# Patient Record
Sex: FEMALE | Race: BLACK OR AFRICAN AMERICAN | Employment: FULL TIME | URBAN - METROPOLITAN AREA
[De-identification: names, ages, dates, MRNs, and addresses within clinical notes are randomized per-mention and may not be internally consistent; named-entity substitution may affect disease eponyms.]

---

## 2017-01-06 ENCOUNTER — HOSPITAL ENCOUNTER (OUTPATIENT)
Dept: GENERAL RADIOLOGY | Age: 67
Discharge: HOME | End: 2017-01-06
Payer: COMMERCIAL

## 2017-01-06 DIAGNOSIS — M79.0 CHRONIC ARTICULAR RHEUMATISM: ICD-10-CM

## 2017-01-06 PROCEDURE — 73552 X-RAY EXAM OF FEMUR 2/>: CPT

## 2017-01-06 PROCEDURE — 73502 X-RAY EXAM HIP UNI 2-3 VIEWS: CPT

## 2017-01-06 PROCEDURE — 72110 X-RAY EXAM L-2 SPINE 4/>VWS: CPT

## 2017-08-25 ENCOUNTER — HOSPITAL ENCOUNTER (OUTPATIENT)
Dept: MAMMOGRAPHY | Age: 67
Discharge: HOME | End: 2017-08-25
Attending: INTERNAL MEDICINE
Payer: COMMERCIAL

## 2017-08-25 DIAGNOSIS — Z12.31 VISIT FOR SCREENING MAMMOGRAM: ICD-10-CM

## 2017-08-25 PROCEDURE — 77063 BREAST TOMOSYNTHESIS BI: CPT

## 2019-02-08 ENCOUNTER — HOSPITAL ENCOUNTER (OUTPATIENT)
Dept: MAMMOGRAPHY | Age: 69
Discharge: HOME | End: 2019-02-08
Attending: INTERNAL MEDICINE
Payer: COMMERCIAL

## 2019-02-08 DIAGNOSIS — Z12.31 VISIT FOR SCREENING MAMMOGRAM: ICD-10-CM

## 2019-02-08 PROCEDURE — 77063 BREAST TOMOSYNTHESIS BI: CPT

## 2020-02-28 ENCOUNTER — HOSPITAL ENCOUNTER (OUTPATIENT)
Dept: MAMMOGRAPHY | Age: 70
Discharge: HOME | End: 2020-02-28
Attending: INTERNAL MEDICINE
Payer: MEDICARE

## 2020-02-28 DIAGNOSIS — Z12.31 VISIT FOR SCREENING MAMMOGRAM: ICD-10-CM

## 2020-02-28 PROCEDURE — 77063 BREAST TOMOSYNTHESIS BI: CPT

## 2021-02-23 ENCOUNTER — TRANSCRIBE ORDER (OUTPATIENT)
Dept: SCHEDULING | Age: 71
End: 2021-02-23

## 2021-02-23 DIAGNOSIS — Z12.31 SCREENING MAMMOGRAM, ENCOUNTER FOR: Primary | ICD-10-CM

## 2021-03-17 ENCOUNTER — HOSPITAL ENCOUNTER (OUTPATIENT)
Dept: MAMMOGRAPHY | Age: 71
Discharge: HOME | End: 2021-03-17
Attending: INTERNAL MEDICINE
Payer: COMMERCIAL

## 2021-03-17 DIAGNOSIS — Z12.31 SCREENING MAMMOGRAM, ENCOUNTER FOR: ICD-10-CM

## 2021-03-17 PROCEDURE — 77063 BREAST TOMOSYNTHESIS BI: CPT

## 2022-01-01 ENCOUNTER — HOSPITAL ENCOUNTER (OUTPATIENT)
Dept: RADIATION THERAPY | Age: 72
Discharge: HOME | DRG: 683 | End: 2022-09-21
Payer: MEDICARE

## 2022-01-01 ENCOUNTER — HOSPITAL ENCOUNTER (INPATIENT)
Age: 72
LOS: 10 days | Discharge: HOSPICE | DRG: 754 | End: 2022-10-14
Attending: EMERGENCY MEDICINE | Admitting: HOSPITALIST
Payer: MEDICARE

## 2022-01-01 ENCOUNTER — HOME CARE VISIT (OUTPATIENT)
Dept: HOSPICE | Facility: HOSPICE | Age: 72
End: 2022-01-01
Payer: MEDICARE

## 2022-01-01 ENCOUNTER — APPOINTMENT (OUTPATIENT)
Dept: GENERAL RADIOLOGY | Age: 72
DRG: 754 | End: 2022-01-01
Attending: EMERGENCY MEDICINE
Payer: MEDICARE

## 2022-01-01 ENCOUNTER — APPOINTMENT (OUTPATIENT)
Dept: CT IMAGING | Age: 72
DRG: 754 | End: 2022-01-01
Attending: STUDENT IN AN ORGANIZED HEALTH CARE EDUCATION/TRAINING PROGRAM
Payer: MEDICARE

## 2022-01-01 ENCOUNTER — HOSPITAL ENCOUNTER (OUTPATIENT)
Dept: RADIATION THERAPY | Age: 72
Discharge: HOME | DRG: 683 | End: 2022-09-13
Payer: MEDICARE

## 2022-01-01 ENCOUNTER — APPOINTMENT (OUTPATIENT)
Dept: GENERAL RADIOLOGY | Age: 72
DRG: 683 | End: 2022-01-01
Attending: PHYSICIAN ASSISTANT
Payer: MEDICARE

## 2022-01-01 ENCOUNTER — HOSPITAL ENCOUNTER (OUTPATIENT)
Dept: ULTRASOUND IMAGING | Age: 72
Discharge: HOME | End: 2022-06-21
Attending: INTERNAL MEDICINE
Payer: COMMERCIAL

## 2022-01-01 ENCOUNTER — HOSPITAL ENCOUNTER (OUTPATIENT)
Dept: MAMMOGRAPHY | Age: 72
Discharge: HOME | End: 2022-06-21
Attending: INTERNAL MEDICINE
Payer: COMMERCIAL

## 2022-01-01 ENCOUNTER — APPOINTMENT (OUTPATIENT)
Dept: VASCULAR SURGERY | Age: 72
DRG: 683 | End: 2022-01-01
Attending: HOSPITALIST
Payer: MEDICARE

## 2022-01-01 ENCOUNTER — APPOINTMENT (OUTPATIENT)
Dept: NON INVASIVE DIAGNOSTICS | Age: 72
DRG: 683 | End: 2022-01-01
Attending: HOSPITALIST
Payer: MEDICARE

## 2022-01-01 ENCOUNTER — HOSPITAL ENCOUNTER (INPATIENT)
Dept: RADIATION THERAPY | Age: 72
Discharge: HOME | DRG: 683 | End: 2022-09-08
Payer: MEDICARE

## 2022-01-01 ENCOUNTER — APPOINTMENT (OUTPATIENT)
Dept: CT IMAGING | Age: 72
DRG: 754 | End: 2022-01-01
Attending: EMERGENCY MEDICINE
Payer: MEDICARE

## 2022-01-01 ENCOUNTER — APPOINTMENT (OUTPATIENT)
Dept: RADIATION THERAPY | Age: 72
End: 2022-01-01

## 2022-01-01 ENCOUNTER — HOSPITAL ENCOUNTER (OUTPATIENT)
Dept: RADIATION THERAPY | Age: 72
Discharge: HOME | DRG: 683 | End: 2022-09-20
Payer: MEDICARE

## 2022-01-01 ENCOUNTER — APPOINTMENT (OUTPATIENT)
Dept: GENERAL RADIOLOGY | Age: 72
DRG: 754 | End: 2022-01-01
Attending: STUDENT IN AN ORGANIZED HEALTH CARE EDUCATION/TRAINING PROGRAM
Payer: MEDICARE

## 2022-01-01 ENCOUNTER — HOSPITAL ENCOUNTER (INPATIENT)
Dept: INTERVENTIONAL RADIOLOGY/VASCULAR | Age: 72
Discharge: HOME | DRG: 683 | End: 2022-09-22
Attending: PHYSICIAN ASSISTANT
Payer: MEDICARE

## 2022-01-01 ENCOUNTER — ANESTHESIA EVENT (OUTPATIENT)
Dept: SURGERY | Age: 72
DRG: 683 | End: 2022-01-01
Payer: MEDICARE

## 2022-01-01 ENCOUNTER — ANESTHESIA (OUTPATIENT)
Dept: SURGERY | Age: 72
DRG: 683 | End: 2022-01-01
Payer: MEDICARE

## 2022-01-01 ENCOUNTER — APPOINTMENT (OUTPATIENT)
Dept: CT IMAGING | Age: 72
DRG: 683 | End: 2022-01-01
Attending: PHYSICIAN ASSISTANT
Payer: MEDICARE

## 2022-01-01 ENCOUNTER — HOSPITAL ENCOUNTER (INPATIENT)
Dept: INTERVENTIONAL RADIOLOGY/VASCULAR | Age: 72
Discharge: HOME | DRG: 683 | End: 2022-09-04
Attending: UROLOGY
Payer: MEDICARE

## 2022-01-01 ENCOUNTER — APPOINTMENT (OUTPATIENT)
Dept: ULTRASOUND IMAGING | Age: 72
DRG: 683 | End: 2022-01-01
Attending: INTERNAL MEDICINE
Payer: MEDICARE

## 2022-01-01 ENCOUNTER — HOSPITAL ENCOUNTER (OUTPATIENT)
Dept: RADIATION THERAPY | Age: 72
Discharge: HOME | DRG: 683 | End: 2022-09-12
Payer: MEDICARE

## 2022-01-01 ENCOUNTER — HOSPITAL ENCOUNTER (EMERGENCY)
Age: 72
Discharge: HOME | End: 2022-07-05
Attending: STUDENT IN AN ORGANIZED HEALTH CARE EDUCATION/TRAINING PROGRAM
Payer: COMMERCIAL

## 2022-01-01 ENCOUNTER — HOSPITAL ENCOUNTER (INPATIENT)
Dept: RADIATION THERAPY | Age: 72
Discharge: HOME | DRG: 683 | End: 2022-09-09
Payer: MEDICARE

## 2022-01-01 ENCOUNTER — TRANSCRIBE ORDER (OUTPATIENT)
Dept: SCHEDULING | Age: 72
End: 2022-01-01

## 2022-01-01 ENCOUNTER — HOSPITAL ENCOUNTER (OUTPATIENT)
Dept: RADIATION THERAPY | Age: 72
Discharge: HOME | DRG: 683 | End: 2022-09-14
Payer: MEDICARE

## 2022-01-01 ENCOUNTER — HOSPITAL ENCOUNTER (OUTPATIENT)
Dept: RADIATION THERAPY | Age: 72
Discharge: HOME | DRG: 683 | End: 2022-09-19
Payer: MEDICARE

## 2022-01-01 ENCOUNTER — APPOINTMENT (OUTPATIENT)
Dept: GENERAL RADIOLOGY | Age: 72
DRG: 683 | End: 2022-01-01
Attending: UROLOGY
Payer: MEDICARE

## 2022-01-01 ENCOUNTER — APPOINTMENT (OUTPATIENT)
Dept: ULTRASOUND IMAGING | Age: 72
End: 2022-01-01
Attending: PHYSICIAN ASSISTANT
Payer: COMMERCIAL

## 2022-01-01 ENCOUNTER — HOME CARE VISIT (OUTPATIENT)
Dept: SCHEDULING | Facility: HOME HEALTH | Age: 72
End: 2022-01-01
Payer: MEDICARE

## 2022-01-01 ENCOUNTER — HOSPITAL ENCOUNTER (OUTPATIENT)
Dept: RADIATION THERAPY | Age: 72
Discharge: HOME | DRG: 683 | End: 2022-09-16
Payer: MEDICARE

## 2022-01-01 ENCOUNTER — HOSPITAL ENCOUNTER (OUTPATIENT)
Dept: RADIATION THERAPY | Age: 72
Discharge: HOME | DRG: 683 | End: 2022-09-15
Payer: MEDICARE

## 2022-01-01 ENCOUNTER — HOSPICE ADMISSION (OUTPATIENT)
Dept: HOSPICE | Facility: HOSPICE | Age: 72
End: 2022-01-01
Payer: MEDICARE

## 2022-01-01 ENCOUNTER — HOSPITAL ENCOUNTER (OUTPATIENT)
Dept: RADIATION THERAPY | Age: 72
Discharge: HOME | End: 2022-10-03

## 2022-01-01 ENCOUNTER — HOSPITAL ENCOUNTER (INPATIENT)
Dept: INTERVENTIONAL RADIOLOGY/VASCULAR | Age: 72
Discharge: HOME | DRG: 683 | End: 2022-09-15
Attending: PHYSICIAN ASSISTANT
Payer: MEDICARE

## 2022-01-01 ENCOUNTER — HOSPITAL ENCOUNTER (INPATIENT)
Age: 72
LOS: 22 days | Discharge: NURSING, REHAB OR LONG-TERM CARE | DRG: 683 | End: 2022-09-23
Attending: EMERGENCY MEDICINE | Admitting: INTERNAL MEDICINE
Payer: MEDICARE

## 2022-01-01 ENCOUNTER — HOSPITAL ENCOUNTER (OUTPATIENT)
Dept: RADIATION THERAPY | Age: 72
Discharge: HOME | DRG: 683 | End: 2022-09-23
Payer: MEDICARE

## 2022-01-01 ENCOUNTER — HOSPITAL ENCOUNTER (OUTPATIENT)
Dept: MAMMOGRAPHY | Age: 72
Discharge: HOME | End: 2022-05-07
Attending: INTERNAL MEDICINE
Payer: COMMERCIAL

## 2022-01-01 ENCOUNTER — APPOINTMENT (OUTPATIENT)
Dept: ULTRASOUND IMAGING | Age: 72
DRG: 683 | End: 2022-01-01
Attending: PHYSICIAN ASSISTANT
Payer: MEDICARE

## 2022-01-01 VITALS
OXYGEN SATURATION: 100 % | DIASTOLIC BLOOD PRESSURE: 81 MMHG | SYSTOLIC BLOOD PRESSURE: 143 MMHG | HEART RATE: 88 BPM | RESPIRATION RATE: 20 BRPM

## 2022-01-01 VITALS
OXYGEN SATURATION: 100 % | DIASTOLIC BLOOD PRESSURE: 78 MMHG | RESPIRATION RATE: 18 BRPM | SYSTOLIC BLOOD PRESSURE: 152 MMHG | HEART RATE: 102 BPM

## 2022-01-01 VITALS
OXYGEN SATURATION: 85 % | HEART RATE: 116 BPM | SYSTOLIC BLOOD PRESSURE: 108 MMHG | TEMPERATURE: 99.2 F | RESPIRATION RATE: 26 BRPM | DIASTOLIC BLOOD PRESSURE: 56 MMHG

## 2022-01-01 VITALS
OXYGEN SATURATION: 92 % | HEIGHT: 63 IN | HEART RATE: 116 BPM | TEMPERATURE: 100.6 F | WEIGHT: 226 LBS | RESPIRATION RATE: 22 BRPM | BODY MASS INDEX: 40.04 KG/M2 | SYSTOLIC BLOOD PRESSURE: 99 MMHG | DIASTOLIC BLOOD PRESSURE: 64 MMHG

## 2022-01-01 VITALS
TEMPERATURE: 98.4 F | HEART RATE: 78 BPM | WEIGHT: 223 LBS | RESPIRATION RATE: 17 BRPM | DIASTOLIC BLOOD PRESSURE: 74 MMHG | OXYGEN SATURATION: 98 % | BODY MASS INDEX: 37.11 KG/M2 | SYSTOLIC BLOOD PRESSURE: 162 MMHG

## 2022-01-01 VITALS
TEMPERATURE: 97.6 F | OXYGEN SATURATION: 95 % | DIASTOLIC BLOOD PRESSURE: 76 MMHG | BODY MASS INDEX: 46.38 KG/M2 | WEIGHT: 252 LBS | HEIGHT: 62 IN | RESPIRATION RATE: 18 BRPM | SYSTOLIC BLOOD PRESSURE: 125 MMHG | HEART RATE: 93 BPM

## 2022-01-01 VITALS
SYSTOLIC BLOOD PRESSURE: 143 MMHG | HEART RATE: 119 BPM | RESPIRATION RATE: 20 BRPM | OXYGEN SATURATION: 100 % | DIASTOLIC BLOOD PRESSURE: 81 MMHG

## 2022-01-01 DIAGNOSIS — R92.8 ABNORMAL MAMMOGRAM: ICD-10-CM

## 2022-01-01 DIAGNOSIS — Z12.31 VISIT FOR SCREENING MAMMOGRAM: ICD-10-CM

## 2022-01-01 DIAGNOSIS — D21.9 FIBROID: ICD-10-CM

## 2022-01-01 DIAGNOSIS — Z12.31 VISIT FOR SCREENING MAMMOGRAM: Primary | ICD-10-CM

## 2022-01-01 DIAGNOSIS — K92.2 GASTROINTESTINAL HEMORRHAGE, UNSPECIFIED GASTROINTESTINAL HEMORRHAGE TYPE: ICD-10-CM

## 2022-01-01 DIAGNOSIS — N17.9 ACUTE RENAL FAILURE, UNSPECIFIED ACUTE RENAL FAILURE TYPE (HCC): Primary | ICD-10-CM

## 2022-01-01 DIAGNOSIS — R10.84 ABDOMINAL PAIN, GENERALIZED: ICD-10-CM

## 2022-01-01 DIAGNOSIS — M54.50 ACUTE LOW BACK PAIN, UNSPECIFIED BACK PAIN LATERALITY, UNSPECIFIED WHETHER SCIATICA PRESENT: ICD-10-CM

## 2022-01-01 DIAGNOSIS — B35.4 TINEA CORPORIS: ICD-10-CM

## 2022-01-01 DIAGNOSIS — Z71.89 GOALS OF CARE, COUNSELING/DISCUSSION: Primary | ICD-10-CM

## 2022-01-01 DIAGNOSIS — D49.9 NEOPLASM CAUSING MASS EFFECT ON ADJACENT STRUCTURES: ICD-10-CM

## 2022-01-01 DIAGNOSIS — N93.9 VAGINAL BLEEDING: Primary | ICD-10-CM

## 2022-01-01 DIAGNOSIS — R92.8 ABNORMAL MAMMOGRAM: Primary | ICD-10-CM

## 2022-01-01 DIAGNOSIS — D64.9 ANEMIA, UNSPECIFIED TYPE: ICD-10-CM

## 2022-01-01 DIAGNOSIS — Z71.89 ADVANCED CARE PLANNING/COUNSELING DISCUSSION: ICD-10-CM

## 2022-01-01 DIAGNOSIS — C55 MALIGNANT NEOPLASM OF UTERUS, UNSPECIFIED SITE (HCC): ICD-10-CM

## 2022-01-01 LAB
ABO + RH BLD: NORMAL
ACC. NO. FROM MICRO ORDER, ACCP: NORMAL
ACINETOBACTER CALCOACETICUS-BAUMANII COMPLEX, ACBCX: NOT DETECTED
ALBUMIN SERPL-MCNC: 1.5 G/DL (ref 3.4–5)
ALBUMIN SERPL-MCNC: 2.2 G/DL (ref 3.4–5)
ALBUMIN SERPL-MCNC: 3 G/DL (ref 3.4–5)
ALBUMIN SERPL-MCNC: 3.4 G/DL (ref 3.4–5)
ALBUMIN/GLOB SERPL: 0.3 {RATIO} (ref 0.8–1.7)
ALBUMIN/GLOB SERPL: 0.6 {RATIO} (ref 0.8–1.7)
ALBUMIN/GLOB SERPL: 0.7 {RATIO} (ref 0.8–1.7)
ALP SERPL-CCNC: 107 U/L (ref 45–117)
ALP SERPL-CCNC: 75 U/L (ref 45–117)
ALP SERPL-CCNC: 99 U/L (ref 45–117)
ALT SERPL-CCNC: 17 U/L (ref 13–56)
ALT SERPL-CCNC: 22 U/L (ref 13–56)
ALT SERPL-CCNC: 28 U/L (ref 13–56)
ANION GAP SERPL CALC-SCNC: 10 MMOL/L (ref 3–18)
ANION GAP SERPL CALC-SCNC: 10 MMOL/L (ref 3–18)
ANION GAP SERPL CALC-SCNC: 11 MMOL/L (ref 3–18)
ANION GAP SERPL CALC-SCNC: 11 MMOL/L (ref 3–18)
ANION GAP SERPL CALC-SCNC: 12 MMOL/L (ref 3–18)
ANION GAP SERPL CALC-SCNC: 16 MMOL/L (ref 3–18)
ANION GAP SERPL CALC-SCNC: 16 MMOL/L (ref 3–18)
ANION GAP SERPL CALC-SCNC: 5 MMOL/L (ref 3–18)
ANION GAP SERPL CALC-SCNC: 6 MMOL/L (ref 3–18)
ANION GAP SERPL CALC-SCNC: 7 MMOL/L (ref 3–18)
ANION GAP SERPL CALC-SCNC: 8 MMOL/L (ref 3–18)
ANION GAP SERPL CALC-SCNC: 9 MMOL/L (ref 3–18)
APPEARANCE UR: ABNORMAL
APPEARANCE UR: ABNORMAL
APPEARANCE UR: CLEAR
APTT PPP: 27.7 SEC (ref 23–36.4)
APTT PPP: 30.5 SEC (ref 23–36.4)
AST SERPL-CCNC: 23 U/L (ref 10–38)
AST SERPL-CCNC: 58 U/L (ref 10–38)
AST SERPL-CCNC: 89 U/L (ref 10–38)
ATRIAL RATE: 102 BPM
ATRIAL RATE: 108 BPM
ATRIAL RATE: 85 BPM
BACTERIA SPEC CULT: ABNORMAL
BACTERIA SPEC CULT: NORMAL
BACTERIA URNS QL MICRO: ABNORMAL /HPF
BACTERIA URNS QL MICRO: ABNORMAL /HPF
BACTEROIDES FRAGILIS, BFRA: NOT DETECTED
BASOPHILS # BLD: 0 K/UL (ref 0–0.1)
BASOPHILS # BLD: 0.1 K/UL (ref 0–0.1)
BASOPHILS NFR BLD: 0 % (ref 0–2)
BASOPHILS NFR BLD: 1 % (ref 0–2)
BILIRUB SERPL-MCNC: 0.5 MG/DL (ref 0.2–1)
BILIRUB SERPL-MCNC: 0.7 MG/DL (ref 0.2–1)
BILIRUB SERPL-MCNC: 0.8 MG/DL (ref 0.2–1)
BILIRUB UR QL: ABNORMAL
BILIRUB UR QL: NEGATIVE
BILIRUB UR QL: NEGATIVE
BIOFIRE COMMENT, BCIDPF: NORMAL
BLD PROD TYP BPU: NORMAL
BLOOD GROUP ANTIBODIES SERPL: NORMAL
BPU ID: NORMAL
BUN SERPL-MCNC: 110 MG/DL (ref 7–18)
BUN SERPL-MCNC: 120 MG/DL (ref 7–18)
BUN SERPL-MCNC: 121 MG/DL (ref 7–18)
BUN SERPL-MCNC: 122 MG/DL (ref 7–18)
BUN SERPL-MCNC: 124 MG/DL (ref 7–18)
BUN SERPL-MCNC: 125 MG/DL (ref 7–18)
BUN SERPL-MCNC: 14 MG/DL (ref 7–18)
BUN SERPL-MCNC: 37 MG/DL (ref 7–18)
BUN SERPL-MCNC: 37 MG/DL (ref 7–18)
BUN SERPL-MCNC: 38 MG/DL (ref 7–18)
BUN SERPL-MCNC: 38 MG/DL (ref 7–18)
BUN SERPL-MCNC: 43 MG/DL (ref 7–18)
BUN SERPL-MCNC: 43 MG/DL (ref 7–18)
BUN SERPL-MCNC: 44 MG/DL (ref 7–18)
BUN SERPL-MCNC: 44 MG/DL (ref 7–18)
BUN SERPL-MCNC: 45 MG/DL (ref 7–18)
BUN SERPL-MCNC: 46 MG/DL (ref 7–18)
BUN SERPL-MCNC: 47 MG/DL (ref 7–18)
BUN SERPL-MCNC: 48 MG/DL (ref 7–18)
BUN SERPL-MCNC: 49 MG/DL (ref 7–18)
BUN SERPL-MCNC: 55 MG/DL (ref 7–18)
BUN SERPL-MCNC: 58 MG/DL (ref 7–18)
BUN SERPL-MCNC: 64 MG/DL (ref 7–18)
BUN SERPL-MCNC: 77 MG/DL (ref 7–18)
BUN SERPL-MCNC: 79 MG/DL (ref 7–18)
BUN SERPL-MCNC: 94 MG/DL (ref 7–18)
BUN/CREAT SERPL: 18 (ref 12–20)
BUN/CREAT SERPL: 19 (ref 12–20)
BUN/CREAT SERPL: 20 (ref 12–20)
BUN/CREAT SERPL: 20 (ref 12–20)
BUN/CREAT SERPL: 22 (ref 12–20)
BUN/CREAT SERPL: 22 (ref 12–20)
BUN/CREAT SERPL: 25 (ref 12–20)
BUN/CREAT SERPL: 25 (ref 12–20)
BUN/CREAT SERPL: 26 (ref 12–20)
BUN/CREAT SERPL: 27 (ref 12–20)
BUN/CREAT SERPL: 28 (ref 12–20)
BUN/CREAT SERPL: 30 (ref 12–20)
BUN/CREAT SERPL: 33 (ref 12–20)
BUN/CREAT SERPL: 33 (ref 12–20)
BUN/CREAT SERPL: 34 (ref 12–20)
BUN/CREAT SERPL: 37 (ref 12–20)
BUN/CREAT SERPL: 37 (ref 12–20)
BUN/CREAT SERPL: 41 (ref 12–20)
BUN/CREAT SERPL: 53 (ref 12–20)
BUN/CREAT SERPL: 53 (ref 12–20)
BUN/CREAT SERPL: 56 (ref 12–20)
BUN/CREAT SERPL: 58 (ref 12–20)
BUN/CREAT SERPL: 63 (ref 12–20)
BUN/CREAT SERPL: 64 (ref 12–20)
C GLABRATA DNA VAG QL NAA+PROBE: NOT DETECTED
CALCIUM SERPL-MCNC: 8.2 MG/DL (ref 8.5–10.1)
CALCIUM SERPL-MCNC: 8.2 MG/DL (ref 8.5–10.1)
CALCIUM SERPL-MCNC: 8.3 MG/DL (ref 8.5–10.1)
CALCIUM SERPL-MCNC: 8.4 MG/DL (ref 8.5–10.1)
CALCIUM SERPL-MCNC: 8.5 MG/DL (ref 8.5–10.1)
CALCIUM SERPL-MCNC: 8.6 MG/DL (ref 8.5–10.1)
CALCIUM SERPL-MCNC: 8.7 MG/DL (ref 8.5–10.1)
CALCIUM SERPL-MCNC: 8.7 MG/DL (ref 8.5–10.1)
CALCIUM SERPL-MCNC: 8.9 MG/DL (ref 8.5–10.1)
CALCIUM SERPL-MCNC: 9 MG/DL (ref 8.5–10.1)
CALCIUM SERPL-MCNC: 9.3 MG/DL (ref 8.5–10.1)
CALCIUM SERPL-MCNC: 9.4 MG/DL (ref 8.5–10.1)
CALCIUM SERPL-MCNC: 9.7 MG/DL (ref 8.5–10.1)
CALCULATED P AXIS, ECG09: -2 DEGREES
CALCULATED P AXIS, ECG09: 32 DEGREES
CALCULATED P AXIS, ECG09: 35 DEGREES
CALCULATED R AXIS, ECG10: -18 DEGREES
CALCULATED R AXIS, ECG10: -19 DEGREES
CALCULATED R AXIS, ECG10: -24 DEGREES
CALCULATED T AXIS, ECG11: 164 DEGREES
CALCULATED T AXIS, ECG11: 74 DEGREES
CALCULATED T AXIS, ECG11: 76 DEGREES
CALLED TO:,BCALL1: NORMAL
CALLED TO:,BCALL2: NORMAL
CANDIDA ALBICANS: NOT DETECTED
CANDIDA AURIS, CAAU: NOT DETECTED
CANDIDA KRUSEI, CKRP: NOT DETECTED
CANDIDA PARAPSILOSIS, CPAUP: NOT DETECTED
CANDIDA TROPICALIS, CTROP: NOT DETECTED
CC UR VC: ABNORMAL
CHLORIDE SERPL-SCNC: 102 MMOL/L (ref 100–111)
CHLORIDE SERPL-SCNC: 104 MMOL/L (ref 100–111)
CHLORIDE SERPL-SCNC: 104 MMOL/L (ref 100–111)
CHLORIDE SERPL-SCNC: 105 MMOL/L (ref 100–111)
CHLORIDE SERPL-SCNC: 107 MMOL/L (ref 100–111)
CHLORIDE SERPL-SCNC: 110 MMOL/L (ref 100–111)
CHLORIDE SERPL-SCNC: 111 MMOL/L (ref 100–111)
CHLORIDE SERPL-SCNC: 112 MMOL/L (ref 100–111)
CHLORIDE SERPL-SCNC: 117 MMOL/L (ref 100–111)
CHLORIDE SERPL-SCNC: 121 MMOL/L (ref 100–111)
CHLORIDE SERPL-SCNC: 123 MMOL/L (ref 100–111)
CHLORIDE SERPL-SCNC: 128 MMOL/L (ref 100–111)
CHLORIDE SERPL-SCNC: 96 MMOL/L (ref 100–111)
CO2 SERPL-SCNC: 16 MMOL/L (ref 21–32)
CO2 SERPL-SCNC: 19 MMOL/L (ref 21–32)
CO2 SERPL-SCNC: 19 MMOL/L (ref 21–32)
CO2 SERPL-SCNC: 20 MMOL/L (ref 21–32)
CO2 SERPL-SCNC: 21 MMOL/L (ref 21–32)
CO2 SERPL-SCNC: 22 MMOL/L (ref 21–32)
CO2 SERPL-SCNC: 23 MMOL/L (ref 21–32)
CO2 SERPL-SCNC: 24 MMOL/L (ref 21–32)
CO2 SERPL-SCNC: 30 MMOL/L (ref 21–32)
COLOR UR: ABNORMAL
COLOR UR: YELLOW
COLOR UR: YELLOW
CREAT SERPL-MCNC: 0.76 MG/DL (ref 0.6–1.3)
CREAT SERPL-MCNC: 0.83 MG/DL (ref 0.6–1.3)
CREAT SERPL-MCNC: 0.93 MG/DL (ref 0.6–1.3)
CREAT SERPL-MCNC: 0.99 MG/DL (ref 0.6–1.3)
CREAT SERPL-MCNC: 1.02 MG/DL (ref 0.6–1.3)
CREAT SERPL-MCNC: 1.16 MG/DL (ref 0.6–1.3)
CREAT SERPL-MCNC: 1.22 MG/DL (ref 0.6–1.3)
CREAT SERPL-MCNC: 1.23 MG/DL (ref 0.6–1.3)
CREAT SERPL-MCNC: 1.35 MG/DL (ref 0.6–1.3)
CREAT SERPL-MCNC: 1.35 MG/DL (ref 0.6–1.3)
CREAT SERPL-MCNC: 1.4 MG/DL (ref 0.6–1.3)
CREAT SERPL-MCNC: 1.44 MG/DL (ref 0.6–1.3)
CREAT SERPL-MCNC: 1.47 MG/DL (ref 0.6–1.3)
CREAT SERPL-MCNC: 1.59 MG/DL (ref 0.6–1.3)
CREAT SERPL-MCNC: 1.61 MG/DL (ref 0.6–1.3)
CREAT SERPL-MCNC: 1.66 MG/DL (ref 0.6–1.3)
CREAT SERPL-MCNC: 1.82 MG/DL (ref 0.6–1.3)
CREAT SERPL-MCNC: 1.9 MG/DL (ref 0.6–1.3)
CREAT SERPL-MCNC: 2 MG/DL (ref 0.6–1.3)
CREAT SERPL-MCNC: 2.61 MG/DL (ref 0.6–1.3)
CREAT SERPL-MCNC: 4.79 MG/DL (ref 0.6–1.3)
CREAT SERPL-MCNC: 4.85 MG/DL (ref 0.6–1.3)
CREAT SERPL-MCNC: 5.02 MG/DL (ref 0.6–1.3)
CREAT SERPL-MCNC: 5.55 MG/DL (ref 0.6–1.3)
CREAT SERPL-MCNC: 6.57 MG/DL (ref 0.6–1.3)
CREAT SERPL-MCNC: 6.96 MG/DL (ref 0.6–1.3)
CROSSMATCH RESULT,%XM: NORMAL
CRYPTO NEOFORMANS/GATTII, CRYNEG: NOT DETECTED
DIAGNOSIS, 93000: NORMAL
DIFFERENTIAL METHOD BLD: ABNORMAL
ECHO AO ROOT DIAM: 3.2 CM
ECHO AO ROOT INDEX: 1.52 CM/M2
ECHO LA VOL 2C: 43 ML (ref 22–52)
ECHO LA VOL 4C: 50 ML (ref 22–52)
ECHO LA VOLUME AREA LENGTH: 52 ML
ECHO LA VOLUME INDEX A2C: 20 ML/M2 (ref 16–34)
ECHO LA VOLUME INDEX A4C: 24 ML/M2 (ref 16–34)
ECHO LA VOLUME INDEX AREA LENGTH: 25 ML/M2 (ref 16–34)
ECHO LV E' LATERAL VELOCITY: 8 CM/S
ECHO LV E' SEPTAL VELOCITY: 7 CM/S
ECHO LV FRACTIONAL SHORTENING: 19 % (ref 28–44)
ECHO LV INTERNAL DIMENSION DIASTOLE INDEX: 1.75 CM/M2
ECHO LV INTERNAL DIMENSION DIASTOLIC: 3.7 CM (ref 3.9–5.3)
ECHO LV INTERNAL DIMENSION SYSTOLIC INDEX: 1.42 CM/M2
ECHO LV INTERNAL DIMENSION SYSTOLIC: 3 CM
ECHO LV IVSD: 0.9 CM (ref 0.6–0.9)
ECHO LV MASS 2D: 120.8 G (ref 67–162)
ECHO LV MASS INDEX 2D: 57.2 G/M2 (ref 43–95)
ECHO LV POSTERIOR WALL DIASTOLIC: 1.2 CM (ref 0.6–0.9)
ECHO LV RELATIVE WALL THICKNESS RATIO: 0.65
ECHO LVOT AREA: 3.5 CM2
ECHO LVOT DIAM: 2.1 CM
ECHO LVOT MEAN GRADIENT: 4 MMHG
ECHO LVOT PEAK GRADIENT: 8 MMHG
ECHO LVOT PEAK VELOCITY: 1.4 M/S
ECHO LVOT STROKE VOLUME INDEX: 37.6 ML/M2
ECHO LVOT SV: 79.3 ML
ECHO LVOT VTI: 22.9 CM
ECHO MV A VELOCITY: 1.24 M/S
ECHO MV E DECELERATION TIME (DT): 134.4 MS
ECHO MV E VELOCITY: 0.62 M/S
ECHO MV E/A RATIO: 0.5
ECHO MV E/E' LATERAL: 7.75
ECHO MV E/E' RATIO (AVERAGED): 8.3
ECHO MV E/E' SEPTAL: 8.86
ECHO RV TAPSE: 2.2 CM (ref 1.7–?)
ENTEROBACTER CLOACAE COMPLEX, ECCP: NOT DETECTED
ENTEROBACTERALES SP. , ENBLS: NOT DETECTED
ENTEROCOCCUS FAECALIS, ENFA: NOT DETECTED
ENTEROCOCCUS FAECIUM, ENFAM: NOT DETECTED
EOSINOPHIL # BLD: 0 K/UL (ref 0–0.4)
EOSINOPHIL # BLD: 0.1 K/UL (ref 0–0.4)
EOSINOPHIL # BLD: 0.3 K/UL (ref 0–0.4)
EOSINOPHIL NFR BLD: 0 % (ref 0–5)
EOSINOPHIL NFR BLD: 1 % (ref 0–5)
EOSINOPHIL NFR BLD: 2 % (ref 0–5)
EPITH CASTS URNS QL MICRO: ABNORMAL /LPF (ref 0–5)
EPITH CASTS URNS QL MICRO: NEGATIVE /LPF (ref 0–5)
ERYTHROCYTE [DISTWIDTH] IN BLOOD BY AUTOMATED COUNT: 15.7 % (ref 11.6–14.5)
ERYTHROCYTE [DISTWIDTH] IN BLOOD BY AUTOMATED COUNT: 15.9 % (ref 11.6–14.5)
ERYTHROCYTE [DISTWIDTH] IN BLOOD BY AUTOMATED COUNT: 15.9 % (ref 11.6–14.5)
ERYTHROCYTE [DISTWIDTH] IN BLOOD BY AUTOMATED COUNT: 16.1 % (ref 11.6–14.5)
ERYTHROCYTE [DISTWIDTH] IN BLOOD BY AUTOMATED COUNT: 16.2 % (ref 11.6–14.5)
ERYTHROCYTE [DISTWIDTH] IN BLOOD BY AUTOMATED COUNT: 19.8 % (ref 11.6–14.5)
ERYTHROCYTE [DISTWIDTH] IN BLOOD BY AUTOMATED COUNT: 19.8 % (ref 11.6–14.5)
ERYTHROCYTE [DISTWIDTH] IN BLOOD BY AUTOMATED COUNT: 20.2 % (ref 11.6–14.5)
ERYTHROCYTE [DISTWIDTH] IN BLOOD BY AUTOMATED COUNT: 20.2 % (ref 11.6–14.5)
ERYTHROCYTE [DISTWIDTH] IN BLOOD BY AUTOMATED COUNT: 20.5 % (ref 11.6–14.5)
ERYTHROCYTE [DISTWIDTH] IN BLOOD BY AUTOMATED COUNT: 20.6 % (ref 11.6–14.5)
ERYTHROCYTE [DISTWIDTH] IN BLOOD BY AUTOMATED COUNT: 20.7 % (ref 11.6–14.5)
ERYTHROCYTE [DISTWIDTH] IN BLOOD BY AUTOMATED COUNT: 20.9 % (ref 11.6–14.5)
ERYTHROCYTE [DISTWIDTH] IN BLOOD BY AUTOMATED COUNT: 20.9 % (ref 11.6–14.5)
ERYTHROCYTE [DISTWIDTH] IN BLOOD BY AUTOMATED COUNT: 21.6 % (ref 11.6–14.5)
ERYTHROCYTE [DISTWIDTH] IN BLOOD BY AUTOMATED COUNT: 23.9 % (ref 11.6–14.5)
ERYTHROCYTE [DISTWIDTH] IN BLOOD BY AUTOMATED COUNT: 23.9 % (ref 11.6–14.5)
ERYTHROCYTE [DISTWIDTH] IN BLOOD BY AUTOMATED COUNT: 24.9 % (ref 11.6–14.5)
ERYTHROCYTE [DISTWIDTH] IN BLOOD BY AUTOMATED COUNT: 25.2 % (ref 11.6–14.5)
ERYTHROCYTE [DISTWIDTH] IN BLOOD BY AUTOMATED COUNT: 26.9 % (ref 11.6–14.5)
ESCHERICHIA COLI: NOT DETECTED
FERRITIN SERPL-MCNC: 823 NG/ML (ref 8–388)
FOLATE SERPL-MCNC: 10.8 NG/ML (ref 3.1–17.5)
GLOBULIN SER CALC-MCNC: 4.4 G/DL (ref 2–4)
GLOBULIN SER CALC-MCNC: 4.6 G/DL (ref 2–4)
GLOBULIN SER CALC-MCNC: 5.4 G/DL (ref 2–4)
GLUCOSE BLD STRIP.AUTO-MCNC: 105 MG/DL (ref 70–110)
GLUCOSE BLD STRIP.AUTO-MCNC: 118 MG/DL (ref 70–110)
GLUCOSE BLD STRIP.AUTO-MCNC: 122 MG/DL (ref 70–110)
GLUCOSE BLD STRIP.AUTO-MCNC: 123 MG/DL (ref 70–110)
GLUCOSE BLD STRIP.AUTO-MCNC: 135 MG/DL (ref 70–110)
GLUCOSE BLD STRIP.AUTO-MCNC: 140 MG/DL (ref 70–110)
GLUCOSE BLD STRIP.AUTO-MCNC: 147 MG/DL (ref 70–110)
GLUCOSE BLD STRIP.AUTO-MCNC: 160 MG/DL (ref 70–110)
GLUCOSE BLD STRIP.AUTO-MCNC: 172 MG/DL (ref 70–110)
GLUCOSE SERPL-MCNC: 104 MG/DL (ref 74–99)
GLUCOSE SERPL-MCNC: 105 MG/DL (ref 74–99)
GLUCOSE SERPL-MCNC: 108 MG/DL (ref 74–99)
GLUCOSE SERPL-MCNC: 108 MG/DL (ref 74–99)
GLUCOSE SERPL-MCNC: 109 MG/DL (ref 74–99)
GLUCOSE SERPL-MCNC: 109 MG/DL (ref 74–99)
GLUCOSE SERPL-MCNC: 111 MG/DL (ref 74–99)
GLUCOSE SERPL-MCNC: 112 MG/DL (ref 74–99)
GLUCOSE SERPL-MCNC: 114 MG/DL (ref 74–99)
GLUCOSE SERPL-MCNC: 114 MG/DL (ref 74–99)
GLUCOSE SERPL-MCNC: 115 MG/DL (ref 74–99)
GLUCOSE SERPL-MCNC: 116 MG/DL (ref 74–99)
GLUCOSE SERPL-MCNC: 119 MG/DL (ref 74–99)
GLUCOSE SERPL-MCNC: 123 MG/DL (ref 74–99)
GLUCOSE SERPL-MCNC: 124 MG/DL (ref 74–99)
GLUCOSE SERPL-MCNC: 160 MG/DL (ref 74–99)
GLUCOSE SERPL-MCNC: 164 MG/DL (ref 74–99)
GLUCOSE SERPL-MCNC: 77 MG/DL (ref 74–99)
GLUCOSE SERPL-MCNC: 84 MG/DL (ref 74–99)
GLUCOSE SERPL-MCNC: 88 MG/DL (ref 74–99)
GLUCOSE SERPL-MCNC: 95 MG/DL (ref 74–99)
GLUCOSE SERPL-MCNC: 95 MG/DL (ref 74–99)
GLUCOSE SERPL-MCNC: 96 MG/DL (ref 74–99)
GLUCOSE SERPL-MCNC: 97 MG/DL (ref 74–99)
GLUCOSE UR STRIP.AUTO-MCNC: NEGATIVE MG/DL
GRAM STN SPEC: ABNORMAL
GRAM STN SPEC: ABNORMAL
HAEMOPHILUS INFLUENZAE, HMI: NOT DETECTED
HCT VFR BLD AUTO: 13.6 % (ref 35–45)
HCT VFR BLD AUTO: 14.8 % (ref 35–45)
HCT VFR BLD AUTO: 17 % (ref 35–45)
HCT VFR BLD AUTO: 17.6 % (ref 35–45)
HCT VFR BLD AUTO: 18.4 % (ref 35–45)
HCT VFR BLD AUTO: 18.6 % (ref 35–45)
HCT VFR BLD AUTO: 19.4 % (ref 35–45)
HCT VFR BLD AUTO: 19.5 % (ref 35–45)
HCT VFR BLD AUTO: 19.8 % (ref 35–45)
HCT VFR BLD AUTO: 19.9 % (ref 35–45)
HCT VFR BLD AUTO: 19.9 % (ref 35–45)
HCT VFR BLD AUTO: 20.1 % (ref 35–45)
HCT VFR BLD AUTO: 20.2 % (ref 35–45)
HCT VFR BLD AUTO: 20.4 % (ref 35–45)
HCT VFR BLD AUTO: 20.4 % (ref 35–45)
HCT VFR BLD AUTO: 20.5 % (ref 35–45)
HCT VFR BLD AUTO: 20.6 % (ref 35–45)
HCT VFR BLD AUTO: 20.9 % (ref 35–45)
HCT VFR BLD AUTO: 21.4 % (ref 35–45)
HCT VFR BLD AUTO: 21.5 % (ref 35–45)
HCT VFR BLD AUTO: 21.8 % (ref 35–45)
HCT VFR BLD AUTO: 21.9 % (ref 35–45)
HCT VFR BLD AUTO: 22.1 % (ref 35–45)
HCT VFR BLD AUTO: 22.1 % (ref 35–45)
HCT VFR BLD AUTO: 22.4 % (ref 35–45)
HCT VFR BLD AUTO: 22.6 % (ref 35–45)
HCT VFR BLD AUTO: 22.7 % (ref 35–45)
HCT VFR BLD AUTO: 22.8 % (ref 35–45)
HCT VFR BLD AUTO: 23.1 % (ref 35–45)
HCT VFR BLD AUTO: 23.3 % (ref 35–45)
HCT VFR BLD AUTO: 23.6 % (ref 35–45)
HCT VFR BLD AUTO: 24 % (ref 35–45)
HCT VFR BLD AUTO: 24.1 % (ref 35–45)
HCT VFR BLD AUTO: 24.8 % (ref 35–45)
HCT VFR BLD AUTO: 25.4 % (ref 35–45)
HCT VFR BLD AUTO: 25.5 % (ref 35–45)
HCT VFR BLD AUTO: 26.4 % (ref 35–45)
HCT VFR BLD AUTO: 26.5 % (ref 35–45)
HCT VFR BLD AUTO: 26.7 % (ref 35–45)
HCT VFR BLD AUTO: 27.1 % (ref 35–45)
HCT VFR BLD AUTO: 34.1 % (ref 35–45)
HEMOCCULT STL QL: NEGATIVE
HEMOCCULT STL QL: POSITIVE
HGB BLD-MCNC: 11.8 G/DL (ref 12–16)
HGB BLD-MCNC: 4.2 G/DL (ref 12–16)
HGB BLD-MCNC: 4.8 G/DL (ref 12–16)
HGB BLD-MCNC: 6 G/DL (ref 12–16)
HGB BLD-MCNC: 6 G/DL (ref 12–16)
HGB BLD-MCNC: 6.2 G/DL (ref 12–16)
HGB BLD-MCNC: 6.4 G/DL (ref 12–16)
HGB BLD-MCNC: 6.4 G/DL (ref 12–16)
HGB BLD-MCNC: 6.5 G/DL (ref 12–16)
HGB BLD-MCNC: 6.7 G/DL (ref 12–16)
HGB BLD-MCNC: 6.7 G/DL (ref 12–16)
HGB BLD-MCNC: 6.8 G/DL (ref 12–16)
HGB BLD-MCNC: 6.9 G/DL (ref 12–16)
HGB BLD-MCNC: 7 G/DL (ref 12–16)
HGB BLD-MCNC: 7 G/DL (ref 12–16)
HGB BLD-MCNC: 7.1 G/DL (ref 12–16)
HGB BLD-MCNC: 7.2 G/DL (ref 12–16)
HGB BLD-MCNC: 7.2 G/DL (ref 12–16)
HGB BLD-MCNC: 7.3 G/DL (ref 12–16)
HGB BLD-MCNC: 7.4 G/DL (ref 12–16)
HGB BLD-MCNC: 7.5 G/DL (ref 12–16)
HGB BLD-MCNC: 7.6 G/DL (ref 12–16)
HGB BLD-MCNC: 7.6 G/DL (ref 12–16)
HGB BLD-MCNC: 7.7 G/DL (ref 12–16)
HGB BLD-MCNC: 7.7 G/DL (ref 12–16)
HGB BLD-MCNC: 7.8 G/DL (ref 12–16)
HGB BLD-MCNC: 8.2 G/DL (ref 12–16)
HGB BLD-MCNC: 8.4 G/DL (ref 12–16)
HGB BLD-MCNC: 8.6 G/DL (ref 12–16)
HGB BLD-MCNC: 8.6 G/DL (ref 12–16)
HGB BLD-MCNC: 9 G/DL (ref 12–16)
HGB BLD-MCNC: 9 G/DL (ref 12–16)
HGB BLD-MCNC: 9.1 G/DL (ref 12–16)
HGB BLD-MCNC: 9.3 G/DL (ref 12–16)
HGB UR QL STRIP: ABNORMAL
HGB UR QL STRIP: ABNORMAL
HGB UR QL STRIP: NEGATIVE
HISTORY CHECKED?,CKHIST: NORMAL
HYALINE CASTS URNS QL MICRO: ABNORMAL /LPF (ref 0–2)
IMM GRANULOCYTES # BLD AUTO: 0 K/UL
IMM GRANULOCYTES # BLD AUTO: 0 K/UL (ref 0–0.04)
IMM GRANULOCYTES # BLD AUTO: 0.1 K/UL (ref 0–0.04)
IMM GRANULOCYTES # BLD AUTO: 0.2 K/UL (ref 0–0.04)
IMM GRANULOCYTES # BLD AUTO: 0.2 K/UL (ref 0–0.04)
IMM GRANULOCYTES # BLD AUTO: 0.3 K/UL (ref 0–0.04)
IMM GRANULOCYTES # BLD AUTO: 0.4 K/UL (ref 0–0.04)
IMM GRANULOCYTES NFR BLD AUTO: 0 %
IMM GRANULOCYTES NFR BLD AUTO: 0 % (ref 0–0.5)
IMM GRANULOCYTES NFR BLD AUTO: 1 % (ref 0–0.5)
IMM GRANULOCYTES NFR BLD AUTO: 2 % (ref 0–0.5)
IMM GRANULOCYTES NFR BLD AUTO: 2 % (ref 0–0.5)
IMM GRANULOCYTES NFR BLD AUTO: 3 % (ref 0–0.5)
INR PPP: 1.3 (ref 0.8–1.2)
INR PPP: 1.6 (ref 0.8–1.2)
INR PPP: 1.8 (ref 0.8–1.2)
INR PPP: 2.1 (ref 0.8–1.2)
IRON SATN MFR SERPL: 13 % (ref 20–50)
IRON SERPL-MCNC: 23 UG/DL (ref 50–175)
KETONES UR QL STRIP.AUTO: NEGATIVE MG/DL
KLEBSIELLA AEROGENES, KLAE: NOT DETECTED
KLEBSIELLA OXYTOCA: NOT DETECTED
KLEBSIELLA PNEUMONIAE GROUP, KPPG: NOT DETECTED
LACTATE BLD-SCNC: 1.04 MMOL/L (ref 0.4–2)
LACTATE SERPL-SCNC: 1.3 MMOL/L (ref 0.4–2)
LACTATE SERPL-SCNC: 5.5 MMOL/L (ref 0.4–2)
LEUKOCYTE ESTERASE UR QL STRIP.AUTO: ABNORMAL
LEUKOCYTE ESTERASE UR QL STRIP.AUTO: ABNORMAL
LEUKOCYTE ESTERASE UR QL STRIP.AUTO: NEGATIVE
LIPASE SERPL-CCNC: 112 U/L (ref 73–393)
LISTERIA MONOCYTOGENES, LMONP: NOT DETECTED
LYMPHOCYTES # BLD: 0.1 K/UL (ref 0.9–3.6)
LYMPHOCYTES # BLD: 0.2 K/UL (ref 0.9–3.6)
LYMPHOCYTES # BLD: 0.3 K/UL (ref 0.9–3.6)
LYMPHOCYTES # BLD: 0.4 K/UL (ref 0.9–3.6)
LYMPHOCYTES # BLD: 0.5 K/UL (ref 0.9–3.6)
LYMPHOCYTES # BLD: 0.6 K/UL (ref 0.9–3.6)
LYMPHOCYTES # BLD: 0.6 K/UL (ref 0.9–3.6)
LYMPHOCYTES # BLD: 0.7 K/UL (ref 0.9–3.6)
LYMPHOCYTES # BLD: 0.9 K/UL (ref 0.9–3.6)
LYMPHOCYTES # BLD: 1.1 K/UL (ref 0.9–3.6)
LYMPHOCYTES # BLD: 1.8 K/UL (ref 0.9–3.6)
LYMPHOCYTES # BLD: 1.9 K/UL (ref 0.9–3.6)
LYMPHOCYTES # BLD: 2.5 K/UL (ref 0.9–3.6)
LYMPHOCYTES # BLD: 2.7 K/UL (ref 0.9–3.6)
LYMPHOCYTES NFR BLD: 1 % (ref 21–52)
LYMPHOCYTES NFR BLD: 10 % (ref 21–52)
LYMPHOCYTES NFR BLD: 12 % (ref 21–52)
LYMPHOCYTES NFR BLD: 15 % (ref 21–52)
LYMPHOCYTES NFR BLD: 2 % (ref 21–52)
LYMPHOCYTES NFR BLD: 3 % (ref 21–52)
LYMPHOCYTES NFR BLD: 31 % (ref 21–52)
LYMPHOCYTES NFR BLD: 4 % (ref 21–52)
LYMPHOCYTES NFR BLD: 5 % (ref 21–52)
LYMPHOCYTES NFR BLD: 5 % (ref 21–52)
LYMPHOCYTES NFR BLD: 7 % (ref 21–52)
MAGNESIUM SERPL-MCNC: 2 MG/DL (ref 1.6–2.6)
MAGNESIUM SERPL-MCNC: 2.1 MG/DL (ref 1.6–2.6)
MAGNESIUM SERPL-MCNC: 2.1 MG/DL (ref 1.6–2.6)
MAGNESIUM SERPL-MCNC: 3.6 MG/DL (ref 1.6–2.6)
MAGNESIUM SERPL-MCNC: 3.8 MG/DL (ref 1.6–2.6)
MAGNESIUM SERPL-MCNC: 4 MG/DL (ref 1.6–2.6)
MAGNESIUM SERPL-MCNC: 4.2 MG/DL (ref 1.6–2.6)
MCH RBC QN AUTO: 24.7 PG (ref 24–34)
MCH RBC QN AUTO: 25 PG (ref 24–34)
MCH RBC QN AUTO: 25.1 PG (ref 24–34)
MCH RBC QN AUTO: 25.1 PG (ref 24–34)
MCH RBC QN AUTO: 25.2 PG (ref 24–34)
MCH RBC QN AUTO: 25.3 PG (ref 24–34)
MCH RBC QN AUTO: 25.6 PG (ref 24–34)
MCH RBC QN AUTO: 25.6 PG (ref 24–34)
MCH RBC QN AUTO: 25.7 PG (ref 24–34)
MCH RBC QN AUTO: 26.3 PG (ref 24–34)
MCH RBC QN AUTO: 26.3 PG (ref 24–34)
MCH RBC QN AUTO: 26.8 PG (ref 24–34)
MCH RBC QN AUTO: 27 PG (ref 24–34)
MCH RBC QN AUTO: 27.1 PG (ref 24–34)
MCH RBC QN AUTO: 27.5 PG (ref 24–34)
MCH RBC QN AUTO: 28.4 PG (ref 24–34)
MCH RBC QN AUTO: 28.4 PG (ref 24–34)
MCH RBC QN AUTO: 28.5 PG (ref 24–34)
MCH RBC QN AUTO: 28.9 PG (ref 24–34)
MCH RBC QN AUTO: 29 PG (ref 24–34)
MCH RBC QN AUTO: 29.2 PG (ref 24–34)
MCH RBC QN AUTO: 29.6 PG (ref 24–34)
MCHC RBC AUTO-ENTMCNC: 31.4 G/DL (ref 31–37)
MCHC RBC AUTO-ENTMCNC: 32.1 G/DL (ref 31–37)
MCHC RBC AUTO-ENTMCNC: 32.4 G/DL (ref 31–37)
MCHC RBC AUTO-ENTMCNC: 33 G/DL (ref 31–37)
MCHC RBC AUTO-ENTMCNC: 33.1 G/DL (ref 31–37)
MCHC RBC AUTO-ENTMCNC: 33.2 G/DL (ref 31–37)
MCHC RBC AUTO-ENTMCNC: 33.3 G/DL (ref 31–37)
MCHC RBC AUTO-ENTMCNC: 33.5 G/DL (ref 31–37)
MCHC RBC AUTO-ENTMCNC: 33.5 G/DL (ref 31–37)
MCHC RBC AUTO-ENTMCNC: 33.7 G/DL (ref 31–37)
MCHC RBC AUTO-ENTMCNC: 33.8 G/DL (ref 31–37)
MCHC RBC AUTO-ENTMCNC: 33.9 G/DL (ref 31–37)
MCHC RBC AUTO-ENTMCNC: 34.1 G/DL (ref 31–37)
MCHC RBC AUTO-ENTMCNC: 34.1 G/DL (ref 31–37)
MCHC RBC AUTO-ENTMCNC: 34.3 G/DL (ref 31–37)
MCHC RBC AUTO-ENTMCNC: 34.3 G/DL (ref 31–37)
MCHC RBC AUTO-ENTMCNC: 34.4 G/DL (ref 31–37)
MCHC RBC AUTO-ENTMCNC: 34.6 G/DL (ref 31–37)
MCHC RBC AUTO-ENTMCNC: 34.7 G/DL (ref 31–37)
MCHC RBC AUTO-ENTMCNC: 34.9 G/DL (ref 31–37)
MCHC RBC AUTO-ENTMCNC: 35.3 G/DL (ref 31–37)
MCHC RBC AUTO-ENTMCNC: 35.6 G/DL (ref 31–37)
MCV RBC AUTO: 72 FL (ref 78–100)
MCV RBC AUTO: 72.4 FL (ref 78–100)
MCV RBC AUTO: 72.6 FL (ref 78–100)
MCV RBC AUTO: 72.6 FL (ref 78–100)
MCV RBC AUTO: 73.1 FL (ref 78–100)
MCV RBC AUTO: 73.3 FL (ref 78–100)
MCV RBC AUTO: 74.6 FL (ref 78–100)
MCV RBC AUTO: 74.9 FL (ref 78–100)
MCV RBC AUTO: 76.1 FL (ref 78–100)
MCV RBC AUTO: 76.5 FL (ref 78–100)
MCV RBC AUTO: 79.1 FL (ref 78–100)
MCV RBC AUTO: 79.3 FL (ref 78–100)
MCV RBC AUTO: 79.4 FL (ref 78–100)
MCV RBC AUTO: 80.4 FL (ref 78–100)
MCV RBC AUTO: 84.7 FL (ref 78–100)
MCV RBC AUTO: 85.2 FL (ref 78–100)
MCV RBC AUTO: 86.1 FL (ref 78–100)
MCV RBC AUTO: 87 FL (ref 78–100)
MCV RBC AUTO: 87.3 FL (ref 78–100)
MCV RBC AUTO: 87.4 FL (ref 78–100)
MCV RBC AUTO: 87.6 FL (ref 78–100)
MCV RBC AUTO: 88.9 FL (ref 78–100)
METAMYELOCYTES NFR BLD MANUAL: 1 %
METAMYELOCYTES NFR BLD MANUAL: 2 %
MONOCYTES # BLD: 0 K/UL (ref 0.05–1.2)
MONOCYTES # BLD: 0 K/UL (ref 0.05–1.2)
MONOCYTES # BLD: 0.1 K/UL (ref 0.05–1.2)
MONOCYTES # BLD: 0.2 K/UL (ref 0.05–1.2)
MONOCYTES # BLD: 0.4 K/UL (ref 0.05–1.2)
MONOCYTES # BLD: 0.5 K/UL (ref 0.05–1.2)
MONOCYTES # BLD: 0.6 K/UL (ref 0.05–1.2)
MONOCYTES # BLD: 0.7 K/UL (ref 0.05–1.2)
MONOCYTES # BLD: 0.8 K/UL (ref 0.05–1.2)
MONOCYTES # BLD: 0.8 K/UL (ref 0.05–1.2)
MONOCYTES # BLD: 0.9 K/UL (ref 0.05–1.2)
MONOCYTES NFR BLD: 0 % (ref 3–10)
MONOCYTES NFR BLD: 0 % (ref 3–10)
MONOCYTES NFR BLD: 1 % (ref 3–10)
MONOCYTES NFR BLD: 2 % (ref 3–10)
MONOCYTES NFR BLD: 3 % (ref 3–10)
MONOCYTES NFR BLD: 4 % (ref 3–10)
MONOCYTES NFR BLD: 4 % (ref 3–10)
MONOCYTES NFR BLD: 5 % (ref 3–10)
MONOCYTES NFR BLD: 6 % (ref 3–10)
MONOCYTES NFR BLD: 8 % (ref 3–10)
MUCOUS THREADS URNS QL MICRO: ABNORMAL /LPF
NEISSERIA MENINGITIDIS, NMNI: NOT DETECTED
NEUTS BAND NFR BLD MANUAL: 1 %
NEUTS BAND NFR BLD MANUAL: 1 % (ref 0–5)
NEUTS BAND NFR BLD MANUAL: 1 % (ref 0–5)
NEUTS BAND NFR BLD MANUAL: 3 %
NEUTS BAND NFR BLD MANUAL: 3 % (ref 0–5)
NEUTS BAND NFR BLD MANUAL: 6 % (ref 0–5)
NEUTS BAND NFR BLD MANUAL: 8 % (ref 0–5)
NEUTS SEG # BLD: 10.4 K/UL (ref 1.8–8)
NEUTS SEG # BLD: 11.2 K/UL (ref 1.8–8)
NEUTS SEG # BLD: 11.7 K/UL (ref 1.8–8)
NEUTS SEG # BLD: 12.2 K/UL (ref 1.8–8)
NEUTS SEG # BLD: 12.4 K/UL (ref 1.8–8)
NEUTS SEG # BLD: 12.6 K/UL (ref 1.8–8)
NEUTS SEG # BLD: 12.7 K/UL (ref 1.8–8)
NEUTS SEG # BLD: 12.8 K/UL (ref 1.8–8)
NEUTS SEG # BLD: 13.4 K/UL (ref 1.8–8)
NEUTS SEG # BLD: 13.5 K/UL (ref 1.8–8)
NEUTS SEG # BLD: 13.7 K/UL (ref 1.8–8)
NEUTS SEG # BLD: 14.8 K/UL (ref 1.8–8)
NEUTS SEG # BLD: 16.7 K/UL (ref 1.8–8)
NEUTS SEG # BLD: 5.5 K/UL (ref 1.8–8)
NEUTS SEG # BLD: 6.7 K/UL (ref 1.8–8)
NEUTS SEG # BLD: 7.2 K/UL (ref 1.8–8)
NEUTS SEG # BLD: 8 K/UL (ref 1.8–8)
NEUTS SEG # BLD: 8.3 K/UL (ref 1.8–8)
NEUTS SEG # BLD: 8.9 K/UL (ref 1.8–8)
NEUTS SEG NFR BLD: 63 % (ref 40–73)
NEUTS SEG NFR BLD: 79 % (ref 40–73)
NEUTS SEG NFR BLD: 79 % (ref 40–73)
NEUTS SEG NFR BLD: 80 % (ref 40–73)
NEUTS SEG NFR BLD: 82 % (ref 40–73)
NEUTS SEG NFR BLD: 83 % (ref 40–73)
NEUTS SEG NFR BLD: 85 % (ref 40–73)
NEUTS SEG NFR BLD: 87 % (ref 40–73)
NEUTS SEG NFR BLD: 88 % (ref 40–73)
NEUTS SEG NFR BLD: 89 % (ref 40–73)
NEUTS SEG NFR BLD: 90 % (ref 40–73)
NEUTS SEG NFR BLD: 91 % (ref 40–73)
NEUTS SEG NFR BLD: 92 % (ref 40–73)
NEUTS SEG NFR BLD: 93 % (ref 40–73)
NEUTS SEG NFR BLD: 94 % (ref 40–73)
NITRITE UR QL STRIP.AUTO: NEGATIVE
NITRITE UR QL STRIP.AUTO: NEGATIVE
NITRITE UR QL STRIP.AUTO: POSITIVE
NRBC # BLD: 0 K/UL (ref 0–0.01)
NRBC # BLD: 0.03 K/UL (ref 0–0.01)
NRBC # BLD: 0.03 K/UL (ref 0–0.01)
NRBC # BLD: 0.04 K/UL (ref 0–0.01)
NRBC # BLD: 0.05 K/UL (ref 0–0.01)
NRBC # BLD: 0.06 K/UL (ref 0–0.01)
NRBC # BLD: 0.1 K/UL (ref 0–0.01)
NRBC # BLD: 0.1 K/UL (ref 0–0.01)
NRBC # BLD: 0.12 K/UL (ref 0–0.01)
NRBC # BLD: 0.14 K/UL (ref 0–0.01)
NRBC # BLD: 0.14 K/UL (ref 0–0.01)
NRBC BLD-RTO: 0 PER 100 WBC
NRBC BLD-RTO: 0.2 PER 100 WBC
NRBC BLD-RTO: 0.3 PER 100 WBC
NRBC BLD-RTO: 0.5 PER 100 WBC
NRBC BLD-RTO: 0.5 PER 100 WBC
NRBC BLD-RTO: 0.6 PER 100 WBC
NRBC BLD-RTO: 0.7 PER 100 WBC
NRBC BLD-RTO: 1.3 PER 100 WBC
NRBC BLD-RTO: 1.5 PER 100 WBC
NRBC BLD-RTO: 1.7 PER 100 WBC
P-R INTERVAL, ECG05: 134 MS
P-R INTERVAL, ECG05: 168 MS
P-R INTERVAL, ECG05: 202 MS
PH UR STRIP: 5 [PH] (ref 5–8)
PH UR STRIP: 5.5 [PH] (ref 5–8)
PH UR STRIP: 7.5 [PH] (ref 5–8)
PHOSPHATE SERPL-MCNC: 3.3 MG/DL (ref 2.5–4.9)
PHOSPHATE SERPL-MCNC: 3.7 MG/DL (ref 2.5–4.9)
PLATELET # BLD AUTO: 124 K/UL (ref 135–420)
PLATELET # BLD AUTO: 151 K/UL (ref 135–420)
PLATELET # BLD AUTO: 164 K/UL (ref 135–420)
PLATELET # BLD AUTO: 191 K/UL (ref 135–420)
PLATELET # BLD AUTO: 197 K/UL (ref 135–420)
PLATELET # BLD AUTO: 233 K/UL (ref 135–420)
PLATELET # BLD AUTO: 234 K/UL (ref 135–420)
PLATELET # BLD AUTO: 235 K/UL (ref 135–420)
PLATELET # BLD AUTO: 238 K/UL (ref 135–420)
PLATELET # BLD AUTO: 246 K/UL (ref 135–420)
PLATELET # BLD AUTO: 247 K/UL (ref 135–420)
PLATELET # BLD AUTO: 255 K/UL (ref 135–420)
PLATELET # BLD AUTO: 255 K/UL (ref 135–420)
PLATELET # BLD AUTO: 264 K/UL (ref 135–420)
PLATELET # BLD AUTO: 265 K/UL (ref 135–420)
PLATELET # BLD AUTO: 360 K/UL (ref 135–420)
PLATELET # BLD AUTO: 71 K/UL (ref 135–420)
PLATELET # BLD AUTO: 73 K/UL (ref 135–420)
PLATELET # BLD AUTO: 80 K/UL (ref 135–420)
PLATELET # BLD AUTO: 84 K/UL (ref 135–420)
PLATELET # BLD AUTO: 92 K/UL (ref 135–420)
PLATELET # BLD AUTO: 98 K/UL (ref 135–420)
PLATELET COMMENTS,PCOM: ABNORMAL
PMV BLD AUTO: 10 FL (ref 9.2–11.8)
PMV BLD AUTO: 10.2 FL (ref 9.2–11.8)
PMV BLD AUTO: 10.3 FL (ref 9.2–11.8)
PMV BLD AUTO: 10.3 FL (ref 9.2–11.8)
PMV BLD AUTO: 10.4 FL (ref 9.2–11.8)
PMV BLD AUTO: 10.6 FL (ref 9.2–11.8)
PMV BLD AUTO: 10.7 FL (ref 9.2–11.8)
PMV BLD AUTO: 9.4 FL (ref 9.2–11.8)
PMV BLD AUTO: 9.4 FL (ref 9.2–11.8)
PMV BLD AUTO: 9.8 FL (ref 9.2–11.8)
PMV BLD AUTO: 9.9 FL (ref 9.2–11.8)
POTASSIUM SERPL-SCNC: 3.3 MMOL/L (ref 3.5–5.5)
POTASSIUM SERPL-SCNC: 3.3 MMOL/L (ref 3.5–5.5)
POTASSIUM SERPL-SCNC: 3.4 MMOL/L (ref 3.5–5.5)
POTASSIUM SERPL-SCNC: 3.4 MMOL/L (ref 3.5–5.5)
POTASSIUM SERPL-SCNC: 3.5 MMOL/L (ref 3.5–5.5)
POTASSIUM SERPL-SCNC: 3.6 MMOL/L (ref 3.5–5.5)
POTASSIUM SERPL-SCNC: 3.7 MMOL/L (ref 3.5–5.5)
POTASSIUM SERPL-SCNC: 3.8 MMOL/L (ref 3.5–5.5)
POTASSIUM SERPL-SCNC: 3.9 MMOL/L (ref 3.5–5.5)
POTASSIUM SERPL-SCNC: 3.9 MMOL/L (ref 3.5–5.5)
POTASSIUM SERPL-SCNC: 4 MMOL/L (ref 3.5–5.5)
POTASSIUM SERPL-SCNC: 4.2 MMOL/L (ref 3.5–5.5)
POTASSIUM SERPL-SCNC: 4.3 MMOL/L (ref 3.5–5.5)
POTASSIUM SERPL-SCNC: 4.4 MMOL/L (ref 3.5–5.5)
PROCALCITONIN SERPL-MCNC: 3.88 NG/ML
PROMYELOCYTES NFR BLD MANUAL: 1 %
PROT SERPL-MCNC: 5.9 G/DL (ref 6.4–8.2)
PROT SERPL-MCNC: 8 G/DL (ref 6.4–8.2)
PROT SERPL-MCNC: 8.4 G/DL (ref 6.4–8.2)
PROT UR STRIP-MCNC: 100 MG/DL
PROT UR STRIP-MCNC: ABNORMAL MG/DL
PROT UR STRIP-MCNC: NEGATIVE MG/DL
PROTEUS, PRP: NOT DETECTED
PROTHROMBIN TIME: 17 SEC (ref 11.5–15.2)
PROTHROMBIN TIME: 19.3 SEC (ref 11.5–15.2)
PROTHROMBIN TIME: 21.4 SEC (ref 11.5–15.2)
PROTHROMBIN TIME: 23.8 SEC (ref 11.5–15.2)
PSEUDOMONAS AERUGINOSA: NOT DETECTED
Q-T INTERVAL, ECG07: 300 MS
Q-T INTERVAL, ECG07: 336 MS
Q-T INTERVAL, ECG07: 374 MS
QRS DURATION, ECG06: 76 MS
QRS DURATION, ECG06: 78 MS
QRS DURATION, ECG06: 94 MS
QTC CALCULATION (BEZET), ECG08: 391 MS
QTC CALCULATION (BEZET), ECG08: 445 MS
QTC CALCULATION (BEZET), ECG08: 450 MS
RBC # BLD AUTO: 1.87 M/UL (ref 4.2–5.3)
RBC # BLD AUTO: 2.22 M/UL (ref 4.2–5.3)
RBC # BLD AUTO: 2.31 M/UL (ref 4.2–5.3)
RBC # BLD AUTO: 2.34 M/UL (ref 4.2–5.3)
RBC # BLD AUTO: 2.55 M/UL (ref 4.2–5.3)
RBC # BLD AUTO: 2.57 M/UL (ref 4.2–5.3)
RBC # BLD AUTO: 2.57 M/UL (ref 4.2–5.3)
RBC # BLD AUTO: 2.58 M/UL (ref 4.2–5.3)
RBC # BLD AUTO: 2.68 M/UL (ref 4.2–5.3)
RBC # BLD AUTO: 2.7 M/UL (ref 4.2–5.3)
RBC # BLD AUTO: 2.7 M/UL (ref 4.2–5.3)
RBC # BLD AUTO: 2.75 M/UL (ref 4.2–5.3)
RBC # BLD AUTO: 2.76 M/UL (ref 4.2–5.3)
RBC # BLD AUTO: 2.79 M/UL (ref 4.2–5.3)
RBC # BLD AUTO: 2.84 M/UL (ref 4.2–5.3)
RBC # BLD AUTO: 2.88 M/UL (ref 4.2–5.3)
RBC # BLD AUTO: 2.89 M/UL (ref 4.2–5.3)
RBC # BLD AUTO: 2.95 M/UL (ref 4.2–5.3)
RBC # BLD AUTO: 2.99 M/UL (ref 4.2–5.3)
RBC # BLD AUTO: 3.07 M/UL (ref 4.2–5.3)
RBC # BLD AUTO: 3.28 M/UL (ref 4.2–5.3)
RBC # BLD AUTO: 4.48 M/UL (ref 4.2–5.3)
RBC #/AREA URNS HPF: ABNORMAL /HPF (ref 0–5)
RBC #/AREA URNS HPF: ABNORMAL /HPF (ref 0–5)
RBC MORPH BLD: ABNORMAL
RESISTANT GENE SPACE, REGENE: NORMAL
SALMONELLA, SALMO: NOT DETECTED
SERRATIA MARCESCENS: NOT DETECTED
SERVICE CMNT-IMP: ABNORMAL
SERVICE CMNT-IMP: ABNORMAL
SERVICE CMNT-IMP: NORMAL
SODIUM SERPL-SCNC: 134 MMOL/L (ref 136–145)
SODIUM SERPL-SCNC: 137 MMOL/L (ref 136–145)
SODIUM SERPL-SCNC: 137 MMOL/L (ref 136–145)
SODIUM SERPL-SCNC: 138 MMOL/L (ref 136–145)
SODIUM SERPL-SCNC: 139 MMOL/L (ref 136–145)
SODIUM SERPL-SCNC: 140 MMOL/L (ref 136–145)
SODIUM SERPL-SCNC: 141 MMOL/L (ref 136–145)
SODIUM SERPL-SCNC: 142 MMOL/L (ref 136–145)
SODIUM SERPL-SCNC: 143 MMOL/L (ref 136–145)
SODIUM SERPL-SCNC: 148 MMOL/L (ref 136–145)
SODIUM SERPL-SCNC: 150 MMOL/L (ref 136–145)
SODIUM SERPL-SCNC: 150 MMOL/L (ref 136–145)
SODIUM SERPL-SCNC: 152 MMOL/L (ref 136–145)
SODIUM SERPL-SCNC: 152 MMOL/L (ref 136–145)
SODIUM SERPL-SCNC: 154 MMOL/L (ref 136–145)
SP GR UR REFRACTOMETRY: 1.01 (ref 1–1.03)
SPECIMEN EXP DATE BLD: NORMAL
STAPH EPIDERMIDIS, STEP: NOT DETECTED
STAPH LUGDUNENSIS, STALUG: NOT DETECTED
STAPHYLOCOCCUS AUREUS: NOT DETECTED
STAPHYLOCOCCUS, STAPP: NOT DETECTED
STATUS OF UNIT,%ST: NORMAL
STENO MALTOPHILIA, STMA: NOT DETECTED
STREPTOCOCCUS , STPSP: NOT DETECTED
STREPTOCOCCUS AGALACTIAE (GROUP B): NOT DETECTED
STREPTOCOCCUS PNEUMONIAE , SPNP: NOT DETECTED
STREPTOCOCCUS PYOGENES (GROUP A), SPYOP: NOT DETECTED
T4 FREE SERPL-MCNC: 1.5 NG/DL (ref 0.7–1.5)
TIBC SERPL-MCNC: 176 UG/DL (ref 250–450)
TROPONIN-HIGH SENSITIVITY: 13 NG/L (ref 0–54)
TROPONIN-HIGH SENSITIVITY: 14 NG/L (ref 0–54)
TSH SERPL DL<=0.05 MIU/L-ACNC: 2.57 UIU/ML (ref 0.36–3.74)
UNIT DIVISION, %UDIV: 0
URATE CRY URNS QL MICRO: ABNORMAL
UROBILINOGEN UR QL STRIP.AUTO: 0.2 EU/DL (ref 0.2–1)
UROBILINOGEN UR QL STRIP.AUTO: 0.2 EU/DL (ref 0.2–1)
UROBILINOGEN UR QL STRIP.AUTO: 1 EU/DL (ref 0.2–1)
VANCOMYCIN SERPL-MCNC: 13.5 UG/ML (ref 5–40)
VANCOMYCIN SERPL-MCNC: 17.3 UG/ML (ref 5–40)
VANCOMYCIN SERPL-MCNC: 21.2 UG/ML (ref 5–40)
VANCOMYCIN SERPL-MCNC: 21.3 UG/ML (ref 5–40)
VANCOMYCIN SERPL-MCNC: 21.6 UG/ML (ref 5–40)
VANCOMYCIN SERPL-MCNC: 23.9 UG/ML (ref 5–40)
VANCOMYCIN SERPL-MCNC: 6.9 UG/ML (ref 5–40)
VANCOMYCIN SERPL-MCNC: 7.4 UG/ML (ref 5–40)
VENTRICULAR RATE, ECG03: 102 BPM
VENTRICULAR RATE, ECG03: 108 BPM
VENTRICULAR RATE, ECG03: 85 BPM
VIT B12 SERPL-MCNC: 933 PG/ML (ref 211–911)
WBC # BLD AUTO: 11.8 K/UL (ref 4.6–13.2)
WBC # BLD AUTO: 12 K/UL (ref 4.6–13.2)
WBC # BLD AUTO: 12.4 K/UL (ref 4.6–13.2)
WBC # BLD AUTO: 13.2 K/UL (ref 4.6–13.2)
WBC # BLD AUTO: 13.2 K/UL (ref 4.6–13.2)
WBC # BLD AUTO: 14 K/UL (ref 4.6–13.2)
WBC # BLD AUTO: 14.1 K/UL (ref 4.6–13.2)
WBC # BLD AUTO: 14.2 K/UL (ref 4.6–13.2)
WBC # BLD AUTO: 15.4 K/UL (ref 4.6–13.2)
WBC # BLD AUTO: 15.6 K/UL (ref 4.6–13.2)
WBC # BLD AUTO: 15.6 K/UL (ref 4.6–13.2)
WBC # BLD AUTO: 15.7 K/UL (ref 4.6–13.2)
WBC # BLD AUTO: 16.4 K/UL (ref 4.6–13.2)
WBC # BLD AUTO: 17.2 K/UL (ref 4.6–13.2)
WBC # BLD AUTO: 17.2 K/UL (ref 4.6–13.2)
WBC # BLD AUTO: 17.3 K/UL (ref 4.6–13.2)
WBC # BLD AUTO: 8.2 K/UL (ref 4.6–13.2)
WBC # BLD AUTO: 8.3 K/UL (ref 4.6–13.2)
WBC # BLD AUTO: 8.7 K/UL (ref 4.6–13.2)
WBC # BLD AUTO: 8.9 K/UL (ref 4.6–13.2)
WBC # BLD AUTO: 9.1 K/UL (ref 4.6–13.2)
WBC # BLD AUTO: 9.8 K/UL (ref 4.6–13.2)
WBC MORPH BLD: ABNORMAL
WBC URNS QL MICRO: ABNORMAL /HPF (ref 0–4)
WBC URNS QL MICRO: ABNORMAL /HPF (ref 0–4)

## 2022-01-01 PROCEDURE — 74176 CT ABD & PELVIS W/O CONTRAST: CPT

## 2022-01-01 PROCEDURE — 74011000250 HC RX REV CODE- 250: Performed by: INTERNAL MEDICINE

## 2022-01-01 PROCEDURE — 74011250636 HC RX REV CODE- 250/636: Performed by: HOSPITALIST

## 2022-01-01 PROCEDURE — 85025 COMPLETE CBC W/AUTO DIFF WBC: CPT

## 2022-01-01 PROCEDURE — 74011250637 HC RX REV CODE- 250/637: Performed by: FAMILY MEDICINE

## 2022-01-01 PROCEDURE — 36415 COLL VENOUS BLD VENIPUNCTURE: CPT

## 2022-01-01 PROCEDURE — 99233 SBSQ HOSP IP/OBS HIGH 50: CPT | Performed by: NURSE PRACTITIONER

## 2022-01-01 PROCEDURE — 83605 ASSAY OF LACTIC ACID: CPT

## 2022-01-01 PROCEDURE — 36430 TRANSFUSION BLD/BLD COMPNT: CPT

## 2022-01-01 PROCEDURE — 77030040922 HC BLNKT HYPOTHRM STRY -A: Performed by: UROLOGY

## 2022-01-01 PROCEDURE — 74011250637 HC RX REV CODE- 250/637: Performed by: INTERNAL MEDICINE

## 2022-01-01 PROCEDURE — 99222 1ST HOSP IP/OBS MODERATE 55: CPT | Performed by: NURSE PRACTITIONER

## 2022-01-01 PROCEDURE — 81001 URINALYSIS AUTO W/SCOPE: CPT

## 2022-01-01 PROCEDURE — 77030040392 HC DRSG OPTIFOAM MDII -A

## 2022-01-01 PROCEDURE — 74174 CTA ABD&PLVS W/CONTRAST: CPT

## 2022-01-01 PROCEDURE — 87185 SC STD ENZYME DETCJ PER NZM: CPT

## 2022-01-01 PROCEDURE — 83735 ASSAY OF MAGNESIUM: CPT

## 2022-01-01 PROCEDURE — 2709999900 HC NON-CHARGEABLE SUPPLY

## 2022-01-01 PROCEDURE — 74011250637 HC RX REV CODE- 250/637: Performed by: HOSPITALIST

## 2022-01-01 PROCEDURE — C9113 INJ PANTOPRAZOLE SODIUM, VIA: HCPCS | Performed by: HOSPITALIST

## 2022-01-01 PROCEDURE — 97535 SELF CARE MNGMENT TRAINING: CPT

## 2022-01-01 PROCEDURE — 83540 ASSAY OF IRON: CPT

## 2022-01-01 PROCEDURE — 74011250636 HC RX REV CODE- 250/636: Performed by: INTERNAL MEDICINE

## 2022-01-01 PROCEDURE — 85018 HEMOGLOBIN: CPT

## 2022-01-01 PROCEDURE — 85610 PROTHROMBIN TIME: CPT

## 2022-01-01 PROCEDURE — 99284 EMERGENCY DEPT VISIT MOD MDM: CPT

## 2022-01-01 PROCEDURE — 65270000046 HC RM TELEMETRY

## 2022-01-01 PROCEDURE — 74011250637 HC RX REV CODE- 250/637: Performed by: EMERGENCY MEDICINE

## 2022-01-01 PROCEDURE — 99232 SBSQ HOSP IP/OBS MODERATE 35: CPT | Performed by: FAMILY MEDICINE

## 2022-01-01 PROCEDURE — 99239 HOSP IP/OBS DSCHRG MGMT >30: CPT | Performed by: INTERNAL MEDICINE

## 2022-01-01 PROCEDURE — 0T9130Z DRAINAGE OF LEFT KIDNEY WITH DRAINAGE DEVICE, PERCUTANEOUS APPROACH: ICD-10-PCS | Performed by: RADIOLOGY

## 2022-01-01 PROCEDURE — 36573 INSJ PICC RS&I 5 YR+: CPT | Performed by: INTERNAL MEDICINE

## 2022-01-01 PROCEDURE — 80048 BASIC METABOLIC PNL TOTAL CA: CPT

## 2022-01-01 PROCEDURE — 74011000258 HC RX REV CODE- 258: Performed by: EMERGENCY MEDICINE

## 2022-01-01 PROCEDURE — 84439 ASSAY OF FREE THYROXINE: CPT

## 2022-01-01 PROCEDURE — 99233 SBSQ HOSP IP/OBS HIGH 50: CPT | Performed by: FAMILY MEDICINE

## 2022-01-01 PROCEDURE — 77030018842 HC SOL IRR SOD CL 9% BAXT -A

## 2022-01-01 PROCEDURE — 74011000250 HC RX REV CODE- 250: Performed by: HOSPITALIST

## 2022-01-01 PROCEDURE — 50432 PLMT NEPHROSTOMY CATHETER: CPT

## 2022-01-01 PROCEDURE — 84443 ASSAY THYROID STIM HORMONE: CPT

## 2022-01-01 PROCEDURE — 82272 OCCULT BLD FECES 1-3 TESTS: CPT

## 2022-01-01 PROCEDURE — 80202 ASSAY OF VANCOMYCIN: CPT

## 2022-01-01 PROCEDURE — 50435 EXCHANGE NEPHROSTOMY CATH: CPT

## 2022-01-01 PROCEDURE — 97164 PT RE-EVAL EST PLAN CARE: CPT

## 2022-01-01 PROCEDURE — 74011000636 HC RX REV CODE- 636: Performed by: INTERNAL MEDICINE

## 2022-01-01 PROCEDURE — 96361 HYDRATE IV INFUSION ADD-ON: CPT

## 2022-01-01 PROCEDURE — 65660000004 HC RM CVT STEPDOWN

## 2022-01-01 PROCEDURE — 86923 COMPATIBILITY TEST ELECTRIC: CPT

## 2022-01-01 PROCEDURE — 74011000250 HC RX REV CODE- 250: Performed by: PHYSICIAN ASSISTANT

## 2022-01-01 PROCEDURE — 77412 RADIATION TX DELIVERY LVL 3: CPT

## 2022-01-01 PROCEDURE — 99232 SBSQ HOSP IP/OBS MODERATE 35: CPT | Performed by: HOSPITALIST

## 2022-01-01 PROCEDURE — P9016 RBC LEUKOCYTES REDUCED: HCPCS

## 2022-01-01 PROCEDURE — 74011250636 HC RX REV CODE- 250/636: Performed by: EMERGENCY MEDICINE

## 2022-01-01 PROCEDURE — 97165 OT EVAL LOW COMPLEX 30 MIN: CPT

## 2022-01-01 PROCEDURE — 74011250636 HC RX REV CODE- 250/636: Performed by: STUDENT IN AN ORGANIZED HEALTH CARE EDUCATION/TRAINING PROGRAM

## 2022-01-01 PROCEDURE — 82607 VITAMIN B-12: CPT

## 2022-01-01 PROCEDURE — 36592 COLLECT BLOOD FROM PICC: CPT

## 2022-01-01 PROCEDURE — 77030005518 HC CATH URETH FOL 2W BARD -B: Performed by: UROLOGY

## 2022-01-01 PROCEDURE — 99232 SBSQ HOSP IP/OBS MODERATE 35: CPT | Performed by: INTERNAL MEDICINE

## 2022-01-01 PROCEDURE — 51701 INSERT BLADDER CATHETER: CPT

## 2022-01-01 PROCEDURE — 97530 THERAPEUTIC ACTIVITIES: CPT

## 2022-01-01 PROCEDURE — 74011250636 HC RX REV CODE- 250/636: Performed by: PHYSICIAN ASSISTANT

## 2022-01-01 PROCEDURE — 74018 RADEX ABDOMEN 1 VIEW: CPT

## 2022-01-01 PROCEDURE — 84145 PROCALCITONIN (PCT): CPT

## 2022-01-01 PROCEDURE — 97162 PT EVAL MOD COMPLEX 30 MIN: CPT

## 2022-01-01 PROCEDURE — 99233 SBSQ HOSP IP/OBS HIGH 50: CPT | Performed by: STUDENT IN AN ORGANIZED HEALTH CARE EDUCATION/TRAINING PROGRAM

## 2022-01-01 PROCEDURE — 85730 THROMBOPLASTIN TIME PARTIAL: CPT

## 2022-01-01 PROCEDURE — 99223 1ST HOSP IP/OBS HIGH 75: CPT | Performed by: HOSPITALIST

## 2022-01-01 PROCEDURE — 77030040393 HC DRSG OPTIFOAM GENT MDII -B

## 2022-01-01 PROCEDURE — 77295 3-D RADIOTHERAPY PLAN: CPT

## 2022-01-01 PROCEDURE — 86900 BLOOD TYPING SEROLOGIC ABO: CPT

## 2022-01-01 PROCEDURE — 77030011254 HC DRSG HYDRGEL S&N -A

## 2022-01-01 PROCEDURE — 74011000258 HC RX REV CODE- 258: Performed by: INTERNAL MEDICINE

## 2022-01-01 PROCEDURE — 71045 X-RAY EXAM CHEST 1 VIEW: CPT

## 2022-01-01 PROCEDURE — 0T9030Z DRAINAGE OF RIGHT KIDNEY WITH DRAINAGE DEVICE, PERCUTANEOUS APPROACH: ICD-10-PCS | Performed by: RADIOLOGY

## 2022-01-01 PROCEDURE — 82962 GLUCOSE BLOOD TEST: CPT

## 2022-01-01 PROCEDURE — 93005 ELECTROCARDIOGRAM TRACING: CPT

## 2022-01-01 PROCEDURE — 87186 SC STD MICRODIL/AGAR DIL: CPT

## 2022-01-01 PROCEDURE — 74011250636 HC RX REV CODE- 250/636: Performed by: SURGERY

## 2022-01-01 PROCEDURE — 87086 URINE CULTURE/COLONY COUNT: CPT

## 2022-01-01 PROCEDURE — 85027 COMPLETE CBC AUTOMATED: CPT

## 2022-01-01 PROCEDURE — 76010000138 HC OR TIME 0.5 TO 1 HR: Performed by: UROLOGY

## 2022-01-01 PROCEDURE — 94762 N-INVAS EAR/PLS OXIMTRY CONT: CPT

## 2022-01-01 PROCEDURE — 77030010509 HC AIRWY LMA MSK TELE -A: Performed by: ANESTHESIOLOGY

## 2022-01-01 PROCEDURE — 77427 RADIATION TX MANAGEMENT X5: CPT | Performed by: RADIOLOGY

## 2022-01-01 PROCEDURE — 87040 BLOOD CULTURE FOR BACTERIA: CPT

## 2022-01-01 PROCEDURE — 99233 SBSQ HOSP IP/OBS HIGH 50: CPT | Performed by: INTERNAL MEDICINE

## 2022-01-01 PROCEDURE — 74011000250 HC RX REV CODE- 250: Performed by: EMERGENCY MEDICINE

## 2022-01-01 PROCEDURE — 74011250636 HC RX REV CODE- 250/636: Performed by: FAMILY MEDICINE

## 2022-01-01 PROCEDURE — 76856 US EXAM PELVIC COMPLETE: CPT

## 2022-01-01 PROCEDURE — 74011250637 HC RX REV CODE- 250/637: Performed by: PHYSICIAN ASSISTANT

## 2022-01-01 PROCEDURE — 74011250636 HC RX REV CODE- 250/636: Performed by: RADIOLOGY

## 2022-01-01 PROCEDURE — 30233N1 TRANSFUSION OF NONAUTOLOGOUS RED BLOOD CELLS INTO PERIPHERAL VEIN, PERCUTANEOUS APPROACH: ICD-10-PCS | Performed by: INTERNAL MEDICINE

## 2022-01-01 PROCEDURE — 81003 URINALYSIS AUTO W/O SCOPE: CPT

## 2022-01-01 PROCEDURE — 77030038269 HC DRN EXT URIN PURWCK BARD -A

## 2022-01-01 PROCEDURE — 92526 ORAL FUNCTION THERAPY: CPT

## 2022-01-01 PROCEDURE — 65270000029 HC RM PRIVATE

## 2022-01-01 PROCEDURE — 77030040361 HC SLV COMPR DVT MDII -B: Performed by: UROLOGY

## 2022-01-01 PROCEDURE — 92610 EVALUATE SWALLOWING FUNCTION: CPT

## 2022-01-01 PROCEDURE — 76060000032 HC ANESTHESIA 0.5 TO 1 HR: Performed by: UROLOGY

## 2022-01-01 PROCEDURE — 87150 DNA/RNA AMPLIFIED PROBE: CPT

## 2022-01-01 PROCEDURE — 76770 US EXAM ABDO BACK WALL COMP: CPT

## 2022-01-01 PROCEDURE — G0299 HHS/HOSPICE OF RN EA 15 MIN: HCPCS

## 2022-01-01 PROCEDURE — 96374 THER/PROPH/DIAG INJ IV PUSH: CPT

## 2022-01-01 PROCEDURE — 93306 TTE W/DOPPLER COMPLETE: CPT

## 2022-01-01 PROCEDURE — HOSPICE MEDICATION HC HH HOSPICE MEDICATION

## 2022-01-01 PROCEDURE — 80069 RENAL FUNCTION PANEL: CPT

## 2022-01-01 PROCEDURE — 36569 INSJ PICC 5 YR+ W/O IMAGING: CPT

## 2022-01-01 PROCEDURE — 99222 1ST HOSP IP/OBS MODERATE 55: CPT | Performed by: COLON & RECTAL SURGERY

## 2022-01-01 PROCEDURE — 74420 UROGRAPHY RTRGR +-KUB: CPT

## 2022-01-01 PROCEDURE — 77290 THER RAD SIMULAJ FIELD CPLX: CPT

## 2022-01-01 PROCEDURE — 74011000250 HC RX REV CODE- 250: Performed by: SURGERY

## 2022-01-01 PROCEDURE — 87077 CULTURE AEROBIC IDENTIFY: CPT

## 2022-01-01 PROCEDURE — 82728 ASSAY OF FERRITIN: CPT

## 2022-01-01 PROCEDURE — 74011000636 HC RX REV CODE- 636: Performed by: PHYSICIAN ASSISTANT

## 2022-01-01 PROCEDURE — 0651 HSPC ROUTINE HOME CARE

## 2022-01-01 PROCEDURE — 99223 1ST HOSP IP/OBS HIGH 75: CPT | Performed by: RADIOLOGY

## 2022-01-01 PROCEDURE — 77030037878 HC DRSG MEPILEX >48IN BORD MOLN -B

## 2022-01-01 PROCEDURE — 77280 THER RAD SIMULAJ FIELD SMPL: CPT

## 2022-01-01 PROCEDURE — 76210000017 HC OR PH I REC 1.5 TO 2 HR: Performed by: UROLOGY

## 2022-01-01 PROCEDURE — 77336 RADIATION PHYSICS CONSULT: CPT

## 2022-01-01 PROCEDURE — 74011000636 HC RX REV CODE- 636: Performed by: SURGERY

## 2022-01-01 PROCEDURE — 99285 EMERGENCY DEPT VISIT HI MDM: CPT

## 2022-01-01 PROCEDURE — 00860 ANES XTRPRTL PX LWR ABD NOS: CPT | Performed by: ANESTHESIOLOGY

## 2022-01-01 PROCEDURE — 84484 ASSAY OF TROPONIN QUANT: CPT

## 2022-01-01 PROCEDURE — 74011250636 HC RX REV CODE- 250/636: Performed by: NURSE ANESTHETIST, CERTIFIED REGISTERED

## 2022-01-01 PROCEDURE — 83690 ASSAY OF LIPASE: CPT

## 2022-01-01 PROCEDURE — 74011000272 HC RX REV CODE- 272: Performed by: UROLOGY

## 2022-01-01 PROCEDURE — 99222 1ST HOSP IP/OBS MODERATE 55: CPT | Performed by: INTERNAL MEDICINE

## 2022-01-01 PROCEDURE — 97168 OT RE-EVAL EST PLAN CARE: CPT

## 2022-01-01 PROCEDURE — 99232 SBSQ HOSP IP/OBS MODERATE 35: CPT | Performed by: NURSE PRACTITIONER

## 2022-01-01 PROCEDURE — 77063 BREAST TOMOSYNTHESIS BI: CPT

## 2022-01-01 PROCEDURE — 76642 ULTRASOUND BREAST LIMITED: CPT

## 2022-01-01 PROCEDURE — 93970 EXTREMITY STUDY: CPT

## 2022-01-01 PROCEDURE — 99232 SBSQ HOSP IP/OBS MODERATE 35: CPT | Performed by: COLON & RECTAL SURGERY

## 2022-01-01 PROCEDURE — 99232 SBSQ HOSP IP/OBS MODERATE 35: CPT | Performed by: EMERGENCY MEDICINE

## 2022-01-01 PROCEDURE — P9040 RBC LEUKOREDUCED IRRADIATED: HCPCS

## 2022-01-01 PROCEDURE — 2709999900 HC NON-CHARGEABLE SUPPLY: Performed by: UROLOGY

## 2022-01-01 PROCEDURE — 74011000250 HC RX REV CODE- 250: Performed by: NURSE ANESTHETIST, CERTIFIED REGISTERED

## 2022-01-01 PROCEDURE — 84100 ASSAY OF PHOSPHORUS: CPT

## 2022-01-01 PROCEDURE — 80053 COMPREHEN METABOLIC PANEL: CPT

## 2022-01-01 PROCEDURE — 74011250637 HC RX REV CODE- 250/637: Performed by: NURSE PRACTITIONER

## 2022-01-01 PROCEDURE — C9113 INJ PANTOPRAZOLE SODIUM, VIA: HCPCS | Performed by: EMERGENCY MEDICINE

## 2022-01-01 PROCEDURE — 77061 BREAST TOMOSYNTHESIS UNI: CPT

## 2022-01-01 PROCEDURE — 96375 TX/PRO/DX INJ NEW DRUG ADDON: CPT

## 2022-01-01 PROCEDURE — 74011000250 HC RX REV CODE- 250: Performed by: STUDENT IN AN ORGANIZED HEALTH CARE EDUCATION/TRAINING PROGRAM

## 2022-01-01 PROCEDURE — 3331090004 HSPC SERVICE INTENSITY ADD-ON

## 2022-01-01 PROCEDURE — 77334 RADIATION TREATMENT AID(S): CPT

## 2022-01-01 PROCEDURE — 77263 THER RADIOLOGY TX PLNG CPLX: CPT | Performed by: RADIOLOGY

## 2022-01-01 PROCEDURE — 77417 THER RADIOLOGY PORT IMAGE(S): CPT

## 2022-01-01 PROCEDURE — 77300 RADIATION THERAPY DOSE PLAN: CPT

## 2022-01-01 PROCEDURE — 77030019927 HC TBNG IRR CYSTO BAXT -A: Performed by: UROLOGY

## 2022-01-01 PROCEDURE — 72170 X-RAY EXAM OF PELVIS: CPT

## 2022-01-01 RX ORDER — SODIUM CHLORIDE, SODIUM LACTATE, POTASSIUM CHLORIDE, CALCIUM CHLORIDE 600; 310; 30; 20 MG/100ML; MG/100ML; MG/100ML; MG/100ML
75 INJECTION, SOLUTION INTRAVENOUS CONTINUOUS
Status: DISPENSED | OUTPATIENT
Start: 2022-01-01 | End: 2022-01-01

## 2022-01-01 RX ORDER — NYSTATIN 100000 [USP'U]/G
POWDER TOPICAL 3 TIMES DAILY
Status: DISCONTINUED | OUTPATIENT
Start: 2022-01-01 | End: 2022-01-01 | Stop reason: HOSPADM

## 2022-01-01 RX ORDER — METOPROLOL TARTRATE 25 MG/1
25 TABLET, FILM COATED ORAL 2 TIMES DAILY
Status: DISCONTINUED | OUTPATIENT
Start: 2022-01-01 | End: 2022-01-01

## 2022-01-01 RX ORDER — ACETAMINOPHEN 500 MG
500 TABLET ORAL
Status: DISCONTINUED | OUTPATIENT
Start: 2022-01-01 | End: 2022-01-01 | Stop reason: HOSPADM

## 2022-01-01 RX ORDER — SIMETHICONE 80 MG
80 TABLET,CHEWABLE ORAL
Status: DISCONTINUED | OUTPATIENT
Start: 2022-01-01 | End: 2022-01-01 | Stop reason: HOSPADM

## 2022-01-01 RX ORDER — SODIUM BICARBONATE 650 MG/1
650 TABLET ORAL 2 TIMES DAILY
Qty: 60 TABLET | Refills: 0 | Status: SHIPPED
Start: 2022-01-01 | End: 2022-01-01

## 2022-01-01 RX ORDER — FENTANYL CITRATE 50 UG/ML
INJECTION, SOLUTION INTRAMUSCULAR; INTRAVENOUS AS NEEDED
Status: DISCONTINUED | OUTPATIENT
Start: 2022-01-01 | End: 2022-01-01 | Stop reason: HOSPADM

## 2022-01-01 RX ORDER — SODIUM CHLORIDE 0.9 % (FLUSH) 0.9 %
5-40 SYRINGE (ML) INJECTION AS NEEDED
Status: DISCONTINUED | OUTPATIENT
Start: 2022-01-01 | End: 2022-01-01 | Stop reason: HOSPADM

## 2022-01-01 RX ORDER — MAGNESIUM SULFATE 100 %
4 CRYSTALS MISCELLANEOUS AS NEEDED
Status: DISCONTINUED | OUTPATIENT
Start: 2022-01-01 | End: 2022-01-01 | Stop reason: HOSPADM

## 2022-01-01 RX ORDER — SODIUM CHLORIDE 450 MG/100ML
75 INJECTION, SOLUTION INTRAVENOUS CONTINUOUS
Status: DISPENSED | OUTPATIENT
Start: 2022-01-01 | End: 2022-01-01

## 2022-01-01 RX ORDER — SODIUM CHLORIDE 9 MG/ML
250 INJECTION, SOLUTION INTRAVENOUS AS NEEDED
Status: DISCONTINUED | OUTPATIENT
Start: 2022-01-01 | End: 2022-01-01

## 2022-01-01 RX ORDER — SODIUM BICARBONATE 650 MG/1
1300 TABLET ORAL 2 TIMES DAILY
Status: DISCONTINUED | OUTPATIENT
Start: 2022-01-01 | End: 2022-01-01

## 2022-01-01 RX ORDER — METOPROLOL TARTRATE 50 MG/1
50 TABLET ORAL 2 TIMES DAILY
Status: DISCONTINUED | OUTPATIENT
Start: 2022-01-01 | End: 2022-01-01

## 2022-01-01 RX ORDER — FUROSEMIDE 10 MG/ML
20 INJECTION INTRAMUSCULAR; INTRAVENOUS ONCE
Status: COMPLETED | OUTPATIENT
Start: 2022-01-01 | End: 2022-01-01

## 2022-01-01 RX ORDER — FLUCONAZOLE 2 MG/ML
200 INJECTION, SOLUTION INTRAVENOUS DAILY
Status: DISCONTINUED | OUTPATIENT
Start: 2022-01-01 | End: 2022-01-01 | Stop reason: HOSPADM

## 2022-01-01 RX ORDER — FENTANYL CITRATE 50 UG/ML
25 INJECTION, SOLUTION INTRAMUSCULAR; INTRAVENOUS
Status: DISCONTINUED | OUTPATIENT
Start: 2022-01-01 | End: 2022-01-01 | Stop reason: HOSPADM

## 2022-01-01 RX ORDER — METOPROLOL TARTRATE 50 MG/1
50 TABLET ORAL 2 TIMES DAILY
Qty: 60 TABLET | Refills: 0 | Status: SHIPPED
Start: 2022-01-01 | End: 2022-10-23

## 2022-01-01 RX ORDER — OXYCODONE AND ACETAMINOPHEN 5; 325 MG/1; MG/1
2 TABLET ORAL
Status: COMPLETED | OUTPATIENT
Start: 2022-01-01 | End: 2022-01-01

## 2022-01-01 RX ORDER — LORAZEPAM 2 MG/ML
0.5 CONCENTRATE ORAL
Qty: 30 EACH | Status: SHIPPED | OUTPATIENT
Start: 2022-01-01

## 2022-01-01 RX ORDER — ONDANSETRON 2 MG/ML
INJECTION INTRAMUSCULAR; INTRAVENOUS AS NEEDED
Status: DISCONTINUED | OUTPATIENT
Start: 2022-01-01 | End: 2022-01-01 | Stop reason: HOSPADM

## 2022-01-01 RX ORDER — SODIUM CHLORIDE 9 MG/ML
75 INJECTION, SOLUTION INTRAVENOUS CONTINUOUS
Status: DISCONTINUED | OUTPATIENT
Start: 2022-01-01 | End: 2022-01-01

## 2022-01-01 RX ORDER — PANTOPRAZOLE SODIUM 40 MG/10ML
40 INJECTION, POWDER, LYOPHILIZED, FOR SOLUTION INTRAVENOUS
Status: COMPLETED | OUTPATIENT
Start: 2022-01-01 | End: 2022-01-01

## 2022-01-01 RX ORDER — DEXTROSE MONOHYDRATE AND SODIUM CHLORIDE 5; .45 G/100ML; G/100ML
100 INJECTION, SOLUTION INTRAVENOUS CONTINUOUS
Status: DISCONTINUED | OUTPATIENT
Start: 2022-01-01 | End: 2022-01-01

## 2022-01-01 RX ORDER — SODIUM CHLORIDE 9 MG/ML
100 INJECTION, SOLUTION INTRAVENOUS AS NEEDED
Status: DISCONTINUED | OUTPATIENT
Start: 2022-01-01 | End: 2022-01-01 | Stop reason: HOSPADM

## 2022-01-01 RX ORDER — FENTANYL CITRATE 50 UG/ML
12.5-1 INJECTION, SOLUTION INTRAMUSCULAR; INTRAVENOUS
Status: DISPENSED | OUTPATIENT
Start: 2022-01-01 | End: 2022-01-01

## 2022-01-01 RX ORDER — LIDOCAINE HYDROCHLORIDE 10 MG/ML
30 INJECTION, SOLUTION EPIDURAL; INFILTRATION; INTRACAUDAL; PERINEURAL ONCE
Status: ACTIVE | OUTPATIENT
Start: 2022-01-01 | End: 2022-01-01

## 2022-01-01 RX ORDER — OXYCODONE HCL 5 MG/5 ML
5 SOLUTION, ORAL ORAL
Qty: 30 ML | Refills: 0 | Status: SHIPPED | OUTPATIENT
Start: 2022-01-01 | End: 2022-01-01

## 2022-01-01 RX ORDER — LIDOCAINE HYDROCHLORIDE 10 MG/ML
5 INJECTION, SOLUTION EPIDURAL; INFILTRATION; INTRACAUDAL; PERINEURAL ONCE
Status: ACTIVE | OUTPATIENT
Start: 2022-01-01 | End: 2022-01-01

## 2022-01-01 RX ORDER — SODIUM CHLORIDE 0.9 % (FLUSH) 0.9 %
5-40 SYRINGE (ML) INJECTION EVERY 8 HOURS
Status: DISCONTINUED | OUTPATIENT
Start: 2022-01-01 | End: 2022-01-01 | Stop reason: HOSPADM

## 2022-01-01 RX ORDER — FLUMAZENIL 0.1 MG/ML
0.2 INJECTION INTRAVENOUS
Status: DISPENSED | OUTPATIENT
Start: 2022-01-01 | End: 2022-01-01

## 2022-01-01 RX ORDER — VANCOMYCIN 2 GRAM/500 ML IN 0.9 % SODIUM CHLORIDE INTRAVENOUS
2000 ONCE
Status: COMPLETED | OUTPATIENT
Start: 2022-01-01 | End: 2022-01-01

## 2022-01-01 RX ORDER — SODIUM CHLORIDE, SODIUM LACTATE, POTASSIUM CHLORIDE, CALCIUM CHLORIDE 600; 310; 30; 20 MG/100ML; MG/100ML; MG/100ML; MG/100ML
500 INJECTION, SOLUTION INTRAVENOUS ONCE
Status: COMPLETED | OUTPATIENT
Start: 2022-01-01 | End: 2022-01-01

## 2022-01-01 RX ORDER — POTASSIUM CHLORIDE 20 MEQ/1
40 TABLET, EXTENDED RELEASE ORAL
Status: COMPLETED | OUTPATIENT
Start: 2022-01-01 | End: 2022-01-01

## 2022-01-01 RX ORDER — INFANT FORMULA WITH IRON
POWDER (GRAM) ORAL 3 TIMES DAILY
Status: DISCONTINUED | OUTPATIENT
Start: 2022-01-01 | End: 2022-01-01 | Stop reason: HOSPADM

## 2022-01-01 RX ORDER — CALCIUM CARB/MAGNESIUM CARB 311-232MG
5 TABLET ORAL
Status: DISCONTINUED | OUTPATIENT
Start: 2022-01-01 | End: 2022-01-01 | Stop reason: HOSPADM

## 2022-01-01 RX ORDER — MORPHINE SULFATE 20 MG/ML
20 SOLUTION ORAL
Qty: 24 ML | Refills: 0 | Status: SHIPPED | OUTPATIENT
Start: 2022-01-01 | End: 2022-01-01 | Stop reason: SDUPTHER

## 2022-01-01 RX ORDER — HEPARIN SODIUM 5000 [USP'U]/ML
5000 INJECTION, SOLUTION INTRAVENOUS; SUBCUTANEOUS EVERY 8 HOURS
Status: DISCONTINUED | OUTPATIENT
Start: 2022-01-01 | End: 2022-01-01 | Stop reason: HOSPADM

## 2022-01-01 RX ORDER — MORPHINE SULFATE 2 MG/ML
2 INJECTION, SOLUTION INTRAMUSCULAR; INTRAVENOUS
Status: DISCONTINUED | OUTPATIENT
Start: 2022-01-01 | End: 2022-01-01 | Stop reason: HOSPADM

## 2022-01-01 RX ORDER — TRAMADOL HYDROCHLORIDE 50 MG/1
50 TABLET ORAL
COMMUNITY
Start: 2022-01-01 | End: 2022-01-01

## 2022-01-01 RX ORDER — POTASSIUM CHLORIDE AND SODIUM CHLORIDE 450; 150 MG/100ML; MG/100ML
INJECTION, SOLUTION INTRAVENOUS CONTINUOUS
Status: DISCONTINUED | OUTPATIENT
Start: 2022-01-01 | End: 2022-01-01

## 2022-01-01 RX ORDER — FACIAL-BODY WIPES
10 EACH TOPICAL
Qty: 5 SUPPOSITORY | Refills: 0 | Status: SHIPPED | OUTPATIENT
Start: 2022-01-01

## 2022-01-01 RX ORDER — MIDAZOLAM HYDROCHLORIDE 1 MG/ML
.5-2 INJECTION, SOLUTION INTRAMUSCULAR; INTRAVENOUS
Status: DISPENSED | OUTPATIENT
Start: 2022-01-01 | End: 2022-01-01

## 2022-01-01 RX ORDER — POTASSIUM CHLORIDE 7.45 MG/ML
10 INJECTION INTRAVENOUS
Status: DISPENSED | OUTPATIENT
Start: 2022-01-01 | End: 2022-01-01

## 2022-01-01 RX ORDER — LEVOFLOXACIN 500 MG/1
500 TABLET, FILM COATED ORAL DAILY
Qty: 4 TABLET | Refills: 0 | Status: SHIPPED | OUTPATIENT
Start: 2022-01-01

## 2022-01-01 RX ORDER — PROMETHAZINE HYDROCHLORIDE 12.5 MG/1
12.5 TABLET ORAL
Status: DISCONTINUED | OUTPATIENT
Start: 2022-01-01 | End: 2022-01-01 | Stop reason: HOSPADM

## 2022-01-01 RX ORDER — ONDANSETRON 2 MG/ML
4 INJECTION INTRAMUSCULAR; INTRAVENOUS
Status: COMPLETED | OUTPATIENT
Start: 2022-01-01 | End: 2022-01-01

## 2022-01-01 RX ORDER — NALBUPHINE HYDROCHLORIDE 20 MG/ML
5 INJECTION, SOLUTION INTRAMUSCULAR; INTRAVENOUS; SUBCUTANEOUS
Status: DISCONTINUED | OUTPATIENT
Start: 2022-01-01 | End: 2022-01-01 | Stop reason: HOSPADM

## 2022-01-01 RX ORDER — SODIUM CHLORIDE 0.9 % (FLUSH) 0.9 %
5-40 SYRINGE (ML) INJECTION EVERY 8 HOURS
Status: CANCELLED | OUTPATIENT
Start: 2022-01-01

## 2022-01-01 RX ORDER — SODIUM CHLORIDE 9 MG/ML
250 INJECTION, SOLUTION INTRAVENOUS AS NEEDED
Status: DISCONTINUED | OUTPATIENT
Start: 2022-01-01 | End: 2022-01-01 | Stop reason: HOSPADM

## 2022-01-01 RX ORDER — LISINOPRIL AND HYDROCHLOROTHIAZIDE 12.5; 2 MG/1; MG/1
1 TABLET ORAL DAILY
COMMUNITY
Start: 2022-01-01 | End: 2022-01-01

## 2022-01-01 RX ORDER — NYSTATIN 100000 [USP'U]/G
POWDER TOPICAL
Status: COMPLETED | OUTPATIENT
Start: 2022-01-01 | End: 2022-01-01

## 2022-01-01 RX ORDER — WATER FOR INJECTION,STERILE
VIAL (ML) INJECTION
Status: DISCONTINUED
Start: 2022-01-01 | End: 2022-01-01 | Stop reason: WASHOUT

## 2022-01-01 RX ORDER — HYOSCYAMINE SULFATE 0.12 MG/1
0.12 TABLET SUBLINGUAL
Qty: 10 TABLET | Refills: 0 | Status: SHIPPED | OUTPATIENT
Start: 2022-01-01

## 2022-01-01 RX ORDER — AMOXICILLIN 250 MG
1 CAPSULE ORAL DAILY
Qty: 30 TABLET | Refills: 0 | Status: SHIPPED
Start: 2022-01-01 | End: 2022-10-24

## 2022-01-01 RX ORDER — APIXABAN 2.5 MG/1
TABLET, FILM COATED ORAL
COMMUNITY
Start: 2022-01-01 | End: 2022-01-01

## 2022-01-01 RX ORDER — PHYTONADIONE 10 MG/ML
10 INJECTION, EMULSION INTRAMUSCULAR; INTRAVENOUS; SUBCUTANEOUS ONCE
Status: DISCONTINUED | OUTPATIENT
Start: 2022-01-01 | End: 2022-01-01

## 2022-01-01 RX ORDER — ERGOCALCIFEROL 1.25 MG/1
CAPSULE ORAL
COMMUNITY
Start: 2022-01-01 | End: 2022-01-01

## 2022-01-01 RX ORDER — MORPHINE SULFATE 4 MG/ML
4 INJECTION INTRAVENOUS
Status: COMPLETED | OUTPATIENT
Start: 2022-01-01 | End: 2022-01-01

## 2022-01-01 RX ORDER — NALOXONE HYDROCHLORIDE 0.4 MG/ML
0.2 INJECTION, SOLUTION INTRAMUSCULAR; INTRAVENOUS; SUBCUTANEOUS
Status: DISPENSED | OUTPATIENT
Start: 2022-01-01 | End: 2022-01-01

## 2022-01-01 RX ORDER — NALOXONE HYDROCHLORIDE 0.4 MG/ML
0.2 INJECTION, SOLUTION INTRAMUSCULAR; INTRAVENOUS; SUBCUTANEOUS AS NEEDED
Status: DISCONTINUED | OUTPATIENT
Start: 2022-01-01 | End: 2022-01-01 | Stop reason: HOSPADM

## 2022-01-01 RX ORDER — FLUCONAZOLE 200 MG/1
200 TABLET ORAL DAILY
Qty: 4 TABLET | Refills: 0 | Status: SHIPPED | OUTPATIENT
Start: 2022-01-01 | End: 2022-10-18

## 2022-01-01 RX ORDER — LEVOFLOXACIN 500 MG/1
500 TABLET, FILM COATED ORAL EVERY 24 HOURS
Status: DISCONTINUED | OUTPATIENT
Start: 2022-01-01 | End: 2022-01-01

## 2022-01-01 RX ORDER — HYDRALAZINE HYDROCHLORIDE 25 MG/1
25 TABLET, FILM COATED ORAL 3 TIMES DAILY
Status: DISCONTINUED | OUTPATIENT
Start: 2022-01-01 | End: 2022-01-01

## 2022-01-01 RX ORDER — SODIUM CHLORIDE 0.9 % (FLUSH) 0.9 %
5-40 SYRINGE (ML) INJECTION AS NEEDED
Status: CANCELLED | OUTPATIENT
Start: 2022-01-01

## 2022-01-01 RX ORDER — SODIUM CHLORIDE 9 MG/ML
500 INJECTION, SOLUTION INTRAVENOUS ONCE
Status: COMPLETED | OUTPATIENT
Start: 2022-01-01 | End: 2022-01-01

## 2022-01-01 RX ORDER — OXYCODONE HYDROCHLORIDE 5 MG/1
5 TABLET ORAL
Status: DISCONTINUED | OUTPATIENT
Start: 2022-01-01 | End: 2022-01-01

## 2022-01-01 RX ORDER — METOPROLOL TARTRATE 5 MG/5ML
5 INJECTION INTRAVENOUS ONCE
Status: COMPLETED | OUTPATIENT
Start: 2022-01-01 | End: 2022-01-01

## 2022-01-01 RX ORDER — DEXAMETHASONE SODIUM PHOSPHATE 4 MG/ML
INJECTION, SOLUTION INTRA-ARTICULAR; INTRALESIONAL; INTRAMUSCULAR; INTRAVENOUS; SOFT TISSUE AS NEEDED
Status: DISCONTINUED | OUTPATIENT
Start: 2022-01-01 | End: 2022-01-01 | Stop reason: HOSPADM

## 2022-01-01 RX ORDER — FLUMAZENIL 0.1 MG/ML
0.2 INJECTION INTRAVENOUS AS NEEDED
Status: DISCONTINUED | OUTPATIENT
Start: 2022-01-01 | End: 2022-01-01 | Stop reason: HOSPADM

## 2022-01-01 RX ORDER — LIDOCAINE HYDROCHLORIDE 10 MG/ML
30 INJECTION, SOLUTION EPIDURAL; INFILTRATION; INTRACAUDAL; PERINEURAL ONCE
Status: COMPLETED | OUTPATIENT
Start: 2022-01-01 | End: 2022-01-01

## 2022-01-01 RX ORDER — ONDANSETRON 2 MG/ML
4 INJECTION INTRAMUSCULAR; INTRAVENOUS
Status: DISCONTINUED | OUTPATIENT
Start: 2022-01-01 | End: 2022-01-01 | Stop reason: HOSPADM

## 2022-01-01 RX ORDER — MORPHINE SULFATE 20 MG/ML
20 SOLUTION ORAL
Qty: 30 ML | Refills: 0 | Status: SHIPPED | OUTPATIENT
Start: 2022-01-01 | End: 2022-10-16

## 2022-01-01 RX ORDER — MORPHINE SULFATE 2 MG/ML
2 INJECTION, SOLUTION INTRAMUSCULAR; INTRAVENOUS
Status: DISCONTINUED | OUTPATIENT
Start: 2022-01-01 | End: 2022-01-01

## 2022-01-01 RX ORDER — INFANT FORMULA WITH IRON
POWDER (GRAM) ORAL 3 TIMES DAILY
Qty: 15 G | Refills: 0 | Status: SHIPPED
Start: 2022-01-01 | End: 2022-01-01

## 2022-01-01 RX ORDER — PROPOFOL 10 MG/ML
INJECTION, EMULSION INTRAVENOUS AS NEEDED
Status: DISCONTINUED | OUTPATIENT
Start: 2022-01-01 | End: 2022-01-01 | Stop reason: HOSPADM

## 2022-01-01 RX ORDER — FLUCONAZOLE 2 MG/ML
200 INJECTION, SOLUTION INTRAVENOUS DAILY
Status: DISCONTINUED | OUTPATIENT
Start: 2022-01-01 | End: 2022-01-01

## 2022-01-01 RX ORDER — SODIUM CHLORIDE, SODIUM LACTATE, POTASSIUM CHLORIDE, CALCIUM CHLORIDE 600; 310; 30; 20 MG/100ML; MG/100ML; MG/100ML; MG/100ML
75 INJECTION, SOLUTION INTRAVENOUS CONTINUOUS
Status: DISCONTINUED | OUTPATIENT
Start: 2022-01-01 | End: 2022-01-01

## 2022-01-01 RX ORDER — POLYETHYLENE GLYCOL 3350 17 G/17G
17 POWDER, FOR SOLUTION ORAL DAILY PRN
Status: DISCONTINUED | OUTPATIENT
Start: 2022-01-01 | End: 2022-01-01 | Stop reason: HOSPADM

## 2022-01-01 RX ORDER — OXYCODONE HYDROCHLORIDE 5 MG/1
5-10 TABLET ORAL
Status: DISCONTINUED | OUTPATIENT
Start: 2022-01-01 | End: 2022-01-01 | Stop reason: HOSPADM

## 2022-01-01 RX ORDER — NALOXONE HYDROCHLORIDE 0.4 MG/ML
0.2 INJECTION, SOLUTION INTRAMUSCULAR; INTRAVENOUS; SUBCUTANEOUS
Status: ACTIVE | OUTPATIENT
Start: 2022-01-01 | End: 2022-01-01

## 2022-01-01 RX ORDER — DIPHENHYDRAMINE HYDROCHLORIDE 50 MG/ML
12.5 INJECTION, SOLUTION INTRAMUSCULAR; INTRAVENOUS
Status: DISCONTINUED | OUTPATIENT
Start: 2022-01-01 | End: 2022-01-01 | Stop reason: HOSPADM

## 2022-01-01 RX ORDER — VANCOMYCIN/0.9 % SOD CHLORIDE 1.5G/250ML
1500 PLASTIC BAG, INJECTION (ML) INTRAVENOUS ONCE
Status: DISCONTINUED | OUTPATIENT
Start: 2022-01-01 | End: 2022-01-01

## 2022-01-01 RX ORDER — MORPHINE SULFATE 2 MG/ML
1 INJECTION, SOLUTION INTRAMUSCULAR; INTRAVENOUS
Status: DISCONTINUED | OUTPATIENT
Start: 2022-01-01 | End: 2022-01-01 | Stop reason: HOSPADM

## 2022-01-01 RX ORDER — BISOPROLOL FUMARATE AND HYDROCHLOROTHIAZIDE 5; 6.25 MG/1; MG/1
1 TABLET ORAL DAILY
COMMUNITY
Start: 2022-01-01 | End: 2022-01-01

## 2022-01-01 RX ORDER — AMOXICILLIN 250 MG
1 CAPSULE ORAL DAILY
Status: DISCONTINUED | OUTPATIENT
Start: 2022-01-01 | End: 2022-01-01 | Stop reason: HOSPADM

## 2022-01-01 RX ORDER — FAMOTIDINE 20 MG/1
20 TABLET, FILM COATED ORAL DAILY
Status: DISCONTINUED | OUTPATIENT
Start: 2022-01-01 | End: 2022-01-01 | Stop reason: HOSPADM

## 2022-01-01 RX ORDER — LIDOCAINE HYDROCHLORIDE 20 MG/ML
INJECTION, SOLUTION EPIDURAL; INFILTRATION; INTRACAUDAL; PERINEURAL AS NEEDED
Status: DISCONTINUED | OUTPATIENT
Start: 2022-01-01 | End: 2022-01-01 | Stop reason: HOSPADM

## 2022-01-01 RX ORDER — LIDOCAINE HYDROCHLORIDE 10 MG/ML
5 INJECTION, SOLUTION EPIDURAL; INFILTRATION; INTRACAUDAL; PERINEURAL ONCE
Status: DISPENSED | OUTPATIENT
Start: 2022-01-01 | End: 2022-01-01

## 2022-01-01 RX ORDER — POTASSIUM CHLORIDE 20 MEQ/1
20 TABLET, EXTENDED RELEASE ORAL
Status: COMPLETED | OUTPATIENT
Start: 2022-01-01 | End: 2022-01-01

## 2022-01-01 RX ORDER — ACETAMINOPHEN 325 MG/1
650 TABLET ORAL
Status: DISCONTINUED | OUTPATIENT
Start: 2022-01-01 | End: 2022-01-01 | Stop reason: HOSPADM

## 2022-01-01 RX ORDER — METOPROLOL TARTRATE 5 MG/5ML
5 INJECTION INTRAVENOUS
Status: COMPLETED | OUTPATIENT
Start: 2022-01-01 | End: 2022-01-01

## 2022-01-01 RX ORDER — LEVOFLOXACIN 5 MG/ML
500 INJECTION, SOLUTION INTRAVENOUS EVERY 24 HOURS
Status: DISCONTINUED | OUTPATIENT
Start: 2022-01-01 | End: 2022-01-01 | Stop reason: HOSPADM

## 2022-01-01 RX ORDER — INSULIN LISPRO 100 [IU]/ML
INJECTION, SOLUTION INTRAVENOUS; SUBCUTANEOUS ONCE
Status: DISCONTINUED | OUTPATIENT
Start: 2022-01-01 | End: 2022-01-01 | Stop reason: HOSPADM

## 2022-01-01 RX ORDER — SODIUM CHLORIDE 0.9 % (FLUSH) 0.9 %
5-10 SYRINGE (ML) INJECTION AS NEEDED
Status: DISCONTINUED | OUTPATIENT
Start: 2022-01-01 | End: 2022-01-01 | Stop reason: HOSPADM

## 2022-01-01 RX ORDER — VANCOMYCIN HYDROCHLORIDE
1250 EVERY 24 HOURS
Status: DISCONTINUED | OUTPATIENT
Start: 2022-01-01 | End: 2022-01-01

## 2022-01-01 RX ORDER — OXYCODONE HYDROCHLORIDE 5 MG/1
5-10 TABLET ORAL
Qty: 20 TABLET | Refills: 0 | Status: SHIPPED | OUTPATIENT
Start: 2022-01-01 | End: 2022-01-01

## 2022-01-01 RX ORDER — DEXTROSE MONOHYDRATE 50 MG/ML
50 INJECTION, SOLUTION INTRAVENOUS CONTINUOUS
Status: DISCONTINUED | OUTPATIENT
Start: 2022-01-01 | End: 2022-01-01 | Stop reason: HOSPADM

## 2022-01-01 RX ORDER — POLYETHYLENE GLYCOL 3350 17 G/17G
17 POWDER, FOR SOLUTION ORAL ONCE
Status: DISPENSED | OUTPATIENT
Start: 2022-01-01 | End: 2022-01-01

## 2022-01-01 RX ORDER — METOPROLOL TARTRATE 50 MG/1
50 TABLET ORAL 2 TIMES DAILY
Status: DISCONTINUED | OUTPATIENT
Start: 2022-01-01 | End: 2022-01-01 | Stop reason: HOSPADM

## 2022-01-01 RX ORDER — METOPROLOL TARTRATE 25 MG/1
12.5 TABLET, FILM COATED ORAL 2 TIMES DAILY
Status: DISCONTINUED | OUTPATIENT
Start: 2022-01-01 | End: 2022-01-01

## 2022-01-01 RX ORDER — SIMETHICONE 80 MG
80 TABLET,CHEWABLE ORAL
Qty: 20 TABLET | Refills: 0 | Status: SHIPPED
Start: 2022-01-01

## 2022-01-01 RX ORDER — METOPROLOL TARTRATE 5 MG/5ML
1.25 INJECTION INTRAVENOUS EVERY 6 HOURS
Status: DISCONTINUED | OUTPATIENT
Start: 2022-01-01 | End: 2022-01-01 | Stop reason: HOSPADM

## 2022-01-01 RX ORDER — METOPROLOL TARTRATE 25 MG/1
12.5 TABLET, FILM COATED ORAL EVERY 12 HOURS
Status: DISCONTINUED | OUTPATIENT
Start: 2022-01-01 | End: 2022-01-01

## 2022-01-01 RX ORDER — ACETAMINOPHEN 650 MG/1
650 SUPPOSITORY RECTAL
Status: DISCONTINUED | OUTPATIENT
Start: 2022-01-01 | End: 2022-01-01 | Stop reason: HOSPADM

## 2022-01-01 RX ORDER — SODIUM BICARBONATE 650 MG/1
650 TABLET ORAL 2 TIMES DAILY
Status: DISCONTINUED | OUTPATIENT
Start: 2022-01-01 | End: 2022-01-01 | Stop reason: HOSPADM

## 2022-01-01 RX ORDER — SODIUM CHLORIDE 9 MG/ML
100 INJECTION, SOLUTION INTRAVENOUS CONTINUOUS
Status: DISCONTINUED | OUTPATIENT
Start: 2022-01-01 | End: 2022-01-01

## 2022-01-01 RX ADMIN — OXYCODONE HYDROCHLORIDE 5 MG: 5 TABLET ORAL at 03:50

## 2022-01-01 RX ADMIN — SENNOSIDES AND DOCUSATE SODIUM 1 TABLET: 50; 8.6 TABLET ORAL at 08:42

## 2022-01-01 RX ADMIN — FENTANYL CITRATE 25 MCG: 50 INJECTION, SOLUTION INTRAMUSCULAR; INTRAVENOUS at 12:15

## 2022-01-01 RX ADMIN — OXYCODONE HYDROCHLORIDE 10 MG: 5 TABLET ORAL at 14:54

## 2022-01-01 RX ADMIN — LIDOCAINE HYDROCHLORIDE 30 ML: 10 INJECTION, SOLUTION EPIDURAL; INFILTRATION; INTRACAUDAL; PERINEURAL at 17:30

## 2022-01-01 RX ADMIN — NYSTATIN: 100000 POWDER TOPICAL at 18:03

## 2022-01-01 RX ADMIN — METOPROLOL TARTRATE 12.5 MG: 25 TABLET, FILM COATED ORAL at 21:29

## 2022-01-01 RX ADMIN — SODIUM CHLORIDE, PRESERVATIVE FREE 10 ML: 5 INJECTION INTRAVENOUS at 22:37

## 2022-01-01 RX ADMIN — HYDRALAZINE HYDROCHLORIDE 25 MG: 25 TABLET, FILM COATED ORAL at 10:19

## 2022-01-01 RX ADMIN — SODIUM CHLORIDE, PRESERVATIVE FREE 10 ML: 5 INJECTION INTRAVENOUS at 06:55

## 2022-01-01 RX ADMIN — METOPROLOL TARTRATE 12.5 MG: 25 TABLET, FILM COATED ORAL at 10:38

## 2022-01-01 RX ADMIN — HYDRALAZINE HYDROCHLORIDE 25 MG: 25 TABLET, FILM COATED ORAL at 17:55

## 2022-01-01 RX ADMIN — METOPROLOL TARTRATE 12.5 MG: 25 TABLET, FILM COATED ORAL at 08:41

## 2022-01-01 RX ADMIN — SODIUM CHLORIDE, PRESERVATIVE FREE 10 ML: 5 INJECTION INTRAVENOUS at 06:27

## 2022-01-01 RX ADMIN — SODIUM CHLORIDE, PRESERVATIVE FREE 10 ML: 5 INJECTION INTRAVENOUS at 23:56

## 2022-01-01 RX ADMIN — SODIUM CHLORIDE 75 ML/HR: 9 INJECTION, SOLUTION INTRAVENOUS at 22:52

## 2022-01-01 RX ADMIN — SENNOSIDES AND DOCUSATE SODIUM 1 TABLET: 50; 8.6 TABLET ORAL at 09:35

## 2022-01-01 RX ADMIN — WATER 2 G: 1 INJECTION INTRAMUSCULAR; INTRAVENOUS; SUBCUTANEOUS at 20:54

## 2022-01-01 RX ADMIN — METOPROLOL TARTRATE 25 MG: 25 TABLET, FILM COATED ORAL at 08:57

## 2022-01-01 RX ADMIN — METOPROLOL TARTRATE 12.5 MG: 25 TABLET, FILM COATED ORAL at 09:35

## 2022-01-01 RX ADMIN — PIPERACILLIN AND TAZOBACTAM 3.38 G: 3; .375 INJECTION, POWDER, FOR SOLUTION INTRAVENOUS at 11:06

## 2022-01-01 RX ADMIN — METOPROLOL TARTRATE 1.25 MG: 5 INJECTION INTRAVENOUS at 11:13

## 2022-01-01 RX ADMIN — SODIUM CHLORIDE 500 ML: 9 INJECTION, SOLUTION INTRAVENOUS at 08:18

## 2022-01-01 RX ADMIN — SODIUM CHLORIDE, PRESERVATIVE FREE 40 MG: 5 INJECTION INTRAVENOUS at 09:01

## 2022-01-01 RX ADMIN — NYSTATIN: 100000 POWDER TOPICAL at 21:46

## 2022-01-01 RX ADMIN — SODIUM CHLORIDE, PRESERVATIVE FREE 10 ML: 5 INJECTION INTRAVENOUS at 22:46

## 2022-01-01 RX ADMIN — SENNOSIDES AND DOCUSATE SODIUM 1 TABLET: 50; 8.6 TABLET ORAL at 09:07

## 2022-01-01 RX ADMIN — SODIUM CHLORIDE, PRESERVATIVE FREE 40 MG: 5 INJECTION INTRAVENOUS at 23:54

## 2022-01-01 RX ADMIN — SODIUM CHLORIDE, PRESERVATIVE FREE 40 MG: 5 INJECTION INTRAVENOUS at 09:11

## 2022-01-01 RX ADMIN — METOPROLOL TARTRATE 12.5 MG: 25 TABLET, FILM COATED ORAL at 10:03

## 2022-01-01 RX ADMIN — SODIUM CHLORIDE, PRESERVATIVE FREE 10 ML: 5 INJECTION INTRAVENOUS at 14:11

## 2022-01-01 RX ADMIN — POTASSIUM CHLORIDE 40 MEQ: 1500 TABLET, EXTENDED RELEASE ORAL at 17:19

## 2022-01-01 RX ADMIN — SODIUM BICARBONATE 650 MG: 650 TABLET ORAL at 17:19

## 2022-01-01 RX ADMIN — HYDRALAZINE HYDROCHLORIDE 25 MG: 25 TABLET, FILM COATED ORAL at 21:15

## 2022-01-01 RX ADMIN — SENNOSIDES AND DOCUSATE SODIUM 1 TABLET: 50; 8.6 TABLET ORAL at 10:53

## 2022-01-01 RX ADMIN — SODIUM CHLORIDE, PRESERVATIVE FREE 40 MG: 5 INJECTION INTRAVENOUS at 10:35

## 2022-01-01 RX ADMIN — Medication 1 CAPSULE: at 09:51

## 2022-01-01 RX ADMIN — HYDRALAZINE HYDROCHLORIDE 25 MG: 25 TABLET, FILM COATED ORAL at 16:50

## 2022-01-01 RX ADMIN — Medication 5 MG: at 22:51

## 2022-01-01 RX ADMIN — SODIUM CHLORIDE, PRESERVATIVE FREE 10 ML: 5 INJECTION INTRAVENOUS at 21:59

## 2022-01-01 RX ADMIN — FLUCONAZOLE 200 MG: 2 INJECTION, SOLUTION INTRAVENOUS at 19:05

## 2022-01-01 RX ADMIN — MORPHINE SULFATE 1 MG: 2 INJECTION, SOLUTION INTRAMUSCULAR; INTRAVENOUS at 11:44

## 2022-01-01 RX ADMIN — SODIUM CHLORIDE, PRESERVATIVE FREE 10 ML: 5 INJECTION INTRAVENOUS at 22:15

## 2022-01-01 RX ADMIN — NYSTATIN: 100000 POWDER TOPICAL at 10:06

## 2022-01-01 RX ADMIN — NYSTATIN: 100000 POWDER TOPICAL at 09:48

## 2022-01-01 RX ADMIN — SODIUM BICARBONATE 1300 MG: 650 TABLET ORAL at 17:36

## 2022-01-01 RX ADMIN — NYSTATIN: 100000 POWDER TOPICAL at 17:27

## 2022-01-01 RX ADMIN — Medication 5 MG: at 02:32

## 2022-01-01 RX ADMIN — METOPROLOL TARTRATE 25 MG: 25 TABLET, FILM COATED ORAL at 17:12

## 2022-01-01 RX ADMIN — NYSTATIN: 100000 POWDER TOPICAL at 23:17

## 2022-01-01 RX ADMIN — NYSTATIN: 100000 POWDER TOPICAL at 09:00

## 2022-01-01 RX ADMIN — HYDRALAZINE HYDROCHLORIDE 25 MG: 25 TABLET, FILM COATED ORAL at 08:31

## 2022-01-01 RX ADMIN — SODIUM CHLORIDE, PRESERVATIVE FREE 10 ML: 5 INJECTION INTRAVENOUS at 21:34

## 2022-01-01 RX ADMIN — HYDRALAZINE HYDROCHLORIDE 25 MG: 25 TABLET, FILM COATED ORAL at 19:08

## 2022-01-01 RX ADMIN — SODIUM CHLORIDE, PRESERVATIVE FREE 10 ML: 5 INJECTION INTRAVENOUS at 14:00

## 2022-01-01 RX ADMIN — CEFEPIME 2 G: 2 INJECTION, POWDER, FOR SOLUTION INTRAVENOUS at 21:30

## 2022-01-01 RX ADMIN — ACETAMINOPHEN 650 MG: 325 TABLET, FILM COATED ORAL at 20:15

## 2022-01-01 RX ADMIN — Medication: at 15:05

## 2022-01-01 RX ADMIN — METOPROLOL TARTRATE 50 MG: 50 TABLET, FILM COATED ORAL at 17:37

## 2022-01-01 RX ADMIN — CEFEPIME 2 G: 2 INJECTION, POWDER, FOR SOLUTION INTRAVENOUS at 10:34

## 2022-01-01 RX ADMIN — OXYCODONE HYDROCHLORIDE 10 MG: 5 TABLET ORAL at 15:19

## 2022-01-01 RX ADMIN — METOPROLOL TARTRATE 25 MG: 25 TABLET, FILM COATED ORAL at 10:16

## 2022-01-01 RX ADMIN — HYDRALAZINE HYDROCHLORIDE 25 MG: 25 TABLET, FILM COATED ORAL at 17:28

## 2022-01-01 RX ADMIN — SODIUM CHLORIDE, PRESERVATIVE FREE 10 ML: 5 INJECTION INTRAVENOUS at 06:45

## 2022-01-01 RX ADMIN — Medication 5 MG: at 21:16

## 2022-01-01 RX ADMIN — HYDRALAZINE HYDROCHLORIDE 25 MG: 25 TABLET, FILM COATED ORAL at 17:43

## 2022-01-01 RX ADMIN — METOPROLOL TARTRATE 12.5 MG: 25 TABLET, FILM COATED ORAL at 17:15

## 2022-01-01 RX ADMIN — METOPROLOL TARTRATE 25 MG: 25 TABLET, FILM COATED ORAL at 08:56

## 2022-01-01 RX ADMIN — SODIUM BICARBONATE 650 MG: 650 TABLET ORAL at 09:44

## 2022-01-01 RX ADMIN — WATER 1 G: 1 INJECTION INTRAMUSCULAR; INTRAVENOUS; SUBCUTANEOUS at 17:30

## 2022-01-01 RX ADMIN — SODIUM BICARBONATE 650 MG: 650 TABLET ORAL at 17:36

## 2022-01-01 RX ADMIN — FENTANYL CITRATE 50 MCG: 50 INJECTION, SOLUTION INTRAMUSCULAR; INTRAVENOUS at 11:47

## 2022-01-01 RX ADMIN — NYSTATIN: 100000 POWDER TOPICAL at 08:58

## 2022-01-01 RX ADMIN — SODIUM CHLORIDE, PRESERVATIVE FREE 10 ML: 5 INJECTION INTRAVENOUS at 22:12

## 2022-01-01 RX ADMIN — OXYCODONE HYDROCHLORIDE 10 MG: 5 TABLET ORAL at 22:16

## 2022-01-01 RX ADMIN — POTASSIUM CHLORIDE AND SODIUM CHLORIDE: 450; 150 INJECTION, SOLUTION INTRAVENOUS at 06:24

## 2022-01-01 RX ADMIN — SODIUM CHLORIDE, PRESERVATIVE FREE 10 ML: 5 INJECTION INTRAVENOUS at 21:46

## 2022-01-01 RX ADMIN — MULTIPLE VITAMINS W/ MINERALS TAB 1 TABLET: TAB at 09:23

## 2022-01-01 RX ADMIN — SODIUM CHLORIDE, PRESERVATIVE FREE 10 ML: 5 INJECTION INTRAVENOUS at 22:04

## 2022-01-01 RX ADMIN — HYDRALAZINE HYDROCHLORIDE 25 MG: 25 TABLET, FILM COATED ORAL at 20:48

## 2022-01-01 RX ADMIN — Medication 5 MG: at 23:14

## 2022-01-01 RX ADMIN — HYDRALAZINE HYDROCHLORIDE 25 MG: 25 TABLET, FILM COATED ORAL at 10:03

## 2022-01-01 RX ADMIN — NYSTATIN: 100000 POWDER TOPICAL at 08:40

## 2022-01-01 RX ADMIN — SODIUM CHLORIDE, PRESERVATIVE FREE 10 ML: 5 INJECTION INTRAVENOUS at 13:43

## 2022-01-01 RX ADMIN — IRON SUCROSE 200 MG: 20 INJECTION, SOLUTION INTRAVENOUS at 09:23

## 2022-01-01 RX ADMIN — SODIUM CHLORIDE, PRESERVATIVE FREE 10 ML: 5 INJECTION INTRAVENOUS at 16:36

## 2022-01-01 RX ADMIN — HYDRALAZINE HYDROCHLORIDE 25 MG: 25 TABLET, FILM COATED ORAL at 08:56

## 2022-01-01 RX ADMIN — SODIUM CHLORIDE, PRESERVATIVE FREE 10 ML: 5 INJECTION INTRAVENOUS at 06:37

## 2022-01-01 RX ADMIN — SENNOSIDES AND DOCUSATE SODIUM 1 TABLET: 50; 8.6 TABLET ORAL at 09:40

## 2022-01-01 RX ADMIN — METOPROLOL TARTRATE 50 MG: 50 TABLET, FILM COATED ORAL at 09:45

## 2022-01-01 RX ADMIN — FAMOTIDINE 20 MG: 20 TABLET ORAL at 09:07

## 2022-01-01 RX ADMIN — METOPROLOL TARTRATE 12.5 MG: 25 TABLET, FILM COATED ORAL at 09:06

## 2022-01-01 RX ADMIN — MULTIPLE VITAMINS W/ MINERALS TAB 1 TABLET: TAB at 08:41

## 2022-01-01 RX ADMIN — SODIUM BICARBONATE 1300 MG: 650 TABLET ORAL at 09:28

## 2022-01-01 RX ADMIN — Medication 5 MG: at 21:12

## 2022-01-01 RX ADMIN — MULTIPLE VITAMINS W/ MINERALS TAB 1 TABLET: TAB at 10:03

## 2022-01-01 RX ADMIN — Medication 1 CAPSULE: at 10:19

## 2022-01-01 RX ADMIN — OXYCODONE HYDROCHLORIDE 10 MG: 5 TABLET ORAL at 07:25

## 2022-01-01 RX ADMIN — PIPERACILLIN AND TAZOBACTAM 3.38 G: 3; .375 INJECTION, POWDER, FOR SOLUTION INTRAVENOUS at 09:40

## 2022-01-01 RX ADMIN — OXYCODONE HYDROCHLORIDE 10 MG: 5 TABLET ORAL at 15:25

## 2022-01-01 RX ADMIN — SODIUM CHLORIDE, POTASSIUM CHLORIDE, SODIUM LACTATE AND CALCIUM CHLORIDE 75 ML/HR: 600; 310; 30; 20 INJECTION, SOLUTION INTRAVENOUS at 16:57

## 2022-01-01 RX ADMIN — NYSTATIN: 100000 POWDER TOPICAL at 10:17

## 2022-01-01 RX ADMIN — SODIUM CHLORIDE, PRESERVATIVE FREE 40 MG: 5 INJECTION INTRAVENOUS at 22:52

## 2022-01-01 RX ADMIN — Medication 1 CAPSULE: at 09:44

## 2022-01-01 RX ADMIN — FAMOTIDINE 20 MG: 20 TABLET ORAL at 08:41

## 2022-01-01 RX ADMIN — SODIUM CHLORIDE, PRESERVATIVE FREE 10 ML: 5 INJECTION INTRAVENOUS at 15:34

## 2022-01-01 RX ADMIN — SODIUM BICARBONATE 650 MG: 650 TABLET ORAL at 17:55

## 2022-01-01 RX ADMIN — SODIUM CHLORIDE, PRESERVATIVE FREE 10 ML: 5 INJECTION INTRAVENOUS at 21:42

## 2022-01-01 RX ADMIN — MULTIPLE VITAMINS W/ MINERALS TAB 1 TABLET: TAB at 09:35

## 2022-01-01 RX ADMIN — OXYCODONE HYDROCHLORIDE 10 MG: 5 TABLET ORAL at 09:34

## 2022-01-01 RX ADMIN — MULTIPLE VITAMINS W/ MINERALS TAB 1 TABLET: TAB at 08:56

## 2022-01-01 RX ADMIN — FENTANYL CITRATE 50 MCG: 50 INJECTION, SOLUTION INTRAMUSCULAR; INTRAVENOUS at 10:35

## 2022-01-01 RX ADMIN — NYSTATIN: 100000 POWDER TOPICAL at 21:52

## 2022-01-01 RX ADMIN — METOPROLOL TARTRATE 12.5 MG: 25 TABLET, FILM COATED ORAL at 09:00

## 2022-01-01 RX ADMIN — HYDRALAZINE HYDROCHLORIDE 25 MG: 25 TABLET, FILM COATED ORAL at 08:57

## 2022-01-01 RX ADMIN — METOPROLOL TARTRATE 1.25 MG: 5 INJECTION INTRAVENOUS at 23:40

## 2022-01-01 RX ADMIN — DEXAMETHASONE SODIUM PHOSPHATE 4 MG: 4 INJECTION, SOLUTION INTRAMUSCULAR; INTRAVENOUS at 11:47

## 2022-01-01 RX ADMIN — SODIUM BICARBONATE 650 MG: 650 TABLET ORAL at 08:57

## 2022-01-01 RX ADMIN — Medication: at 13:42

## 2022-01-01 RX ADMIN — Medication 1 CAPSULE: at 08:29

## 2022-01-01 RX ADMIN — FAMOTIDINE 20 MG: 20 TABLET ORAL at 08:52

## 2022-01-01 RX ADMIN — SODIUM BICARBONATE 1300 MG: 650 TABLET ORAL at 08:29

## 2022-01-01 RX ADMIN — NYSTATIN: 100000 POWDER TOPICAL at 17:01

## 2022-01-01 RX ADMIN — CEFEPIME 2 G: 2 INJECTION, POWDER, FOR SOLUTION INTRAVENOUS at 20:39

## 2022-01-01 RX ADMIN — Medication: at 22:22

## 2022-01-01 RX ADMIN — METOPROLOL TARTRATE 25 MG: 25 TABLET, FILM COATED ORAL at 09:51

## 2022-01-01 RX ADMIN — METOPROLOL TARTRATE 12.5 MG: 25 TABLET, FILM COATED ORAL at 10:53

## 2022-01-01 RX ADMIN — SODIUM CHLORIDE, PRESERVATIVE FREE 40 MG: 5 INJECTION INTRAVENOUS at 08:45

## 2022-01-01 RX ADMIN — METOPROLOL TARTRATE 1.25 MG: 5 INJECTION INTRAVENOUS at 17:52

## 2022-01-01 RX ADMIN — FAMOTIDINE 20 MG: 20 TABLET ORAL at 10:16

## 2022-01-01 RX ADMIN — SODIUM CHLORIDE, PRESERVATIVE FREE 10 ML: 5 INJECTION INTRAVENOUS at 05:44

## 2022-01-01 RX ADMIN — NYSTATIN: 100000 POWDER TOPICAL at 22:39

## 2022-01-01 RX ADMIN — SODIUM CHLORIDE, PRESERVATIVE FREE 10 ML: 5 INJECTION INTRAVENOUS at 21:19

## 2022-01-01 RX ADMIN — FAMOTIDINE 20 MG: 20 TABLET ORAL at 09:40

## 2022-01-01 RX ADMIN — OXYCODONE HYDROCHLORIDE 10 MG: 5 TABLET ORAL at 12:49

## 2022-01-01 RX ADMIN — CEFEPIME 2 G: 2 INJECTION, POWDER, FOR SOLUTION INTRAVENOUS at 08:53

## 2022-01-01 RX ADMIN — HYDRALAZINE HYDROCHLORIDE 25 MG: 25 TABLET, FILM COATED ORAL at 09:07

## 2022-01-01 RX ADMIN — VANCOMYCIN HYDROCHLORIDE 2000 MG: 10 INJECTION, POWDER, LYOPHILIZED, FOR SOLUTION INTRAVENOUS at 23:48

## 2022-01-01 RX ADMIN — MULTIPLE VITAMINS W/ MINERALS TAB 1 TABLET: TAB at 09:07

## 2022-01-01 RX ADMIN — LEVOFLOXACIN 500 MG: 5 INJECTION, SOLUTION INTRAVENOUS at 15:32

## 2022-01-01 RX ADMIN — MULTIPLE VITAMINS W/ MINERALS TAB 1 TABLET: TAB at 09:43

## 2022-01-01 RX ADMIN — LIDOCAINE HYDROCHLORIDE 30 ML: 10 INJECTION, SOLUTION EPIDURAL; INFILTRATION; INTRACAUDAL; PERINEURAL at 10:42

## 2022-01-01 RX ADMIN — SODIUM CHLORIDE, PRESERVATIVE FREE 40 MG: 5 INJECTION INTRAVENOUS at 21:33

## 2022-01-01 RX ADMIN — VANCOMYCIN HYDROCHLORIDE 1250 MG: 10 INJECTION, POWDER, LYOPHILIZED, FOR SOLUTION INTRAVENOUS at 14:37

## 2022-01-01 RX ADMIN — PROPOFOL 100 MG: 10 INJECTION, EMULSION INTRAVENOUS at 11:38

## 2022-01-01 RX ADMIN — OXYCODONE HYDROCHLORIDE 5 MG: 5 TABLET ORAL at 10:01

## 2022-01-01 RX ADMIN — Medication 5 MG: at 22:23

## 2022-01-01 RX ADMIN — HYDRALAZINE HYDROCHLORIDE 25 MG: 25 TABLET, FILM COATED ORAL at 08:41

## 2022-01-01 RX ADMIN — SODIUM CHLORIDE, PRESERVATIVE FREE 10 ML: 5 INJECTION INTRAVENOUS at 06:11

## 2022-01-01 RX ADMIN — OXYCODONE HYDROCHLORIDE 5 MG: 5 TABLET ORAL at 16:09

## 2022-01-01 RX ADMIN — OXYCODONE HYDROCHLORIDE 10 MG: 5 TABLET ORAL at 12:19

## 2022-01-01 RX ADMIN — FAMOTIDINE 20 MG: 20 TABLET ORAL at 08:20

## 2022-01-01 RX ADMIN — POTASSIUM BICARBONATE 20 MEQ: 782 TABLET, EFFERVESCENT ORAL at 11:13

## 2022-01-01 RX ADMIN — MULTIPLE VITAMINS W/ MINERALS TAB 1 TABLET: TAB at 08:57

## 2022-01-01 RX ADMIN — Medication 1 CAPSULE: at 08:41

## 2022-01-01 RX ADMIN — OXYCODONE HYDROCHLORIDE 10 MG: 5 TABLET ORAL at 22:45

## 2022-01-01 RX ADMIN — Medication: at 15:24

## 2022-01-01 RX ADMIN — CEFEPIME 2 G: 2 INJECTION, POWDER, FOR SOLUTION INTRAVENOUS at 22:04

## 2022-01-01 RX ADMIN — SODIUM CHLORIDE, PRESERVATIVE FREE 10 ML: 5 INJECTION INTRAVENOUS at 15:48

## 2022-01-01 RX ADMIN — HYDRALAZINE HYDROCHLORIDE 25 MG: 25 TABLET, FILM COATED ORAL at 10:16

## 2022-01-01 RX ADMIN — Medication 5 MG: at 22:22

## 2022-01-01 RX ADMIN — SODIUM CHLORIDE, PRESERVATIVE FREE 40 MG: 5 INJECTION INTRAVENOUS at 09:43

## 2022-01-01 RX ADMIN — METOPROLOL TARTRATE 1.25 MG: 5 INJECTION INTRAVENOUS at 05:36

## 2022-01-01 RX ADMIN — NYSTATIN: 100000 POWDER TOPICAL at 23:44

## 2022-01-01 RX ADMIN — FAMOTIDINE 20 MG: 20 TABLET ORAL at 08:57

## 2022-01-01 RX ADMIN — NYSTATIN: 100000 POWDER TOPICAL at 15:35

## 2022-01-01 RX ADMIN — HEPARIN SODIUM 5000 UNITS: 5000 INJECTION INTRAVENOUS; SUBCUTANEOUS at 21:58

## 2022-01-01 RX ADMIN — SODIUM CHLORIDE, PRESERVATIVE FREE 10 ML: 5 INJECTION INTRAVENOUS at 21:44

## 2022-01-01 RX ADMIN — SODIUM CHLORIDE, PRESERVATIVE FREE 10 ML: 5 INJECTION INTRAVENOUS at 05:13

## 2022-01-01 RX ADMIN — POTASSIUM CHLORIDE AND SODIUM CHLORIDE: 450; 150 INJECTION, SOLUTION INTRAVENOUS at 13:51

## 2022-01-01 RX ADMIN — CEFEPIME 2 G: 2 INJECTION, POWDER, FOR SOLUTION INTRAVENOUS at 19:59

## 2022-01-01 RX ADMIN — MORPHINE SULFATE 2 MG: 2 INJECTION, SOLUTION INTRAMUSCULAR; INTRAVENOUS at 04:26

## 2022-01-01 RX ADMIN — SODIUM CHLORIDE, PRESERVATIVE FREE 10 ML: 5 INJECTION INTRAVENOUS at 13:02

## 2022-01-01 RX ADMIN — SODIUM CHLORIDE, PRESERVATIVE FREE 10 ML: 5 INJECTION INTRAVENOUS at 15:32

## 2022-01-01 RX ADMIN — HYDRALAZINE HYDROCHLORIDE 25 MG: 25 TABLET, FILM COATED ORAL at 17:25

## 2022-01-01 RX ADMIN — IRON SUCROSE 200 MG: 20 INJECTION, SOLUTION INTRAVENOUS at 09:40

## 2022-01-01 RX ADMIN — FLUCONAZOLE 200 MG: 2 INJECTION, SOLUTION INTRAVENOUS at 16:03

## 2022-01-01 RX ADMIN — Medication 5 MG: at 22:44

## 2022-01-01 RX ADMIN — OXYCODONE HYDROCHLORIDE 10 MG: 5 TABLET ORAL at 04:07

## 2022-01-01 RX ADMIN — ACETAMINOPHEN 650 MG: 325 TABLET, FILM COATED ORAL at 03:24

## 2022-01-01 RX ADMIN — NYSTATIN: 100000 POWDER TOPICAL at 19:08

## 2022-01-01 RX ADMIN — NYSTATIN: 100000 POWDER TOPICAL at 15:48

## 2022-01-01 RX ADMIN — SODIUM CHLORIDE, PRESERVATIVE FREE 40 MG: 5 INJECTION INTRAVENOUS at 21:46

## 2022-01-01 RX ADMIN — SODIUM CHLORIDE, PRESERVATIVE FREE 40 MG: 5 INJECTION INTRAVENOUS at 20:38

## 2022-01-01 RX ADMIN — SODIUM CHLORIDE, PRESERVATIVE FREE 40 MG: 5 INJECTION INTRAVENOUS at 08:51

## 2022-01-01 RX ADMIN — POTASSIUM CHLORIDE AND SODIUM CHLORIDE: 450; 150 INJECTION, SOLUTION INTRAVENOUS at 17:35

## 2022-01-01 RX ADMIN — SODIUM CHLORIDE, PRESERVATIVE FREE 10 ML: 5 INJECTION INTRAVENOUS at 06:14

## 2022-01-01 RX ADMIN — METOPROLOL TARTRATE 12.5 MG: 25 TABLET, FILM COATED ORAL at 17:19

## 2022-01-01 RX ADMIN — SODIUM CHLORIDE, PRESERVATIVE FREE 10 ML: 5 INJECTION INTRAVENOUS at 05:35

## 2022-01-01 RX ADMIN — SODIUM CHLORIDE, PRESERVATIVE FREE 10 ML: 5 INJECTION INTRAVENOUS at 21:31

## 2022-01-01 RX ADMIN — NYSTATIN: 100000 POWDER TOPICAL at 17:03

## 2022-01-01 RX ADMIN — ACETAMINOPHEN 500 MG: 500 TABLET ORAL at 06:42

## 2022-01-01 RX ADMIN — HEPARIN SODIUM 5000 UNITS: 5000 INJECTION INTRAVENOUS; SUBCUTANEOUS at 07:48

## 2022-01-01 RX ADMIN — Medication 5 MG: at 21:42

## 2022-01-01 RX ADMIN — METOPROLOL TARTRATE 12.5 MG: 25 TABLET, FILM COATED ORAL at 20:10

## 2022-01-01 RX ADMIN — Medication 5 MG: at 22:04

## 2022-01-01 RX ADMIN — SODIUM CHLORIDE, PRESERVATIVE FREE 10 ML: 5 INJECTION INTRAVENOUS at 05:10

## 2022-01-01 RX ADMIN — SODIUM BICARBONATE 650 MG: 650 TABLET ORAL at 16:50

## 2022-01-01 RX ADMIN — SODIUM CHLORIDE 75 ML/HR: 9 INJECTION, SOLUTION INTRAVENOUS at 17:24

## 2022-01-01 RX ADMIN — POTASSIUM CHLORIDE AND SODIUM CHLORIDE: 450; 150 INJECTION, SOLUTION INTRAVENOUS at 02:55

## 2022-01-01 RX ADMIN — MORPHINE SULFATE 4 MG: 4 INJECTION, SOLUTION INTRAMUSCULAR; INTRAVENOUS at 16:17

## 2022-01-01 RX ADMIN — METOPROLOL TARTRATE 12.5 MG: 25 TABLET, FILM COATED ORAL at 17:43

## 2022-01-01 RX ADMIN — SODIUM CHLORIDE, PRESERVATIVE FREE 40 MG: 5 INJECTION INTRAVENOUS at 10:20

## 2022-01-01 RX ADMIN — FAMOTIDINE 20 MG: 20 TABLET ORAL at 09:35

## 2022-01-01 RX ADMIN — Medication 1 CAPSULE: at 08:52

## 2022-01-01 RX ADMIN — POTASSIUM CHLORIDE AND SODIUM CHLORIDE: 450; 150 INJECTION, SOLUTION INTRAVENOUS at 03:42

## 2022-01-01 RX ADMIN — OXYCODONE HYDROCHLORIDE 5 MG: 5 TABLET ORAL at 21:24

## 2022-01-01 RX ADMIN — SODIUM CHLORIDE, PRESERVATIVE FREE 10 ML: 5 INJECTION INTRAVENOUS at 05:15

## 2022-01-01 RX ADMIN — LEVOFLOXACIN 500 MG: 5 INJECTION, SOLUTION INTRAVENOUS at 16:55

## 2022-01-01 RX ADMIN — NYSTATIN: 100000 POWDER TOPICAL at 08:17

## 2022-01-01 RX ADMIN — NYSTATIN: 100000 POWDER TOPICAL at 22:00

## 2022-01-01 RX ADMIN — IOPAMIDOL 96 ML: 755 INJECTION, SOLUTION INTRAVENOUS at 09:00

## 2022-01-01 RX ADMIN — METOPROLOL TARTRATE 50 MG: 50 TABLET, FILM COATED ORAL at 17:53

## 2022-01-01 RX ADMIN — SODIUM BICARBONATE 650 MG: 650 TABLET ORAL at 17:53

## 2022-01-01 RX ADMIN — Medication 5 MG: at 22:21

## 2022-01-01 RX ADMIN — NYSTATIN: 100000 POWDER TOPICAL at 09:07

## 2022-01-01 RX ADMIN — SODIUM CHLORIDE, PRESERVATIVE FREE 10 ML: 5 INJECTION INTRAVENOUS at 14:07

## 2022-01-01 RX ADMIN — FENTANYL CITRATE 25 MCG: 50 INJECTION INTRAMUSCULAR; INTRAVENOUS at 12:29

## 2022-01-01 RX ADMIN — SODIUM CHLORIDE, PRESERVATIVE FREE 10 ML: 5 INJECTION INTRAVENOUS at 13:58

## 2022-01-01 RX ADMIN — IOHEXOL 50 ML: 300 INJECTION, SOLUTION INTRAVENOUS at 17:56

## 2022-01-01 RX ADMIN — SODIUM CHLORIDE, PRESERVATIVE FREE 10 ML: 5 INJECTION INTRAVENOUS at 22:51

## 2022-01-01 RX ADMIN — METOPROLOL TARTRATE 12.5 MG: 25 TABLET, FILM COATED ORAL at 08:31

## 2022-01-01 RX ADMIN — NYSTATIN: 100000 POWDER TOPICAL at 21:44

## 2022-01-01 RX ADMIN — CEFEPIME 2 G: 2 INJECTION, POWDER, FOR SOLUTION INTRAVENOUS at 09:33

## 2022-01-01 RX ADMIN — HEPARIN SODIUM 5000 UNITS: 5000 INJECTION INTRAVENOUS; SUBCUTANEOUS at 15:41

## 2022-01-01 RX ADMIN — SODIUM CHLORIDE, PRESERVATIVE FREE 40 MG: 5 INJECTION INTRAVENOUS at 09:34

## 2022-01-01 RX ADMIN — HYDRALAZINE HYDROCHLORIDE 25 MG: 25 TABLET, FILM COATED ORAL at 17:36

## 2022-01-01 RX ADMIN — HYDRALAZINE HYDROCHLORIDE 25 MG: 25 TABLET, FILM COATED ORAL at 09:35

## 2022-01-01 RX ADMIN — OXYCODONE HYDROCHLORIDE 10 MG: 5 TABLET ORAL at 18:46

## 2022-01-01 RX ADMIN — METOPROLOL TARTRATE 12.5 MG: 25 TABLET, FILM COATED ORAL at 17:06

## 2022-01-01 RX ADMIN — SODIUM CHLORIDE, PRESERVATIVE FREE 40 MG: 5 INJECTION INTRAVENOUS at 09:00

## 2022-01-01 RX ADMIN — OXYCODONE HYDROCHLORIDE 5 MG: 5 TABLET ORAL at 02:12

## 2022-01-01 RX ADMIN — Medication 5 MG: at 23:18

## 2022-01-01 RX ADMIN — SODIUM CHLORIDE, POTASSIUM CHLORIDE, SODIUM LACTATE AND CALCIUM CHLORIDE 500 ML: 600; 310; 30; 20 INJECTION, SOLUTION INTRAVENOUS at 21:46

## 2022-01-01 RX ADMIN — MORPHINE SULFATE 1 MG: 2 INJECTION, SOLUTION INTRAMUSCULAR; INTRAVENOUS at 18:07

## 2022-01-01 RX ADMIN — DEXTROSE MONOHYDRATE 50 ML/HR: 50 INJECTION, SOLUTION INTRAVENOUS at 16:09

## 2022-01-01 RX ADMIN — DEXTROSE MONOHYDRATE 50 ML/HR: 50 INJECTION, SOLUTION INTRAVENOUS at 19:39

## 2022-01-01 RX ADMIN — SODIUM CHLORIDE, PRESERVATIVE FREE 10 ML: 5 INJECTION INTRAVENOUS at 17:36

## 2022-01-01 RX ADMIN — HYDRALAZINE HYDROCHLORIDE 25 MG: 25 TABLET, FILM COATED ORAL at 17:16

## 2022-01-01 RX ADMIN — FAMOTIDINE 20 MG: 20 TABLET ORAL at 10:03

## 2022-01-01 RX ADMIN — Medication 5 MG: at 22:06

## 2022-01-01 RX ADMIN — OXYCODONE HYDROCHLORIDE 5 MG: 5 TABLET ORAL at 03:41

## 2022-01-01 RX ADMIN — SENNOSIDES AND DOCUSATE SODIUM 1 TABLET: 50; 8.6 TABLET ORAL at 10:01

## 2022-01-01 RX ADMIN — HEPARIN SODIUM 5000 UNITS: 5000 INJECTION INTRAVENOUS; SUBCUTANEOUS at 06:36

## 2022-01-01 RX ADMIN — MULTIPLE VITAMINS W/ MINERALS TAB 1 TABLET: TAB at 09:45

## 2022-01-01 RX ADMIN — METOPROLOL TARTRATE 12.5 MG: 25 TABLET, FILM COATED ORAL at 19:13

## 2022-01-01 RX ADMIN — SODIUM CHLORIDE, POTASSIUM CHLORIDE, SODIUM LACTATE AND CALCIUM CHLORIDE 75 ML/HR: 600; 310; 30; 20 INJECTION, SOLUTION INTRAVENOUS at 16:50

## 2022-01-01 RX ADMIN — NYSTATIN: 100000 POWDER TOPICAL at 17:44

## 2022-01-01 RX ADMIN — ACETAMINOPHEN 650 MG: 325 TABLET, FILM COATED ORAL at 12:17

## 2022-01-01 RX ADMIN — FAMOTIDINE 20 MG: 20 TABLET ORAL at 10:20

## 2022-01-01 RX ADMIN — ACETAMINOPHEN 650 MG: 325 TABLET, FILM COATED ORAL at 12:23

## 2022-01-01 RX ADMIN — MULTIPLE VITAMINS W/ MINERALS TAB 1 TABLET: TAB at 10:01

## 2022-01-01 RX ADMIN — VANCOMYCIN HYDROCHLORIDE 2000 MG: 10 INJECTION, POWDER, LYOPHILIZED, FOR SOLUTION INTRAVENOUS at 23:17

## 2022-01-01 RX ADMIN — FAMOTIDINE 20 MG: 20 TABLET ORAL at 10:01

## 2022-01-01 RX ADMIN — SODIUM CHLORIDE, PRESERVATIVE FREE 10 ML: 5 INJECTION INTRAVENOUS at 14:01

## 2022-01-01 RX ADMIN — DEXTROSE MONOHYDRATE 50 ML/HR: 50 INJECTION, SOLUTION INTRAVENOUS at 15:03

## 2022-01-01 RX ADMIN — Medication: at 22:08

## 2022-01-01 RX ADMIN — PIPERACILLIN AND TAZOBACTAM 3.38 G: 3; .375 INJECTION, POWDER, FOR SOLUTION INTRAVENOUS at 23:18

## 2022-01-01 RX ADMIN — FUROSEMIDE 20 MG: 10 INJECTION, SOLUTION INTRAMUSCULAR; INTRAVENOUS at 12:53

## 2022-01-01 RX ADMIN — FLUCONAZOLE 200 MG: 2 INJECTION, SOLUTION INTRAVENOUS at 18:28

## 2022-01-01 RX ADMIN — MIDAZOLAM HYDROCHLORIDE 1 MG: 1 INJECTION, SOLUTION INTRAMUSCULAR; INTRAVENOUS at 10:46

## 2022-01-01 RX ADMIN — METOPROLOL TARTRATE 25 MG: 25 TABLET, FILM COATED ORAL at 08:52

## 2022-01-01 RX ADMIN — MIDAZOLAM HYDROCHLORIDE 0.5 MG: 1 INJECTION, SOLUTION INTRAMUSCULAR; INTRAVENOUS at 12:00

## 2022-01-01 RX ADMIN — FAMOTIDINE 20 MG: 20 TABLET ORAL at 10:53

## 2022-01-01 RX ADMIN — SODIUM CHLORIDE, PRESERVATIVE FREE 10 ML: 5 INJECTION INTRAVENOUS at 14:20

## 2022-01-01 RX ADMIN — METOPROLOL TARTRATE 12.5 MG: 25 TABLET, FILM COATED ORAL at 17:25

## 2022-01-01 RX ADMIN — NYSTATIN: 100000 POWDER TOPICAL at 22:22

## 2022-01-01 RX ADMIN — HYDRALAZINE HYDROCHLORIDE 25 MG: 25 TABLET, FILM COATED ORAL at 09:23

## 2022-01-01 RX ADMIN — MULTIPLE VITAMINS W/ MINERALS TAB 1 TABLET: TAB at 08:20

## 2022-01-01 RX ADMIN — SODIUM BICARBONATE 650 MG: 650 TABLET ORAL at 09:51

## 2022-01-01 RX ADMIN — HEPARIN SODIUM 5000 UNITS: 5000 INJECTION INTRAVENOUS; SUBCUTANEOUS at 22:27

## 2022-01-01 RX ADMIN — VANCOMYCIN HYDROCHLORIDE 750 MG: 750 INJECTION, POWDER, LYOPHILIZED, FOR SOLUTION INTRAVENOUS at 14:14

## 2022-01-01 RX ADMIN — MULTIPLE VITAMINS W/ MINERALS TAB 1 TABLET: TAB at 10:16

## 2022-01-01 RX ADMIN — SODIUM CHLORIDE, PRESERVATIVE FREE 10 ML: 5 INJECTION INTRAVENOUS at 13:55

## 2022-01-01 RX ADMIN — SENNOSIDES AND DOCUSATE SODIUM 1 TABLET: 50; 8.6 TABLET ORAL at 10:16

## 2022-01-01 RX ADMIN — SODIUM CHLORIDE, PRESERVATIVE FREE 40 MG: 5 INJECTION INTRAVENOUS at 10:38

## 2022-01-01 RX ADMIN — OXYCODONE HYDROCHLORIDE AND ACETAMINOPHEN 2 TABLET: 5; 325 TABLET ORAL at 00:18

## 2022-01-01 RX ADMIN — OXYCODONE HYDROCHLORIDE 5 MG: 5 TABLET ORAL at 14:09

## 2022-01-01 RX ADMIN — NYSTATIN: 100000 POWDER TOPICAL at 11:06

## 2022-01-01 RX ADMIN — SODIUM CHLORIDE, PRESERVATIVE FREE 10 ML: 5 INJECTION INTRAVENOUS at 15:20

## 2022-01-01 RX ADMIN — OXYCODONE HYDROCHLORIDE 5 MG: 5 TABLET ORAL at 03:32

## 2022-01-01 RX ADMIN — METOPROLOL TARTRATE 25 MG: 25 TABLET, FILM COATED ORAL at 19:14

## 2022-01-01 RX ADMIN — SODIUM CHLORIDE, PRESERVATIVE FREE 10 ML: 5 INJECTION INTRAVENOUS at 05:52

## 2022-01-01 RX ADMIN — POLYETHYLENE GLYCOL 3350 17 G: 17 POWDER, FOR SOLUTION ORAL at 17:33

## 2022-01-01 RX ADMIN — SODIUM CHLORIDE, PRESERVATIVE FREE 10 ML: 5 INJECTION INTRAVENOUS at 21:30

## 2022-01-01 RX ADMIN — POTASSIUM CHLORIDE 20 MEQ: 1500 TABLET, EXTENDED RELEASE ORAL at 16:35

## 2022-01-01 RX ADMIN — SENNOSIDES AND DOCUSATE SODIUM 1 TABLET: 50; 8.6 TABLET ORAL at 08:57

## 2022-01-01 RX ADMIN — SODIUM BICARBONATE 650 MG: 650 TABLET ORAL at 08:52

## 2022-01-01 RX ADMIN — SODIUM CHLORIDE, PRESERVATIVE FREE 10 ML: 5 INJECTION INTRAVENOUS at 22:22

## 2022-01-01 RX ADMIN — HEPARIN SODIUM 5000 UNITS: 5000 INJECTION INTRAVENOUS; SUBCUTANEOUS at 15:37

## 2022-01-01 RX ADMIN — SODIUM CHLORIDE, PRESERVATIVE FREE 10 ML: 5 INJECTION INTRAVENOUS at 13:03

## 2022-01-01 RX ADMIN — Medication 5 MG: at 22:26

## 2022-01-01 RX ADMIN — METOPROLOL TARTRATE 25 MG: 25 TABLET, FILM COATED ORAL at 18:02

## 2022-01-01 RX ADMIN — SODIUM CHLORIDE, PRESERVATIVE FREE 10 ML: 5 INJECTION INTRAVENOUS at 22:49

## 2022-01-01 RX ADMIN — ACETAMINOPHEN 500 MG: 500 TABLET ORAL at 22:52

## 2022-01-01 RX ADMIN — SODIUM CHLORIDE, POTASSIUM CHLORIDE, SODIUM LACTATE AND CALCIUM CHLORIDE 75 ML/HR: 600; 310; 30; 20 INJECTION, SOLUTION INTRAVENOUS at 11:27

## 2022-01-01 RX ADMIN — NYSTATIN: 100000 POWDER TOPICAL at 20:09

## 2022-01-01 RX ADMIN — DEXTROSE MONOHYDRATE 50 ML/HR: 50 INJECTION, SOLUTION INTRAVENOUS at 10:47

## 2022-01-01 RX ADMIN — HYDRALAZINE HYDROCHLORIDE 25 MG: 25 TABLET, FILM COATED ORAL at 09:51

## 2022-01-01 RX ADMIN — HEPARIN SODIUM 5000 UNITS: 5000 INJECTION INTRAVENOUS; SUBCUTANEOUS at 21:44

## 2022-01-01 RX ADMIN — OXYCODONE HYDROCHLORIDE 10 MG: 5 TABLET ORAL at 22:48

## 2022-01-01 RX ADMIN — Medication 1 CAPSULE: at 09:35

## 2022-01-01 RX ADMIN — SODIUM CHLORIDE, PRESERVATIVE FREE 10 ML: 5 INJECTION INTRAVENOUS at 21:18

## 2022-01-01 RX ADMIN — SODIUM BICARBONATE 650 MG: 650 TABLET ORAL at 08:56

## 2022-01-01 RX ADMIN — HYDRALAZINE HYDROCHLORIDE 25 MG: 25 TABLET, FILM COATED ORAL at 22:27

## 2022-01-01 RX ADMIN — HYDRALAZINE HYDROCHLORIDE 25 MG: 25 TABLET, FILM COATED ORAL at 21:42

## 2022-01-01 RX ADMIN — PIPERACILLIN AND TAZOBACTAM 3.38 G: 3; .375 INJECTION, POWDER, FOR SOLUTION INTRAVENOUS at 21:59

## 2022-01-01 RX ADMIN — SODIUM CHLORIDE 75 ML/HR: 9 INJECTION, SOLUTION INTRAVENOUS at 02:36

## 2022-01-01 RX ADMIN — SODIUM BICARBONATE 650 MG: 650 TABLET ORAL at 18:02

## 2022-01-01 RX ADMIN — Medication 5 MG: at 22:14

## 2022-01-01 RX ADMIN — SODIUM BICARBONATE 1300 MG: 650 TABLET ORAL at 17:25

## 2022-01-01 RX ADMIN — SODIUM CHLORIDE, PRESERVATIVE FREE 40 MG: 5 INJECTION INTRAVENOUS at 22:05

## 2022-01-01 RX ADMIN — ACETAMINOPHEN 650 MG: 325 TABLET, FILM COATED ORAL at 03:36

## 2022-01-01 RX ADMIN — Medication 1 CAPSULE: at 10:03

## 2022-01-01 RX ADMIN — MULTIPLE VITAMINS W/ MINERALS TAB 1 TABLET: TAB at 09:51

## 2022-01-01 RX ADMIN — FENTANYL CITRATE 50 MCG: 50 INJECTION INTRAMUSCULAR; INTRAVENOUS at 17:40

## 2022-01-01 RX ADMIN — OXYCODONE HYDROCHLORIDE 10 MG: 5 TABLET ORAL at 08:59

## 2022-01-01 RX ADMIN — VANCOMYCIN HYDROCHLORIDE 1250 MG: 10 INJECTION, POWDER, LYOPHILIZED, FOR SOLUTION INTRAVENOUS at 14:00

## 2022-01-01 RX ADMIN — MULTIPLE VITAMINS W/ MINERALS TAB 1 TABLET: TAB at 10:19

## 2022-01-01 RX ADMIN — OXYCODONE HYDROCHLORIDE 5 MG: 5 TABLET ORAL at 08:38

## 2022-01-01 RX ADMIN — METOPROLOL TARTRATE 12.5 MG: 25 TABLET, FILM COATED ORAL at 10:01

## 2022-01-01 RX ADMIN — SODIUM CHLORIDE, PRESERVATIVE FREE 10 ML: 5 INJECTION INTRAVENOUS at 19:39

## 2022-01-01 RX ADMIN — Medication 5 MG: at 22:43

## 2022-01-01 RX ADMIN — FAMOTIDINE 20 MG: 20 TABLET ORAL at 09:43

## 2022-01-01 RX ADMIN — SIMETHICONE 80 MG: 80 TABLET, CHEWABLE ORAL at 12:43

## 2022-01-01 RX ADMIN — HYDRALAZINE HYDROCHLORIDE 25 MG: 25 TABLET, FILM COATED ORAL at 23:14

## 2022-01-01 RX ADMIN — Medication 1 CAPSULE: at 08:58

## 2022-01-01 RX ADMIN — NYSTATIN: 100000 POWDER TOPICAL at 17:19

## 2022-01-01 RX ADMIN — MIDAZOLAM 1 MG: 1 INJECTION INTRAMUSCULAR; INTRAVENOUS at 17:40

## 2022-01-01 RX ADMIN — SODIUM CHLORIDE, PRESERVATIVE FREE 10 ML: 5 INJECTION INTRAVENOUS at 05:53

## 2022-01-01 RX ADMIN — SENNOSIDES AND DOCUSATE SODIUM 1 TABLET: 50; 8.6 TABLET ORAL at 09:23

## 2022-01-01 RX ADMIN — SODIUM CHLORIDE 75 ML/HR: 9 INJECTION, SOLUTION INTRAVENOUS at 17:28

## 2022-01-01 RX ADMIN — Medication 1 CAPSULE: at 17:15

## 2022-01-01 RX ADMIN — HEPARIN SODIUM 5000 UNITS: 5000 INJECTION INTRAVENOUS; SUBCUTANEOUS at 13:35

## 2022-01-01 RX ADMIN — METOPROLOL TARTRATE 12.5 MG: 25 TABLET, FILM COATED ORAL at 21:33

## 2022-01-01 RX ADMIN — FAMOTIDINE 20 MG: 20 TABLET ORAL at 09:51

## 2022-01-01 RX ADMIN — NYSTATIN: 100000 POWDER TOPICAL at 09:24

## 2022-01-01 RX ADMIN — SODIUM CHLORIDE, POTASSIUM CHLORIDE, SODIUM LACTATE AND CALCIUM CHLORIDE 75 ML/HR: 600; 310; 30; 20 INJECTION, SOLUTION INTRAVENOUS at 05:25

## 2022-01-01 RX ADMIN — SENNOSIDES AND DOCUSATE SODIUM 1 TABLET: 50; 8.6 TABLET ORAL at 10:19

## 2022-01-01 RX ADMIN — SODIUM CHLORIDE, PRESERVATIVE FREE 10 ML: 5 INJECTION INTRAVENOUS at 23:19

## 2022-01-01 RX ADMIN — SODIUM CHLORIDE, PRESERVATIVE FREE 10 ML: 5 INJECTION INTRAVENOUS at 06:12

## 2022-01-01 RX ADMIN — DEXTROSE MONOHYDRATE 50 ML/HR: 50 INJECTION, SOLUTION INTRAVENOUS at 02:35

## 2022-01-01 RX ADMIN — IRON SUCROSE 200 MG: 20 INJECTION, SOLUTION INTRAVENOUS at 10:35

## 2022-01-01 RX ADMIN — Medication 1 CAPSULE: at 09:54

## 2022-01-01 RX ADMIN — HYDRALAZINE HYDROCHLORIDE 25 MG: 25 TABLET, FILM COATED ORAL at 22:40

## 2022-01-01 RX ADMIN — METOPROLOL TARTRATE 1.25 MG: 5 INJECTION INTRAVENOUS at 23:57

## 2022-01-01 RX ADMIN — SODIUM CHLORIDE, PRESERVATIVE FREE 10 ML: 5 INJECTION INTRAVENOUS at 21:21

## 2022-01-01 RX ADMIN — SENNOSIDES AND DOCUSATE SODIUM 1 TABLET: 50; 8.6 TABLET ORAL at 09:06

## 2022-01-01 RX ADMIN — FLUCONAZOLE 200 MG: 2 INJECTION, SOLUTION INTRAVENOUS at 18:04

## 2022-01-01 RX ADMIN — METOPROLOL TARTRATE 12.5 MG: 25 TABLET, FILM COATED ORAL at 09:48

## 2022-01-01 RX ADMIN — METOPROLOL TARTRATE 1.25 MG: 5 INJECTION INTRAVENOUS at 12:53

## 2022-01-01 RX ADMIN — Medication 1 CAPSULE: at 10:01

## 2022-01-01 RX ADMIN — PIPERACILLIN AND TAZOBACTAM 3.38 G: 3; .375 INJECTION, POWDER, FOR SOLUTION INTRAVENOUS at 09:23

## 2022-01-01 RX ADMIN — SODIUM CHLORIDE, PRESERVATIVE FREE 10 ML: 5 INJECTION INTRAVENOUS at 22:23

## 2022-01-01 RX ADMIN — DEXTROSE MONOHYDRATE 50 ML/HR: 50 INJECTION, SOLUTION INTRAVENOUS at 17:30

## 2022-01-01 RX ADMIN — Medication 1 CAPSULE: at 09:06

## 2022-01-01 RX ADMIN — NYSTATIN: 100000 POWDER TOPICAL at 09:44

## 2022-01-01 RX ADMIN — MIDAZOLAM HYDROCHLORIDE 0.5 MG: 1 INJECTION, SOLUTION INTRAMUSCULAR; INTRAVENOUS at 12:15

## 2022-01-01 RX ADMIN — HYDRALAZINE HYDROCHLORIDE 25 MG: 25 TABLET, FILM COATED ORAL at 22:51

## 2022-01-01 RX ADMIN — OXYCODONE HYDROCHLORIDE 10 MG: 5 TABLET ORAL at 08:40

## 2022-01-01 RX ADMIN — IRON SUCROSE 300 MG: 20 INJECTION, SOLUTION INTRAVENOUS at 17:55

## 2022-01-01 RX ADMIN — OXYCODONE HYDROCHLORIDE 5 MG: 5 TABLET ORAL at 15:17

## 2022-01-01 RX ADMIN — HYDRALAZINE HYDROCHLORIDE 25 MG: 25 TABLET, FILM COATED ORAL at 09:06

## 2022-01-01 RX ADMIN — SENNOSIDES AND DOCUSATE SODIUM 1 TABLET: 50; 8.6 TABLET ORAL at 09:43

## 2022-01-01 RX ADMIN — METOPROLOL TARTRATE 25 MG: 25 TABLET, FILM COATED ORAL at 17:55

## 2022-01-01 RX ADMIN — VANCOMYCIN HYDROCHLORIDE 1250 MG: 10 INJECTION, POWDER, LYOPHILIZED, FOR SOLUTION INTRAVENOUS at 12:19

## 2022-01-01 RX ADMIN — Medication: at 09:01

## 2022-01-01 RX ADMIN — FENTANYL CITRATE 50 MCG: 50 INJECTION INTRAMUSCULAR; INTRAVENOUS at 17:30

## 2022-01-01 RX ADMIN — OXYCODONE HYDROCHLORIDE 5 MG: 5 TABLET ORAL at 10:22

## 2022-01-01 RX ADMIN — SODIUM CHLORIDE, PRESERVATIVE FREE 10 ML: 5 INJECTION INTRAVENOUS at 07:49

## 2022-01-01 RX ADMIN — HYDRALAZINE HYDROCHLORIDE 25 MG: 25 TABLET, FILM COATED ORAL at 09:00

## 2022-01-01 RX ADMIN — SODIUM CHLORIDE, PRESERVATIVE FREE 10 ML: 5 INJECTION INTRAVENOUS at 20:05

## 2022-01-01 RX ADMIN — METOPROLOL TARTRATE 12.5 MG: 25 TABLET, FILM COATED ORAL at 17:36

## 2022-01-01 RX ADMIN — SODIUM BICARBONATE 1300 MG: 650 TABLET ORAL at 17:15

## 2022-01-01 RX ADMIN — FAMOTIDINE 20 MG: 20 TABLET ORAL at 08:29

## 2022-01-01 RX ADMIN — MULTIPLE VITAMINS W/ MINERALS TAB 1 TABLET: TAB at 09:00

## 2022-01-01 RX ADMIN — NYSTATIN: 100000 POWDER TOPICAL at 15:50

## 2022-01-01 RX ADMIN — FAMOTIDINE 20 MG: 20 TABLET ORAL at 09:48

## 2022-01-01 RX ADMIN — HEPARIN SODIUM 5000 UNITS: 5000 INJECTION INTRAVENOUS; SUBCUTANEOUS at 05:10

## 2022-01-01 RX ADMIN — ONDANSETRON 4 MG: 2 INJECTION INTRAMUSCULAR; INTRAVENOUS at 11:47

## 2022-01-01 RX ADMIN — SODIUM CHLORIDE, PRESERVATIVE FREE 10 ML: 5 INJECTION INTRAVENOUS at 15:42

## 2022-01-01 RX ADMIN — MIDAZOLAM HYDROCHLORIDE 1 MG: 1 INJECTION, SOLUTION INTRAMUSCULAR; INTRAVENOUS at 10:35

## 2022-01-01 RX ADMIN — SODIUM CHLORIDE, PRESERVATIVE FREE 10 ML: 5 INJECTION INTRAVENOUS at 06:03

## 2022-01-01 RX ADMIN — NYSTATIN: 100000 POWDER TOPICAL at 15:44

## 2022-01-01 RX ADMIN — FENTANYL CITRATE 25 MCG: 50 INJECTION INTRAMUSCULAR; INTRAVENOUS at 12:20

## 2022-01-01 RX ADMIN — MORPHINE SULFATE 2 MG: 2 INJECTION, SOLUTION INTRAMUSCULAR; INTRAVENOUS at 09:56

## 2022-01-01 RX ADMIN — NYSTATIN: 100000 POWDER TOPICAL at 22:15

## 2022-01-01 RX ADMIN — HYDRALAZINE HYDROCHLORIDE 25 MG: 25 TABLET, FILM COATED ORAL at 22:21

## 2022-01-01 RX ADMIN — SODIUM CHLORIDE, PRESERVATIVE FREE 10 ML: 5 INJECTION INTRAVENOUS at 05:37

## 2022-01-01 RX ADMIN — SODIUM BICARBONATE 650 MG: 650 TABLET ORAL at 08:19

## 2022-01-01 RX ADMIN — SODIUM BICARBONATE 650 MG: 650 TABLET ORAL at 10:16

## 2022-01-01 RX ADMIN — PHYTONADIONE 10 MG: 10 INJECTION, EMULSION INTRAMUSCULAR; INTRAVENOUS; SUBCUTANEOUS at 17:29

## 2022-01-01 RX ADMIN — SODIUM CHLORIDE, PRESERVATIVE FREE 10 ML: 5 INJECTION INTRAVENOUS at 06:23

## 2022-01-01 RX ADMIN — NYSTATIN: 100000 POWDER TOPICAL at 09:09

## 2022-01-01 RX ADMIN — PIPERACILLIN AND TAZOBACTAM 3.38 G: 3; .375 INJECTION, POWDER, FOR SOLUTION INTRAVENOUS at 22:43

## 2022-01-01 RX ADMIN — SODIUM CHLORIDE, PRESERVATIVE FREE 40 MG: 5 INJECTION INTRAVENOUS at 08:11

## 2022-01-01 RX ADMIN — SODIUM BICARBONATE 650 MG: 650 TABLET ORAL at 17:02

## 2022-01-01 RX ADMIN — SODIUM CHLORIDE, PRESERVATIVE FREE 10 ML: 5 INJECTION INTRAVENOUS at 09:36

## 2022-01-01 RX ADMIN — NYSTATIN: 100000 POWDER TOPICAL at 10:22

## 2022-01-01 RX ADMIN — OXYCODONE HYDROCHLORIDE 10 MG: 5 TABLET ORAL at 21:15

## 2022-01-01 RX ADMIN — SODIUM CHLORIDE, PRESERVATIVE FREE 10 ML: 5 INJECTION INTRAVENOUS at 23:17

## 2022-01-01 RX ADMIN — SODIUM CHLORIDE, PRESERVATIVE FREE 10 ML: 5 INJECTION INTRAVENOUS at 22:11

## 2022-01-01 RX ADMIN — NYSTATIN: 100000 POWDER TOPICAL at 22:27

## 2022-01-01 RX ADMIN — Medication 1 CAPSULE: at 10:16

## 2022-01-01 RX ADMIN — NYSTATIN: 100000 POWDER TOPICAL at 22:42

## 2022-01-01 RX ADMIN — NYSTATIN: 100000 POWDER TOPICAL at 08:36

## 2022-01-01 RX ADMIN — SODIUM BICARBONATE 1300 MG: 650 TABLET ORAL at 10:01

## 2022-01-01 RX ADMIN — IRON SUCROSE 200 MG: 20 INJECTION, SOLUTION INTRAVENOUS at 15:41

## 2022-01-01 RX ADMIN — NYSTATIN: 100000 POWDER TOPICAL at 09:54

## 2022-01-01 RX ADMIN — NYSTATIN: 100000 POWDER TOPICAL at 08:53

## 2022-01-01 RX ADMIN — HYDRALAZINE HYDROCHLORIDE 25 MG: 25 TABLET, FILM COATED ORAL at 21:11

## 2022-01-01 RX ADMIN — HYDRALAZINE HYDROCHLORIDE 25 MG: 25 TABLET, FILM COATED ORAL at 15:48

## 2022-01-01 RX ADMIN — METOPROLOL TARTRATE 12.5 MG: 25 TABLET, FILM COATED ORAL at 10:20

## 2022-01-01 RX ADMIN — MULTIPLE VITAMINS W/ MINERALS TAB 1 TABLET: TAB at 08:52

## 2022-01-01 RX ADMIN — HYDRALAZINE HYDROCHLORIDE 25 MG: 25 TABLET, FILM COATED ORAL at 09:43

## 2022-01-01 RX ADMIN — METOPROLOL TARTRATE 12.5 MG: 25 TABLET, FILM COATED ORAL at 16:34

## 2022-01-01 RX ADMIN — SODIUM CHLORIDE 75 ML/HR: 4.5 INJECTION, SOLUTION INTRAVENOUS at 00:06

## 2022-01-01 RX ADMIN — SODIUM CHLORIDE, PRESERVATIVE FREE 10 ML: 5 INJECTION INTRAVENOUS at 21:16

## 2022-01-01 RX ADMIN — MORPHINE SULFATE 1 MG: 2 INJECTION, SOLUTION INTRAMUSCULAR; INTRAVENOUS at 04:25

## 2022-01-01 RX ADMIN — NYSTATIN: 100000 POWDER TOPICAL at 19:13

## 2022-01-01 RX ADMIN — FAMOTIDINE 20 MG: 20 TABLET ORAL at 09:23

## 2022-01-01 RX ADMIN — SODIUM CHLORIDE, PRESERVATIVE FREE 40 MG: 5 INJECTION INTRAVENOUS at 20:54

## 2022-01-01 RX ADMIN — MORPHINE SULFATE 2 MG: 2 INJECTION, SOLUTION INTRAMUSCULAR; INTRAVENOUS at 09:47

## 2022-01-01 RX ADMIN — HEPARIN SODIUM 5000 UNITS: 5000 INJECTION INTRAVENOUS; SUBCUTANEOUS at 06:16

## 2022-01-01 RX ADMIN — SODIUM BICARBONATE 1300 MG: 650 TABLET ORAL at 10:03

## 2022-01-01 RX ADMIN — SODIUM BICARBONATE 650 MG: 650 TABLET ORAL at 09:07

## 2022-01-01 RX ADMIN — SODIUM CHLORIDE, PRESERVATIVE FREE 10 ML: 5 INJECTION INTRAVENOUS at 06:17

## 2022-01-01 RX ADMIN — SODIUM BICARBONATE 650 MG: 650 TABLET ORAL at 09:43

## 2022-01-01 RX ADMIN — OXYCODONE HYDROCHLORIDE 10 MG: 5 TABLET ORAL at 22:22

## 2022-01-01 RX ADMIN — OXYCODONE HYDROCHLORIDE 5 MG: 5 TABLET ORAL at 23:14

## 2022-01-01 RX ADMIN — OXYCODONE HYDROCHLORIDE 5 MG: 5 TABLET ORAL at 03:38

## 2022-01-01 RX ADMIN — POTASSIUM CHLORIDE AND SODIUM CHLORIDE: 450; 150 INJECTION, SOLUTION INTRAVENOUS at 19:22

## 2022-01-01 RX ADMIN — NYSTATIN: 100000 POWDER TOPICAL at 17:55

## 2022-01-01 RX ADMIN — Medication 1 CAPSULE: at 08:56

## 2022-01-01 RX ADMIN — PIPERACILLIN AND TAZOBACTAM 3.38 G: 3; .375 INJECTION, POWDER, FOR SOLUTION INTRAVENOUS at 09:50

## 2022-01-01 RX ADMIN — IOHEXOL 50 ML: 300 INJECTION, SOLUTION INTRAVENOUS at 10:52

## 2022-01-01 RX ADMIN — OXYCODONE HYDROCHLORIDE 5 MG: 5 TABLET ORAL at 17:43

## 2022-01-01 RX ADMIN — OXYCODONE HYDROCHLORIDE 10 MG: 5 TABLET ORAL at 08:52

## 2022-01-01 RX ADMIN — OXYCODONE HYDROCHLORIDE 10 MG: 5 TABLET ORAL at 06:28

## 2022-01-01 RX ADMIN — LEVOFLOXACIN 500 MG: 5 INJECTION, SOLUTION INTRAVENOUS at 17:52

## 2022-01-01 RX ADMIN — HYDRALAZINE HYDROCHLORIDE 25 MG: 25 TABLET, FILM COATED ORAL at 21:57

## 2022-01-01 RX ADMIN — NYSTATIN: 100000 POWDER TOPICAL at 23:45

## 2022-01-01 RX ADMIN — MIDAZOLAM 1 MG: 1 INJECTION INTRAMUSCULAR; INTRAVENOUS at 17:30

## 2022-01-01 RX ADMIN — SODIUM CHLORIDE 1000 ML: 9 INJECTION, SOLUTION INTRAVENOUS at 16:17

## 2022-01-01 RX ADMIN — SODIUM CHLORIDE, PRESERVATIVE FREE 10 ML: 5 INJECTION INTRAVENOUS at 22:00

## 2022-01-01 RX ADMIN — LIDOCAINE HYDROCHLORIDE 20 MG: 20 INJECTION, SOLUTION EPIDURAL; INFILTRATION; INTRACAUDAL; PERINEURAL at 11:38

## 2022-01-01 RX ADMIN — METOPROLOL TARTRATE 5 MG: 5 INJECTION, SOLUTION INTRAVENOUS at 14:06

## 2022-01-01 RX ADMIN — ONDANSETRON 4 MG: 2 INJECTION INTRAMUSCULAR; INTRAVENOUS at 16:17

## 2022-01-01 RX ADMIN — NYSTATIN: 100000 POWDER TOPICAL at 21:24

## 2022-01-01 RX ADMIN — SODIUM CHLORIDE, PRESERVATIVE FREE 40 MG: 5 INJECTION INTRAVENOUS at 21:30

## 2022-01-01 RX ADMIN — SODIUM CHLORIDE, PRESERVATIVE FREE 10 ML: 5 INJECTION INTRAVENOUS at 07:00

## 2022-01-01 RX ADMIN — FENTANYL CITRATE 50 MCG: 50 INJECTION, SOLUTION INTRAMUSCULAR; INTRAVENOUS at 10:45

## 2022-01-01 RX ADMIN — Medication 1 CAPSULE: at 09:00

## 2022-01-01 RX ADMIN — FENTANYL CITRATE 50 MCG: 50 INJECTION, SOLUTION INTRAMUSCULAR; INTRAVENOUS at 11:44

## 2022-01-01 RX ADMIN — MORPHINE SULFATE 1 MG: 2 INJECTION, SOLUTION INTRAMUSCULAR; INTRAVENOUS at 18:24

## 2022-01-01 RX ADMIN — Medication 1 CAPSULE: at 09:07

## 2022-01-01 RX ADMIN — Medication 5 MG: at 22:49

## 2022-01-01 RX ADMIN — PANTOPRAZOLE SODIUM 40 MG: 40 INJECTION, POWDER, FOR SOLUTION INTRAVENOUS at 00:19

## 2022-01-01 RX ADMIN — SODIUM CHLORIDE, PRESERVATIVE FREE 10 ML: 5 INJECTION INTRAVENOUS at 23:55

## 2022-01-01 RX ADMIN — IRON SUCROSE 200 MG: 20 INJECTION, SOLUTION INTRAVENOUS at 10:53

## 2022-01-01 RX ADMIN — SENNOSIDES AND DOCUSATE SODIUM 1 TABLET: 50; 8.6 TABLET ORAL at 09:51

## 2022-01-01 RX ADMIN — OXYCODONE HYDROCHLORIDE 10 MG: 5 TABLET ORAL at 07:39

## 2022-01-01 RX ADMIN — HYDRALAZINE HYDROCHLORIDE 25 MG: 25 TABLET, FILM COATED ORAL at 16:51

## 2022-01-01 RX ADMIN — OXYCODONE HYDROCHLORIDE 10 MG: 5 TABLET ORAL at 17:19

## 2022-01-01 RX ADMIN — SODIUM CHLORIDE, PRESERVATIVE FREE 10 ML: 5 INJECTION INTRAVENOUS at 09:54

## 2022-01-01 RX ADMIN — HEPARIN SODIUM 5000 UNITS: 5000 INJECTION INTRAVENOUS; SUBCUTANEOUS at 23:19

## 2022-01-01 RX ADMIN — IRON SUCROSE 200 MG: 20 INJECTION, SOLUTION INTRAVENOUS at 09:34

## 2022-01-01 RX ADMIN — SODIUM CHLORIDE, PRESERVATIVE FREE 10 ML: 5 INJECTION INTRAVENOUS at 21:47

## 2022-01-01 RX ADMIN — HYDRALAZINE HYDROCHLORIDE 25 MG: 25 TABLET, FILM COATED ORAL at 17:18

## 2022-01-01 RX ADMIN — HEPARIN SODIUM 5000 UNITS: 5000 INJECTION INTRAVENOUS; SUBCUTANEOUS at 22:43

## 2022-01-01 RX ADMIN — MULTIPLE VITAMINS W/ MINERALS TAB 1 TABLET: TAB at 08:29

## 2022-01-01 RX ADMIN — METOPROLOL TARTRATE 12.5 MG: 25 TABLET, FILM COATED ORAL at 19:08

## 2022-01-01 RX ADMIN — METOPROLOL TARTRATE 1.25 MG: 5 INJECTION INTRAVENOUS at 18:23

## 2022-01-01 RX ADMIN — SENNOSIDES AND DOCUSATE SODIUM 1 TABLET: 50; 8.6 TABLET ORAL at 08:56

## 2022-01-01 RX ADMIN — IRON SUCROSE 300 MG: 20 INJECTION, SOLUTION INTRAVENOUS at 17:36

## 2022-01-01 RX ADMIN — SENNOSIDES AND DOCUSATE SODIUM 1 TABLET: 50; 8.6 TABLET ORAL at 10:03

## 2022-01-01 RX ADMIN — MORPHINE SULFATE 1 MG: 2 INJECTION, SOLUTION INTRAMUSCULAR; INTRAVENOUS at 02:30

## 2022-01-01 RX ADMIN — FAMOTIDINE 20 MG: 20 TABLET ORAL at 09:46

## 2022-01-01 RX ADMIN — SENNOSIDES AND DOCUSATE SODIUM 1 TABLET: 50; 8.6 TABLET ORAL at 08:29

## 2022-01-01 RX ADMIN — NYSTATIN: 100000 POWDER TOPICAL at 08:44

## 2022-01-01 RX ADMIN — OXYCODONE HYDROCHLORIDE 5 MG: 5 TABLET ORAL at 10:03

## 2022-01-01 RX ADMIN — SODIUM BICARBONATE 650 MG: 650 TABLET ORAL at 17:12

## 2022-01-01 RX ADMIN — FENTANYL CITRATE 25 MCG: 50 INJECTION, SOLUTION INTRAMUSCULAR; INTRAVENOUS at 12:00

## 2022-01-01 RX ADMIN — OXYCODONE HYDROCHLORIDE 5 MG: 5 TABLET ORAL at 20:07

## 2022-01-01 RX ADMIN — METOPROLOL TARTRATE 5 MG: 5 INJECTION, SOLUTION INTRAVENOUS at 21:46

## 2022-01-01 RX ADMIN — PIPERACILLIN AND TAZOBACTAM 3.38 G: 3; .375 INJECTION, POWDER, FOR SOLUTION INTRAVENOUS at 23:17

## 2022-01-01 RX ADMIN — HYDRALAZINE HYDROCHLORIDE 25 MG: 25 TABLET, FILM COATED ORAL at 10:01

## 2022-01-01 RX ADMIN — METOPROLOL TARTRATE 1.25 MG: 5 INJECTION INTRAVENOUS at 05:51

## 2022-01-01 RX ADMIN — NYSTATIN: 100000 POWDER TOPICAL at 22:53

## 2022-01-01 RX ADMIN — OXYCODONE HYDROCHLORIDE 10 MG: 5 TABLET ORAL at 10:27

## 2022-01-01 RX ADMIN — HEPARIN SODIUM 5000 UNITS: 5000 INJECTION INTRAVENOUS; SUBCUTANEOUS at 07:00

## 2022-01-01 RX ADMIN — NYSTATIN: 100000 POWDER TOPICAL at 17:16

## 2022-01-01 RX ADMIN — NYSTATIN: 100000 POWDER TOPICAL at 00:07

## 2022-01-01 RX ADMIN — OXYCODONE HYDROCHLORIDE 5 MG: 5 TABLET ORAL at 21:42

## 2022-01-01 RX ADMIN — WATER 1 G: 1 INJECTION INTRAMUSCULAR; INTRAVENOUS; SUBCUTANEOUS at 11:50

## 2022-01-01 RX ADMIN — Medication: at 08:47

## 2022-01-01 RX ADMIN — METOPROLOL TARTRATE 12.5 MG: 25 TABLET, FILM COATED ORAL at 09:43

## 2022-01-01 RX ADMIN — METOPROLOL TARTRATE 50 MG: 50 TABLET, FILM COATED ORAL at 17:18

## 2022-01-01 RX ADMIN — METOPROLOL TARTRATE 12.5 MG: 25 TABLET, FILM COATED ORAL at 09:23

## 2022-01-01 RX ADMIN — SODIUM CHLORIDE 75 ML/HR: 9 INJECTION, SOLUTION INTRAVENOUS at 22:40

## 2022-01-01 RX ADMIN — SODIUM CHLORIDE, PRESERVATIVE FREE 10 ML: 5 INJECTION INTRAVENOUS at 18:02

## 2022-01-01 RX ADMIN — SODIUM CHLORIDE, PRESERVATIVE FREE 10 ML: 5 INJECTION INTRAVENOUS at 04:18

## 2022-01-01 RX ADMIN — SODIUM CHLORIDE, PRESERVATIVE FREE 10 ML: 5 INJECTION INTRAVENOUS at 22:45

## 2022-01-01 RX ADMIN — SODIUM CHLORIDE, PRESERVATIVE FREE 10 ML: 5 INJECTION INTRAVENOUS at 02:37

## 2022-01-01 RX ADMIN — OXYCODONE HYDROCHLORIDE 10 MG: 5 TABLET ORAL at 13:16

## 2022-01-01 RX ADMIN — NYSTATIN: 100000 POWDER TOPICAL at 15:05

## 2022-01-01 RX ADMIN — NYSTATIN: 100000 POWDER TOPICAL at 17:28

## 2022-01-01 RX ADMIN — NYSTATIN: 100000 POWDER TOPICAL at 23:20

## 2022-01-01 RX ADMIN — FAMOTIDINE 20 MG: 20 TABLET ORAL at 08:56

## 2022-01-01 RX ADMIN — MULTIPLE VITAMINS W/ MINERALS TAB 1 TABLET: TAB at 10:52

## 2022-01-01 RX ADMIN — NYSTATIN: 100000 POWDER TOPICAL at 10:04

## 2022-01-01 RX ADMIN — OXYCODONE HYDROCHLORIDE 10 MG: 5 TABLET ORAL at 19:56

## 2022-01-01 RX ADMIN — HYDRALAZINE HYDROCHLORIDE 25 MG: 25 TABLET, FILM COATED ORAL at 22:14

## 2022-01-01 RX ADMIN — SODIUM CHLORIDE, PRESERVATIVE FREE 40 MG: 5 INJECTION INTRAVENOUS at 21:19

## 2022-01-01 RX ADMIN — NYSTATIN: 100000 POWDER TOPICAL at 11:21

## 2022-01-01 RX ADMIN — FAMOTIDINE 20 MG: 20 TABLET ORAL at 10:02

## 2022-01-01 RX ADMIN — MORPHINE SULFATE 2 MG: 2 INJECTION, SOLUTION INTRAMUSCULAR; INTRAVENOUS at 18:03

## 2022-01-01 RX ADMIN — NYSTATIN: 100000 POWDER TOPICAL at 17:38

## 2022-01-01 RX ADMIN — Medication 5 MG: at 21:58

## 2022-01-01 RX ADMIN — FAMOTIDINE 20 MG: 20 TABLET ORAL at 09:00

## 2022-01-01 RX ADMIN — CEFEPIME 2 G: 2 INJECTION, POWDER, FOR SOLUTION INTRAVENOUS at 08:00

## 2022-01-01 RX ADMIN — Medication 5 MG: at 21:15

## 2022-01-01 RX ADMIN — SODIUM CHLORIDE 250 ML: 9 INJECTION, SOLUTION INTRAVENOUS at 18:06

## 2022-01-01 RX ADMIN — SODIUM CHLORIDE, PRESERVATIVE FREE 10 ML: 5 INJECTION INTRAVENOUS at 15:05

## 2022-01-01 RX ADMIN — METOPROLOL TARTRATE 25 MG: 25 TABLET, FILM COATED ORAL at 17:38

## 2022-01-01 RX ADMIN — METOPROLOL TARTRATE 12.5 MG: 25 TABLET, FILM COATED ORAL at 22:05

## 2022-01-01 RX ADMIN — MIDAZOLAM HYDROCHLORIDE 1 MG: 1 INJECTION, SOLUTION INTRAMUSCULAR; INTRAVENOUS at 11:50

## 2022-01-01 RX ADMIN — NYSTATIN: 100000 POWDER TOPICAL at 09:41

## 2022-01-01 RX ADMIN — Medication 5 MG: at 22:00

## 2022-01-01 RX ADMIN — Medication 5 MG: at 21:44

## 2022-01-01 RX ADMIN — HYDRALAZINE HYDROCHLORIDE 25 MG: 25 TABLET, FILM COATED ORAL at 15:18

## 2022-01-01 RX ADMIN — SODIUM CHLORIDE, PRESERVATIVE FREE 10 ML: 5 INJECTION INTRAVENOUS at 16:50

## 2022-01-01 RX ADMIN — SODIUM CHLORIDE, PRESERVATIVE FREE 10 ML: 5 INJECTION INTRAVENOUS at 06:04

## 2022-01-01 RX ADMIN — METOPROLOL TARTRATE 12.5 MG: 25 TABLET, FILM COATED ORAL at 09:07

## 2022-01-01 RX ADMIN — MORPHINE SULFATE 1 MG: 2 INJECTION, SOLUTION INTRAMUSCULAR; INTRAVENOUS at 08:53

## 2022-01-01 RX ADMIN — SODIUM BICARBONATE 650 MG: 650 TABLET ORAL at 19:08

## 2022-01-01 RX ADMIN — OXYCODONE HYDROCHLORIDE 10 MG: 5 TABLET ORAL at 22:21

## 2022-01-01 RX ADMIN — HEPARIN SODIUM 5000 UNITS: 5000 INJECTION INTRAVENOUS; SUBCUTANEOUS at 14:00

## 2022-01-01 RX ADMIN — SODIUM CHLORIDE, PRESERVATIVE FREE 10 ML: 5 INJECTION INTRAVENOUS at 17:28

## 2022-01-01 RX ADMIN — SODIUM BICARBONATE 1300 MG: 650 TABLET ORAL at 17:43

## 2022-01-01 RX ADMIN — METOPROLOL TARTRATE 12.5 MG: 25 TABLET, FILM COATED ORAL at 17:27

## 2022-01-01 RX ADMIN — IRON SUCROSE 200 MG: 20 INJECTION, SOLUTION INTRAVENOUS at 10:21

## 2022-01-01 RX ADMIN — METOPROLOL TARTRATE 12.5 MG: 25 TABLET, FILM COATED ORAL at 17:30

## 2022-01-01 RX ADMIN — FENTANYL CITRATE 50 MCG: 50 INJECTION, SOLUTION INTRAMUSCULAR; INTRAVENOUS at 11:50

## 2022-01-01 RX ADMIN — MULTIPLE VITAMINS W/ MINERALS TAB 1 TABLET: TAB at 09:40

## 2022-01-01 RX ADMIN — SODIUM CHLORIDE, PRESERVATIVE FREE 10 ML: 5 INJECTION INTRAVENOUS at 21:20

## 2022-01-01 RX ADMIN — NYSTATIN: 100000 POWDER TOPICAL at 17:37

## 2022-01-01 RX ADMIN — PROMETHAZINE HYDROCHLORIDE 12.5 MG: 12.5 TABLET ORAL at 02:31

## 2022-01-01 RX ADMIN — Medication 1 CAPSULE: at 08:19

## 2022-01-01 RX ADMIN — CEFEPIME 2 G: 2 INJECTION, POWDER, FOR SOLUTION INTRAVENOUS at 10:21

## 2022-01-01 RX ADMIN — OXYCODONE HYDROCHLORIDE 10 MG: 5 TABLET ORAL at 16:55

## 2022-01-01 RX ADMIN — HEPARIN SODIUM 5000 UNITS: 5000 INJECTION INTRAVENOUS; SUBCUTANEOUS at 13:11

## 2022-07-05 NOTE — Clinical Note
[facility]  [address] [facility] EMERGENCY DEPT  [id number] [address] [phone]    Work/School Note    Date: 7/5/2022    To Whom It May concern:    Truman Kinney was seen and treated today in the emergency room by the following provider(s):  Attending Provider: Kayla Kahn DO  Physician Assistant: Tonja Mcnamara. Truman Kinney is excused from work/school on 07/05/22 and 07/06/22. She is medically clear to return to work/school on 7/7/2022.        Sincerely,          ARSALAN Hernandez

## 2022-07-05 NOTE — DISCHARGE INSTRUCTIONS
Follow-up with your primary care physician and gynecologist within 2 days for reassessment. Bring the results from this visit with you for their review. YOU WILL NEED FURTHER EVALUATION AND WORK-UP FOR THE ABNORMAL VAGINAL BLEEDING. Return to the ED immediately for any new, worsening, or persistent symptoms, including fever, vomiting, abdominal pain, or any other medical concerns.

## 2022-07-05 NOTE — ED TRIAGE NOTES
Patient states she has had light vaginal bleeding for a week. Today it gushed out with clots.   Described as dark

## 2022-07-05 NOTE — ED PROVIDER NOTES
EMERGENCY DEPARTMENT HISTORY AND PHYSICAL EXAM    7:59 AM      Date: 7/5/2022  Patient Name: Brandon Steiner    History of Presenting Illness     Chief Complaint   Patient presents with    Vaginal Bleeding       History Provided By: Patient, Son    Additional History (Context): Brandon Steiner is a 80 y.o. female with hx of HTN and other noted PMH who presents with c/o vaginal bleeding x 1 week. Pt notes mild spotting with increased bleeding and passage of clots today. Pt denies fever or chills, nausea or vomiting, abdominal pain, dysuria, weight loss. Denies history of fibroids, ovarian cyst, cancer. Denies blood thinner use. Patient notes menopause over 20 years ago. PCP: Marisa Shepard MD      Past History     Past Medical History:  Past Medical History:   Diagnosis Date    HTN (hypertension)        Past Surgical History:  History reviewed. No pertinent surgical history. Family History:  History reviewed. No pertinent family history. Social History:  Social History     Tobacco Use    Smoking status: Never Smoker    Smokeless tobacco: Not on file   Substance Use Topics    Alcohol use: Not Currently    Drug use: Not on file       Allergies:  No Known Allergies      Review of Systems       Review of Systems   Constitutional: Negative for chills and fever. Respiratory: Negative for shortness of breath. Cardiovascular: Negative for chest pain. Gastrointestinal: Negative for abdominal pain, nausea and vomiting. Genitourinary: Positive for vaginal bleeding. Skin: Negative for rash. Neurological: Negative for weakness. All other systems reviewed and are negative. Physical Exam     Visit Vitals  BP (!) 162/74 (BP 1 Location: Left upper arm)   Pulse 78   Temp 98.4 °F (36.9 °C)   Resp 17   Wt 101.2 kg (223 lb)   SpO2 98%   BMI 37.11 kg/m²         Physical Exam  Vitals and nursing note reviewed. Constitutional:       General: She is not in acute distress.      Appearance: Normal appearance. She is well-developed. She is not ill-appearing, toxic-appearing or diaphoretic. HENT:      Head: Normocephalic and atraumatic. Cardiovascular:      Rate and Rhythm: Normal rate and regular rhythm. Heart sounds: Normal heart sounds. No murmur heard. No friction rub. No gallop. Pulmonary:      Effort: Pulmonary effort is normal. No respiratory distress. Breath sounds: Normal breath sounds. No wheezing or rales. Abdominal:      General: Abdomen is flat. There is no distension. Palpations: Abdomen is soft. Tenderness: There is no abdominal tenderness. There is no right CVA tenderness, left CVA tenderness, guarding or rebound. Musculoskeletal:         General: Normal range of motion. Cervical back: Normal range of motion and neck supple. Skin:     General: Skin is warm. Findings: No rash. Neurological:      Mental Status: She is alert. Diagnostic Study Results     Labs -  Recent Results (from the past 12 hour(s))   CBC WITH AUTOMATED DIFF    Collection Time: 07/05/22  6:31 AM   Result Value Ref Range    WBC 8.7 4.6 - 13.2 K/uL    RBC 4.48 4.20 - 5.30 M/uL    HGB 11.8 (L) 12.0 - 16.0 g/dL    HCT 34.1 (L) 35.0 - 45.0 %    MCV 76.1 (L) 78.0 - 100.0 FL    MCH 26.3 24.0 - 34.0 PG    MCHC 34.6 31.0 - 37.0 g/dL    RDW 15.7 (H) 11.6 - 14.5 %    PLATELET 673 234 - 490 K/uL    MPV 10.6 9.2 - 11.8 FL    NRBC 0.0 0  WBC    ABSOLUTE NRBC 0.00 0.00 - 0.01 K/uL    NEUTROPHILS 63 40 - 73 %    LYMPHOCYTES 31 21 - 52 %    MONOCYTES 4 3 - 10 %    EOSINOPHILS 1 0 - 5 %    BASOPHILS 0 0 - 2 %    IMMATURE GRANULOCYTES 0 0.0 - 0.5 %    ABS. NEUTROPHILS 5.5 1.8 - 8.0 K/UL    ABS. LYMPHOCYTES 2.7 0.9 - 3.6 K/UL    ABS. MONOCYTES 0.4 0.05 - 1.2 K/UL    ABS. EOSINOPHILS 0.1 0.0 - 0.4 K/UL    ABS. BASOPHILS 0.0 0.0 - 0.1 K/UL    ABS. IMM.  GRANS. 0.0 0.00 - 0.04 K/UL    DF AUTOMATED     METABOLIC PANEL, COMPREHENSIVE    Collection Time: 07/05/22  6:31 AM   Result Value Ref Range    Sodium 140 136 - 145 mmol/L    Potassium 3.6 3.5 - 5.5 mmol/L    Chloride 104 100 - 111 mmol/L    CO2 30 21 - 32 mmol/L    Anion gap 6 3.0 - 18 mmol/L    Glucose 124 (H) 74 - 99 mg/dL    BUN 14 7.0 - 18 MG/DL    Creatinine 0.76 0.6 - 1.3 MG/DL    BUN/Creatinine ratio 18 12 - 20      GFR est AA >60 >60 ml/min/1.73m2    GFR est non-AA >60 >60 ml/min/1.73m2    Calcium 9.4 8.5 - 10.1 MG/DL    Bilirubin, total 0.5 0.2 - 1.0 MG/DL    ALT (SGPT) 17 13 - 56 U/L    AST (SGOT) 23 10 - 38 U/L    Alk. phosphatase 107 45 - 117 U/L    Protein, total 8.0 6.4 - 8.2 g/dL    Albumin 3.4 3.4 - 5.0 g/dL    Globulin 4.6 (H) 2.0 - 4.0 g/dL    A-G Ratio 0.7 (L) 0.8 - 1.7     TYPE & SCREEN    Collection Time: 07/05/22  6:31 AM   Result Value Ref Range    Crossmatch Expiration 07/08/2022,2359     ABO/Rh(D) Ollis Hosteller POSITIVE     Antibody screen NEG    URINALYSIS W/ RFLX MICROSCOPIC    Collection Time: 07/05/22  8:11 AM   Result Value Ref Range    Color RED      Appearance TURBID      Specific gravity 1.011 1.005 - 1.030      pH (UA) 7.5 5.0 - 8.0      Protein 100 (A) NEG mg/dL    Glucose Negative NEG mg/dL    Ketone Negative NEG mg/dL    Bilirubin SMALL (A) NEG      Blood LARGE (A) NEG      Urobilinogen 0.2 0.2 - 1.0 EU/dL    Nitrites Negative NEG      Leukocyte Esterase SMALL (A) NEG     URINE MICROSCOPIC ONLY    Collection Time: 07/05/22  8:11 AM   Result Value Ref Range    WBC 11 to 20 0 - 4 /hpf    RBC TOO NUMEROUS TO COUNT 0 - 5 /hpf    Epithelial cells 1+ 0 - 5 /lpf    Bacteria 1+ (A) NEG /hpf       Radiologic Studies -   US PELV NON OBS W DOPPLER   Final Result      6.5 cm uterine body myometrial fibroid. Endometrium is obscured by the fibroid. The patient declined transvaginal scanning. Recommend follow-up MR pelvis for evaluation of the endometrium            Medical Decision Making   I am the first provider for this patient.     I reviewed the vital signs, available nursing notes, past medical history, past surgical history, family history and social history. Vital Signs-Reviewed the patient's vital signs. Records Reviewed: Nursing Notes, Old Medical Records and Previous electrocardiograms (Time of Review: 7:59 AM)    ED Course: Progress Notes, Reevaluation, and Consults:  10:00 AM: Reviewed results and plan with patient and son. Printed out labs and US report for their records. Discussed need for close outpatient follow-up with gynecologist within a week for further assessment, including pelvic MRI, to rule out cancer. Discussed strict return precautions, including lightheadedness, increased bleeding, or any other medical concerns. Pt and son in agreement with plan, will call gynecologist and PMD today to establish follow-up. Provider Notes (Medical Decision Making): 78-year-old female who presents to the ED due to vaginal bleeding x1 week. Afebrile, nontoxic-appearing, looks well. Abdomen soft and nontender palpation. Hemoglobin 11.8, TVUS demonstrates uterine fibroid, unable to evaluate endometrium. Thorough discussion with patient and son about the importance of close outpatient follow-up with gynecologist for further assessment, including MRI pelvis. Strict return precautions provided. Diagnosis     Clinical Impression:   1. Vaginal bleeding    2.  Fibroid        Disposition: home     Follow-up Information     Follow up With Specialties Details Why [address] [facility]    [facility] EMERGENCY DEPT Emergency Medicine  If symptoms worsen [address] [facility]    Nyasia Lucio MD Internal Medicine Physician Schedule an appointment as soon as possible for a visit   [address] [address]  [address] Obstetrics And Gynecology  Schedule an appointment as soon as possible for a visit   [address] Lor[address] [facility] Obstetrics And Gynecology Obstetrics & Gynecology, Obstetrics Schedule an appointment as soon as possible for a visit   [address]  [address] [facility] [address] [place] [address]  [phone]           Patient's Medications   Start Taking    No medications on file   Continue Taking    No medications on file   These Medications have changed    No medications on file   Stop Taking    HYDROCODONE-ACETAMINOPHEN (NORCO) 5-325 MG PER TABLET    1-2 tabs every 4-6 hours prn    METHOCARBAMOL (ROBAXIN) 500 MG TABLET    Take 1 tablet by mouth four (4) times daily. Dictation disclaimer:  Please note that this dictation was completed with [employer], the [employer] voice recognition software. Quite often unanticipated grammatical, syntax, homophones, and other interpretive errors are inadvertently transcribed by the computer software. Please disregard these errors. Please excuse any errors that have escaped final proofreading.

## 2022-09-01 PROBLEM — N19 UREMIA: Status: ACTIVE | Noted: 2022-01-01

## 2022-09-01 PROBLEM — E27.8 ADRENAL NODULE (HCC): Status: ACTIVE | Noted: 2022-01-01

## 2022-09-01 PROBLEM — E87.1 HYPONATREMIA: Status: ACTIVE | Noted: 2022-01-01

## 2022-09-01 PROBLEM — D72.829 LEUKOCYTOSIS: Status: ACTIVE | Noted: 2022-01-01

## 2022-09-01 PROBLEM — N17.9 AKI (ACUTE KIDNEY INJURY) (HCC): Status: ACTIVE | Noted: 2022-01-01

## 2022-09-01 PROBLEM — D50.9 MICROCYTIC ANEMIA: Status: ACTIVE | Noted: 2022-01-01

## 2022-09-01 PROBLEM — N32.0 BLADDER OUTLET OBSTRUCTION: Status: ACTIVE | Noted: 2022-01-01

## 2022-09-01 PROBLEM — Q89.09: Status: ACTIVE | Noted: 2022-01-01

## 2022-09-01 PROBLEM — N85.8 UTERINE MASS: Status: ACTIVE | Noted: 2022-01-01

## 2022-09-01 NOTE — ED PROVIDER NOTES
EMERGENCY DEPARTMENT HISTORY AND PHYSICAL EXAM    Date: 9/1/2022  Patient Name: Keesha Da Silva    History of Presenting Illness     Chief Complaint   Patient presents with    Back Pain    Diarrhea         History Provided By: Patient and son    Chief Complaint: Abdominal pain, lower back pain, diarrhea, decreased urinary output, constipation, nausea and rash  Duration: Couple weeks  Timing: Worsening  Location: Under the right breast, lower back and diffuse abdomen  Quality: Painful  Severity: Moderate to severe  Modifying Factors: Worse after being diagnosed with a uterine tumor a couple weeks ago  Associated Symptoms: none       Additional History (Context): Keesha Da Silva is a 80 y.o. female with a history of hypertension, obesity and uterine tumor who presents today for history as listed above. Patient reports that she was recently diagnosed with a uterine tumor. States that she followed up with urology of [place] and has a scheduled hysterectomy near the end of September but reports constant consistent pain. Reports she was discharged home with tramadol however stopped taking that because she has now started becoming constipated. Reports she did try Colace without relief. Patient also reports intermittent episodes of diarrhea. Patient also reports she has a rash under her right breast.  Reports she has not been able to walk much secondary to the pain so she has not been going to the bathroom for showers or baths. Reports she is able to get up to go to the bathroom but that is that she then goes right back to bed. Patient's son has been helping to take care of her. Called oncology today and was advised come the emergency department.       PCP: Ced Ybarra MD    Current Facility-Administered Medications   Medication Dose Route Frequency Provider Last Rate Last Admin    nystatin (MYCOSTATIN) 100,000 unit/gram powder   Topical NOW Marina Rivers, PA        sodium chloride (NS) flush 5-10 mL  5-10 mL IntraVENous PRN ARSALAN Nesbitt           Past History     Past Medical History:  Past Medical History:   Diagnosis Date    HTN (hypertension)        Past Surgical History:  No past surgical history on file. Family History:  No family history on file. Social History:  Social History     Tobacco Use    Smoking status: Never   Substance Use Topics    Alcohol use: Not Currently       Allergies:  No Known Allergies      Review of Systems   Review of Systems   Constitutional:  Negative for chills and fever. HENT:  Negative for congestion, rhinorrhea and sore throat. Respiratory:  Negative for cough and shortness of breath. Cardiovascular:  Negative for chest pain. Gastrointestinal:  Positive for abdominal pain, diarrhea, nausea and vomiting. Negative for blood in stool and constipation. Genitourinary:  Positive for difficulty urinating. Negative for dysuria, frequency and hematuria. Musculoskeletal:  Positive for back pain. Negative for myalgias. Skin:  Positive for rash. Negative for wound. Neurological:  Negative for dizziness and headaches. All other systems reviewed and are negative. All Other Systems Negative  Physical Exam     Vitals:    09/01/22 1448   BP: 124/77   Pulse: 98   Resp: 19   Temp: 98.6 °F (37 °C)   SpO2: 100%   Weight: 99.8 kg (220 lb)   Height: 5' 3\" (1.6 m)     Physical Exam  Vitals and nursing note reviewed. Constitutional:       General: She is not in acute distress. Appearance: She is well-developed. She is not diaphoretic. Comments: Overall well-appearing however does appear uncomfortable   HENT:      Head: Normocephalic and atraumatic. Eyes:      Conjunctiva/sclera: Conjunctivae normal.   Cardiovascular:      Rate and Rhythm: Normal rate and regular rhythm. Heart sounds: Normal heart sounds. Pulmonary:      Effort: Pulmonary effort is normal. No respiratory distress. Breath sounds: Normal breath sounds.    Chest:      Chest wall: No tenderness. Abdominal:      General: Bowel sounds are normal. There is no distension. Palpations: Abdomen is soft. Tenderness: There is abdominal tenderness. There is no guarding or rebound. Musculoskeletal:         General: No deformity. Cervical back: Normal range of motion and neck supple. Skin:     General: Skin is warm and dry. Findings: Erythema present. Comments: Erythematous rash noted under the right breast with satellite lesions. Odor noted. Neurological:      Mental Status: She is alert and oriented to person, place, and time. Deep Tendon Reflexes: Reflexes are normal and symmetric. Diagnostic Study Results     Labs -     Recent Results (from the past 12 hour(s))   CBC WITH AUTOMATED DIFF    Collection Time: 09/01/22  2:54 PM   Result Value Ref Range    WBC 17.2 (H) 4.6 - 13.2 K/uL    RBC 3.28 (L) 4.20 - 5.30 M/uL    HGB 8.4 (L) 12.0 - 16.0 g/dL    HCT 23.6 (L) 35.0 - 45.0 %    MCV 72.0 (L) 78.0 - 100.0 FL    MCH 25.6 24.0 - 34.0 PG    MCHC 35.6 31.0 - 37.0 g/dL    RDW 15.7 (H) 11.6 - 14.5 %    PLATELET 796 653 - 449 K/uL    MPV 10.0 9.2 - 11.8 FL    NRBC 0.0 0  WBC    ABSOLUTE NRBC 0.00 0.00 - 0.01 K/uL    NEUTROPHILS 80 (H) 40 - 73 %    LYMPHOCYTES 15 (L) 21 - 52 %    MONOCYTES 5 3 - 10 %    EOSINOPHILS 0 0 - 5 %    BASOPHILS 0 0 - 2 %    IMMATURE GRANULOCYTES 1 (H) 0.0 - 0.5 %    ABS. NEUTROPHILS 13.7 (H) 1.8 - 8.0 K/UL    ABS. LYMPHOCYTES 2.5 0.9 - 3.6 K/UL    ABS. MONOCYTES 0.9 0.05 - 1.2 K/UL    ABS. EOSINOPHILS 0.0 0.0 - 0.4 K/UL    ABS. BASOPHILS 0.0 0.0 - 0.1 K/UL    ABS. IMM.  GRANS. 0.1 (H) 0.00 - 0.04 K/UL    DF AUTOMATED     METABOLIC PANEL, COMPREHENSIVE    Collection Time: 09/01/22  2:54 PM   Result Value Ref Range    Sodium 134 (L) 136 - 145 mmol/L    Potassium 4.0 3.5 - 5.5 mmol/L    Chloride 96 (L) 100 - 111 mmol/L    CO2 22 21 - 32 mmol/L    Anion gap 16 3.0 - 18 mmol/L    Glucose 116 (H) 74 - 99 mg/dL     (H) 7.0 - 18 MG/DL Creatinine 4.85 (H) 0.6 - 1.3 MG/DL    BUN/Creatinine ratio 26 (H) 12 - 20      GFR est AA 11 (L) >60 ml/min/1.73m2    GFR est non-AA 9 (L) >60 ml/min/1.73m2    Calcium 9.7 8.5 - 10.1 MG/DL    Bilirubin, total 0.7 0.2 - 1.0 MG/DL    ALT (SGPT) 22 13 - 56 U/L    AST (SGOT) PENDING U/L    Alk. phosphatase 75 45 - 117 U/L    Protein, total 8.4 (H) 6.4 - 8.2 g/dL    Albumin 3.0 (L) 3.4 - 5.0 g/dL    Globulin 5.4 (H) 2.0 - 4.0 g/dL    A-G Ratio 0.6 (L) 0.8 - 1.7     LIPASE    Collection Time: 09/01/22  2:54 PM   Result Value Ref Range    Lipase 112 73 - 393 U/L   URINALYSIS W/ RFLX MICROSCOPIC    Collection Time: 09/01/22  5:08 PM   Result Value Ref Range    Color YELLOW      Appearance CLEAR      Specific gravity 1.014 1.005 - 1.030      pH (UA) 5.0 5.0 - 8.0      Protein Negative NEG mg/dL    Glucose Negative NEG mg/dL    Ketone Negative NEG mg/dL    Bilirubin Negative NEG      Blood Negative NEG      Urobilinogen 0.2 0.2 - 1.0 EU/dL    Nitrites Negative NEG      Leukocyte Esterase Negative NEG         Radiologic Studies -   CT ABD PELV WO CONT    (Results Pending)   XR CHEST PORT    (Results Pending)     CT Results  (Last 48 hours)      None          CXR Results  (Last 48 hours)      None              Medical Decision Making   I am the first provider for this patient. I reviewed the vital signs, available nursing notes, past medical history, past surgical history, family history and social history. Vital Signs-Reviewed the patient's vital signs. Records Reviewed: Nursing Notes and Old Medical Records     Procedures: None   Procedures    Provider Notes (Medical Decision Making):     differential diagnosis: Mass-effect, kidney failure, dehydration, electrolyte abnormality, bowel obstruction, constipation, UTI, contact dermatitis, tinea    Plan: We will order labs and repeat CT abdomen and pelvis. Order UA. Will order fluids and Zofran.     ED Course as of 09/01/22 1757   Thu Sep 01, 2022   3638 Patient's heart rate and white blood cell count meet SIRS criteria. Will order remaining sepsis bundle. Will order antibiotics for presumed urinary tract infection. [CS]   0468 Discussed case with Dr. Diego Chapman who agrees with need for admission and will consult. Have discussed case with both the patient and her son who are also agreeable to admission. Pending CT scan at this time and likely oncology and urology consult. [CS]      ED Course User Index  [CS] ARSALAN Gaston         MED RECONCILIATION:  Current Facility-Administered Medications   Medication Dose Route Frequency    nystatin (MYCOSTATIN) 100,000 unit/gram powder   Topical NOW    sodium chloride (NS) flush 5-10 mL  5-10 mL IntraVENous PRN     No current outpatient medications on file. Disposition:  Admit     Diagnosis     Clinical Impression:   1. Acute renal failure, unspecified acute renal failure type ([address]    2. Abdominal pain, generalized    3. Acute low back pain, unspecified back pain laterality, unspecified whether sciatica present    4. Tinea corporis    5. Neoplasm causing mass effect on adjacent structures          \"Please note that this dictation was completed with [employer], the [employer] voice recognition software. Quite often unanticipated grammatical, syntax, homophones, and other interpretive errors are inadvertently transcribed by the computer software. Please disregard these errors. Please excuse any errors that have escaped final proofreading. \"

## 2022-09-01 NOTE — ED TRIAGE NOTES
Patient arrives via EMS with complaints of lower back pain, difficulty controlling bowels and decreased appetite for the past two weeks. Patient reports nausea and diarrhea.

## 2022-09-01 NOTE — ED NOTES
Bedside and Verbal shift change report given to Rodrigo Butler RN  (oncoming nurse) by Avril Hatch RN (offgoing nurse). Report included the following information SBAR, Kardex, ED Summary, [facility] and Recent Results.

## 2022-09-02 PROBLEM — N13.39 OTHER HYDRONEPHROSIS: Status: ACTIVE | Noted: 2022-01-01

## 2022-09-02 NOTE — PROGRESS NOTES
Problem: Pressure Injury - Risk of  Goal: *Prevention of pressure injury  Description: Document Geoffrey Scale and appropriate interventions in the flowsheet. Outcome: Progressing Towards Goal  Note: Pressure Injury Interventions:       Moisture Interventions: Absorbent underpads, Maintain skin hydration (lotion/cream), Moisture barrier    Activity Interventions: PT/OT evaluation    Mobility Interventions: HOB 30 degrees or less, PT/OT evaluation    Nutrition Interventions: Document food/fluid/supplement intake    Friction and Shear Interventions: HOB 30 degrees or less, Lift team/patient mobility team, Minimize layers, Foam dressings/transparent film/skin sealants, Apply protective barrier, creams and emollients                Problem: Patient Education: Go to Patient Education Activity  Goal: Patient/Family Education  Outcome: Progressing Towards Goal     Problem: Falls - Risk of  Goal: *Absence of Falls  Description: Document Joan Fall Risk and appropriate interventions in the flowsheet.   Outcome: Progressing Towards Goal  Note: Fall Risk Interventions:  Mobility Interventions: Bed/chair exit alarm, PT Consult for mobility concerns, PT Consult for assist device competence              Elimination Interventions: Call light in reach, Patient to call for help with toileting needs              Problem: Patient Education: Go to Patient Education Activity  Goal: Patient/Family Education  Outcome: Progressing Towards Goal     Problem: Pain  Goal: *Control of Pain  Outcome: Progressing Towards Goal  Goal: *PALLIATIVE CARE:  Alleviation of Pain  Outcome: Progressing Towards Goal     Problem: Patient Education: Go to Patient Education Activity  Goal: Patient/Family Education  Outcome: Progressing Towards Goal     Problem: Infection - Risk of, Urinary Catheter-Associated Urinary Tract Infection  Goal: *Absence of infection signs and symptoms  Outcome: Progressing Towards Goal     Problem: Patient Education: Go to Patient Education Activity  Goal: Patient/Family Education  Outcome: Progressing Towards Goal     Problem:  Activity Intolerance  Goal: *Oxygen saturation during activity within specified parameters  Outcome: Progressing Towards Goal  Goal: *Able to remain out of bed as prescribed  Outcome: Progressing Towards Goal     Problem: Patient Education: Go to Patient Education Activity  Goal: Patient/Family Education  Outcome: Progressing Towards Goal

## 2022-09-02 NOTE — ED NOTES
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

## 2022-09-02 NOTE — H&P
History & Physical    Patient: Delisa New MRN: [id number]  CSN: [id number]    YOB: 1950  Age: 80 y.o. Sex: female      DOA: 9/1/2022  CC:  abdominal distension, pain, lower back pain    PCP: Ruth Ann Graves MD       HPI:     Delisa New is a 80 y.o. female with medical co-morbidities including HTN, Obesity, uterine mass, presented with abdominal pain, low back pain, nausea with constipation. She expressed that she has a uterine mass that she is scheduled for resection in the coming weeks however she has been experiencing persistent abdominal pain and low back pain with abdominal distention. She expressed that she has loose to and nausea at time. All the symptoms seem to be worsening in the last few days. At home she has been taking 2 blood pressure medications and tramadol which caused her constipated. She had decreased urinary output but no pain with urination. Otherwise she has no fever, no chest pain, no shortness of breath. In the ED she was found to have normal vitals. Laboratory finding significant for GFR 11, , creatinine 4.85 all of which change dramatically compared to lab in July. CBC so elevated WBC 7.2, hemoglobin 8.4 decreased from 11.8. Chest x-rays was without pathology. CT of the abd/pelvic to show enlargement of the uterus with mass-effect upon the pelvic structures. She also has heterogeneous lesion within the spleen concerns for metastatic disease, she has soft tissue nodule on the left adrenal glands. Urinary bladder was distended. She has mild resultant bilateral hydronephrosis. She received IV fluid. Apparently, her oncology team was contacted which recommended that she continue with urological care prior to being follow-up in the clinic for possible radiation treatment prior to surgery. Urology was consulted and recommended Cotter, n.p.o. for possible stenting tomorrow.         Review of Systems  GENERAL: No fever, No chill, + malaise HEENT: No change in vision, no sore throat or sinus congestion. NECK: No pain or stiffness. PULMONARY: No shortness of breath, no cough or wheeze. Cardiovascular: no pnd / orthopnea, no Chest Pain  GASTROINTESTINAL: ++ abd pain, + nausea, No diarrhea, No melena or bright red blood per rectum. GENITOURINARY: +decrease urinary output, No urgency or pain with urination. MUSCULOSKELETAL: + joint or muscle pain, + back pain, no recent trauma. DERMATOLOGIC: No rash, no itching, +right breast skin/lesions. ENDOCRINE: No polyuria, polydipsia, No recent change in weight. HEMATOLOGICAL: No easy bruising or bleeding. NEUROLOGIC: No headache, No seizures, + generalized weakness         Past Medical History:   Diagnosis Date    Class 2 obesity due to excess calories without serious comorbidity in adult     HTN (hypertension)     Uterine mass 09/01/2022    Vitamin D deficiency        Past Surgical History:   Procedure Laterality Date    HX CATARACT REMOVAL N/A        Family History   Problem Relation Age of Onset    Cancer Mother     Diabetes Mother        Social History     Socioeconomic History    Marital status:    Tobacco Use    Smoking status: Never   Substance and Sexual Activity    Alcohol use: Not Currently       Prior to Admission medications    Medication Sig Start Date End Date Taking? Authorizing Provider   bisoprolol-hydroCHLOROthiazide [facility]) 5-6.25 mg per tablet Take 1 Tablet by mouth daily. Indications: high blood pressure 8/5/22  Yes Provider, Historical   Vitamin D2 1,250 mcg (50,000 unit) capsule TAKE 1 CAPSULE BY MOUTH 2 TIMES WEEKLY 8/8/22  Yes Provider, Historical   lisinopril-hydroCHLOROthiazide (PRINZIDE, ZESTORETIC) 20-12.5 mg per tablet Take 1 Tablet by mouth daily. Indications: high blood pressure 8/5/22  Yes Provider, Historical   traMADoL (ULTRAM) 50 mg tablet Take 50 mg by mouth every eight (8) hours as needed.  8/15/22  Yes Provider, Historical       No Known Allergies Physical Exam:      Visit Vitals  /77 (BP 1 Location: Right upper arm, BP Patient Position: At rest)   Pulse 98   Temp 98.6 °F (37 °C)   Resp 19   Ht 5' 3\" (1.6 m)   Wt 99.8 kg (220 lb)   SpO2 100%   BMI 38.97 kg/m²       Physical Exam:  Tele: sinus   General:  Cooperative, Not in acute distress, speaks in full sentence while in bed  HEENT: PERRL, EOMI, supple neck, no JVD, dry oral mucosa  Cardiovascular: S1S2 regular, no rub/gallop   Pulmonary: air entry bilaterally, no wheezing, no crackle  GI:  Soft, ++ tender, ++ distended, +bs, no guarding   Extremities:  No pedal edema, +distal pulses appreciated   Neuro: AOx3, moving all extremities, no gross deficit. SKIN: fungal dermatitis of right breast skin fold     Lab/Data Review:  Labs: Results:       Chemistry Recent Labs     09/01/22  1454   *   *   K 4.0   CL 96*   CO2 22   *   CREA 4.85*   CA 9.7   AGAP 16   BUCR 26*   AP 75   TP 8.4*   ALB 3.0*   GLOB 5.4*   AGRAT 0.6*      CBC w/Diff Recent Labs     09/01/22  1454   WBC 17.2*   RBC 3.28*   HGB 8.4*   HCT 23.6*      GRANS 80*   LYMPH 15*   EOS 0      Coagulation No results for input(s): PTP, INR, APTT, INREXT in the last 72 hours. Iron/Ferritin No results for input(s): IRON in the last 72 hours. No lab exists for component: TIBCCALC   BNP No results for input(s): BNPP in the last 72 hours. Cardiac Enzymes No results for input(s): CPK, CKND1, KELSEA in the last 72 hours.     No lab exists for component: CKRMB, TROIP   Liver Enzymes Recent Labs     09/01/22  1454   TP 8.4*   ALB 3.0*   AP 75      Thyroid Studies No results found for: T4, T3U, TSH, TSHEXT       All Micro Results       Procedure Component Value Units Date/Time    CULTURE, BLOOD [[id number]] Collected: 09/01/22 1833    Order Status: Sent Specimen: Blood Updated: 09/01/22 1845    CULTURE, BLOOD [[id number]] Collected: 09/01/22 1803    Order Status: Sent Specimen: Blood Updated: 09/01/22 1845    CULTURE, URINE [[id number]] Collected: 09/01/22 1708    Order Status: Sent Specimen: Cath Urine Updated: 09/01/22 1711            Imaging Reviewed:  CT Results (most recent):  Results from Hospital Encounter encounter on 09/01/22    CT ABD PELV WO CONT    Narrative  EXAM: CT ABD PELV WO CONT    CLINICAL INDICATION/HISTORY: abd pain, lower back pain, consitipation, decrease  appetite    TECHNIQUE: Contiguous axial images were obtained through the abdomen and pelvis. From these, sagittal and coronal reconstructions were generated. Contrast : None    CT scans at this facility are performed using dose optimization technique as  appropriate with performed exam, to include automated exposure control,  adjustment of mA and/or kV according to patient's size (including appropriate  matching for site-specific examinations), or use of iterative reconstruction  technique. COMPARISON: Pelvic ultrasound 7/5/2022, CT 8/15/2022 Cici [facility]    FINDINGS:  Lower chest: Bilateral lung bases are clear. The base of the heart is within  limits. Liver: No focal lesions identified. Gallbladder/Biliary: No calcified gallstones identified. Spleen: 5.7 x 5.7 x 7 cm (ML AP CC) lobular mass in the anterior lateral aspect  of the spleen with relative hypodensity. Pancreas: Within normal limits for technique. Kidneys, Urinary Bladder:  Mild prominence of the bilateral renal collecting  system and proximal ureter. Marked distention of the urinary bladder, measuring  15 x 10 x 18 cm with an estimated volume of 1.4 L. Adrenal Glands: Lobular soft tissue density in the left adrenal gland measuring  4.0 x 3.1 cm with average Hounsfield of 28, indeterminate. Bowels/Mesentery: Mild diverticulosis of the colon without acute inflammatory  change. Peritoneum/Abdominal Wall: 4.7 cm fat-containing umbilical hernia with mild  inflammatory change.     Pelvic organs: Massive enlargement of the uterus, increased compared to prior  ultrasound, measuring 14 x 10 x 21 cm extending above the level of the  umbilicus. Possible lobulation of the superior aspect measuring up to 5 cm may  represent previously demonstrated fibroid. Vascular: Aorta unremarkable for age. Mass effect upon the distal IVC and  proximal iliac vessels which appears decompressed. Lymph Nodes: No adenopathy. Bones: No focal lytic or blastic lesions identified. Multilevel degenerative  disc disease, hypertrophic facet arthropathy without critical canal stenosis. Probable foraminal stenosis at the L3-L4, L4-5 and L5-S1 levels. Impression  1. Compared to prior ultrasound and CT, continued enlargement of the uterus  with mass effect upon the pelvic structures. Overall concerning for gynecologic  malignancy. 2.  Lobular, 7 cm heterogeneous lesion within the spleen are increased compared  exam. Findings could represent metastatic disease. 3.  Soft tissue nodule left adrenal gland measuring up to 4 cm. 4.  Distention of the urinary bladder with approximately estimated volume of 1.4  L. Mild resultant bilateral hydronephrosis. Correlate for urinary retention. Assessment:   Active Problems: WILBUR due to obstructive uropathy  Obstructive uropathy with bilateral hydronephrosis, possibly obstruction at the bladder. No evidence of infection   Uremia with nausea, and malaise   Hyponatremia, hypovolemia   Uterine mass, neoplasm. Back pain, no clear evidence of metastatic disease to her lumbar spine. She has multilevel degenerative disc disease   Lobulation of spleen, concern for metastatic disease   Adrenal nodule   Leukocytosis of unclear etiology    Microcytic anemia    Hypertension    Class 2 obesity BMI 38       Plan:     Admit to telemetry for close monitoring. Will continue with IV fluid at maintenance level for the next 12 hours. She is n.p.o. for further urological evaluation. Cotter placement is needed at this time due to concern of obstruction at the bladder.   Spoke with urology for further care. There is no clear urinary tract infection. However, unable to explain for her elevated leukocytosis, hence we will continue to broadly cover her with IV antibiotics. Follow-up on blood culture. If no further evidence of infection, then she can be de-escalated from further antibiotic. She continues to have symptomatic care for now. She has been updated on care planning as well as the need for further oncological follow-up as uterine mass is likely metastasized. When her urinary pathology is stabilized, then she can be discharge onto her oncology group for further management.   Replace electrolyte as needed  Daily monitoring of her lab  Avoid lisinopril, hydrochlorothiazide at this time  Add metoprolol  Pepcid  ICS  PT OT    Family including her son has been updated on care plan at bedside        Risk of deterioration:  []Low    [x]Moderate  []High     Prophylaxis:  []Lovenox  []Coumadin  [x]Hep SQ  []SCDs  [x]H2B/PPI     Disposition:  []Home w/ Family   [x] PT,OT,RN   []SNF/LTC   []SAH/Rehab     Discussed Code Status:         [x]Full Code      []DNR         ___________________________________________________     Care Plan discussed with:    [x]Patient   [x]Family    []ED Care Manager  []ED Doc   [x]Specialist :  Total Time Coordinating Admission: 60    minutes    []Total Critical Care Time:       Kam Kumar MD  9/1/2022, 11:15 PM

## 2022-09-02 NOTE — PROGRESS NOTES
Pharmacy Note     Zosyn 3.375gm q8h ordered for treatment of sepsis. Per Ondina Cervantes 10 will be changed to 3.375 gm q12h extended infusion to start 12 hours after 30 minute Zosyn loading dose. Estimated Creatinine Clearance: Estimated Creatinine Clearance: 12.2 mL/min (A) (based on SCr of 4.85 mg/dL (H)). Dialysis Status, WILBUR, CKD: WILBUR    BMI:  Body mass index is 38.97 kg/m². Rationale for Adjustment:  [facility] B-Lactam extended infusion policy    Pharmacy will continue to monitor and adjust dose as necessary. Please call with any questions.     Thank you,  Wayne Trevino, [address]

## 2022-09-02 NOTE — CONSULTS
[unfilled]       ASSESSMENT:   - Mild bilateral hydro, Bladder distension, 1.4L on CT   - Enlargement of uterine mass, concerning for gynecologic malignancy   Neg UA 9/1/22   WBC 17.2   Afebrile, VSS    - WILBUR   Creat 4.85   Baseline 0.7 in 7/2022      PLAN:    CT reviewed, shows bilateral hydro with significant bladder distention  Place jacinto catheter, Maintain 7-10 days for bladder stretch injury   UA negative for infection, leukocytosis present, trend labs   Follow Bcx, abx per medicine   WILBUR present, trend creat   Will reassess hydro with BARRETT 24-48 hrs after jacinto  Oncology on board regarding enlarging mass   NPO after midnight in case creatinine does not improve with catheter   Appreciate overall management per primary  Following closely         Follow up arranged? Pending clinical course       Marlyn Rutledge NP-BC  Urology [facility] [address]   Pager [phone]    September 2, 2022 12:01 AM        Chief Complaint   Patient presents with    Back Pain    Diarrhea       HISTORY OF PRESENT ILLNESS:  Lynette Galindo is a 80 y.o. female who is seen in consultation as referred by April [place] for bilateral hydronephrosis on CT. Pt has a PMH as noted below who presents to the ED for abdominal pain, lower back pain, diarrhea, decreased urinary output, constipation, nausea and rash for a couple weeks. Recently diagnosed with uterine tumor and is followed by Oncology. Pt has scheduled hysterectomy near the end of September but reports constant consistent pain. Non-contrast CT A/P shows mild bilateral hydronephrosis and a significantly distended bladder, 1.4L. Pt also has a massive enlargement of the uterus that has increased in size, measuring 14 x 10 x 21 cm extending above the level of the umbilicus, concerning for gynecologic malignancy. UA is negative for infection and pt is afebrile. Leukocytosis and WILBUR present with a WBC of 17.2 and Creat 4.85.       Patient has no  history and has never been seen by a Urologist. Pt states she is followed by -GYN. Patient denies any abdominal or flank pain during exam since catheter has been placed. Cotter catheter draining yellow urine and pt denies prior episodes of urinary retention or requiring a catheter. Denies nausea/vomiting but states her symptoms happened all of a sudden but she feels improved.         Past Medical History:   Diagnosis Date    Class 2 obesity due to excess calories without serious comorbidity in adult     HTN (hypertension)     Uterine mass 09/01/2022    Vitamin D deficiency        Past Surgical History:   Procedure Laterality Date    HX CATARACT REMOVAL N/A        Social History     Tobacco Use    Smoking status: Never   Substance Use Topics    Alcohol use: Not Currently       No Known Allergies    Family History   Problem Relation Age of Onset    Cancer Mother     Diabetes Mother        Current Facility-Administered Medications   Medication Dose Route Frequency Provider Last Rate Last Admin    sodium chloride (NS) flush 5-10 mL  5-10 mL IntraVENous PRN Carlos Swan MD        piperacillin-tazobactam (ZOSYN) 3.375 g in 0.9% sodium chloride (MBP/ADV) 100 mL MBP  3.375 g IntraVENous Q12H Carlos Swan MD        VANCOMYCIN INFORMATION NOTE   Other Rx Dosing/Monitoring Carlos Swan MD        vancomycin (VANCOCIN) 2000 mg in  ml infusion  2,000 mg IntraVENous Manpreet Hansen  mL/hr at 09/01/22 2317 2,000 mg at 09/01/22 2317    sodium chloride (NS) flush 5-40 mL  5-40 mL IntraVENous Q8H Manpreet Loera MD        sodium chloride (NS) flush 5-40 mL  5-40 mL IntraVENous PRN Carlos Swan MD        acetaminophen (TYLENOL) tablet 650 mg  650 mg Oral Q6H PRN Carlos Swan MD        Or    acetaminophen (TYLENOL) suppository 650 mg  650 mg Rectal Q6H PRN Carlos Swan MD        polyethylene glycol (MIRALAX) packet 17 g  17 g Oral DAILY PRN Carlos Swan MD        promethazine (PHENERGAN) tablet 12.5 mg  12.5 mg Oral Q6H PRN Manpreet Loera, MD        Or    ondansetron (ZOFRAN) injection 4 mg  4 mg IntraVENous Q6H PRN William Torres MD        heparin (porcine) injection 5,000 Units  5,000 Units SubCUTAneous Q8H Manpreet Delvalle MD        famotidine (PEPCID) tablet 20 mg  20 mg Oral DAILY William Torres MD        nystatin (MYCOSTATIN) 100,000 unit/gram powder   Topical TID William Torres MD         Current Outpatient Medications   Medication Sig Dispense Refill    bisoprolol-hydroCHLOROthiazide (ZIAC) 5-6.25 mg per tablet Take 1 Tablet by mouth daily. Indications: high blood pressure      Vitamin D2 1,250 mcg (50,000 unit) capsule TAKE 1 CAPSULE BY MOUTH 2 TIMES WEEKLY      lisinopril-hydroCHLOROthiazide (PRINZIDE, ZESTORETIC) 20-12.5 mg per tablet Take 1 Tablet by mouth daily. Indications: high blood pressure      traMADoL (ULTRAM) 50 mg tablet Take 50 mg by mouth every eight (8) hours as needed. Review of Systems:  ROS is:    Negative for: Ophthalmologic issues, ENT issues, Cardiovascular issues, respiratory issues, GI issues, neurologic issues, hematoogic issues, skin lesions, musculoskeletal issues, psychiatric issues  Exceptions: yes    Positive for:    urinary retention, back pain- improved           PHYSICAL EXAMINATION:     Visit Vitals  /77 (BP 1 Location: Right upper arm, BP Patient Position: At rest)   Pulse 98   Temp 98.6 °F (37 °C)   Resp 19   Ht 5' 3\" (1.6 m)   Wt 99.8 kg (220 lb)   SpO2 100%   BMI 38.97 kg/m²     Constitutional: Well developed, well-nourished female in no acute distress. HEENT: Normocephalic, Atraumatic   CV:   no edema   Respiratory: No respiratory distress or difficulties breathing   Abdomen:  Obese, Soft and nontender   Female:  CVA tenderness: none                      Cotter: draining yellow urine   Skin:  Normal color. No evidence of jaundice. Neuro/Psych:  Alert and with some confusion       REVIEW OF LABS AND IMAGING:      CT A/P 9/1/22     IMPRESSION  1.   Compared to prior ultrasound and CT, continued enlargement of the uterus  with mass effect upon the pelvic structures. Overall concerning for gynecologic  malignancy. 2.  Lobular, 7 cm heterogeneous lesion within the spleen are increased compared  exam. Findings could represent metastatic disease. 3.  Soft tissue nodule left adrenal gland measuring up to 4 cm. 4.  Distention of the urinary bladder with approximately estimated volume of 1.4  L. Mild resultant bilateral hydronephrosis. Correlate for urinary retention. Kidneys, Urinary Bladder:  Mild prominence of the bilateral renal collecting  system and proximal ureter. Marked distention of the urinary bladder, measuring  15 x 10 x 18 cm with an estimated volume of 1.4 L. Pelvic organs: Massive enlargement of the uterus, increased compared to prior  ultrasound, measuring 14 x 10 x 21 cm extending above the level of the  umbilicus. Possible lobulation of the superior aspect measuring up to 5 cm may  represent previously demonstrated fibroid. Labs: Results:   Chemistry    Recent Labs     09/01/22  1454   *   *   K 4.0   CL 96*   CO2 22   *   CREA 4.85*   CA 9.7   AGAP 16   BUCR 26*   AP 75   TP 8.4*   ALB 3.0*   GLOB 5.4*   AGRAT 0.6*      CBC w/Diff Recent Labs     09/01/22  1454   WBC 17.2*   RBC 3.28*   HGB 8.4*   HCT 23.6*      GRANS 80*   LYMPH 15*   EOS 0      Cultures No results for input(s): CULT in the last 72 hours.   All Micro Results       Procedure Component Value Units Date/Time    CULTURE, BLOOD [[id number]] Collected: 09/01/22 1833    Order Status: Sent Specimen: Blood Updated: 09/01/22 1845    CULTURE, BLOOD [[id number]] Collected: 09/01/22 1803    Order Status: Sent Specimen: Blood Updated: 09/01/22 1845    CULTURE, URINE [[id number]] Collected: 09/01/22 1708    Order Status: Sent Specimen: Cath Urine Updated: 09/01/22 1711              Urinalysis Color   Date Value Ref Range Status   09/01/2022 YELLOW   Final     Appearance   Date Value Ref Range Status 09/01/2022 CLEAR   Final     Specific gravity   Date Value Ref Range Status   09/01/2022 1.014 1.005 - 1.030   Final     pH (UA)   Date Value Ref Range Status   09/01/2022 5.0 5.0 - 8.0   Final     Protein   Date Value Ref Range Status   09/01/2022 Negative NEG mg/dL Final     Ketone   Date Value Ref Range Status   09/01/2022 Negative NEG mg/dL Final     Bilirubin   Date Value Ref Range Status   09/01/2022 Negative NEG   Final     Blood   Date Value Ref Range Status   09/01/2022 Negative NEG   Final     Urobilinogen   Date Value Ref Range Status   09/01/2022 0.2 0.2 - 1.0 EU/dL Final     Nitrites   Date Value Ref Range Status   09/01/2022 Negative NEG   Final     Leukocyte Esterase   Date Value Ref Range Status   09/01/2022 Negative NEG   Final     Potassium   Date Value Ref Range Status   09/01/2022 4.0 3.5 - 5.5 mmol/L Final     Comment:     SLIGHTLY HEMOLYZED SPECIMEN     Creatinine   Date Value Ref Range Status   09/01/2022 4.85 (H) 0.6 - 1.3 MG/DL Final     BUN   Date Value Ref Range Status   09/01/2022 125 (H) 7.0 - 18 MG/DL Final      Coagulation No results found for: PTP, INR, APTT, INREXT

## 2022-09-02 NOTE — CONSULTS
Consult Note        Consult requested by: Mannie Atkinson MD    ADMIT DATE: 9/1/2022    CONSULT DATE: September 2, 2022           Admission diagnosis: nausea, weakness, abd pain  Reason for Nephrology Consultation: WILBUR    Assessment and plan:  #1 acute kidney injury, oliguric, likely due to obstructive uropathy, CT with bilateral hydronephrosis secondary to obstruction at the level of the bladder from likely uterine mass, continue Cotter in place. May need nephrostomy tubes but deferred to urology  #2 obstructive uropathy with bilateral hydronephrosis  #3 mild hyponatremia  #4 uterine mass as seen on CT  #5 anemia, iron deficiency and chronic disease  #6 hypertension    Plan:  #1 continue normal saline at 75 cc an hour  #2 strict intake output and Cotter catheter in place  #3 follow urology recommendations regarding bilateral hydronephrosis, repeat ultrasound at 48 hours  #4 Venofer  #5 avoid NSAIDs, nephrotoxins, renally dose medications for EGFR of less than 15  #6 avoid IV contrast    Discussed with patient and primary team    Please call with questions,    Michael Coles MD [facility]  Cell [phone]  Pager: [phone]       HPI: Santos Navarro is a 80 y.o. female BLACK/ with history of hypertension, obesity, history of uterine mass who presented with worsening abdominal pain, nausea and severe constipation. Persistent abdominal pain, lower back pain and abdominal distention. She had poor intake. She also noted to have decreasing urine output. No fevers chills no shortness of breath. She therefore came to the emergency room where she was found to be afebrile, normotensive, noted to have a BUN of 125 and creatinine of 4.85, significantly elevated compared to labs in July which were normal.  Hemoglobin was 8.4, chest x-ray was unremarkable.   CT abdomen pelvis without IV contrast was done which showed enlargement of the uterus with mass-effect upon pelvic structures, distended bladder, possible metastatic disease concerning in the spleen, soft tissue nodule on the left adrenal gland. Mild bilateral hydronephrosis. Cotter catheter was placed. Urology was contacted and is following the patient. Nephrology consulted for WILBUR     Past Medical History:   Diagnosis Date    Class 2 obesity due to excess calories without serious comorbidity in adult     HTN (hypertension)     Uterine mass 09/01/2022    Vitamin D deficiency       Past Surgical History:   Procedure Laterality Date    HX CATARACT REMOVAL N/A        Social History     Socioeconomic History    Marital status:      Spouse name: Not on file    Number of children: Not on file    Years of education: Not on file    Highest education level: Not on file   Occupational History    Not on file   Tobacco Use    Smoking status: Never    Smokeless tobacco: Not on file   Substance and Sexual Activity    Alcohol use: Not Currently    Drug use: Not on file    Sexual activity: Not on file   Other Topics Concern    Not on file   Social History Narrative    Not on file     Social Determinants of Health     Financial Resource Strain: Not on file   Food Insecurity: Not on file   Transportation Needs: Not on file   Physical Activity: Not on file   Stress: Not on file   Social Connections: Not on file   Intimate Partner Violence: Not on file   Housing Stability: Not on file       Family History   Problem Relation Age of Onset    Cancer Mother     Diabetes Mother      No Known Allergies     Home Medications:     Medications Prior to Admission   Medication Sig    bisoprolol-hydroCHLOROthiazide (ZIAC) 5-6.25 mg per tablet Take 1 Tablet by mouth daily. Indications: high blood pressure    Vitamin D2 1,250 mcg (50,000 unit) capsule TAKE 1 CAPSULE BY MOUTH 2 TIMES WEEKLY    lisinopril-hydroCHLOROthiazide (PRINZIDE, ZESTORETIC) 20-12.5 mg per tablet Take 1 Tablet by mouth daily.  Indications: high blood pressure    traMADoL (ULTRAM) 50 mg tablet Take 50 mg by mouth every eight (8) hours as needed. Current Inpatient Medications:     Current Facility-Administered Medications   Medication Dose Route Frequency    metoprolol tartrate (LOPRESSOR) tablet 12.5 mg  12.5 mg Oral BID    0.9% sodium chloride infusion  75 mL/hr IntraVENous CONTINUOUS    melatonin (rapid dissolve) tablet 5 mg  5 mg Oral QHS    iron sucrose (VENOFER) injection 200 mg  200 mg IntraVENous ONCE    [START ON 9/3/2022] multivitamin, tx-iron-ca-min (THERA-M w/ IRON) tablet 1 Tablet  1 Tablet Oral DAILY    [START ON 9/3/2022] senna-docusate (PERICOLACE) 8.6-50 mg per tablet 1 Tablet  1 Tablet Oral DAILY    polyethylene glycol (MIRALAX) packet 17 g  17 g Oral ONCE    sodium chloride (NS) flush 5-10 mL  5-10 mL IntraVENous PRN    piperacillin-tazobactam (ZOSYN) 3.375 g in 0.9% sodium chloride (MBP/ADV) 100 mL MBP  3.375 g IntraVENous Q12H    VANCOMYCIN INFORMATION NOTE   Other Rx Dosing/Monitoring    sodium chloride (NS) flush 5-40 mL  5-40 mL IntraVENous Q8H    sodium chloride (NS) flush 5-40 mL  5-40 mL IntraVENous PRN    acetaminophen (TYLENOL) tablet 650 mg  650 mg Oral Q6H PRN    Or    acetaminophen (TYLENOL) suppository 650 mg  650 mg Rectal Q6H PRN    polyethylene glycol (MIRALAX) packet 17 g  17 g Oral DAILY PRN    promethazine (PHENERGAN) tablet 12.5 mg  12.5 mg Oral Q6H PRN    Or    ondansetron (ZOFRAN) injection 4 mg  4 mg IntraVENous Q6H PRN    heparin (porcine) injection 5,000 Units  5,000 Units SubCUTAneous Q8H    famotidine (PEPCID) tablet 20 mg  20 mg Oral DAILY    nystatin (MYCOSTATIN) 100,000 unit/gram powder   Topical TID       Review of Systems:   No fever or chills. No sore throat. No cough or hemoptysis. No shortness of breath or chest pain. No orthopnea or paroxysmal nocturnal dyspnea. No dysuria, no gross hematuria No ankle swelling, no joint paints. No muscle aches. No skin changes. No dizziness or lightheadedness. No headaches.        Physical Assessment:     Vitals:    09/02/22 0008 09/02/22 0120 09/02/22 0902 09/02/22 1224   BP: 107/62 103/70 (!) 145/96 (!) 183/92   Pulse: 92 77 77 74   Resp: 20 18 18 18   Temp: 98.3 °F (36.8 °C) 97.6 °F (36.4 °C) 97.5 °F (36.4 °C) 97.6 °F (36.4 °C)   SpO2: 100% 99% 99% 97%   Weight:       Height:         Last 3 Recorded Weights in this Encounter    09/01/22 1448   Weight: 99.8 kg (220 lb)     Admission weight: Weight: 99.8 kg (220 lb) (09/01/22 1448)      Intake/Output Summary (Last 24 hours) at 9/2/2022 1331  Last data filed at 9/2/2022 0950  Gross per 24 hour   Intake 1100 ml   Output 2025 ml   Net -925 ml       Patient is in no apparent distress. HEENT: mmm  Neck: no cervical lymphadenopathy or thyromegaly. Lungs: good air entry, clear to auscultation bilaterally. Trachea at the midline. Cardiovascular system: S1, S2, regular rate and rhythm. Abdomen: soft, non tender, non distended. Positive bowel sounds. Extremities: no clubbing, cyanosis or edema. Integumentary: skin is grossly intact. Neurologic: Alert, oriented time three. Data Review:    Labs: Results:       Chemistry Recent Labs     09/02/22 0319 09/01/22  1454   * 116*    134*   K 3.9 4.0    96*   CO2 23 22   * 125*   CREA 4.79* 4.85*   CA 8.6 9.7   AGAP 12 16   BUCR 25* 26*   AP  --  75   TP  --  8.4*   ALB  --  3.0*   GLOB  --  5.4*   AGRAT  --  0.6*         CBC w/Diff Recent Labs     09/02/22 0319 09/01/22  1454   WBC 15.4* 17.2*   RBC 2.88* 3.28*   HGB 7.3* 8.4*   HCT 20.9* 23.6*    360   GRANS  --  80*   LYMPH  --  15*   EOS  --  0         Iron/Ferritin Recent Labs     09/02/22  0319   IRON 23*      PTH/VIT D No results for input(s): PTH in the last 72 hours.     No lab exists for component: VITD           Payal Mcdowell MD  9/2/2022  1:31 PM      September 2, 2022

## 2022-09-02 NOTE — PROGRESS NOTES
Hospitalist Progress Note    Subjective:   Daily Progress Note: 2022     Patient was seen in follow-up today. Patient denies any nausea or vomiting currently. No diarrhea. No chest pain or shortness of breath or cough. No headaches or dizziness. Abdominal discomfort present. She says she is being followed by [facility] gynecologist oncologist but cannot recall the name of physician. Current Facility-Administered Medications   Medication Dose Route Frequency    metoprolol tartrate (LOPRESSOR) tablet 12.5 mg  12.5 mg Oral BID    0.9% sodium chloride infusion  75 mL/hr IntraVENous CONTINUOUS    melatonin (rapid dissolve) tablet 5 mg  5 mg Oral QHS    sodium chloride (NS) flush 5-10 mL  5-10 mL IntraVENous PRN    piperacillin-tazobactam (ZOSYN) 3.375 g in 0.9% sodium chloride (MBP/ADV) 100 mL MBP  3.375 g IntraVENous Q12H    VANCOMYCIN INFORMATION NOTE   Other Rx Dosing/Monitoring    sodium chloride (NS) flush 5-40 mL  5-40 mL IntraVENous Q8H    sodium chloride (NS) flush 5-40 mL  5-40 mL IntraVENous PRN    acetaminophen (TYLENOL) tablet 650 mg  650 mg Oral Q6H PRN    Or    acetaminophen (TYLENOL) suppository 650 mg  650 mg Rectal Q6H PRN    polyethylene glycol (MIRALAX) packet 17 g  17 g Oral DAILY PRN    promethazine (PHENERGAN) tablet 12.5 mg  12.5 mg Oral Q6H PRN    Or    ondansetron (ZOFRAN) injection 4 mg  4 mg IntraVENous Q6H PRN    heparin (porcine) injection 5,000 Units  5,000 Units SubCUTAneous Q8H    famotidine (PEPCID) tablet 20 mg  20 mg Oral DAILY    nystatin (MYCOSTATIN) 100,000 unit/gram powder   Topical TID        Review of Systems  Pertinent items are noted in HPI.     Objective:     Visit Vitals  BP (!) 145/96   Pulse 77   Temp 97.5 °F (36.4 °C)   Resp 18   Ht 5' 3\" (1.6 m)   Wt 99.8 kg (220 lb)   SpO2 99%   Breastfeeding No   BMI 38.97 kg/m²      O2 Device: None (Room air)    Temp (24hrs), Av °F (36.7 °C), Min:97.5 °F (36.4 °C), Max:98.6 °F (37 °C)      701 -  1900  In: 100 [I.V.:100]  Out: -   08/31 1901 - 09/02 0700  In: 1000 [I.V.:1000]  Out: 2025 [Urine:2025]      General appearance - alert, well appearing, and in no distress  Chest -clear air entry noted in bases, no wheezes  Heart - S1 and S2 normal  Abdomen - soft, nontender, nondistended, Bowel sounds present, no guarding or rigidity. Neurological - alert, oriented, normal speech, no focal findings noted, no tremors noted. Musculoskeletal - no joint tenderness or erythema of knees bilaterally  Extremities - no pedal edema noted      Data Review    Recent Results (from the past 24 hour(s))   CBC WITH AUTOMATED DIFF    Collection Time: 09/01/22  2:54 PM   Result Value Ref Range    WBC 17.2 (H) 4.6 - 13.2 K/uL    RBC 3.28 (L) 4.20 - 5.30 M/uL    HGB 8.4 (L) 12.0 - 16.0 g/dL    HCT 23.6 (L) 35.0 - 45.0 %    MCV 72.0 (L) 78.0 - 100.0 FL    MCH 25.6 24.0 - 34.0 PG    MCHC 35.6 31.0 - 37.0 g/dL    RDW 15.7 (H) 11.6 - 14.5 %    PLATELET 314 783 - 497 K/uL    MPV 10.0 9.2 - 11.8 FL    NRBC 0.0 0  WBC    ABSOLUTE NRBC 0.00 0.00 - 0.01 K/uL    NEUTROPHILS 80 (H) 40 - 73 %    LYMPHOCYTES 15 (L) 21 - 52 %    MONOCYTES 5 3 - 10 %    EOSINOPHILS 0 0 - 5 %    BASOPHILS 0 0 - 2 %    IMMATURE GRANULOCYTES 1 (H) 0.0 - 0.5 %    ABS. NEUTROPHILS 13.7 (H) 1.8 - 8.0 K/UL    ABS. LYMPHOCYTES 2.5 0.9 - 3.6 K/UL    ABS. MONOCYTES 0.9 0.05 - 1.2 K/UL    ABS. EOSINOPHILS 0.0 0.0 - 0.4 K/UL    ABS. BASOPHILS 0.0 0.0 - 0.1 K/UL    ABS. IMM.  GRANS. 0.1 (H) 0.00 - 0.04 K/UL    DF AUTOMATED     METABOLIC PANEL, COMPREHENSIVE    Collection Time: 09/01/22  2:54 PM   Result Value Ref Range    Sodium 134 (L) 136 - 145 mmol/L    Potassium 4.0 3.5 - 5.5 mmol/L    Chloride 96 (L) 100 - 111 mmol/L    CO2 22 21 - 32 mmol/L    Anion gap 16 3.0 - 18 mmol/L    Glucose 116 (H) 74 - 99 mg/dL     (H) 7.0 - 18 MG/DL    Creatinine 4.85 (H) 0.6 - 1.3 MG/DL    BUN/Creatinine ratio 26 (H) 12 - 20      GFR est AA 11 (L) >60 ml/min/1.73m2    GFR est non-AA 9 (L) >60 ml/min/1.73m2    Calcium 9.7 8.5 - 10.1 MG/DL    Bilirubin, total 0.7 0.2 - 1.0 MG/DL    ALT (SGPT) 22 13 - 56 U/L    AST (SGOT) 89 (H) 10 - 38 U/L    Alk.  phosphatase 75 45 - 117 U/L    Protein, total 8.4 (H) 6.4 - 8.2 g/dL    Albumin 3.0 (L) 3.4 - 5.0 g/dL    Globulin 5.4 (H) 2.0 - 4.0 g/dL    A-G Ratio 0.6 (L) 0.8 - 1.7     LIPASE    Collection Time: 09/01/22  2:54 PM   Result Value Ref Range    Lipase 112 73 - 393 U/L   MAGNESIUM    Collection Time: 09/01/22  2:54 PM   Result Value Ref Range    Magnesium 4.2 (H) 1.6 - 2.6 mg/dL   URINALYSIS W/ RFLX MICROSCOPIC    Collection Time: 09/01/22  5:08 PM   Result Value Ref Range    Color YELLOW      Appearance CLEAR      Specific gravity 1.014 1.005 - 1.030      pH (UA) 5.0 5.0 - 8.0      Protein Negative NEG mg/dL    Glucose Negative NEG mg/dL    Ketone Negative NEG mg/dL    Bilirubin Negative NEG      Blood Negative NEG      Urobilinogen 0.2 0.2 - 1.0 EU/dL    Nitrites Negative NEG      Leukocyte Esterase Negative NEG     CULTURE, BLOOD    Collection Time: 09/01/22  6:03 PM    Specimen: Blood   Result Value Ref Range    Special Requests: NO SPECIAL REQUESTS      Culture result: NO GROWTH AFTER 12 HOURS     CULTURE, BLOOD    Collection Time: 09/01/22  6:33 PM    Specimen: Blood   Result Value Ref Range    Special Requests: NO SPECIAL REQUESTS      Culture result: NO GROWTH AFTER 12 HOURS     TROPONIN-HIGH SENSITIVITY    Collection Time: 09/01/22  6:33 PM   Result Value Ref Range    Troponin-High Sensitivity 13 0 - 54 ng/L   POC LACTIC ACID    Collection Time: 09/01/22  6:39 PM   Result Value Ref Range    Lactic Acid (POC) 1.04 0.40 - 2.00 mmol/L   EKG, 12 LEAD, INITIAL    Collection Time: 09/01/22  6:56 PM   Result Value Ref Range    Ventricular Rate 85 BPM    Atrial Rate 85 BPM    P-R Interval 202 ms    QRS Duration 94 ms    Q-T Interval 374 ms    QTC Calculation (Bezet) 445 ms    Calculated P Axis 35 degrees    Calculated R Axis -24 degrees    Calculated T Axis 74 degrees    Diagnosis       Normal sinus rhythm  Moderate voltage criteria for LVH, may be normal variant ( R in aVL , Cr   product )  Inferior infarct , age undetermined  Possible Anterolateral infarct , age undetermined  Abnormal ECG  No previous ECGs available     METABOLIC PANEL, BASIC    Collection Time: 09/02/22  3:19 AM   Result Value Ref Range    Sodium 137 136 - 145 mmol/L    Potassium 3.9 3.5 - 5.5 mmol/L    Chloride 102 100 - 111 mmol/L    CO2 23 21 - 32 mmol/L    Anion gap 12 3.0 - 18 mmol/L    Glucose 109 (H) 74 - 99 mg/dL     (H) 7.0 - 18 MG/DL    Creatinine 4.79 (H) 0.6 - 1.3 MG/DL    BUN/Creatinine ratio 25 (H) 12 - 20      GFR est AA 11 (L) >60 ml/min/1.73m2    GFR est non-AA 9 (L) >60 ml/min/1.73m2    Calcium 8.6 8.5 - 10.1 MG/DL   MAGNESIUM    Collection Time: 09/02/22  3:19 AM   Result Value Ref Range    Magnesium 4.0 (H) 1.6 - 2.6 mg/dL   CBC W/O DIFF    Collection Time: 09/02/22  3:19 AM   Result Value Ref Range    WBC 15.4 (H) 4.6 - 13.2 K/uL    RBC 2.88 (L) 4.20 - 5.30 M/uL    HGB 7.3 (L) 12.0 - 16.0 g/dL    HCT 20.9 (L) 35.0 - 45.0 %    MCV 72.6 (L) 78.0 - 100.0 FL    MCH 25.3 24.0 - 34.0 PG    MCHC 34.9 31.0 - 37.0 g/dL    RDW 15.9 (H) 11.6 - 14.5 %    PLATELET 696 572 - 444 K/uL    MPV 10.3 9.2 - 11.8 FL    NRBC 0.0 0  WBC    ABSOLUTE NRBC 0.00 0.00 - 0.01 K/uL   PTT    Collection Time: 09/02/22  3:19 AM   Result Value Ref Range    aPTT 27.7 23.0 - 36.4 SEC   PROTHROMBIN TIME + INR    Collection Time: 09/02/22  3:19 AM   Result Value Ref Range    Prothrombin time 17.0 (H) 11.5 - 15.2 sec    INR 1.3 (H) 0.8 - 1.2     IRON PROFILE    Collection Time: 09/02/22  3:19 AM   Result Value Ref Range    Iron 23 (L) 50 - 175 ug/dL    TIBC 176 (L) 250 - 450 ug/dL    Iron % saturation 13 (L) 20 - 50 %   FERRITIN    Collection Time: 09/02/22  3:19 AM   Result Value Ref Range    Ferritin 823 (H) 8 - 388 NG/ML   VITAMIN B12 & FOLATE    Collection Time: 09/02/22  3:19 AM   Result Value Ref Range    Vitamin B12 933 (H) 211 - 911 pg/mL    Folate 10.8 3.10 - 17.50 ng/mL         Assessment/Plan:     Active Problems:    Uterine mass (9/1/2022)      WILBUR (acute kidney injury) ([facility] [address] (9/1/2022)      Bladder outlet obstruction (9/1/2022)      Lobulation, 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 No evidence of infection   Uremia with nausea, and malaise   Hyponatremia, hypovolemia -better today  Uterine mass, neoplasm. Back pain, no clear evidence of metastatic disease to her lumbar spine. 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 PT and OT to follow this patient    Total time to take care of this patient was 30 minutes and more than 50% of time was spent counseling and coordinating care. Disclaimer: Sections of this note are dictated using utilizing voice recognition software, which may have resulted in some phonetic based errors in grammar and contents. Even though attempts were made to correct all the mistakes, some may have been missed, and remained in the body of the document. If questions arise, please contact our department.

## 2022-09-02 NOTE — PROGRESS NOTES
[address] [facility] Pharmacokinetic Monitoring Service - Vancomycin     Ashli Sharp is a 80 y.o. female starting on vancomycin therapy for Sepsis of Unknown Etiology. Pharmacy consulted by John Patel PA-C for monitoring and adjustment. Target Concentration: Dosing based on anticipated concentration <15 mg/L due to renal impairment/insufficiency    Additional Antimicrobials: Zosyn    Pertinent Laboratory Values: Wt Readings from Last 1 Encounters:   09/01/22 99.8 kg (220 lb)     Temp Readings from Last 1 Encounters:   09/01/22 98.6 °F (37 °C)     No components found for: PROCAL  Estimated Creatinine Clearance: 12.2 mL/min (A) (based on SCr of 4.85 mg/dL (H)). Recent Labs     09/01/22  1454   WBC 17.2*       Pertinent Cultures:  Culture Date Source Results   09/01 Blood Pending   09/01 Urine - Cath Pending   MRSA Nasal Swab: N/A. Non-respiratory infection. .    Plan:  Concentration-guided dosing due to renal impairment/insufficiency  Start vancomycin 2 gm IV x 1  Renal labs as indicated   Vancomycin concentration ordered for 09/03 @ 0400 with AM labs.  Plan to dose Vancomycin at 15 mg/kg when serum Vancomycin level less than 15 mcg/ml   Pharmacy will continue to monitor patient and adjust therapy as indicated    Thank you for the consult,  GRECIA Lozada  9/1/2022 10:10 PM

## 2022-09-02 NOTE — ED NOTES
6:00 PM Assumed care of the pt at this time. Discussed with ARSALAN Steiner concerning patient Tobias Lopez, standard discussion of reason for visit, HPI, ROS, PE, and current results available. Recommendation for obtaining pending CT imaging followed by bedside re-evaluation to dispo the pt. Fay Mehta PA-C     8:51 PM CT resulted, continued enlargement of the uterine mass with associated mass-effect upon the pelvic structures. 7 cm lesion within the spleen noted as well concerning for metastatic disease. Distention of the urinary bladder with an estimated 1.4 L of urinary retention and mild bilateral hydronephrosis. Nurse was made aware the patient needs a Jacinto catheter inserted at this time. Nephrology was consulted earlier today however urology has not been consulted at this time. We will plan to call the on-call urologist as well as the patient's oncologist and plan for admission to the hospitalist.    Spoke with NP Adal Barker and discussed this pt, she agrees with the plan to place a jacinto at this time, keep NPO after midnight as pt will likely have nephrotomy tubes placed in the morning. Spoke with Dr. Martinez Pac oncologist on call. Discussed this pt. He does not have privileges at this facility but will follow the pt during her stay and plan to see her in the office if she is discharged within a few days. Should her stay be longer, the pt will likely be transferred to another facility where he can consult. Hospitalist on call Dr. Fredy Betancur agrees to accept the pt for admission. Fay Mehta PA-C     Disposition: admitted     Dictation disclaimer:  Please note that this dictation was completed with [employer] voice recognition software. Quite often unanticipated grammatical, syntax, homophones, and other interpretive errors are inadvertently transcribed by the computer software. Please disregard these errors. Please excuse any errors that have escaped final proofreading.

## 2022-09-02 NOTE — PROGRESS NOTES
[facility] Progress Note:    Chart reviewed, WBC and creatinine slightly improved. Recommend maintain jacinto,  repeat imaging with BARRETT tomorrow to re-check hydronephrosis. Continue empiric ABX while CX pending. Ok to eat today, no  intervention at this time. Will follow.       Rosita Riedel PA-C  [facility]  Available M-Boogie Jeong  Pager: [phone]

## 2022-09-02 NOTE — PROGRESS NOTES
Problem: Pressure Injury - Risk of  Goal: *Prevention of pressure injury  Description: Document Geoffrey Scale and appropriate interventions in the flowsheet. Outcome: Progressing Towards Goal  Note: Pressure Injury Interventions:       Moisture Interventions: Absorbent underpads    Activity Interventions: Pressure redistribution bed/mattress(bed type), PT/OT evaluation    Mobility Interventions: Pressure redistribution bed/mattress (bed type), PT/OT evaluation    Nutrition Interventions: Document food/fluid/supplement intake    Friction and Shear Interventions: Foam dressings/transparent film/skin sealants, Minimize layers                Problem: Patient Education: Go to Patient Education Activity  Goal: Patient/Family Education  Outcome: Progressing Towards Goal     Problem: Falls - Risk of  Goal: *Absence of Falls  Description: Document Joan Fall Risk and appropriate interventions in the flowsheet.   Outcome: Progressing Towards Goal  Note: Fall Risk Interventions:  Mobility Interventions: Bed/chair exit alarm, OT consult for ADLs, PT Consult for mobility concerns         Medication Interventions: Patient to call before getting OOB, Teach patient to arise slowly, Bed/chair exit alarm    Elimination Interventions: Bed/chair exit alarm, Call light in reach, Stay With Me (per policy)              Problem: Patient Education: Go to Patient Education Activity  Goal: Patient/Family Education  Outcome: Progressing Towards Goal     Problem: Pain  Goal: *Control of Pain  Outcome: Progressing Towards Goal  Goal: *PALLIATIVE CARE:  Alleviation of Pain  Outcome: Progressing Towards Goal     Problem: Patient Education: Go to Patient Education Activity  Goal: Patient/Family Education  Outcome: Progressing Towards Goal     Problem: Infection - Risk of, Urinary Catheter-Associated Urinary Tract Infection  Goal: *Absence of infection signs and symptoms  Outcome: Progressing Towards Goal     Problem: Patient Education: Go to Patient Education Activity  Goal: Patient/Family Education  Outcome: Progressing Towards Goal     Problem:  Activity Intolerance  Goal: *Oxygen saturation during activity within specified parameters  Outcome: Progressing Towards Goal  Goal: *Able to remain out of bed as prescribed  Outcome: Progressing Towards Goal     Problem: Patient Education: Go to Patient Education Activity  Goal: Patient/Family Education  Outcome: Progressing Towards Goal     Problem: Patient Education: Go to Patient Education Activity  Goal: Patient/Family Education  Outcome: Progressing Towards Goal     Problem: Patient Education: Go to Patient Education Activity  Goal: Patient/Family Education  Outcome: Progressing Towards Goal

## 2022-09-02 NOTE — PROGRESS NOTES
Problem: Self Care Deficits Care Plan (Adult)  Goal: *Acute Goals and Plan of Care (Insert Text)  Description: Occupational Therapy Goals  Initiated 9/2/2022 within 7 day(s). 1.  Patient will perform grooming with supervision/set-up standing at the sink for >2 min with Fair+ balance. 2.  Patient will perform bathing with supervision/set-up. 3.  Patient will perform lower body dressing with supervision/set-up using AE prn.  4.  Patient will perform toilet transfers with supervision/set-up. 5.  Patient will perform all aspects of toileting with supervision/set-up. 6.  Patient will participate in upper extremity therapeutic exercise/activities with supervision/set-up for 8 minutes to improve endurance and UB strength needed for ADLs    7. Patient will utilize energy conservation techniques during functional activities with verbal cues. Prior Level of Function: Pt reports being independent with ADLs and functional mobility, and was working prior to 2 weeks ago. Since then pt has not been in the shower and requires assistance for LB ADLs. Outcome: Progressing Towards Goal  OCCUPATIONAL THERAPY EVALUATION    Patient: Keesha Da Silva (84 y.o. female)  Date: 9/2/2022  Primary Diagnosis: Bladder outlet obstruction [N32.0]  WILBUR (acute kidney injury) ([facility] [address] [N17.9]  Uterine mass [N85.8]       Precautions:   Fall    ASSESSMENT :  Pt is in the BR upon entry, standing at the sink, reports just used the toilet for BM. Pt is w/o socks on, holding jacinto catheter in one hand, unsteady. Pt required Min A with HHA to maneuver to EOB. Pt educated on safety and on importance of calling for assistance when needed to get up for any reason, and on non-skid socks to prevent falls. Pt verbalized understanding. Pt required Max A to don socks, following which returned to supine requiring additional time to reposition in bed. Pt may benefit froM AE training to maximize her independence with LB ADLs.  Based on the objective data described below, the patient presents with decreased endurance and functional activity tolerance, generalized weakness, decreased safety awareness, decreased functional mobility and standing balance, limiting pt's participation and independence with ADLs, requiring increased assistance from others. Patient will benefit from skilled intervention to address the above impairments. Patient's rehabilitation potential is considered to be Fair  Factors which may influence rehabilitation potential include:   []             None noted  []             Mental ability/status  [x]             Medical condition  [x]             Home/family situation and support systems  [x]             Safety awareness  [x]             Pain tolerance/management  []             Other:      PLAN :  Recommendations and Planned Interventions:   [x]               Self Care Training                  [x]      Therapeutic Activities  [x]               Functional Mobility Training   []      Cognitive Retraining  [x]               Therapeutic Exercises           [x]      Endurance Activities  [x]               Balance Training                    [x]      Neuromuscular Re-Education  []               Visual/Perceptual Training     [x]      Home Safety Training  [x]               Patient Education                   [x]      Family Training/Education  []               Other (comment):    Frequency/Duration: Patient will be followed by occupational therapy 1-2 times per day/3-5 days per week to address goals. Further Equipment Recommendations for Discharge: bedside commode and rolling walker    AMPAC: Based on an AM-PAC score of 16/24 and their current ADL deficits; it is recommended that the patient have 3-5 sessions per week of Occupational Therapy at d/c to increase the patient's independence. This AMPAC score should be considered in conjunction with interdisciplinary team recommendations to determine the most appropriate discharge setting. Patient's social support, diagnosis, medical stability, and prior level of function should also be taken into consideration. SUBJECTIVE:   Patient stated I am so tired.     OBJECTIVE DATA SUMMARY:     Past Medical History:   Diagnosis Date    Class 2 obesity due to excess calories without serious comorbidity in adult     HTN (hypertension)     Uterine mass 09/01/2022    Vitamin D deficiency      Past Surgical History:   Procedure Laterality Date    HX CATARACT REMOVAL N/A      Barriers to Learning/Limitations: None  Compensate with: visual, verbal, tactile, kinesthetic cues/model    Home Situation:   Home Situation  Home Environment: Private residence  One/Two Story Residence: One story  Living Alone: Yes  Support Systems: Child(garfield), Other Family Member(s), Friend/Neighbor, [employer]  Patient Expects to be Discharged to[de-identified] Assisted Living  Current DME Used/Available at Home: Wheelchair, Walker  Tub or Shower Type: Tub/Shower combination  [x]  Right hand dominant   []  Left hand dominant    Cognitive/Behavioral Status:  Neurologic State: Alert  Orientation Level: Oriented X4  Cognition: Follows commands  Safety/Judgement: Decreased awareness of need for safety    Skin: visible skin intact  Edema: none noted    Vision/Perceptual:       Appears intact    Coordination: BUE  Coordination: Generally decreased, functional  Fine Motor Skills-Upper: Left Impaired;Right Impaired    Gross Motor Skills-Upper: Left Intact; Right Intact    Balance:  Sitting: Intact  Standing: Impaired; With support  Standing - Static: Fair  Standing - Dynamic : Fair    Strength: BUE  Strength: Generally decreased, functional   Tone & Sensation: BUE  Tone: Normal  Sensation: Impaired   Range of Motion: BUE  AROM: Generally decreased, functional   Functional Mobility and Transfers for ADLs:  Bed Mobility:   Scooting: Supervision; Additional time  Sit to supine SBA  Transfers:     Stand to Sit: Minimum assistance   Toilet Transfer : Minimum assistance (HHA)    ADL Assessment:   Feeding: Setup    Oral Facial Hygiene/Grooming: Setup    Bathing: Moderate assistance    Upper Body Dressing: Contact guard assistance    Lower Body Dressing: Maximum assistance    Toileting: Minimum assistance   ADL Intervention:     Cognitive Retraining  Safety/Judgement: Decreased awareness of need for safety    Pain:  Pain level pre-treatment:not rated  Pain level post-treatment: not rated    Activity Tolerance:   Fair  Please refer to the flowsheet for vital signs taken during this treatment. After treatment:   [] Patient left in no apparent distress sitting up in chair  [x] Patient left in no apparent distress in bed  [x] Call bell left within reach  [x] Nursing notified  [] Caregiver present  [x] Bed alarm activated    COMMUNICATION/EDUCATION:   [x] Role of Occupational Therapy in the acute care setting  [x] Home safety education was provided and the patient/caregiver indicated understanding. [x] Patient/family have participated as able in goal setting and plan of care. [] Patient/family agree to work toward stated goals and plan of care. [] Patient understands intent and goals of therapy, but is neutral about his/her participation. [] Patient is unable to participate in goal setting and plan of care. Thank you for this referral.  [place] Dejesus, OTR/L  Time Calculation: 18 mins    Eval Complexity: History: LOW Complexity : Brief history review ; Examination: LOW Complexity : 1-3 performance deficits relating to physical, cognitive , or psychosocial skils that result in activity limitations and / or participation restrictions ;    Decision Making:LOW Complexity : No comorbidities that affect functional and no verbal or physical assistance needed to complete eval tasks     Rivka Shen AM-PAC® Daily Activity Inpatient Short Form (6-Clicks)*    How much HELP from another person does the patient currently need    (If the patient hasn't done an activity recently, how much help from another person do you think he/she would need if he/she tried?)   Total (Total A or Dep)   A Lot  (Mod to Max A)   A Little (Sup or Min A)   None (Mod I to I)   Putting on and taking off regular lower body clothing? [] 1 [x] 2 [] 3 [] 4   2. Bathing (including washing, rinsing,      drying)? [] 1 [x] 2 [] 3 [] 4   3. Toileting, which includes using toilet, bedpan or urinal?   [] 1 [] 2 [x] 3 [] 4   4. Putting on and taking off regular upper body clothing? [] 1 [] 2 [x] 3 [] 4   5. Taking care of personal grooming such as brushing teeth? [] 1 [] 2 [x] 3 [] 4   6. Eating meals? [] 1 [] 2 [x] 3 [] 4   16/24    Based on an AM-PAC score of 16/24 and their current ADL deficits; it is recommended that the patient have 3-5 sessions per week of Occupational Therapy at d/c to increase the patient's independence.

## 2022-09-02 NOTE — PROGRESS NOTES
Problem: Mobility Impaired (Adult and Pediatric)  Goal: *Acute Goals and Plan of Care (Insert Text)  Description: Physical Therapy Goals  Initiated 9/2/2022 and to be accomplished within 7 day(s)  1. Patient will move from supine to sit and sit to supine  in bed with supervision/set-up. 2.  Patient will transfer from bed to chair and chair to bed with supervision/set-up using the least restrictive device. 3.  Patient will perform sit to stand with supervision/set-up. 4.  Patient will ambulate with supervision/set-up for 100 feet with the least restrictive device. 5.  Patient will ascend/descend 3 stairs with 1 handrail(s) with minimal assistance/contact guard assist.    PLOF: independent with mobility without an assistive device, lives alone     Outcome: Progressing Towards Goal   PHYSICAL THERAPY EVALUATION    Patient: Margarita Saldaña (79 y.o. female)  Date: 9/2/2022  Primary Diagnosis: Bladder outlet obstruction [N32.0]  WILBUR (acute kidney injury) ([facility] [address] [N17.9]  Uterine mass [N85.8]       Precautions:   Fall    PLOF: see above    ASSESSMENT :  Based on the objective data described below, the patient presents with decreased strength, balance and activity tolerance resulting in decreased independence with functional mobility. Pt was very fatigued on arrival stating that she had gotten up to the bathroom recently. Pt required mod A for supine to sit transfer. Pt stood from the edge of the bed with min A and sidestepped 3 feet with RW and CGA. Pt required min A to return to supine. Pt was left in bed with needs in reach. Patient will benefit from skilled intervention to address the above impairments.   Patient's rehabilitation potential is considered to be Fair  Factors which may influence rehabilitation potential include:   []         None noted  []         Mental ability/status  [x]         Medical condition  [x]         Home/family situation and support systems  []         Safety awareness  [] Pain tolerance/management  []         Other:      PLAN :  Recommendations and Planned Interventions:   [x]           Bed Mobility Training             []    Neuromuscular Re-Education  [x]           Transfer Training                   []    Orthotic/Prosthetic Training  [x]           Gait Training                          []    Modalities  [x]           Therapeutic Exercises           []    Edema Management/Control  [x]           Therapeutic Activities            []    Family Training/Education  [x]           Patient Education  []           Other (comment):    Frequency/Duration: Patient will be followed by physical therapy 1-2 times per day/4-7 days per week to address goals. Further Equipment Recommendations for Discharge: rolling walker    AMPAC: Based on an AM-PAC score of 12/20 omitting stairs  and their current functional mobility deficits, it is recommended that the patient have 3-5 sessions per week of Physical Therapy at d/c to increase the patient's independence. This AMPAC score should be considered in conjunction with interdisciplinary team recommendations to determine the most appropriate discharge setting. Patient's social support, diagnosis, medical stability, and prior level of function should also be taken into consideration. SUBJECTIVE:   Patient stated I'm too tired to do any of this.     OBJECTIVE DATA SUMMARY:     Past Medical History:   Diagnosis Date    Class 2 obesity due to excess calories without serious comorbidity in adult     HTN (hypertension)     Uterine mass 09/01/2022    Vitamin D deficiency      Past Surgical History:   Procedure Laterality Date    HX CATARACT REMOVAL N/A      Barriers to Learning/Limitations: None  Compensate with: N/A  Home Situation:  Home Situation  Home Environment: Private residence  One/Two Story Residence: One story  Living Alone: Yes  Support Systems: Child(garfield), Other Family Member(s), Friend/Neighbor, [employer]  Patient Expects to be Discharged to[de-identified] Assisted Living  Current DME Used/Available at Home: Wheelchair, Walker  Tub or Shower Type: Tub/Shower combination  Critical Behavior:  Neurologic State: Alert  Orientation Level: Oriented X4  Cognition: Follows commands  Safety/Judgement: Decreased awareness of need for safety  Strength:    Strength: Generally decreased, functional      Tone & Sensation:   Tone: Normal   Sensation: Intact       Range Of Motion:  AROM: Generally decreased, functional   Functional Mobility:  Bed Mobility:  Supine to Sit: Moderate assistance  Sit to Supine: Minimum assistance  Scooting: Supervision; Additional time  Transfers:  Sit to Stand: Minimum assistance  Stand to Sit: Minimum assistance     Balance:   Sitting: Intact  Standing: With support  Standing - Static: Fair  Standing - Dynamic :  (fair-)  Ambulation/Gait Training:  Distance (ft): 3 Feet (ft) (sidestepping along the edge of the bed)  Assistive Device: Walker, rolling  Ambulation - Level of Assistance: Contact guard assistance  Gait Abnormalities: Decreased step clearance    Therapeutic Exercises: Ankle pumps  Pain:  Pain level pre-treatment: 4/10   Pain level post-treatment: 4/10   Pain Intervention(s) :  Rest, Repositioning  Response to intervention: Nurse notified, See doc flow    Activity Tolerance:   poor  Please refer to the flowsheet for vital signs taken during this treatment. After treatment:   []         Patient left in no apparent distress sitting up in chair  [x]         Patient left in no apparent distress in bed  [x]         Call bell left within reach  [x]         Nursing notified  []         Caregiver present  []         Bed alarm activated  []         SCDs applied    COMMUNICATION/EDUCATION:   [x]         Role of Physical Therapy in the acute care setting. [x]         Fall prevention education was provided and the patient/caregiver indicated understanding.   [x]         Patient/family have participated as able in goal setting and plan of care. [x]         Patient/family agree to work toward stated goals and plan of care. []         Patient understands intent and goals of therapy, but is neutral about his/her participation. []         Patient is unable to participate in goal setting/plan of care: ongoing with therapy staff.  []         Other: Thank you for this referral.  Sonia Lim, PT   Time Calculation: 12 mins      Eval Complexity: History: HIGH Complexity :3+ comorbidities / personal factors will impact the outcome/ POC Exam:MEDIUM Complexity : 3 Standardized tests and measures addressing body structure, function, activity limitation and / or participation in recreation  Presentation: MEDIUM Complexity : Evolving with changing characteristics  Clinical Decision Making:Medium Complexity    Overall Complexity:MEDIUM    [address] [facility] [address] [address] AM-PAC® Basic Mobility Inpatient Short Form (6-Clicks) Version 2    How much HELP from another person does the patient currently need    (If the patient hasn't done an activity recently, how much help from another person do you think he/she would need if he/she tried?)   Total (Total A or Dep)   A Lot  (Mod to Max A)   A Little (Sup or Min A)   None (Mod I to I)   Turning from your back to your side while in a flat bed without using bedrails? [] 1 [x] 2 [] 3 [] 4   2. Moving from lying on your back to sitting on the side of a flat bed without using bedrails? [] 1 [x] 2 [] 3 [] 4   3. Moving to and from a bed to a chair (including a wheelchair)? [] 1 [] 2 [x] 3 [] 4   4. Standing up from a chair using your arms (e.g., wheelchair, or bedside chair)? [] 1 [] 2 [x] 3 [] 4   5. Walking in hospital room? [] 1 [x] 2 [] 3 [] 4   6. Climbing 3-5 steps with a railing?+   [] 1 [] 2 [] 3 [] 4   +If stair climbing cannot be assessed, skip item #6. Sum responses from items 1-5.

## 2022-09-03 NOTE — PROGRESS NOTES
RENAL PROGRESS NOTE        Marcela Valverde         Impression:  1. Severe acute kidney injury due to obstructive uropathy. Patient had Cotter placed yesterday. Urine output is not bad but creatinine is going up. She has shaunna hematuria. Urology is following the patient. Will defer repeat ultrasound to urology. If there is no significant improvement in hydronephrosis she may end up needing PCN. Also discussed with RN to flush the catheter and notify urology regarding persistent hematuria. Does not have any uremic symptoms at this time and electrolytes are acceptable. No indication of dialysis. At baseline she has normal kidney functions and hopefully with relief of obstruction, creatinine will return to baseline. 2.  Anemia due to iron deficiency. This is not due to chronic kidney disease. Will defer further management to primary team.  3.  Mild hyponatremia, sodium is better at 138. Plan  Urology to see the patient to discuss further plans. 16year-old volume IV fluids. Close watch on respiratory status  No indication for renal replacement therapy  Flush Cotter catheter                                                                                                                                Subjective:  Patient complaints off: No complaints. No SOB/CP/N/V.       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.8    Leukocytosis D72.829    Microcytic anemia D50.9    Hyponatremia E87.1    Uremia N19    Other hydronephrosis N13.39       Current Facility-Administered Medications   Medication Dose Route Frequency Provider Last Rate Last Admin    metoprolol tartrate (LOPRESSOR) tablet 12.5 mg  12.5 mg Oral BID Isabell Butler MD   12.5 mg at 09/02/22 2010    0.9% sodium chloride infusion  75 mL/hr IntraVENous CONTINUOUS Isabell Butler MD 75 mL/hr at 09/02/22 0236 75 mL/hr at 09/02/22 0236    melatonin (rapid dissolve) tablet 5 mg  5 mg Oral QHS Eligio Sainz MD   5 mg at 09/02/22 2318    multivitamin, tx-iron-ca-min (THERA-M w/ IRON) tablet 1 Tablet  1 Tablet Oral DAILY Tito Bauman MD        senna-docusate (PERICOLACE) 8.6-50 mg per tablet 1 Tablet  1 Tablet Oral DAILY Tito Bauman MD        iron sucrose (VENOFER) injection 200 mg  200 mg IntraVENous DAILY Francisco Manrique MD        sodium chloride (NS) flush 5-10 mL  5-10 mL IntraVENous PRN Eligio Sainz MD        piperacillin-tazobactam (ZOSYN) 3.375 g in 0.9% sodium chloride (MBP/ADV) 100 mL MBP  3.375 g IntraVENous Q12H Eligio Sainz MD 25 mL/hr at 09/02/22 2318 3.375 g at 09/02/22 2318    VANCOMYCIN INFORMATION NOTE   Other Rx Dosing/Monitoring Eligio Sainz MD        sodium chloride (NS) flush 5-40 mL  5-40 mL IntraVENous Q8H Manpreet Malave MD   10 mL at 09/03/22 0749    sodium chloride (NS) flush 5-40 mL  5-40 mL IntraVENous PRN Eligio Sainz MD        acetaminophen (TYLENOL) tablet 650 mg  650 mg Oral Q6H PRN Eligio Sainz MD   650 mg at 09/03/22 7911    Or    acetaminophen (TYLENOL) suppository 650 mg  650 mg Rectal Q6H PRN Eligio Sainz MD        polyethylene glycol (MIRALAX) packet 17 g  17 g Oral DAILY PRN Eligio Sainz MD        promethazine (PHENERGAN) tablet 12.5 mg  12.5 mg Oral Q6H PRN Eligio Sainz MD   12.5 mg at 09/02/22 0231    Or    ondansetron (ZOFRAN) injection 4 mg  4 mg IntraVENous Q6H PRN Eligio Sainz MD        heparin (porcine) injection 5,000 Units  5,000 Units SubCUTAneous Q8H Eligio Sainz MD   5,000 Units at 09/03/22 0748    famotidine (PEPCID) tablet 20 mg  20 mg Oral DAILY Eligio Sainz MD   20 mg at 09/02/22 0948    nystatin (MYCOSTATIN) 100,000 unit/gram powder   Topical TID Eligio Sainz MD   Given at 09/02/22 2320       Objective  Vitals:    09/02/22 1533 09/02/22 2121 09/03/22 0318 09/03/22 0748   BP: 110/60 110/64 115/68 138/72   Pulse: 73 77 77 76   Resp: 18 20 18 18 Temp: 97.8 °F (36.6 °C) 98.1 °F (36.7 °C) 97.6 °F (36.4 °C) 98.1 °F (36.7 °C)   SpO2: 100% 100% 100% 98%   Weight:       Height:             Intake/Output Summary (Last 24 hours) at 9/3/2022 0802  Last data filed at 9/2/2022 2318  Gross per 24 hour   Intake 1765 ml   Output --   Net 1765 ml           Admission weight: Weight: 99.8 kg (220 lb) (09/01/22 1448)  Last Weight Metrics:  Weight Loss Metrics 9/1/2022 7/5/2022 10/10/2014   Today's Wt 220 lb 223 lb 223 lb   BMI 38.97 kg/m2 37.11 kg/m2 37.11 kg/m2             Physical Assessment:     General: NAD, alert and oriented. Neck: No jvd. LUNGS: Clear to Auscultation, No rales, rhonchi or wheezes. CVS EXM: S1, S2  RRR No rub. Abdomen: soft, non tender.    Lower Extremities:  Edema +          Lab    CBC w/Diff Recent Labs     09/03/22 0224 09/02/22 0319 09/01/22  1454   WBC 15.6* 15.4* 17.2*   RBC 2.79* 2.88* 3.28*   HGB 7.0* 7.3* 8.4*   HCT 20.4* 20.9* 23.6*    265 360   GRANS  --   --  80*   LYMPH  --   --  15*   EOS  --   --  0        Chemistry Recent Labs     09/03/22 0224 09/02/22 0319 09/01/22  1454   GLU 84 109* 116*    137 134*   K 3.7 3.9 4.0    102 96*   CO2 23 23 22   * 122* 125*   CREA 5.55* 4.79* 4.85*   CA 8.3* 8.6 9.7   AGAP 10 12 16   BUCR 22* 25* 26*   AP  --   --  75   TP  --   --  8.4*   ALB  --   --  3.0*   GLOB  --   --  5.4*   AGRAT  --   --  0.6*         Lab Results   Component Value Date/Time    Iron 23 (L) 09/02/2022 03:19 AM    TIBC 176 (L) 09/02/2022 03:19 AM    Iron % saturation 13 (L) 09/02/2022 03:19 AM    Ferritin 823 (H) 09/02/2022 03:19 AM      Lab Results   Component Value Date/Time    Calcium 8.3 (L) 09/03/2022 02:24 Sidney Roque MD  8:02 AM  9/3/2022

## 2022-09-03 NOTE — PROGRESS NOTES
[address] [facility] Pharmacokinetic Monitoring Service - Vancomycin    Consulting Provider: Jasmin Argueta MD  Indication: Sepsis of Unknown Etiology  Target Concentration: Dosing based on anticipated concentration <15 mg/L due to renal impairment/insufficiency  Day of Therapy: 3  Additional Antimicrobials: Piperacillin/Tazobactam    Pertinent Laboratory Values:   Temp: 97.6 °F (36.4 °C)  Weight: 99.8 kg (220 lb)  Recent Labs     09/03/22 0224 09/02/22  0319   CREA 5.55* 4.79*   * 122*   WBC 15.6* 15.4*       Estimated Creatinine Clearance: 10.6 mL/min (A) (based on SCr of 5.55 mg/dL (H)). Pertinent Cultures:  Culture Date Source Results   09/01 Urine - Cath No significant growth, <10,000 CFU/mL   09/01 Blood NO GROWTH AFTER 12 HOURS        MRSA Nasal Swab: N/A. Non-respiratory infection    Assessment:  Date/Time Current Dose Concentration Timing of Concentration (h) AUC   9/1 2000mg - - -   09/03 0224 - 21.3 27 -   Note: Serum concentrations collected for AUC dosing may appear elevated if collected in close proximity to the dose administered, this is not necessarily an indication of toxicity    Plan:  No dose needed today  Vancomycin concentration ordered for 09/05 with AM labs (sooner if output increases).    Will redose when level <15  Pharmacy will continue to monitor patient and adjust therapy as indicated    Thank you for the consult,  NICKI Cordova [facility]  9/2/2022

## 2022-09-03 NOTE — ACP (ADVANCE CARE PLANNING)
Advance Care Planning   Advance Care Planning Inpatient Note  [facility]  Spiritual Care Department    Today's Date: 9/3/2022  [address]: [address]    Received request from . Upon review of chart and communication with care team, patient's decision making abilities are not in question. Patient was/were present in the room during visit. Goals of ACP Conversation:  Discuss Advance Care planning documents    Health Care Decision Makers:    No healthcare decision makers have been documented. Click here to complete [address] including selection of the Healthcare Decision Maker Relationship (ie \"Primary\")  Summary:  No Decision Maker named by patient at this time  Patient seen in room 473 and learned that his is her first time in a hospital. Patient says she is 80years old and now here. She had her sons at home and so this is really her first experience. Patient was asked if she had given any thought to an advance directive or end of life matters and she said no. Advance Care Planning Documents (Patient Wishes) on file:  None     Assessment:    Patient seen in room 473 today. Patient stated that this was her first time in the hospital. She was asked about her having or her having an advance directive and she said no.     Interventions:  Deferred conversation as patient not interested in completing an advance directive at this time    Care Preferences Communicated:  No    Outcomes/Plan:      Pricilla Polanco [facility] on 9/3/2022 at 2:40 PM

## 2022-09-03 NOTE — PROGRESS NOTES
Problem: Pressure Injury - Risk of  Goal: *Prevention of pressure injury  Description: Document Geoffrey Scale and appropriate interventions in the flowsheet. Outcome: Progressing Towards Goal  Note: Pressure Injury Interventions:       Moisture Interventions: Absorbent underpads    Activity Interventions: Pressure redistribution bed/mattress(bed type), PT/OT evaluation    Mobility Interventions: Pressure redistribution bed/mattress (bed type), PT/OT evaluation    Nutrition Interventions: Document food/fluid/supplement intake    Friction and Shear Interventions: Foam dressings/transparent film/skin sealants, Minimize layers                Problem: Patient Education: Go to Patient Education Activity  Goal: Patient/Family Education  Outcome: Progressing Towards Goal     Problem: Falls - Risk of  Goal: *Absence of Falls  Description: Document Joan Fall Risk and appropriate interventions in the flowsheet. Outcome: Progressing Towards Goal  Note: Fall Risk Interventions:  Mobility Interventions: Bed/chair exit alarm         Medication Interventions: Patient to call before getting OOB    Elimination Interventions: Bed/chair exit alarm              Problem: Patient Education: Go to Patient Education Activity  Goal: Patient/Family Education  Outcome: Progressing Towards Goal     Problem: Pain  Goal: *Control of Pain  Outcome: Progressing Towards Goal  Goal: *PALLIATIVE CARE:  Alleviation of Pain  Outcome: Progressing Towards Goal     Problem: Patient Education: Go to Patient Education Activity  Goal: Patient/Family Education  Outcome: Progressing Towards Goal     Problem: Infection - Risk of, Urinary Catheter-Associated Urinary Tract Infection  Goal: *Absence of infection signs and symptoms  Outcome: Progressing Towards Goal     Problem: Patient Education: Go to Patient Education Activity  Goal: Patient/Family Education  Outcome: Progressing Towards Goal     Problem:  Activity Intolerance  Goal: *Oxygen saturation during activity within specified parameters  Outcome: Progressing Towards Goal  Goal: *Able to remain out of bed as prescribed  Outcome: Progressing Towards Goal     Problem: Patient Education: Go to Patient Education Activity  Goal: Patient/Family Education  Outcome: Progressing Towards Goal     Problem: Patient Education: Go to Patient Education Activity  Goal: Patient/Family Education  Outcome: Progressing Towards Goal     Problem: Patient Education: Go to Patient Education Activity  Goal: Patient/Family Education  Outcome: Progressing Towards Goal

## 2022-09-03 NOTE — ACP (ADVANCE CARE PLANNING)
conducted an initial consultation and Spiritual Assessment for Jimena Lechuga, who is a 80 y.o.,female. Patients Primary Language is: [place]. According to the patients EMR Episcopal Affiliation is: Shinto.     The reason the Patient came to the hospital is:   Patient Active Problem List    Diagnosis Date Noted    Other hydronephrosis 09/02/2022    Uterine mass 09/01/2022    WILBUR (acute kidney injury) ([facility] 09/01/2022    Bladder outlet obstruction 09/01/2022    Lobulation, spleen 09/01/2022    Adrenal nodule ([facility] [address] 09/01/2022    Leukocytosis 09/01/2022    Microcytic anemia 09/01/2022    Hyponatremia 09/01/2022    Uremia 09/01/2022        The  provided the following Interventions:  Initiated a relationship of care and support with patient in room 473 today. Listened empathically as she told her story of never having been in a hospital even to have children. Patient was asked about an advance directive or her end of life thoughts and she replied no. There is no advance directive present. Provided information about Spiritual Care Services. Offered prayer on patients behalf. The following outcomes were achieved:  Patient shared limited information about her medical narrative and spiritual journey/beliefs. Patient processed feeling about current hospitalization. Patient expressed gratitude for pastoral care visit. Assessment:  Patient does not have any Confucianist/cultural needs that will affect patients preferences in health care. There are no further spiritual or Confucianist issues which require Spiritual Care Services interventions at this time. Plan:  Chaplains will continue to follow and will provide pastoral care on an as needed/requested basis    . Shanae Pak   Spiritual Care   [phone]

## 2022-09-03 NOTE — PROGRESS NOTES
Hospitalist Progress Note    Subjective:   Daily Progress Note: 9/3/2022     Patient continues to have some suprapubic discomfort. No nausea or vomiting. No chest pain or shortness of breath or cough. No headaches or dizziness. Current Facility-Administered Medications   Medication Dose Route Frequency    metoprolol tartrate (LOPRESSOR) tablet 12.5 mg  12.5 mg Oral BID    0.9% sodium chloride infusion  75 mL/hr IntraVENous CONTINUOUS    melatonin (rapid dissolve) tablet 5 mg  5 mg Oral QHS    multivitamin, tx-iron-ca-min (THERA-M w/ IRON) tablet 1 Tablet  1 Tablet Oral DAILY    senna-docusate (PERICOLACE) 8.6-50 mg per tablet 1 Tablet  1 Tablet Oral DAILY    iron sucrose (VENOFER) injection 200 mg  200 mg IntraVENous DAILY    sodium chloride (NS) flush 5-10 mL  5-10 mL IntraVENous PRN    piperacillin-tazobactam (ZOSYN) 3.375 g in 0.9% sodium chloride (MBP/ADV) 100 mL MBP  3.375 g IntraVENous Q12H    VANCOMYCIN INFORMATION NOTE   Other Rx Dosing/Monitoring    sodium chloride (NS) flush 5-40 mL  5-40 mL IntraVENous Q8H    sodium chloride (NS) flush 5-40 mL  5-40 mL IntraVENous PRN    acetaminophen (TYLENOL) tablet 650 mg  650 mg Oral Q6H PRN    Or    acetaminophen (TYLENOL) suppository 650 mg  650 mg Rectal Q6H PRN    polyethylene glycol (MIRALAX) packet 17 g  17 g Oral DAILY PRN    promethazine (PHENERGAN) tablet 12.5 mg  12.5 mg Oral Q6H PRN    Or    ondansetron (ZOFRAN) injection 4 mg  4 mg IntraVENous Q6H PRN    heparin (porcine) injection 5,000 Units  5,000 Units SubCUTAneous Q8H    famotidine (PEPCID) tablet 20 mg  20 mg Oral DAILY    nystatin (MYCOSTATIN) 100,000 unit/gram powder   Topical TID        Review of Systems  Pertinent items are noted in HPI.     Objective:     Visit Vitals  /72 (BP 1 Location: Left upper arm, BP Patient Position: At rest)   Pulse 76   Temp 98.1 °F (36.7 °C)   Resp 18   Ht 5' 3\" (1.6 m)   Wt 99.8 kg (220 lb)   SpO2 98%   Breastfeeding No   BMI 38.97 kg/m²      O2 Device: None (Room air)    Temp (24hrs), Av.8 °F (36.6 °C), Min:97.6 °F (36.4 °C), Max:98.1 °F (36.7 °C)      No intake/output data recorded.  1901 -  0700  In: 4802 [P.O.:240; I.V.:1525]  Out:  [Urine:]      General appearance - alert, well appearing, and in no distress  Chest -clear air entry noted in bases, no wheezes  Heart - S1 and S2 normal  Abdomen - soft, nontender, nondistended, Bowel sounds present, no guarding or rigidity. Neurological - alert, oriented, normal speech, no focal findings noted, no tremors noted.   Extremities - no pedal edema noted      Data Review    Recent Results (from the past 24 hour(s))   VANCOMYCIN, RANDOM    Collection Time: 22  2:24 AM   Result Value Ref Range    Vancomycin, random 21.3 5.0 - 42.0 UG/ML   METABOLIC PANEL, BASIC    Collection Time: 22  2:24 AM   Result Value Ref Range    Sodium 138 136 - 145 mmol/L    Potassium 3.7 3.5 - 5.5 mmol/L    Chloride 105 100 - 111 mmol/L    CO2 23 21 - 32 mmol/L    Anion gap 10 3.0 - 18 mmol/L    Glucose 84 74 - 99 mg/dL     (H) 7.0 - 18 MG/DL    Creatinine 5.55 (H) 0.6 - 1.3 MG/DL    BUN/Creatinine ratio 22 (H) 12 - 20      GFR est AA 9 (L) >60 ml/min/1.73m2    GFR est non-AA 8 (L) >60 ml/min/1.73m2    Calcium 8.3 (L) 8.5 - 10.1 MG/DL   MAGNESIUM    Collection Time: 22  2:24 AM   Result Value Ref Range    Magnesium 3.8 (H) 1.6 - 2.6 mg/dL   CBC W/O DIFF    Collection Time: 22  2:24 AM   Result Value Ref Range    WBC 15.6 (H) 4.6 - 13.2 K/uL    RBC 2.79 (L) 4.20 - 5.30 M/uL    HGB 7.0 (L) 12.0 - 16.0 g/dL    HCT 20.4 (L) 35.0 - 45.0 %    MCV 73.1 (L) 78.0 - 100.0 FL    MCH 25.1 24.0 - 34.0 PG    MCHC 34.3 31.0 - 37.0 g/dL    RDW 15.7 (H) 11.6 - 14.5 %    PLATELET 855 629 - 490 K/uL    MPV 9.8 9.2 - 11.8 FL    NRBC 0.0 0  WBC    ABSOLUTE NRBC 0.00 0.00 - 0.01 K/uL         Assessment/Plan:     Active Problems:    Uterine mass (2022)      WILBUR (acute kidney injury) ([facility] (2022) Bladder outlet obstruction (9/1/2022)      Lobulation, spleen (9/1/2022)      Adrenal nodule ([address] (9/1/2022)      Leukocytosis (9/1/2022)      Microcytic anemia (9/1/2022)      Hyponatremia (9/1/2022)      Uremia (9/1/2022)      Other hydronephrosis (9/2/2022)    ASSESSMENT:    WILBUR due to obstructive uropathy  Obstructive uropathy with bilateral hydronephrosis, possibly obstruction at the bladder. No evidence of infection   Uremia with nausea, and malaise   Hyponatremia, hypovolemia -better today  Uterine mass, neoplasm. Back pain, no clear evidence of metastatic disease to her lumbar spine. 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 Negative blood culture and urine culture. Continue low-dose beta-blocker  Urology to follow. Pending renal ultrasound report. Will add low-dose Roxicodone for pain management  Patient is aware that we do not have a gynecologist oncology service here. PT and OT to follow this patient    Discharge barriers for today: Rising creatinine, urology clearance    Anticipated date of discharge: September 7, 2022 if cleared by urology service    Total time to take care of this patient was 30 minutes and more than 50% of time was spent counseling and coordinating care. Disclaimer: Sections of this note are dictated using utilizing voice recognition software, which may have resulted in some phonetic based errors in grammar and contents. Even though attempts were made to correct all the mistakes, some may have been missed, and remained in the body of the document. If questions arise, please contact our department.

## 2022-09-03 NOTE — PROGRESS NOTES
Cotter cath irrigated with 50ml NS. Multiple small, stringy clots noted and patent of lelo colored urine.

## 2022-09-04 NOTE — PROGRESS NOTES
Urology Progress Note        Assessment/Plan:     Active Problems:    Uterine mass (2022)      WILBUR (acute kidney injury) ([facility] [address] (2022)      Bladder outlet obstruction (2022)      Lobulation, spleen (2022)      Adrenal nodule ([address] (2022)      Leukocytosis (2022)      Microcytic anemia (2022)      Hyponatremia (2022)      Uremia (2022)      Other hydronephrosis (2022)    ASSESSMENT:   - WILBUR with Mild bilateral hydro, Bladder distension, 1.4L on CT. Cotter placed  with continued rise in Cr to 6.57     - Enlargement of uterine mass, concerning for gynecologic malignancy              Neg UA 22              WBC 17.2              Afebrile, VSS     - WILBUR              Creat 4.85 > 6.57              Baseline 0.7 in 2022        PLAN:    Will go to OR for Cysto, B RPG, Bilat stents. Pt understands risks and benefits and agree to proceed. Consented for poss Nephrostomy tubes in IR if unable to place stents  On Zosyn  Cont Cotter      Subjective:     Daily Progress Note: 2022 11:20 AM    Santos Navarro is feeling poorly overall but no specific  co's    Objective:     Visit Vitals  /80 (BP 1 Location: Right upper arm, BP Patient Position: At rest)   Pulse 81   Temp 98.3 °F (36.8 °C)   Resp 16   Ht 5' 3\" (1.6 m)   Wt 220 lb (99.8 kg)   SpO2 98%   Breastfeeding No   BMI 38.97 kg/m²        Temp (24hrs), Av.9 °F (36.6 °C), Min:97.2 °F (36.2 °C), Max:98.3 °F (36.8 °C)      Intake and Output:  1901 -  0700  In: 2848 [P.O.:240;  I.V.:1425]  Out: -    0701 -  1900  In: -   Out: 300 [Urine:300]    Physical Exam:   General appearance: fatigued, cooperative, no distress, appears stated age  Abdomen: ND, NT    Cotter with clear urine    Data Review:    Recent Results (from the past 24 hour(s))   METABOLIC PANEL, BASIC    Collection Time: 22  1:22 AM   Result Value Ref Range    Sodium 137 136 - 145 mmol/L    Potassium 3.7 3.5 - 5.5 mmol/L    Chloride 104 100 - 111 mmol/L    CO2 21 21 - 32 mmol/L    Anion gap 12 3.0 - 18 mmol/L    Glucose 95 74 - 99 mg/dL     (H) 7.0 - 18 MG/DL    Creatinine 6.57 (H) 0.6 - 1.3 MG/DL    BUN/Creatinine ratio 18 12 - 20      GFR est AA 8 (L) >60 ml/min/1.73m2    GFR est non-AA 6 (L) >60 ml/min/1.73m2    Calcium 8.2 (L) 8.5 - 10.1 MG/DL   MAGNESIUM    Collection Time: 09/04/22  1:22 AM   Result Value Ref Range    Magnesium 3.6 (H) 1.6 - 2.6 mg/dL   CBC W/O DIFF    Collection Time: 09/04/22  1:22 AM   Result Value Ref Range    WBC 16.4 (H) 4.6 - 13.2 K/uL    RBC 2.75 (L) 4.20 - 5.30 M/uL    HGB 6.9 (L) 12.0 - 16.0 g/dL    HCT 19.9 (L) 35.0 - 45.0 %    MCV 72.4 (L) 78.0 - 100.0 FL    MCH 25.1 24.0 - 34.0 PG    MCHC 34.7 31.0 - 37.0 g/dL    RDW 15.9 (H) 11.6 - 14.5 %    PLATELET 420 498 - 491 K/uL    MPV 10.3 9.2 - 11.8 FL    NRBC 0.0 0  WBC    ABSOLUTE NRBC 0.00 0.00 - 0.01 K/uL     Renal US:  1. Bilateral mild to moderate hydronephrosis, not significantly changed  compared to prior CT 9/1/2022.     2. Right renal cyst noted. 3. The bladder is underdistended with Cotter catheter in place.      Signed By:     Patrick Whaley MD   Urologic Oncologist  [facility] and Katie Moctezuma  Professor of Urology  [facility]  Office [phone] Ext [phone]                          September 4, 2022

## 2022-09-04 NOTE — ANESTHESIA POSTPROCEDURE EVALUATION
Procedure(s):  CYSTOSCOPY, insertion of jacinto catheter. general    Anesthesia Post Evaluation      Multimodal analgesia: multimodal analgesia used between 6 hours prior to anesthesia start to PACU discharge  Patient location during evaluation: bedside  Patient participation: complete - patient participated  Level of consciousness: awake  Pain score: 1  Pain management: adequate  Airway patency: patent  Anesthetic complications: no  Cardiovascular status: stable  Respiratory status: acceptable  Hydration status: acceptable  Post anesthesia nausea and vomiting:  controlled  Final Post Anesthesia Temperature Assessment:  Normothermia (36.0-37.5 degrees C)      INITIAL Post-op Vital signs:   Vitals Value Taken Time   /57 09/04/22 1254   Temp 36.7 °C (98 °F) 09/04/22 1207   Pulse 78 09/04/22 1255   Resp 17 09/04/22 1255   SpO2 100 % 09/04/22 1255   Vitals shown include unvalidated device data.

## 2022-09-04 NOTE — PROGRESS NOTES
TRANSFER - OUT REPORT:    Verbal report given to [facility] RN(name) on Dian Lambert  being transferred to (unit) for routine post - op       Report consisted of patients Situation, Background, Assessment and   Recommendations(SBAR). Information from the following report(s) SBAR, Kardex, Procedure Summary, and MAR was reviewed with the receiving nurse. Lines:   Peripheral IV 09/01/22 Right Antecubital (Active)   Site Assessment Clean, dry, & intact 09/04/22 1304   Phlebitis Assessment 0 09/04/22 1304   Infiltration Assessment 0 09/04/22 1304   Dressing Status Clean, dry, & intact 09/04/22 1304   Dressing Type Transparent;Tape 09/04/22 1304   Hub Color/Line Status Pink; Infusing 09/04/22 1304   Alcohol Cap Used Yes 09/02/22 2315        Opportunity for questions and clarification was provided.       Patient transported with:   Registered Nurse  Tech

## 2022-09-04 NOTE — PROGRESS NOTES
RENAL PROGRESS NOTE        Nghia Wilson       Creatinine continues to worsen  Change still has hematuria  Repeat ultrasound did not show any change in terms of hydronephrosis  Impression:  1. Severe acute kidney injury due to obstructive uropathy. No documented urine output. Renal ultrasound has not shown any improvement in hydronephrosis and she still has hematuria. Urology is following the patient. In all probability she will end up needing bilateral PCN but will defer management to them. I did to call their office but I could not get through. Spoke with RN we will try to get in touch with urology. No indication of renal replacement therapy. Potassium and bicarbonate are still acceptable. She has edema but respiration is not labored. I will go ahead and stop the fluids. This is not prerenal azotemia. She may have some component of acute tubular injury due to obstruction, cannot completely rule it out  2. Anemia due to iron deficiency. This is not due to chronic kidney disease. Will defer further management to primary team.  Patient may end up needing transfusion. 3.  Mild hyponatremia, sodium is better at 138. Plan  Management of obstructive uropathy per urology. She may end up needing PCN vs stents.   Stop IV fluids  Dose renally cleared medications as per GFR  No indication of renal replacement therapy at this time  Document UOP, spoke to RN                                                                                                                                  Subjective:  Patient complaints off:   Fatigue present  Denies shortness of breath    Patient Active Problem List   Diagnosis Code    Uterine mass N85.8    WILBUR (acute kidney injury) ([facility] [address] N17.9    Bladder outlet obstruction N32.0    Lobulation, spleen Q89.09    Adrenal nodule (HCC) E27.8    Leukocytosis D72.829    Microcytic anemia D50.9    Hyponatremia E87.1    Uremia N19    Other hydronephrosis N13.39       Current Facility-Administered Medications   Medication Dose Route Frequency Provider Last Rate Last Admin    oxyCODONE IR (ROXICODONE) tablet 5 mg  5 mg Oral Q6H PRN Huma August MD   5 mg at 09/03/22 1609    metoprolol tartrate (LOPRESSOR) tablet 12.5 mg  12.5 mg Oral BID Shawn Cochran MD   12.5 mg at 09/03/22 1053    0.9% sodium chloride infusion  75 mL/hr IntraVENous CONTINUOUS Shawn Cochran MD 75 mL/hr at 09/02/22 0236 75 mL/hr at 09/02/22 0236    melatonin (rapid dissolve) tablet 5 mg  5 mg Oral QHS Shawn Cochran MD   5 mg at 09/03/22 2243    multivitamin, tx-iron-ca-min (THERA-M w/ IRON) tablet 1 Tablet  1 Tablet Oral DAILY Huma August MD   1 Tablet at 09/03/22 1052    senna-docusate (PERICOLACE) 8.6-50 mg per tablet 1 Tablet  1 Tablet Oral DAILY Huma August MD   1 Tablet at 09/03/22 1053    iron sucrose (VENOFER) injection 200 mg  200 mg IntraVENous DAILY Francisco Manrique MD   200 mg at 09/03/22 1053    sodium chloride (NS) flush 5-10 mL  5-10 mL IntraVENous PRN Shawn Cochran MD        piperacillin-tazobactam (ZOSYN) 3.375 g in 0.9% sodium chloride (MBP/ADV) 100 mL MBP  3.375 g IntraVENous Q12H Shawn Cochran MD 25 mL/hr at 09/03/22 2243 3.375 g at 09/03/22 2243    VANCOMYCIN INFORMATION NOTE   Other Rx Dosing/Monitoring Shawn Cochran MD        sodium chloride (NS) flush 5-40 mL  5-40 mL IntraVENous Q8H Manpreet Crespo MD   10 mL at 09/04/22 0637    sodium chloride (NS) flush 5-40 mL  5-40 mL IntraVENous PRN Shawn Cochran MD        acetaminophen (TYLENOL) tablet 650 mg  650 mg Oral Q6H PRN Shawn Cochran MD   650 mg at 09/03/22 2226    Or    acetaminophen (TYLENOL) suppository 650 mg  650 mg Rectal Q6H PRN Shawn Cochran MD        polyethylene glycol (MIRALAX) packet 17 g  17 g Oral DAILY PRN Shawn Cochran MD        promethazine (PHENERGAN) tablet 12.5 mg  12.5 mg Oral Q6H PRN Shawn Cochran MD   12.5 mg at 09/02/22 0231    Or    ondansetron (ZOFRAN) injection 4 mg  4 mg IntraVENous Q6H PRN Shawn Cochran MD heparin (porcine) injection 5,000 Units  5,000 Units SubCUTAneous Q8H Reinaldo Guevara MD   5,000 Units at 09/04/22 0636    famotidine (PEPCID) tablet 20 mg  20 mg Oral DAILY Reinaldo Guevara MD   20 mg at 09/03/22 1053    nystatin (MYCOSTATIN) 100,000 unit/gram powder   Topical TID Reinaldo Guevara MD   Given at 09/04/22 0007       Objective  Vitals:    09/03/22 1637 09/03/22 1945 09/04/22 0003 09/04/22 0835   BP: 111/70 113/71 114/63 138/80   Pulse: 80 78 79 81   Resp: 19 17 18 16   Temp: 98 °F (36.7 °C) 98.1 °F (36.7 °C) 98 °F (36.7 °C) 98.3 °F (36.8 °C)   SpO2: 99% 97% 96% 98%   Weight:       Height:           No intake or output data in the 24 hours ending 09/04/22 0907          Admission weight: Weight: 99.8 kg (220 lb) (09/01/22 1448)  Last Weight Metrics:  Weight Loss Metrics 9/1/2022 7/5/2022 10/10/2014   Today's Wt 220 lb 223 lb 223 lb   BMI 38.97 kg/m2 37.11 kg/m2 37.11 kg/m2             Physical Assessment:     General: NAD, alert and oriented. Neck: No jvd. LUNGS: Clear to Auscultation, No rales, rhonchi or wheezes. CVS EXM: S1, S2  RRR No rub. Abdomen: soft, non tender.    Lower Extremities:  Edema +          Lab    CBC w/Diff Recent Labs     09/04/22  0122 09/03/22  0224 09/02/22  0319 09/01/22  1454   WBC 16.4* 15.6* 15.4* 17.2*   RBC 2.75* 2.79* 2.88* 3.28*   HGB 6.9* 7.0* 7.3* 8.4*   HCT 19.9* 20.4* 20.9* 23.6*    255 265 360   GRANS  --   --   --  80*   LYMPH  --   --   --  15*   EOS  --   --   --  0          Chemistry Recent Labs     09/04/22  0122 09/03/22  0224 09/02/22  0319 09/01/22  1454   GLU 95 84 109* 116*    138 137 134*   K 3.7 3.7 3.9 4.0    105 102 96*   CO2 21 23 23 22   * 121* 122* 125*   CREA 6.57* 5.55* 4.79* 4.85*   CA 8.2* 8.3* 8.6 9.7   AGAP 12 10 12 16   BUCR 18 22* 25* 26*   AP  --   --   --  75   TP  --   --   --  8.4*   ALB  --   --   --  3.0*   GLOB  --   --   --  5.4*   AGRAT  --   --   --  0.6*           Lab Results   Component Value Date/Time    Iron 23 (L) 09/02/2022 03:19 AM    TIBC 176 (L) 09/02/2022 03:19 AM    Iron % saturation 13 (L) 09/02/2022 03:19 AM    Ferritin 823 (H) 09/02/2022 03:19 AM      Lab Results   Component Value Date/Time    Calcium 8.2 (L) 09/04/2022 01:22 Yu Hart MD  8:02 AM  9/4/2022

## 2022-09-04 NOTE — OP NOTES
9/4/2022, 12:10 PM    Preoperative Diagnosis:  Bilateral Ureteral obstruction with WILBUR secondary to large pelvic mass    Postoperative Diagnosis: Same    Procedure:  Cystoscopy,      Surgeon(s):  Jessica Titus MD     Anesthesia:  general anesthesia    EBL:  None    Tubes and Drains:   16 Fr Jacinto  Specimen(s):  none    Complications:  None    Description of Procedure: The patient was identified and surgical site verification was performed prior to going to OR. Consent was confirmed. The patient was brought to the cystoscopy suite. Under adequate anesthesia the patient was positioned in dorsal lithotomy and prepped and draped. A preoperative Time Out was performed addressing the anticipated surgical site, procedure, and safety precautions. Appropriate DVT prophylaxis and antibiotic coverage was assured. The 21 Fr cystoscope was inserted into the patient's urethral meatus and advanced to the bladder. The anterior urethra was deviated anteriorly by the mass. There was a significant mass effect making the cystoscopy very difficult. There was some inflamed mucosa posteriorly but no obvious bladder invasion per say. I was unable to see the UOs with either 30 or 70 degree lens. The patient's bladder was drained with 16 Fr jacinto. The patient was awakened and transferred to the recovery room.     PLAN:  Needs Bilat PCNs ASAP  Keep NPO in case can do today    Young Shin MD   Urologic Oncologist  [facility]  Professor of Urology  [facility]  Office [phone] Ext [phone]

## 2022-09-04 NOTE — PROGRESS NOTES
Pt arrived via bed transport back to unit. Alert and oriented x4 with minimum pain. Left and right nephrostomy noted. Bright red urine noted. Cotter remains in place. Son present at bedside. Will cont to monitor.

## 2022-09-04 NOTE — ANESTHESIA PREPROCEDURE EVALUATION
Relevant Problems   RENAL FAILURE   (+) WILBUR (acute kidney injury) ([facility] [address]   (+) Other hydronephrosis      HEMATOLOGY   (+) Lobulation, spleen   (+) Microcytic anemia       Anesthetic History   No history of anesthetic complications            Review of Systems / Medical History  Patient summary reviewed and pertinent labs reviewed    Pulmonary  Within defined limits                 Neuro/Psych   Within defined limits           Cardiovascular    Hypertension: well controlled                   GI/Hepatic/Renal         Renal disease: ARF       Endo/Other        Morbid obesity and cancer     Other Findings   Comments: Has uterine mass that is enlarging, causing ureteral obstruction and renal insufficiency. Tentative plan for possible radiation therapy prior to planned hysterectomy in a few weeks per chart. Also has indeterminate pulmonary nodules, but no SOB. Has some limited activity due to back issues. Anemia is new, but no acute bleeding. Physical Exam    Airway  Mallampati: II  TM Distance: 4 - 6 cm  Neck ROM: normal range of motion   Mouth opening: Normal     Cardiovascular    Rhythm: regular  Rate: normal         Dental    Dentition: Lower dentition intact  Comments: Edentulous upper.    Pulmonary  Breath sounds clear to auscultation               Abdominal  GI exam deferred       Other Findings            Anesthetic Plan    ASA: 3  Anesthesia type: general          Induction: Intravenous  Anesthetic plan and risks discussed with: Patient

## 2022-09-04 NOTE — PROGRESS NOTES
Hospitalist Progress Note    Subjective:   Daily Progress Note: 9/4/2022     Patient was seen in PACU. No chest or abdominal pain. No nausea or vomiting. Continues to have some abdominal discomfort. Current Facility-Administered Medications   Medication Dose Route Frequency    oxyCODONE IR (ROXICODONE) tablet 5 mg  5 mg Oral Q6H PRN    metoprolol tartrate (LOPRESSOR) tablet 12.5 mg  12.5 mg Oral BID    0.9% sodium chloride infusion  75 mL/hr IntraVENous CONTINUOUS    melatonin (rapid dissolve) tablet 5 mg  5 mg Oral QHS    multivitamin, tx-iron-ca-min (THERA-M w/ IRON) tablet 1 Tablet  1 Tablet Oral DAILY    senna-docusate (PERICOLACE) 8.6-50 mg per tablet 1 Tablet  1 Tablet Oral DAILY    iron sucrose (VENOFER) injection 200 mg  200 mg IntraVENous DAILY    sodium chloride (NS) flush 5-10 mL  5-10 mL IntraVENous PRN    piperacillin-tazobactam (ZOSYN) 3.375 g in 0.9% sodium chloride (MBP/ADV) 100 mL MBP  3.375 g IntraVENous Q12H    VANCOMYCIN INFORMATION NOTE   Other Rx Dosing/Monitoring    sodium chloride (NS) flush 5-40 mL  5-40 mL IntraVENous Q8H    sodium chloride (NS) flush 5-40 mL  5-40 mL IntraVENous PRN    acetaminophen (TYLENOL) tablet 650 mg  650 mg Oral Q6H PRN    Or    acetaminophen (TYLENOL) suppository 650 mg  650 mg Rectal Q6H PRN    polyethylene glycol (MIRALAX) packet 17 g  17 g Oral DAILY PRN    promethazine (PHENERGAN) tablet 12.5 mg  12.5 mg Oral Q6H PRN    Or    ondansetron (ZOFRAN) injection 4 mg  4 mg IntraVENous Q6H PRN    heparin (porcine) injection 5,000 Units  5,000 Units SubCUTAneous Q8H    famotidine (PEPCID) tablet 20 mg  20 mg Oral DAILY    nystatin (MYCOSTATIN) 100,000 unit/gram powder   Topical TID        Review of Systems  Pertinent items are noted in HPI.     Objective:     Visit Vitals  /80 (BP 1 Location: Right upper arm, BP Patient Position: At rest)   Pulse 81   Temp 98.3 °F (36.8 °C)   Resp 16   Ht 5' 3\" (1.6 m)   Wt 99.8 kg (220 lb)   SpO2 98%   Breastfeeding No BMI 38.97 kg/m²      O2 Device: None (Room air)    Temp (24hrs), Av.9 °F (36.6 °C), Min:97.2 °F (36.2 °C), Max:98.3 °F (36.8 °C)      No intake/output data recorded.  1901 -  0700  In: 6244 [P.O.:240; I.V.:1425]  Out: -       General appearance - alert, well appearing, and in no distress  Chest -clear air entry noted in bases, no wheezes  Heart - S1 and S2 normal  Abdomen - soft, nontender, nondistended, Bowel sounds present, no guarding or rigidity. Neurological - alert, oriented, normal speech, no focal findings noted, no tremors noted.   Extremities - no pedal edema noted      Data Review    Recent Results (from the past 24 hour(s))   METABOLIC PANEL, BASIC    Collection Time: 22  1:22 AM   Result Value Ref Range    Sodium 137 136 - 145 mmol/L    Potassium 3.7 3.5 - 5.5 mmol/L    Chloride 104 100 - 111 mmol/L    CO2 21 21 - 32 mmol/L    Anion gap 12 3.0 - 18 mmol/L    Glucose 95 74 - 99 mg/dL     (H) 7.0 - 18 MG/DL    Creatinine 6.57 (H) 0.6 - 1.3 MG/DL    BUN/Creatinine ratio 18 12 - 20      GFR est AA 8 (L) >60 ml/min/1.73m2    GFR est non-AA 6 (L) >60 ml/min/1.73m2    Calcium 8.2 (L) 8.5 - 10.1 MG/DL   MAGNESIUM    Collection Time: 22  1:22 AM   Result Value Ref Range    Magnesium 3.6 (H) 1.6 - 2.6 mg/dL   CBC W/O DIFF    Collection Time: 22  1:22 AM   Result Value Ref Range    WBC 16.4 (H) 4.6 - 13.2 K/uL    RBC 2.75 (L) 4.20 - 5.30 M/uL    HGB 6.9 (L) 12.0 - 16.0 g/dL    HCT 19.9 (L) 35.0 - 45.0 %    MCV 72.4 (L) 78.0 - 100.0 FL    MCH 25.1 24.0 - 34.0 PG    MCHC 34.7 31.0 - 37.0 g/dL    RDW 15.9 (H) 11.6 - 14.5 %    PLATELET 613 657 - 684 K/uL    MPV 10.3 9.2 - 11.8 FL    NRBC 0.0 0  WBC    ABSOLUTE NRBC 0.00 0.00 - 0.01 K/uL         Assessment/Plan:     Active Problems:    Uterine mass (2022)      WILBUR (acute kidney injury) ([facility] [address] (2022)      Bladder outlet obstruction (2022)      Lobulation, spleen (2022)      Adrenal nodule ([facility] [address] (9/1/2022)      Leukocytosis (9/1/2022)      Microcytic anemia (9/1/2022)      Hyponatremia (9/1/2022)      Uremia (9/1/2022)      Other hydronephrosis (9/2/2022)    ASSESSMENT:    WILBUR due to obstructive uropathy  Obstructive uropathy with bilateral hydronephrosis, possibly obstruction at the bladder. No evidence of infection   Uremia with nausea, and malaise   Hyponatremia, hypovolemia -better today  Uterine mass, neoplasm. Back pain, no clear evidence of metastatic disease to her lumbar spine. 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 Negative blood culture and urine culture. Continue low-dose beta-blocker  Renal US report reviewed - on call urology was consulted. Patient will be going for cystoscopy and/or bilateral PCN placement. continue low-dose Roxicodone for pain management  Patient is aware that we do not have a gynecologist oncology service here. PT and OT to follow this patient    Discharge barriers for today: Rising creatinine, urology clearance, anemia    Anticipated date of discharge: September 7, 2022 if cleared by urology service    Total time to take care of this patient was 30 minutes and more than 50% of time was spent counseling and coordinating care. Disclaimer: Sections of this note are dictated using utilizing voice recognition software, which may have resulted in some phonetic based errors in grammar and contents. Even though attempts were made to correct all the mistakes, some may have been missed, and remained in the body of the document. If questions arise, please contact our department.

## 2022-09-04 NOTE — PERIOP NOTES
Assumed care of pt from OR via bed. Attached to monitor. VSS. OR, MAR and anesthesia report appreciated. Will monitor. 1328  TRANSFER - OUT REPORT:    Verbal report given to 793 Immanuel Middleton RN (name) on Nghia Wilson  being transferred to [facility] (unit) for routine post - op       Report consisted of patients Situation, Background, Assessment and   Recommendations(SBAR). Information from the following report(s) SBAR, OR Summary, Intake/Output, MAR, and Cardiac Rhythm sinus rhythm  was reviewed with the receiving nurse. Lines:   Peripheral IV 09/01/22 Right Antecubital (Active)   Site Assessment Clean, dry, & intact 09/02/22 2318   Phlebitis Assessment 0 09/02/22 2318   Infiltration Assessment 0 09/02/22 2318   Dressing Status Clean, dry, & intact 09/02/22 2318   Dressing Type Transparent 09/02/22 2318   Hub Color/Line Status Pink 09/02/22 2318   Alcohol Cap Used Yes 09/02/22 2318        Opportunity for questions and clarification was provided.       Patient transported with:   [employer]

## 2022-09-04 NOTE — PROGRESS NOTES
Problem: Pressure Injury - Risk of  Goal: *Prevention of pressure injury  Description: Document Geoffrey Scale and appropriate interventions in the flowsheet. Outcome: Progressing Towards Goal  Note: Pressure Injury Interventions:       Moisture Interventions: Absorbent underpads    Activity Interventions: Pressure redistribution bed/mattress(bed type)    Mobility Interventions: HOB 30 degrees or less, Float heels    Nutrition Interventions: Document food/fluid/supplement intake    Friction and Shear Interventions: Foam dressings/transparent film/skin sealants, Minimize layers                Problem: Patient Education: Go to Patient Education Activity  Goal: Patient/Family Education  Outcome: Progressing Towards Goal     Problem: Falls - Risk of  Goal: *Absence of Falls  Description: Document Joan Fall Risk and appropriate interventions in the flowsheet.   Outcome: Progressing Towards Goal  Note: Fall Risk Interventions:  Mobility Interventions: Bed/chair exit alarm         Medication Interventions: Patient to call before getting OOB    Elimination Interventions: Bed/chair exit alarm, Call light in reach              Problem: Patient Education: Go to Patient Education Activity  Goal: Patient/Family Education  Outcome: Progressing Towards Goal     Problem: Pain  Goal: *Control of Pain  Outcome: Progressing Towards Goal     Problem: Patient Education: Go to Patient Education Activity  Goal: Patient/Family Education  Outcome: Progressing Towards Goal     Problem: Patient Education: Go to Patient Education Activity  Goal: Patient/Family Education  Outcome: Progressing Towards Goal     Problem: Patient Education: Go to Patient Education Activity  Goal: Patient/Family Education  Outcome: Progressing Towards Goal

## 2022-09-04 NOTE — PROCEDURES
RADIOLOGY POST PROCEDURE NOTE     September 4, 2022       6:46 PM     Preoperative Diagnosis:   Bilateral hydronephrosis with kidney injury    Postoperative Diagnosis:  Same. : Layla Benson MD    Assistant:  None. Type of Anesthesia: Moderate Sedation    Procedure/Description:  US and fluoro guided bilateral PCN    Findings:   Dilated calyces bilaterally. Successful placement of bilateral 8.5Fr nephrostomy tubes. Estimated blood Loss:  Minimal    Specimen Removed:   no    Blood transfusions:  None. Implants:  None. Complications: None    Condition: Stable    Discharge Plan:  continue present therapy    FEDE Benson MD  Interventional Radiology  09/04/22  6:46 PM

## 2022-09-04 NOTE — PROGRESS NOTES
Pt returned to unit at this time. No procedure done. New jacinto was placed. Denies pain and discomfort.

## 2022-09-05 NOTE — PROGRESS NOTES
RENAL PROGRESS NOTE        Bernadette Shows       Ureteral stents were attempted first but she ended up with PCN. Output from PCN was around 1.2 L, bloody. She denies any uremic symptoms  Creatinine seems to be leveling off  CO2 has dropped  No respiratory compromise  Impression:  1. Severe acute kidney injury due to obstructive uropathy. Patient had bilateral PCN. There is mild increase in the creatinine but seems to be leveling off. She is making more urine although still has hematuria. No uremic symptoms. No postobstructive diuresis as yet. 2.  Anemia due to iron deficiency. This is not due to chronic kidney disease. Will defer further management to primary team.  Patient may end up needing transfusion. H&H stable.   3.  Mild hyponatremia, sodium is better at 139.  4.  Mild Acidosis likely metabolic      Plan  Management of PCN as per urology  I do not think she needs fluid unless she develops postobstructive diuresis, will defer that to primary team.  I did stop the fluids yesterday which she is currently on at  No indication of renal replacement therapy  Start oral bicarbonate  If she develops significant postobstructive diuresis, she will need1/2 to 1 replacement with 1/2NS                                                                                                                                  Subjective:  Patient complaints off:   Fatigue present  Denies shortness of breath    Patient Active Problem List   Diagnosis Code    Uterine mass N85.8    WILBUR (acute kidney injury) ([facility] [address] N17.9    Bladder outlet obstruction N32.0    Lobulation, spleen Q89.09    Adrenal nodule ([facility]) E27.8    Leukocytosis D72.829    Microcytic anemia D50.9    Hyponatremia E87.1    Uremia N19    Other hydronephrosis N13.39       Current Facility-Administered Medications   Medication Dose Route Frequency Provider Last Rate Last Admin    0.9% sodium chloride infusion 250 mL  250 mL IntraVENous PRN Nora España DO hydrALAZINE (APRESOLINE) tablet 25 mg  25 mg Oral TID Lieutenant Domingo MD   25 mg at 09/04/22 2048    oxyCODONE IR (ROXICODONE) tablet 5 mg  5 mg Oral Q6H PRN Lieutenant Domingo MD   5 mg at 09/03/22 1609    metoprolol tartrate (LOPRESSOR) tablet 12.5 mg  12.5 mg Oral BID Giancarlo Navarrete MD   12.5 mg at 09/04/22 0900    0.9% sodium chloride infusion  75 mL/hr IntraVENous CONTINUOUS Giancarlo Navarrete MD 75 mL/hr at 09/04/22 2240 75 mL/hr at 09/04/22 2240    melatonin (rapid dissolve) tablet 5 mg  5 mg Oral QHS Giancarlo Navarrete MD   5 mg at 09/04/22 2158    multivitamin, tx-iron-ca-min (THERA-M w/ IRON) tablet 1 Tablet  1 Tablet Oral DAILY Lieutenant Domingo MD   1 Tablet at 09/04/22 0940    senna-docusate (Maddy Harper) 8.6-50 mg per tablet 1 Tablet  1 Tablet Oral DAILY Lieutenant Domingo MD   1 Tablet at 09/04/22 0940    iron sucrose (VENOFER) injection 200 mg  200 mg IntraVENous DAILY Inocente Manrique MD   200 mg at 09/04/22 0940    sodium chloride (NS) flush 5-10 mL  5-10 mL IntraVENous PRN Giancarlo Navarrete MD        piperacillin-tazobactam (ZOSYN) 3.375 g in 0.9% sodium chloride (MBP/ADV) 100 mL MBP  3.375 g IntraVENous Q12H Giancarlo Navarrete MD 25 mL/hr at 09/04/22 2159 3.375 g at 09/04/22 2159    VANCOMYCIN INFORMATION NOTE   Other Rx Dosing/Monitoring Giancarlo Navarrete MD        sodium chloride (NS) flush 5-40 mL  5-40 mL IntraVENous Q8H Manpreet Morley MD   10 mL at 09/05/22 0617    sodium chloride (NS) flush 5-40 mL  5-40 mL IntraVENous PRN Giancarlo Navarrete MD        acetaminophen (TYLENOL) tablet 650 mg  650 mg Oral Q6H PRN Giancarlo Navarrete MD   650 mg at 09/03/22 2339    Or    acetaminophen (TYLENOL) suppository 650 mg  650 mg Rectal Q6H PRN Giancarlo Navarrete MD        polyethylene glycol (MIRALAX) packet 17 g  17 g Oral DAILY PRN Giancarlo Navarrete MD        promethazine (PHENERGAN) tablet 12.5 mg  12.5 mg Oral Q6H PRN Giancarlo Navarrete MD   12.5 mg at 09/02/22 0231    Or    ondansetron (ZOFRAN) injection 4 mg  4 mg IntraVENous Q6H PRN Giancarlo Navarrete MD [Held by provider] heparin (porcine) injection 5,000 Units  5,000 Units SubCUTAneous Q8H Isabell Butler MD   5,000 Units at 09/05/22 0616    famotidine (PEPCID) tablet 20 mg  20 mg Oral DAILY Isabell Butler MD   20 mg at 09/04/22 0940    nystatin (MYCOSTATIN) 100,000 unit/gram powder   Topical TID Isabell Butler MD   Given at 09/04/22 2242       Objective  Vitals:    09/04/22 2048 09/05/22 0015 09/05/22 0354 09/05/22 0723   BP: 137/65 135/68 135/72 132/75   Pulse: 75 80 88 94   Resp: 17 18 18 18   Temp: 97.6 °F (36.4 °C) 97.7 °F (36.5 °C) 97.6 °F (36.4 °C) 98.7 °F (37.1 °C)   SpO2: 98% 99% 97% 97%   Weight:       Height:             Intake/Output Summary (Last 24 hours) at 9/5/2022 0811  Last data filed at 9/5/2022 0723  Gross per 24 hour   Intake 300 ml   Output 1680 ml   Net -1380 ml             Admission weight: Weight: 99.8 kg (220 lb) (09/01/22 1448)  Last Weight Metrics:  Weight Loss Metrics 9/1/2022 7/5/2022 10/10/2014   Today's Wt 220 lb 223 lb 223 lb   BMI 38.97 kg/m2 37.11 kg/m2 37.11 kg/m2             Physical Assessment:     General: NAD, alert and oriented. Neck: No jvd. LUNGS: Clear to Auscultation, No rales, rhonchi or wheezes. CVS EXM: S1, S2  RRR No rub. Abdomen: soft, non tender.    Lower Extremities:  Edema +          Lab    CBC w/Diff Recent Labs     09/05/22  0314 09/04/22  0122 09/03/22  0224   WBC 17.3* 16.4* 15.6*   RBC 2.70* 2.75* 2.79*   HGB 6.8* 6.9* 7.0*   HCT 19.8* 19.9* 20.4*    238 255   GRANS 85*  --   --    LYMPH 10*  --   --    EOS 0  --   --           Chemistry Recent Labs     09/05/22  0314 09/04/22  0122 09/03/22  0224   GLU 77 95 84    137 138   K 4.0 3.7 3.7    104 105   CO2 16* 21 23   * 120* 121*   CREA 6.96* 6.57* 5.55*   CA 8.9 8.2* 8.3*   AGAP 16 12 10   BUCR 18 18 22*           Lab Results   Component Value Date/Time    Iron 23 (L) 09/02/2022 03:19 AM    TIBC 176 (L) 09/02/2022 03:19 AM    Iron % saturation 13 (L) 09/02/2022 03:19 AM    Ferritin 823 (H) 09/02/2022 03:19 AM      Lab Results   Component Value Date/Time    Calcium 8.9 09/05/2022 03:14 AM        Curtis Moya MD  8:02 AM  9/5/2022

## 2022-09-05 NOTE — PROGRESS NOTES
Patient's PIV infiltrated in her left forearm. It was swollen and her arm was elevated on a pillow with a warm compress applied. She now has a new PIV to her right forearm, and it is patent. Nephrostomy tubes and jacinto catheter are all patent and draining properly. Patient denies pain or discomfort at this time.

## 2022-09-05 NOTE — ACP (ADVANCE CARE PLANNING)
Advance Care Planning   Advance Care Planning Inpatient Note  [address] [place] [address]   Spiritual Care Department    Today's Date: 9/5/2022  Unit: [address]     Upon review of chart and communication with care team, patient's decision making abilities are not in question. Patient was/were present in the room during visit. Goals of ACP Conversation:  Discuss Advance Care planning documents  Discuss state decision maker hierarchy    Health Care Decision Makers:      Primary Decision Maker: Maxine Castleman - [phone]    Summary:  Patient declined interest in completing an AMD.  [address]  Documented Next of Kin, per patient report    Advance Care Planning Documents (Patient Wishes) on file:  None     Assessment:    Ms. Charli Zuñiga expresses confidence in her son making decisions on her behalf. They are close. He is her only remaining family besides cousins.     Interventions:  Discussed and provided education on state decision maker hierarchy  Encouraged ongoing ACP conversation with future decision makers and loved ones    Care Preferences Communicated:  No    Outcomes/Plan:  ACP Discussion Completed    Chaplain Lois on 9/5/2022 at 3:10 PM

## 2022-09-05 NOTE — PROGRESS NOTES
Hospitalist Progress Note    Subjective:   Daily Progress Note: 9/5/2022      No chest or abdominal pain. No nausea or vomiting. Continues to have some abdominal discomfort. Current Facility-Administered Medications   Medication Dose Route Frequency    0.9% sodium chloride infusion 250 mL  250 mL IntraVENous PRN    sodium bicarbonate tablet 1,300 mg  1,300 mg Oral BID    hydrALAZINE (APRESOLINE) tablet 25 mg  25 mg Oral TID    oxyCODONE IR (ROXICODONE) tablet 5 mg  5 mg Oral Q6H PRN    metoprolol tartrate (LOPRESSOR) tablet 12.5 mg  12.5 mg Oral BID    0.9% sodium chloride infusion  75 mL/hr IntraVENous CONTINUOUS    melatonin (rapid dissolve) tablet 5 mg  5 mg Oral QHS    multivitamin, tx-iron-ca-min (THERA-M w/ IRON) tablet 1 Tablet  1 Tablet Oral DAILY    senna-docusate (PERICOLACE) 8.6-50 mg per tablet 1 Tablet  1 Tablet Oral DAILY    sodium chloride (NS) flush 5-10 mL  5-10 mL IntraVENous PRN    piperacillin-tazobactam (ZOSYN) 3.375 g in 0.9% sodium chloride (MBP/ADV) 100 mL MBP  3.375 g IntraVENous Q12H    VANCOMYCIN INFORMATION NOTE   Other Rx Dosing/Monitoring    sodium chloride (NS) flush 5-40 mL  5-40 mL IntraVENous Q8H    sodium chloride (NS) flush 5-40 mL  5-40 mL IntraVENous PRN    acetaminophen (TYLENOL) tablet 650 mg  650 mg Oral Q6H PRN    Or    acetaminophen (TYLENOL) suppository 650 mg  650 mg Rectal Q6H PRN    polyethylene glycol (MIRALAX) packet 17 g  17 g Oral DAILY PRN    promethazine (PHENERGAN) tablet 12.5 mg  12.5 mg Oral Q6H PRN    Or    ondansetron (ZOFRAN) injection 4 mg  4 mg IntraVENous Q6H PRN    [Held by provider] heparin (porcine) injection 5,000 Units  5,000 Units SubCUTAneous Q8H    famotidine (PEPCID) tablet 20 mg  20 mg Oral DAILY    nystatin (MYCOSTATIN) 100,000 unit/gram powder   Topical TID        Review of Systems  Pertinent items are noted in HPI.     Objective:     Visit Vitals  /75 (BP 1 Location: Right upper arm, BP Patient Position: Supine)   Pulse 94   Temp 98.7 °F (37.1 °C)   Resp 18   Ht 5' 3\" (1.6 m)   Wt 106.4 kg (234 lb 8 oz)   SpO2 97%   Breastfeeding No   BMI 41.54 kg/m²      O2 Device: None (Room air)    Temp (24hrs), Av.9 °F (36.6 °C), Min:97.5 °F (36.4 °C), Max:98.7 °F (37.1 °C)      701 - 1900  In: 240 [P.O.:240]  Out: 385 [Urine:385]  1901 -  07  In: 300 [I.V.:300]  Out: 1295 [Urine:1295]      General appearance - alert, well appearing, and in no distress  Chest -clear air entry noted in bases, no wheezes  Heart - S1 and S2 normal  Abdomen - soft, nontender, nondistended, Bowel sounds present, no guarding or rigidity. Bilateral PCN tubes in situ  Neurological - alert, oriented, normal speech, no focal findings noted, no tremors noted. Extremities - no pedal edema noted      Data Review    Recent Results (from the past 24 hour(s))   VANCOMYCIN, RANDOM    Collection Time: 22  3:14 AM   Result Value Ref Range    Vancomycin, random 17.3 5.0 - 40.0 UG/ML   CBC WITH AUTOMATED DIFF    Collection Time: 22  3:14 AM   Result Value Ref Range    WBC 17.3 (H) 4.6 - 13.2 K/uL    RBC 2.70 (L) 4.20 - 5.30 M/uL    HGB 6.8 (L) 12.0 - 16.0 g/dL    HCT 19.8 (L) 35.0 - 45.0 %    MCV 73.3 (L) 78.0 - 100.0 FL    MCH 25.2 24.0 - 34.0 PG    MCHC 34.3 31.0 - 37.0 g/dL    RDW 16.1 (H) 11.6 - 14.5 %    PLATELET 821 535 - 018 K/uL    MPV 9.9 9.2 - 11.8 FL    NRBC 0.0 0  WBC    ABSOLUTE NRBC 0.00 0.00 - 0.01 K/uL    NEUTROPHILS 85 (H) 40 - 73 %    LYMPHOCYTES 10 (L) 21 - 52 %    MONOCYTES 3 3 - 10 %    EOSINOPHILS 0 0 - 5 %    BASOPHILS 0 0 - 2 %    IMMATURE GRANULOCYTES 1 (H) 0.0 - 0.5 %    ABS. NEUTROPHILS 14.8 (H) 1.8 - 8.0 K/UL    ABS. LYMPHOCYTES 1.8 0.9 - 3.6 K/UL    ABS. MONOCYTES 0.6 0.05 - 1.2 K/UL    ABS. EOSINOPHILS 0.0 0.0 - 0.4 K/UL    ABS. BASOPHILS 0.0 0.0 - 0.1 K/UL    ABS. IMM.  GRANS. 0.2 (H) 0.00 - 0.04 K/UL    DF AUTOMATED     METABOLIC PANEL, BASIC    Collection Time: 22  3:14 AM   Result Value Ref Range Sodium 139 136 - 145 mmol/L    Potassium 4.0 3.5 - 5.5 mmol/L    Chloride 107 100 - 111 mmol/L    CO2 16 (L) 21 - 32 mmol/L    Anion gap 16 3.0 - 18 mmol/L    Glucose 77 74 - 99 mg/dL     (H) 7.0 - 18 MG/DL    Creatinine 6.96 (H) 0.6 - 1.3 MG/DL    BUN/Creatinine ratio 18 12 - 20      GFR est AA 7 (L) >60 ml/min/1.73m2    GFR est non-AA 6 (L) >60 ml/min/1.73m2    Calcium 8.9 8.5 - 10.1 MG/DL         Assessment/Plan:     Active Problems:    Uterine mass (9/1/2022)      WILBUR (acute kidney injury) ([facility] [address] (9/1/2022)      Bladder outlet obstruction (9/1/2022)      Lobulation, spleen (9/1/2022)      Adrenal nodule ([facility] [address] (9/1/2022)      Leukocytosis (9/1/2022)      Microcytic anemia (9/1/2022)      Hyponatremia (9/1/2022)      Uremia (9/1/2022)      Other hydronephrosis (9/2/2022)    ASSESSMENT:    WILBUR due to obstructive uropathy status post bilateral PCN  Obstructive uropathy with bilateral hydronephrosis, possibly obstruction at the bladder. No evidence of infection s/p bilateral PCN  Uremia with nausea, and malaise, stable  Hyponatremia, hypovolemia -better today  Uterine mass, neoplasm. Back pain, no clear evidence of metastatic disease to her lumbar spine. 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 Microcytic anemia    Hypertension    Class 2 obesity BMI 38     PLAN:      Continue IV antibiotic and monitor this patient  Continue management per renal failure per nephrologist  Continue low-dose beta-blocker  We will add probiotic  continue low-dose Roxicodone for pain management  Patient is aware that we do not have a gynecologist oncology service here.   PT and OT to follow this patient    Discharge barriers for today: Rising creatinine, urology clearance, anemia    Anticipated date of discharge: September 7, 2022 if cleared by urology service    Total time to take care of this patient was 30 minutes and more than 50% of time was spent counseling and coordinating care. Disclaimer: Sections of this note are dictated using utilizing voice recognition software, which may have resulted in some phonetic based errors in grammar and contents. Even though attempts were made to correct all the mistakes, some may have been missed, and remained in the body of the document. If questions arise, please contact our department.

## 2022-09-05 NOTE — PROGRESS NOTES
Problem: Pressure Injury - Risk of  Goal: *Prevention of pressure injury  Description: Document Geoffrey Scale and appropriate interventions in the flowsheet. Outcome: Progressing Towards Goal  Note: Pressure Injury Interventions:       Moisture Interventions: Absorbent underpads    Activity Interventions: Pressure redistribution bed/mattress(bed type), Increase time out of bed    Mobility Interventions: HOB 30 degrees or less    Nutrition Interventions:  (NPO)    Friction and Shear Interventions: Foam dressings/transparent film/skin sealants, Minimize layers                Problem: Patient Education: Go to Patient Education Activity  Goal: Patient/Family Education  Outcome: Progressing Towards Goal     Problem: Falls - Risk of  Goal: *Absence of Falls  Description: Document Joan Fall Risk and appropriate interventions in the flowsheet.   Outcome: Progressing Towards Goal  Note: Fall Risk Interventions:  Mobility Interventions: Bed/chair exit alarm         Medication Interventions: Patient to call before getting OOB    Elimination Interventions: Bed/chair exit alarm, Call light in reach              Problem: Patient Education: Go to Patient Education Activity  Goal: Patient/Family Education  Outcome: Progressing Towards Goal     Problem: Pain  Goal: *Control of Pain  Outcome: Progressing Towards Goal     Problem: Patient Education: Go to Patient Education Activity  Goal: Patient/Family Education  Outcome: Progressing Towards Goal     Problem: Infection - Risk of, Urinary Catheter-Associated Urinary Tract Infection  Goal: *Absence of infection signs and symptoms  Outcome: Progressing Towards Goal     Problem: Patient Education: Go to Patient Education Activity  Goal: Patient/Family Education  Outcome: Progressing Towards Goal     Problem: Patient Education: Go to Patient Education Activity  Goal: Patient/Family Education  Outcome: Progressing Towards Goal     Problem: Patient Education: Go to Patient Education Activity  Goal: Patient/Family Education  Outcome: Progressing Towards Goal

## 2022-09-05 NOTE — PROGRESS NOTES
Problem: Self Care Deficits Care Plan (Adult)  Goal: *Acute Goals and Plan of Care (Insert Text)  Description: Occupational Therapy Goals  Initiated 9/2/2022 within 7 day(s). 1.  Patient will perform grooming with supervision/set-up standing at the sink for >2 min with Fair+ balance. 2.  Patient will perform bathing with supervision/set-up. 3.  Patient will perform lower body dressing with supervision/set-up using AE prn.  4.  Patient will perform toilet transfers with supervision/set-up. 5.  Patient will perform all aspects of toileting with supervision/set-up. 6.  Patient will participate in upper extremity therapeutic exercise/activities with supervision/set-up for 8 minutes to improve endurance and UB strength needed for ADLs    7. Patient will utilize energy conservation techniques during functional activities with verbal cues. Prior Level of Function: Pt reports being independent with ADLs and functional mobility, and was working prior to 2 weeks ago. Since then pt has not been in the shower and requires assistance for LB ADLs. Outcome: Progressing Towards Goal   OCCUPATIONAL THERAPY TREATMENT    Patient: Tobias Lopez (8 y.o. female)  Date: 9/5/2022  Diagnosis: Bladder outlet obstruction [N32.0]  WILBUR (acute kidney injury) ([facility] [address] [N17.9]  Uterine mass [N85.8] <principal problem not specified>  Procedure(s) (LRB):  CYSTOSCOPY, insertion of jacinto catheter (N/A) 1 Day Post-Op  Precautions: Fall  PLOF: Pt reports being independent with ADLs and functional mobility, and was working prior to 2 weeks ago. Since then pt has not been in the shower and requires assistance for LB ADLs. Chart, occupational therapy assessment, plan of care, and goals were reviewed. ASSESSMENT:  Pt presented supine in bed upon entry reporting she had episode of loose BM while in bed. Pt required MIN A rolling L/R to change soil chux pads and MAX A provided for doug area hygiene.  She was given clean hospital gown and donned with SBA. Pt educated on AE training for increased (I) during LB ADL's however pt deferring at this time 2/2 fatigue despite encouragement. She was able to scoot herself towards [facility] with CGA and positioned for comfort. Pt left with HOB elevated, all drains intact, bed alarm active, and all needs left within reach. RN made aware of pt's participation and position. Progression toward goals:  []          Improving appropriately and progressing toward goals  [x]          Improving slowly and progressing toward goals  []          Not making progress toward goals and plan of care will be adjusted     PLAN:  Patient continues to benefit from skilled intervention to address the above impairments. Continue treatment per established plan of care. Further Equipment Recommendations for Discharge:   and [facility]    AMPAC: Based on an AM-PAC score of 15/24 and their current ADL deficits; it is recommended that the patient have 3-5 sessions per week of Occupational Therapy at d/c to increase the patient's independence. This AMPAC score should be considered in conjunction with interdisciplinary team recommendations to determine the most appropriate discharge setting. Patient's social support, diagnosis, medical stability, and prior level of function should also be taken into consideration. SUBJECTIVE:   Patient stated This is my fourth time having a BM today.     OBJECTIVE DATA SUMMARY:   Cognitive/Behavioral Status:  Neurologic State: Alert, Eyes open spontaneously  Orientation Level: Oriented X4  Cognition: Appropriate decision making, Appropriate for age attention/concentration, Appropriate safety awareness, Follows commands  Safety/Judgement: Decreased awareness of need for safety    Functional Mobility and Transfers for ADLs:   Bed Mobility:  Rolling: Minimum assistance        Scooting: Contact guard assistance (towards [facility])     ADL Intervention:       Upper Body [address]: Stand-by assistance         Toileting  Bowel Hygiene: Maximum assistance (supine)      Pain:  Pain level pre-treatment: 0/10   Pain level post-treatment: 0/10      Activity Tolerance:    Fair   Please refer to the flowsheet for vital signs taken during this treatment. After treatment:   []  Patient left in no apparent distress sitting up in chair  [x]  Patient left in no apparent distress in bed  [x]  Call bell left within reach  [x]  Nursing notified  []  Caregiver present  [x]  Bed alarm activated    COMMUNICATION/EDUCATION:   [x] Role of Occupational Therapy in the acute care setting  [] Home safety education was provided and the patient/caregiver indicated understanding. [x] Patient/family have participated as able in working towards goals and plan of care. [x] Patient/family agree to work toward stated goals and plan of care. [] Patient understands intent and goals of therapy, but is neutral about his/her participation. [] Patient is unable to participate in goal setting and plan of care. Thank you for this referral.  JAMSHID Boothe  Time Calculation: 24 mins    [employer] AM-PAC® Daily Activity Inpatient Short Form (6-Clicks)    How much HELP from another person does the patient currently need    (If the patient hasn't done an activity recently, how much help from another person do you think he/she would need if he/she tried?)   Total (Total A or Dep)   A Lot  (Mod to Max A)   A Little (Sup or Min A)   None (Mod I to I)   Putting on and taking off regular lower body clothing? [] 1 [x] 2 [] 3 [] 4   2. Bathing (including washing, rinsing,      drying)? [] 1 [x] 2 [] 3 [] 4   3. Toileting, which includes using toilet, bedpan or urinal?   [] 1 [x] 2 [] 3 [] 4   4. Putting on and taking off regular upper body clothing? [] 1 [] 2 [x] 3 [] 4   5. Taking care of personal grooming such as brushing teeth? [] 1 [] 2 [x] 3 [] 4   6. Eating meals?    [] 1 [] 2 [x] 3 [] 4

## 2022-09-05 NOTE — PROGRESS NOTES
[address] [facility] Pharmacokinetic Monitoring Service - Vancomycin    Consulting Provider: Reinaldo Guevara MD  Indication: Sepsis of Unknown Etiology  Target Concentration: Dosing based on anticipated concentration <15 mg/L due to renal impairment/insufficiency  Day of Therapy: 4  Additional Antimicrobials: Piperacillin/Tazobactam    Pertinent Laboratory Values:   Temp: 97.6 °F (36.4 °C)  Weight: 99.8 kg (220 lb)  Recent Labs     09/05/22  0314 09/04/22  0122   CREA 6.96* 6.57*   * 120*   WBC 17.3* 16.4*       Estimated Creatinine Clearance: 8.5 mL/min (A) (based on SCr of 6.96 mg/dL (H)). Pertinent Cultures:  Culture Date Source Results   09/01 Urine - Cath No significant growth, <10,000 CFU/mL   09/01 Blood NO GROWTH         MRSA Nasal Swab: N/A.  Non-respiratory infection    Assessment:  Date/Time Current Dose Concentration Timing of Concentration (h) AUC   9/1 2000mg - - -   09/03 0224 - 21.3 27 -   9/4 -      09/05 - 17.3 72 h -          Note: Serum concentrations collected for AUC dosing may appear elevated if collected in close proximity to the dose administered, this is not necessarily an indication of toxicity    Plan:  No dose needed today  Vancomycin concentration ordered for 09/06 with AM labs  Will redose when level <15 mcg/ml  Pharmacy will continue to monitor patient and adjust therapy as indicated

## 2022-09-05 NOTE — PROGRESS NOTES
Bedside and Verbal shift change report given to AUNDREA Yates (oncoming nurse) by Mayra Scheuermann, RN   (offgoing nurse). Report included the following information SBAR, Kardex, Intake/Output, MAR, and Recent Results.      Wound Prevention Checklist    Patient: Santa Sr (76 y.o. female)  Date: 9/5/2022  Diagnosis: Bladder outlet obstruction [N32.0]  WILBUR (acute kidney injury) ([facility] [address] [N17.9]  Uterine mass [N85.8] <principal problem not specified>    Precautions: Fall       []  Heel prevention boots placed on patient    []  Patient turned q2h during shift    []  Lift team ordered    [x]  Patient on Shon bed/Specialty bed    []  Each Wound is documented during shift (Stage, Color, drainage, odor, measurements, and dressings)    [x]  Dual skin checks done at bedside during shift report with Luis Tsai RN

## 2022-09-05 NOTE — PROGRESS NOTES
INTERIM UPDATE - 0818 EST on 9/05/2022    Nursing Staff calls to report that AM labs have returned with abnormal findings of Hgb 6.8 g/dL. Alternatives, Risks, Benefits, and Consequences of Blood Transfusion were discussed with Patient in an effort to Obtain Informed Consent for Blood Transfusion. Alternatives, such as foregoing Blood Transfusion and waiting for the body to naturally replace hemoglobin and other connective tissues, were discussed (but not recommended). Risks of Blood Transfusion were discussed; such as Risk of Volume Overload in Congestive Heart Failure, Cirrhosis, or End Stage Renal Disease; Pre-Formed Antibodies causing Acute Lung Injury; transmission of blood borne infection from donated blood; \"Catastrophic Blood Transfusion Errors\" (i.e. acute hemolytic transfusion reactions) were discussed; and the risks of pain, bruising, bleeding, and infection at the site of IV Access. Patient was informed that there are no good substitutions for Blood available at this time to perform intravascular oxygen transportation (or the equivalent). Patient was given the opportunity to ask questions. Ultimately, Patient decided to Deliberate Further regarding Blood Transfusion and decline to sign the Blood Consent Form. Patient requested that Type & Screen not be ordered at this time and, instead, asked that H&H be checked every 8 hours to monitor Hemoglobin. Patient was educated on the symptoms of anemia and was instructed to call Nursing Staff if Patient began to exhibit symptoms or symptoms worsened. The Physical Incomplete Blood Consent Form resides in Patient's Physical Chart. Plan:  As described above, ordered H&H q8hrs to monitor hemoglobin. Blood Transfusion declined at this time (as above) and Type & Screen refused at this time (as above).

## 2022-09-05 NOTE — PROGRESS NOTES
Bedside and Verbal shift change report given to Jeferson Isbell (oncoming nurse) by Yisel Patton RN   (offgoing nurse). Report included the following information SBAR, Kardex, Intake/Output, MAR, and Recent Results.      Wound Prevention Checklist    Patient: Jane Pate (89 y.o. female)  Date: 9/5/2022  Diagnosis: Bladder outlet obstruction [N32.0]  WILBUR (acute kidney injury) ([facility] [address] [N17.9]  Uterine mass [N85.8] <principal problem not specified>    Precautions: Fall       []  Heel prevention boots placed on patient    []  Patient turned q2h during shift    []  Lift team ordered    [x]  Patient on Shon bed/Specialty bed    []  Each Wound is documented during shift (Stage, Color, drainage, odor, measurements, and dressings)    [x]  Dual skin checks done at bedside during shift report with Rosette May RN

## 2022-09-05 NOTE — PROGRESS NOTES
conducted an initial consultation and Spiritual Assessment for Almas Patricia, who is a 80 y.o.,female. Patient's Primary Language is: [place]. According to the patient's EMR Mormon Affiliation is: Denominational.     The reason the Patient came to the hospital is:   Patient Active Problem List    Diagnosis Date Noted    Other hydronephrosis 09/02/2022    Uterine mass 09/01/2022    WILBUR (acute kidney injury) ([facility] [address] 09/01/2022    Bladder outlet obstruction 09/01/2022    Lobulation, spleen 09/01/2022    Adrenal nodule ([facility] [address] 09/01/2022    Leukocytosis 09/01/2022    Microcytic anemia 09/01/2022    Hyponatremia 09/01/2022    Uremia 09/01/2022        The  provided the following Interventions:  Initiated a relationship of care and support through supportive dialogue. Explored for current spiritual needs  Listened empathically as she shared concerning her medical concerns and expressed her laura and trust in God. Offered prayer and assurance of continued prayers on patient's behalf. Addressed AMD's. See ACP note. Chart reviewed. The following outcomes where achieved:  Patient shared limited information about both their medical narrative and spiritual journey/beliefs.  confirmed Patient's Mormon Affiliation. Patient processed feelings about current hospitalization. Patient expressed gratitude for 's visit. Assessment:  Patient does not have any Christian/cultural needs that will affect patient's preferences in health care. There are no aditional spiritual or Christian issues which require intervention at this time. Plan:  Chaplains will continue to follow and will provide pastoral care on an as needed/requested basis.  recommends bedside caregivers page  on duty if patient shows signs of acute spiritual or emotional distress.     [address] [place]   [phone]

## 2022-09-05 NOTE — PROGRESS NOTES
Urology Progress Note        Assessment/Plan:     Active Problems:    Uterine mass (2022)      WILBUR (acute kidney injury) ([facility] [address] (2022)      Bladder outlet obstruction (2022)      Lobulation, spleen (2022)      Adrenal nodule ([facility] [address] (2022)      Leukocytosis (2022)      Microcytic anemia (2022)      Hyponatremia (2022)      Uremia (2022)      Other hydronephrosis (2022)    ASSESSMENT:   - WILBUR with Mild bilateral hydro, Bladder distension, 1.4L on CT. Cotter placed  with continued rise in Cr to 6.57  Cystoscopy  unable to identify ureteral orifices. Bilateral PCNs placed . Cr 6.9     - Enlargement of uterine mass, concerning for gynecologic malignancy              Neg UA 22              WBC 17.2              Afebrile, VSS     - WILBUR              Creat 4.85 > 6.9              Baseline 0.7 in 2022        PLAN:    Cxs neg  Continue Cotter and bilateral PCNs. Follow creatinine      Subjective:     Daily Progress Note: 2022 5:06 PM    Tatyana Molina is 1 Day Post-Op and doing satisfactory. She is tolerating the bilateral nephrostomy tubes which were placed yesterday. No specific complaints    Objective:     Visit Vitals  /73 (BP 1 Location: Right upper arm, BP Patient Position: At rest)   Pulse (!) 103   Temp 98.3 °F (36.8 °C)   Resp 18   Ht 5' 3\" (1.6 m)   Wt 234 lb 8 oz (106.4 kg)   SpO2 97%   Breastfeeding No   BMI 41.54 kg/m²        Temp (24hrs), Av.1 °F (36.7 °C), Min:97.6 °F (36.4 °C), Max:98.7 °F (37.1 °C)      Intake and Output:  1901 -  0700  In: 300 [I.V.:300]  Out: 1295 [Urine:1295]   07 -  1900  In: 240 [P.O.:240]  Out: 5716 [Urine:1125]    Physical Exam:   General appearance: alert, cooperative, no distress, appears stated age  Abdomen: Nondistended, nontender. Bilateral PCNs in place with bloody urine.   Cotter also in place with bloody urine        Data Review:    Recent Results (from the past 24 hour(s)) VANCOMYCIN, RANDOM    Collection Time: 09/05/22  3:14 AM   Result Value Ref Range    Vancomycin, random 17.3 5.0 - 40.0 UG/ML   CBC WITH AUTOMATED DIFF    Collection Time: 09/05/22  3:14 AM   Result Value Ref Range    WBC 17.3 (H) 4.6 - 13.2 K/uL    RBC 2.70 (L) 4.20 - 5.30 M/uL    HGB 6.8 (L) 12.0 - 16.0 g/dL    HCT 19.8 (L) 35.0 - 45.0 %    MCV 73.3 (L) 78.0 - 100.0 FL    MCH 25.2 24.0 - 34.0 PG    MCHC 34.3 31.0 - 37.0 g/dL    RDW 16.1 (H) 11.6 - 14.5 %    PLATELET 168 843 - 271 K/uL    MPV 9.9 9.2 - 11.8 FL    NRBC 0.0 0  WBC    ABSOLUTE NRBC 0.00 0.00 - 0.01 K/uL    NEUTROPHILS 85 (H) 40 - 73 %    LYMPHOCYTES 10 (L) 21 - 52 %    MONOCYTES 3 3 - 10 %    EOSINOPHILS 0 0 - 5 %    BASOPHILS 0 0 - 2 %    IMMATURE GRANULOCYTES 1 (H) 0.0 - 0.5 %    ABS. NEUTROPHILS 14.8 (H) 1.8 - 8.0 K/UL    ABS. LYMPHOCYTES 1.8 0.9 - 3.6 K/UL    ABS. MONOCYTES 0.6 0.05 - 1.2 K/UL    ABS. EOSINOPHILS 0.0 0.0 - 0.4 K/UL    ABS. BASOPHILS 0.0 0.0 - 0.1 K/UL    ABS. IMM.  GRANS. 0.2 (H) 0.00 - 0.04 K/UL    DF AUTOMATED     METABOLIC PANEL, BASIC    Collection Time: 09/05/22  3:14 AM   Result Value Ref Range    Sodium 139 136 - 145 mmol/L    Potassium 4.0 3.5 - 5.5 mmol/L    Chloride 107 100 - 111 mmol/L    CO2 16 (L) 21 - 32 mmol/L    Anion gap 16 3.0 - 18 mmol/L    Glucose 77 74 - 99 mg/dL     (H) 7.0 - 18 MG/DL    Creatinine 6.96 (H) 0.6 - 1.3 MG/DL    BUN/Creatinine ratio 18 12 - 20      GFR est AA 7 (L) >60 ml/min/1.73m2    GFR est non-AA 6 (L) >60 ml/min/1.73m2    Calcium 8.9 8.5 - 10.1 MG/DL   TYPE & SCREEN    Collection Time: 09/05/22  8:42 AM   Result Value Ref Range    Crossmatch Expiration 09/08/2022,2359     ABO/Rh(D) Sis Canales POSITIVE     Antibody screen NEG    HGB & HCT    Collection Time: 09/05/22 12:00 PM   Result Value Ref Range    HGB 7.0 (L) 12.0 - 16.0 g/dL    HCT 20.1 (L) 35.0 - 45.0 %         Signed By:     Bea Titus MD   Urologic Oncologist  [facility] Hortensia Fernando MD Ana Lilia  Professor of Urology  [facility] [employer]  Office [phone] Ext [phone]                          September 5, 2022

## 2022-09-06 NOTE — PROGRESS NOTES
Pt not seen for skilled PT due to:    [ ]  Nausea/vomiting  [ ]  Eating  [ ]  Pain  [ ]  Pt lethargic  [ ]  Off Unit  Other: Attx2- pt declined both times, receiving blood transfusion on 2nd attempt as well. Will f/u later as schedule allows. Thank you.   Bg Starks, PT, DPT

## 2022-09-06 NOTE — PROGRESS NOTES
CM to bedside to complete initial assessment and discuss discharge plan. The patient said she was not ready to discuss going home. She is a  and lives alone. The patient is scheduled to have surgery September 20,2022 at [facility] and from there her recovery will be 4 to 6 weeks.  will continue to monitor and assist with transition of care needs.     MARTHA Seo, RN  Care Management

## 2022-09-06 NOTE — PROGRESS NOTES
Hospitalist Progress Note    Subjective:   Daily Progress Note: 9/6/2022     She continues to have lower abdominal discomfort. Denies any headaches or dizziness. No nausea or vomiting. She prefers going to nursing home as she lives alone.     Current Facility-Administered Medications   Medication Dose Route Frequency    0.9% sodium chloride infusion 250 mL  250 mL IntraVENous PRN    0.45% sodium chloride with KCl 20 mEq/L infusion   IntraVENous CONTINUOUS    0.9% sodium chloride infusion 250 mL  250 mL IntraVENous PRN    0.9% sodium chloride infusion 250 mL  250 mL IntraVENous PRN    sodium bicarbonate tablet 1,300 mg  1,300 mg Oral BID    L. acidophilus,casei,rhamnosus (BIO-K PLUS) capsule 1 Capsule  1 Capsule Oral DAILY    hydrALAZINE (APRESOLINE) tablet 25 mg  25 mg Oral TID    oxyCODONE IR (ROXICODONE) tablet 5 mg  5 mg Oral Q6H PRN    metoprolol tartrate (LOPRESSOR) tablet 12.5 mg  12.5 mg Oral BID    melatonin (rapid dissolve) tablet 5 mg  5 mg Oral QHS    multivitamin, tx-iron-ca-min (THERA-M w/ IRON) tablet 1 Tablet  1 Tablet Oral DAILY    senna-docusate (PERICOLACE) 8.6-50 mg per tablet 1 Tablet  1 Tablet Oral DAILY    sodium chloride (NS) flush 5-10 mL  5-10 mL IntraVENous PRN    sodium chloride (NS) flush 5-40 mL  5-40 mL IntraVENous Q8H    sodium chloride (NS) flush 5-40 mL  5-40 mL IntraVENous PRN    acetaminophen (TYLENOL) tablet 650 mg  650 mg Oral Q6H PRN    Or    acetaminophen (TYLENOL) suppository 650 mg  650 mg Rectal Q6H PRN    polyethylene glycol (MIRALAX) packet 17 g  17 g Oral DAILY PRN    promethazine (PHENERGAN) tablet 12.5 mg  12.5 mg Oral Q6H PRN    Or    ondansetron (ZOFRAN) injection 4 mg  4 mg IntraVENous Q6H PRN    [Held by provider] heparin (porcine) injection 5,000 Units  5,000 Units SubCUTAneous Q8H    famotidine (PEPCID) tablet 20 mg  20 mg Oral DAILY    nystatin (MYCOSTATIN) 100,000 unit/gram powder   Topical TID        Review of Systems  Pertinent items are noted in HPI.    Objective:     Visit Vitals  BP (!) 144/72 (BP 1 Location: Right upper arm)   Pulse (!) 112   Temp 98.2 °F (36.8 °C)   Resp 18   Ht 5' 3\" (1.6 m)   Wt 106.4 kg (234 lb 8 oz)   SpO2 98%   Breastfeeding No   BMI 41.54 kg/m²      O2 Device: None (Room air)    Temp (24hrs), Av.3 °F (36.8 °C), Min:98.2 °F (36.8 °C), Max:98.6 °F (37 °C)      701 - 1900  In: 100 [P.O.:100]  Out: 250 [Urine:250]  1901 -  0700  In: 1320 [P.O.:1320]  Out: 2146 [Urine:3190]      General appearance - alert, well appearing, and in no distress  Chest -clear air entry noted in bases, no wheezes  Heart - S1 and S2 normal  Abdomen - soft, nontender, nondistended, Bowel sounds present, no guarding or rigidity. Bilateral PCN tubes in situ  Neurological - alert, oriented, normal speech, no focal findings noted, no tremors noted.   Extremities - no pedal edema noted      Data Review    Recent Results (from the past 24 hour(s))   HGB & HCT    Collection Time: 22 12:00 PM   Result Value Ref Range    HGB 7.0 (L) 12.0 - 16.0 g/dL    HCT 20.1 (L) 35.0 - 45.0 %   HGB & HCT    Collection Time: 22  8:35 PM   Result Value Ref Range    HGB 6.4 (L) 12.0 - 16.0 g/dL    HCT 18.4 (L) 35.0 - 45.0 %   VANCOMYCIN, RANDOM    Collection Time: 22  5:06 AM   Result Value Ref Range    Vancomycin, random 13.5 5.0 - 40.0 UG/ML   CBC WITH AUTOMATED DIFF    Collection Time: 22  5:06 AM   Result Value Ref Range    WBC 15.7 (H) 4.6 - 13.2 K/uL    RBC 2.34 (L) 4.20 - 5.30 M/uL    HGB 6.0 (L) 12.0 - 16.0 g/dL    HCT 17.0 (LL) 35.0 - 45.0 %    MCV 72.6 (L) 78.0 - 100.0 FL    MCH 25.6 24.0 - 34.0 PG    MCHC 35.3 31.0 - 37.0 g/dL    RDW 16.2 (H) 11.6 - 14.5 %    PLATELET 557 201 - 740 K/uL    MPV 9.9 9.2 - 11.8 FL    NRBC 0.2 (H) 0  WBC    ABSOLUTE NRBC 0.03 (H) 0.00 - 0.01 K/uL    NEUTROPHILS 79 (H) 40 - 73 %    LYMPHOCYTES 12 (L) 21 - 52 %    MONOCYTES 5 3 - 10 %    EOSINOPHILS 2 0 - 5 %    BASOPHILS 0 0 - 2 % IMMATURE GRANULOCYTES 2 (H) 0.0 - 0.5 %    ABS. NEUTROPHILS 12.4 (H) 1.8 - 8.0 K/UL    ABS. LYMPHOCYTES 1.9 0.9 - 3.6 K/UL    ABS. MONOCYTES 0.8 0.05 - 1.2 K/UL    ABS. EOSINOPHILS 0.3 0.0 - 0.4 K/UL    ABS. BASOPHILS 0.0 0.0 - 0.1 K/UL    ABS. IMM. GRANS. 0.4 (H) 0.00 - 0.04 K/UL    DF AUTOMATED     METABOLIC PANEL, BASIC    Collection Time: 09/06/22  5:06 AM   Result Value Ref Range    Sodium 142 136 - 145 mmol/L    Potassium 3.3 (L) 3.5 - 5.5 mmol/L    Chloride 111 100 - 111 mmol/L    CO2 19 (L) 21 - 32 mmol/L    Anion gap 12 3.0 - 18 mmol/L    Glucose 114 (H) 74 - 99 mg/dL     (H) 7.0 - 18 MG/DL    Creatinine 5.02 (H) 0.6 - 1.3 MG/DL    BUN/Creatinine ratio 22 (H) 12 - 20      GFR est AA 10 (L) >60 ml/min/1.73m2    GFR est non-AA 9 (L) >60 ml/min/1.73m2    Calcium 8.6 8.5 - 10.1 MG/DL   RBC, ALLOCATE    Collection Time: 09/06/22  7:30 AM   Result Value Ref Range    HISTORY CHECKED? Historical check performed          Assessment/Plan:     Active Problems:    Uterine mass (9/1/2022)      WILBUR (acute kidney injury) ([address] (9/1/2022)      Bladder outlet obstruction (9/1/2022)      Lobulation, spleen (9/1/2022)      Adrenal nodule ([address] (9/1/2022)      Leukocytosis (9/1/2022)      Microcytic anemia (9/1/2022)      Hyponatremia (9/1/2022)      Uremia (9/1/2022)      Other hydronephrosis (9/2/2022)    ASSESSMENT:    WILBUR due to obstructive uropathy status post bilateral PCN  Obstructive uropathy with bilateral hydronephrosis, possibly obstruction at the bladder. No evidence of infection s/p bilateral PCN  Uremia with nausea, and malaise, stable  Hyponatremia, hypovolemia -better today  Uterine mass, neoplasm. Back pain, no clear evidence of metastatic disease to her lumbar spine. 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    Microcytic anemia    Hypertension    Class 2 obesity BMI 38     PLAN:    Monitor off antibiotic  Continue management per renal failure per nephrologist  Continue low-dose beta-blocker and hydralazine  Patient stated she has talked to  and willing to accept blood transfusion. continue low-dose Roxicodone for pain management  Patient is aware that we do not have a gynecologist oncology service here. PT and OT to follow this patient  Skilled nursing [facility] upon discharge    I have discussed with the patient the rationale for blood component transfusion; its benefits in treating or preventing fatigue, organ damage, or death; and its risk which includes mild transfusion reactions, rare risk of blood borne infection, or more serious but rare reactions. I have discussed the alternatives to transfusion, including the risk and consequences of not receiving transfusion. The patient had an opportunity to ask questions and had agreed to proceed with transfusion of blood components. Discharge barriers for today: Anemia, PT OT clearance    Anticipated date of discharge: September 7, 2022 if cleared by urology service    Total time to take care of this patient was 30 minutes and more than 50% of time was spent counseling and coordinating care. Disclaimer: Sections of this note are dictated using utilizing voice recognition software, which may have resulted in some phonetic based errors in grammar and contents. Even though attempts were made to correct all the mistakes, some may have been missed, and remained in the body of the document. If questions arise, please contact our department.

## 2022-09-06 NOTE — PROGRESS NOTES
.Reason for Admission:   Bladder outlet obstruction [N32.0]  WILBUR (acute kidney injury) ([facility] [address] [N17.9]  Uterine mass [N85.8]    PCP: Keyanna Farmer MD               RUR Score:     21%             Resources/supports as identified by patient/family:       Top Challenges facing patient (as identified by patient/family and CM): Finances/Medication cost?     No concerns  Transportation      self  Support system or lack thereof?   son  Living arrangements? Lives alone,    Self-care/ADLs/Cognition? No concerns        Current Advanced Directive/Advance Care Plan:   no                           Plan for utilizing home health:    no                      Likelihood of readmission:   HIGH    Transition of Care Plan:                    Initial assessment completed with patient. Cognitive status of patient: oriented to time, place, person and situation. Face sheet information confirmed:  yes. The patient designates mukesh Rodriguez to participate in her discharge plan and to receive any needed information. This patient lives in a single family home alone. 3 steps inside house    Patient is able to navigate steps as needed. Prior to hospitalization, patient was considered to be independent with ADLs/IADLS : yes . Patient has a current ACP document on file: no  The patient will be available to transport patient home upon discharge. The patient already has [place],  medical equipment available in the home. Patient is not currently active with home health. Patient has not stayed in a skilled nursing facility or rehab. Was  stay within last 60 days : no. This patient is on dialysis :no     Currently, the discharge plan is TBD. Patient did not want to discuss SNF or d/c plan. The patient states that she can obtain her medications from the pharmacy, and take her medications as directed.     Patient's current insurance is Payor: [employer]olai West / Plan: [place] MEDICARE PART A / Product Type: Medicare /       Care Management Interventions  PCP Verified by CM: Yes  Mode of Transport at Discharge: Other (see comment) (TBD)  Transition of Care Consult (CM Consult): Discharge Planning  Physical Therapy Consult: Yes  Occupational Therapy Consult: Yes  Support Systems: Child(garfield)  Confirm Follow Up Transport: Family  Discharge Location  Patient Expects to be Discharged to[de-identified] Unable to determine at this time         will continue to monitor and assist with transition of care needs.     AMRTHA Crabtree, RN  Care Management

## 2022-09-06 NOTE — PROGRESS NOTES
Spoke with MD Stevie Burt regarding patient having 1 IV access and needing 2 blood transfusions along with new continuous fluid with K+ additive ordered this AM, MD gave verbal ok to transfuse blood first and then begin continuous fluid. Will attempt to place patient additional IV as time allows.

## 2022-09-06 NOTE — PROGRESS NOTES
In Patient Progress note      Admit Date: 9/1/2022    Impression:  1. Severe acute kidney injury due to obstructive uropathy. Patient had bilateral PCN. There is mild increase in the creatinine but seems to be leveling off. She is making more urine although still has hematuria. No uremic symptoms. No postobstructive diuresis as yet. 2.  Anemia due to iron deficiency. This is not due to chronic kidney disease. Will defer further management to primary team.  Patient may end up needing transfusion. H&H stable. 3.  Mild hyponatremia, sodium is better at 139.  4.  Mild Acidosis likely metabolic  5   hypokalemia     S/p  PCN. Excellent output   Output from PCN was around 2.4 L, bloody. She denies any uremic symptoms  Creatinine seems to be improving  No respiratory symptoms     Plan  1) add IVF 1/2 NS + 20 kcl @ 75 cc/hrs   2) replace kcl   3) continue bicarb   4) PCN care per urology  5) no NSAIDs   6) agree with PRBC Tx   7) iron supplement     Discussed with DR Betty Parham     Please call with questions    Tico Mcgee MD FASN  Cell [phone]  Pager: [phone]     Subjective:     - s/p PCN draining well  - No acute over night events. - respiratory - stable  - hemodynamics - stable, no pressrs  - UOP-improved   - Nutrition -poor    Objective:     Visit Vitals  /67   Pulse 96   Temp 98.5 °F (36.9 °C)   Resp 16   Ht 5' 3\" (1.6 m)   Wt 106.4 kg (234 lb 8 oz)   SpO2 100%   Breastfeeding No   BMI 41.54 kg/m²         Intake/Output Summary (Last 24 hours) at 9/6/2022 1420  Last data filed at 9/6/2022 1401  Gross per 24 hour   Intake 1225.5 ml   Output 2770 ml   Net -1544.5 ml       Physical Exam:     General: NAD, alert and oriented. Neck: No jvd. LUNGS: Clear to Auscultation, No rales, rhonchi or wheezes. CVS EXM: S1, S2  RRR No rub. Abdomen: soft, non tender.    Lower Extremities:  Edema +      Data Review:    Recent Labs     09/06/22  0506   WBC 15.7*   RBC 2.34*   HCT 17.0*   MCV 72.6*   MCH 25.6 MCHC 35.3   RDW 16.2*     Recent Labs     09/06/22  0506 09/05/22  0314 09/04/22  0122   * 124* 120*   CREA 5.02* 6.96* 6.57*   CA 8.6 8.9 8.2*   K 3.3* 4.0 3.7    139 137    107 104   CO2 19* 16* 21   * 77 95       Francisco Dietrich MD

## 2022-09-06 NOTE — ROUTINE PROCESS
Wound Prevention Checklist    Patient: Danell Dance (70 y.o. female)  Date: 9/6/2022  Diagnosis: Bladder outlet obstruction [N32.0]  [facility] [N17.9]  Uterine mass [N85.8] <principal problem not specified>    Precautions: Fall       []  Heel prevention boots placed on patient    []  Patient turned q2h during shift    []  Lift team ordered    [x]  Patient on Sh[facility] bed/Specialty bed    []  Each Wound is documented during shift (Stage, Color, drainage, odor, measurements, and dressings)    [x]  Dual skin checks done at bedside during shift report with AUNDREA Bassett RN

## 2022-09-06 NOTE — ROUTINE PROCESS
Bedside shift change report given to Omayra Richard RN (oncoming nurse) by Areli Simpson RN (offgoing nurse). Report included the following information SBAR, Kardex, Intake/Output, and Recent Results.

## 2022-09-06 NOTE — PROGRESS NOTES
Problem: Pressure Injury - Risk of  Goal: *Prevention of pressure injury  Description: Document Geoffrey Scale and appropriate interventions in the flowsheet. Outcome: Progressing Towards Goal  Note: Pressure Injury Interventions:       Moisture Interventions: Absorbent underpads    Activity Interventions: Pressure redistribution bed/mattress(bed type)    Mobility Interventions: HOB 30 degrees or less    Nutrition Interventions: Document food/fluid/supplement intake    Friction and Shear Interventions: Foam dressings/transparent film/skin sealants                Problem: Patient Education: Go to Patient Education Activity  Goal: Patient/Family Education  Outcome: Progressing Towards Goal     Problem: Falls - Risk of  Goal: *Absence of Falls  Description: Document Joan Fall Risk and appropriate interventions in the flowsheet.   Outcome: Progressing Towards Goal  Note: Fall Risk Interventions:  Mobility Interventions: Bed/chair exit alarm         Medication Interventions: Bed/chair exit alarm    Elimination Interventions: Bed/chair exit alarm, Call light in reach, Patient to call for help with toileting needs              Problem: Patient Education: Go to Patient Education Activity  Goal: Patient/Family Education  Outcome: Progressing Towards Goal     Problem: Pain  Goal: *Control of Pain  Outcome: Progressing Towards Goal  Goal: *PALLIATIVE CARE:  Alleviation of Pain  Outcome: Progressing Towards Goal     Problem: Patient Education: Go to Patient Education Activity  Goal: Patient/Family Education  Outcome: Progressing Towards Goal     Problem: Infection - Risk of, Urinary Catheter-Associated Urinary Tract Infection  Goal: *Absence of infection signs and symptoms  Outcome: Progressing Towards Goal     Problem: Patient Education: Go to Patient Education Activity  Goal: Patient/Family Education  Outcome: Progressing Towards Goal

## 2022-09-06 NOTE — PROGRESS NOTES
PIV removed. ED tech Nomi at bedside for placement of U/S guided PIV, ED contacted prior to speaking with MD for IV PICC team. Patient in need of multiple access at this time R/T IV medications/transfusions.

## 2022-09-06 NOTE — PROGRESS NOTES
Spoke with MD regarding patient IV access site assessment and the need for IV medications to be infused, also reported to MD that patient states has had to have multiple PIVs removed and replaced related to same reason. MD ordered PICC line. Nursing supervisor contacted for PICC line IV team to be notified of order.

## 2022-09-06 NOTE — PROGRESS NOTES
Phone: [phone]    Hematology / Oncology Progress Note            Patient: Bernadette Calderon   MRN: [id number]         [facility]: [id number]    YOB: 1950      Admit Date: 2022    Assessment:     Active Problems:    Uterine mass (2022)      WILBUR (acute kidney injury) ([facility] [address] (2022)      Bladder outlet obstruction (2022)      Lobulation, spleen (2022)      Adrenal nodule ([facility] [address] (2022)      Leukocytosis (2022)      Microcytic anemia (2022)      Hyponatremia (2022)      Uremia (2022)      Other hydronephrosis (2022)      Newly dx uterine carcinosarcoma, followed by Dr Jimmie Andre,  VOA  Freddy obstructive hydronephrosis, s/p PCN  anemia      Plan:     S/p freddy PCN, monitor Cr  Prbc today  Out of bed to chair  She will followup out pt with gyn on Dr Ani Carreon, he is aware.         Lety Inman MD  [facility] [phone]      Subjective:     Weak tired  Abdominal bloating    Objective:     Visit Vitals  BP (!) 142/82 (BP 1 Location: Right upper arm, BP Patient Position: At rest)   Pulse (!) 106   Temp 98.2 °F (36.8 °C)   Resp 18   Ht 5' 3\" (1.6 m)   Wt 106.4 kg (234 lb 8 oz)   SpO2 96%   Breastfeeding No   BMI 41.54 kg/m²             Temp (24hrs), Av.3 °F (36.8 °C), Min:98.2 °F (36.8 °C), Max:98.6 °F (37 °C)        Intake/Output Summary (Last 24 hours) at 2022 0817  Last data filed at 2022 7673  Gross per 24 hour   Intake 1420 ml   Output 2285 ml   Net -865 ml     Review of Systems:   Constitutional: fatigue  Eyes: negative for visual disturbance,  icterus  Ears, Nose, Mouth, Throat, and Face: negative for tinnitus, epistaxis, sore mouth and hoarseness  Respiratory: negative for cough, sputum, hemoptysis, pleurisy/chest pain or wheezing  Cardiovascular: negative for chest pain, , palpitations,  syncope, paroxysmal nocturnal dyspnea  Gastrointestinal:positive for bloating,  negative for reflux symptoms, nausea, vomiting, melena, diarrhea, constipation   Genitourinary:negative for dysuria, nocturia, urinary incontinence, hesitancy and hematuria  Endocrine: no polydipsia, no goitre  Integument: negative for rash, skin lesion(s) and pruritus  Hematologic/Lymphatic: negative for easy bruising, bleeding and lymphadenopathy  Musculoskeletal:negative for myalgias, arthralgias and bone pain  Neurological: negative for headaches, dizziness, seizures, paresthesia and weakness    Physical Exam: ECOG : 1  Constitutional: Alert, oriented, obese, laying in bed  Eyes: PERRLA, anicteric,  pallor  ENT: no palpable Lymph nodes, no mucositis, no thrush. Respiratory: bilateral air entry  Cardiovascular: S1S2 regular   Gastrointestinal: soft,  NT, PCN tubes in place  Integument: no rash, no petechiae or ecchymosis.   Neurological: non focal      Labs:  Recent Results (from the past 24 hour(s))   TYPE & SCREEN    Collection Time: 09/05/22  8:42 AM   Result Value Ref Range    Crossmatch Expiration 09/08/2022,2359     ABO/Rh(D) Ana Melgar POSITIVE     Antibody screen NEG     CALLED TO: KELBY BYRD, 0723, 9/6/22 BY 4768     Unit number [id number]     Blood component type [facility]     Unit division 00     Status of unit ALLOCATED     Crossmatch result Compatible     Unit number [id number]     Blood component type [facility]     Unit division 00     Status of unit ALLOCATED     Crossmatch result Compatible    HGB & HCT    Collection Time: 09/05/22 12:00 PM   Result Value Ref Range    HGB 7.0 (L) 12.0 - 16.0 g/dL    HCT 20.1 (L) 35.0 - 45.0 %   HGB & HCT    Collection Time: 09/05/22  8:35 PM   Result Value Ref Range    HGB 6.4 (L) 12.0 - 16.0 g/dL    HCT 18.4 (L) 35.0 - 45.0 %   VANCOMYCIN, RANDOM    Collection Time: 09/06/22  5:06 AM   Result Value Ref Range    Vancomycin, random 13.5 5.0 - 40.0 UG/ML   CBC WITH AUTOMATED DIFF    Collection Time: 09/06/22  5:06 AM   Result Value Ref Range    WBC 15.7 (H) 4.6 - 13.2 K/uL    RBC 2.34 (L) 4.20 - 5.30 M/uL    HGB 6.0 (L) 12.0 - 16.0 g/dL    HCT 17.0 (LL) 35.0 - 45.0 %    MCV 72.6 (L) 78.0 - 100.0 FL    MCH 25.6 24.0 - 34.0 PG    MCHC 35.3 31.0 - 37.0 g/dL    RDW 16.2 (H) 11.6 - 14.5 %    PLATELET 879 288 - 881 K/uL    MPV 9.9 9.2 - 11.8 FL    NRBC 0.2 (H) 0  WBC    ABSOLUTE NRBC 0.03 (H) 0.00 - 0.01 K/uL    NEUTROPHILS 79 (H) 40 - 73 %    LYMPHOCYTES 12 (L) 21 - 52 %    MONOCYTES 5 3 - 10 %    EOSINOPHILS 2 0 - 5 %    BASOPHILS 0 0 - 2 %    IMMATURE GRANULOCYTES 2 (H) 0.0 - 0.5 %    ABS. NEUTROPHILS 12.4 (H) 1.8 - 8.0 K/UL    ABS. LYMPHOCYTES 1.9 0.9 - 3.6 K/UL    ABS. MONOCYTES 0.8 0.05 - 1.2 K/UL    ABS. EOSINOPHILS 0.3 0.0 - 0.4 K/UL    ABS. BASOPHILS 0.0 0.0 - 0.1 K/UL    ABS. IMM. GRANS. 0.4 (H) 0.00 - 0.04 K/UL    DF AUTOMATED     METABOLIC PANEL, BASIC    Collection Time: 09/06/22  5:06 AM   Result Value Ref Range    Sodium 142 136 - 145 mmol/L    Potassium 3.3 (L) 3.5 - 5.5 mmol/L    Chloride 111 100 - 111 mmol/L    CO2 19 (L) 21 - 32 mmol/L    Anion gap 12 3.0 - 18 mmol/L    Glucose 114 (H) 74 - 99 mg/dL     (H) 7.0 - 18 MG/DL    Creatinine 5.02 (H) 0.6 - 1.3 MG/DL    BUN/Creatinine ratio 22 (H) 12 - 20      GFR est AA 10 (L) >60 ml/min/1.73m2    GFR est non-AA 9 (L) >60 ml/min/1.73m2    Calcium 8.6 8.5 - 10.1 MG/DL   RBC, ALLOCATE    Collection Time: 09/06/22  7:30 AM   Result Value Ref Range    HISTORY CHECKED?  Historical check performed

## 2022-09-06 NOTE — PROGRESS NOTES
OUTPUT    Cotter: 175cc  Color: Bloody    Left Nephrostomy tube: 350cc                              Color: sanguinous    Right Nephrostomy tube: 400cc                              Color: sanguinous

## 2022-09-06 NOTE — PROGRESS NOTES
Urology Progress Note        Assessment/Plan:     Active Problems:    Uterine mass (2022)      WILBUR (acute kidney injury) ([facility] [address] (2022)      Bladder outlet obstruction (2022)      Lobulation, spleen (2022)      Adrenal nodule ([facility] [address] (2022)      Leukocytosis (2022)      Microcytic anemia (2022)      Hyponatremia (2022)      Uremia (2022)      Other hydronephrosis (2022)  ASSESSMENT:   - WILBUR with Mild bilateral hydro, Bladder distension, 1.4L on CT. Cotter placed  with continued rise in Cr to 6.57  Cystoscopy  unable to identify ureteral orifices. Bilateral PCNs placed . - Enlargement of uterine mass, concerning for gynecologic malignancy              Neg UA 22              WBC 15.7<17.2              Afebrile, VSS     - WILBUR              Creat 5.02<6.96>6.57              Baseline 0.7 in 2022  - Gross Hematuria    Hgb 6<6.4<7.0     PLAN:    Cxs neg  Continue Cotter and bilateral PCNs. Consider Cotter prior to DC, once creat NADIRS and WBC improves. Follow creatinine  Will need PCN exchange every 3 months. PCN need to be flushed with 10 cc NS daily. Gross Hematuria should improved with time. Continue to hold all [facility] and monitor H&H  Will chart check tomorrow, see Thursday. Tenzin Lizama PA-C  [facility]  Available -Fri, [facility]  Pager: [phone]     Subjective:     Daily Progress Note: 2022 5:06 PM    Gil Lisa is 2 Days Post-Op . Getting 1 U PRBC at this time. Creatinine and WBC slowly improving.   VSS    Objective:     Visit Vitals  /63   Pulse 96   Temp 98.2 °F (36.8 °C)   Resp 18   Ht 5' 3\" (1.6 m)   Wt 106.4 kg (234 lb 8 oz)   SpO2 98%   Breastfeeding No   BMI 41.54 kg/m²        Temp (24hrs), Av.3 °F (36.8 °C), Min:98.2 °F (36.8 °C), Max:98.6 °F (37 °C)      Intake and Output:  1901 - 700  In: 1320 [P.O.:1320]  Out: 0399 [Urine:3190]  701 - 1900  In: 100 [P.O.:100]  Out: 250 [Urine:250]    Physical Exam:   General appearance: alert, cooperative, no distress, appears stated age  Abdomen: Nondistended, nontender. Bilateral PCNs in place with bloody urine. Cotter also in place with clear urine, with some blood layering on bottom of tubing. Data Review:    Recent Results (from the past 24 hour(s))   HGB & HCT    Collection Time: 09/05/22 12:00 PM   Result Value Ref Range    HGB 7.0 (L) 12.0 - 16.0 g/dL    HCT 20.1 (L) 35.0 - 45.0 %   HGB & HCT    Collection Time: 09/05/22  8:35 PM   Result Value Ref Range    HGB 6.4 (L) 12.0 - 16.0 g/dL    HCT 18.4 (L) 35.0 - 45.0 %   VANCOMYCIN, RANDOM    Collection Time: 09/06/22  5:06 AM   Result Value Ref Range    Vancomycin, random 13.5 5.0 - 40.0 UG/ML   CBC WITH AUTOMATED DIFF    Collection Time: 09/06/22  5:06 AM   Result Value Ref Range    WBC 15.7 (H) 4.6 - 13.2 K/uL    RBC 2.34 (L) 4.20 - 5.30 M/uL    HGB 6.0 (L) 12.0 - 16.0 g/dL    HCT 17.0 (LL) 35.0 - 45.0 %    MCV 72.6 (L) 78.0 - 100.0 FL    MCH 25.6 24.0 - 34.0 PG    MCHC 35.3 31.0 - 37.0 g/dL    RDW 16.2 (H) 11.6 - 14.5 %    PLATELET 277 439 - 838 K/uL    MPV 9.9 9.2 - 11.8 FL    NRBC 0.2 (H) 0  WBC    ABSOLUTE NRBC 0.03 (H) 0.00 - 0.01 K/uL    NEUTROPHILS 79 (H) 40 - 73 %    LYMPHOCYTES 12 (L) 21 - 52 %    MONOCYTES 5 3 - 10 %    EOSINOPHILS 2 0 - 5 %    BASOPHILS 0 0 - 2 %    IMMATURE GRANULOCYTES 2 (H) 0.0 - 0.5 %    ABS. NEUTROPHILS 12.4 (H) 1.8 - 8.0 K/UL    ABS. LYMPHOCYTES 1.9 0.9 - 3.6 K/UL    ABS. MONOCYTES 0.8 0.05 - 1.2 K/UL    ABS. EOSINOPHILS 0.3 0.0 - 0.4 K/UL    ABS. BASOPHILS 0.0 0.0 - 0.1 K/UL    ABS. IMM.  GRANS. 0.4 (H) 0.00 - 0.04 K/UL    DF AUTOMATED     METABOLIC PANEL, BASIC    Collection Time: 09/06/22  5:06 AM   Result Value Ref Range    Sodium 142 136 - 145 mmol/L    Potassium 3.3 (L) 3.5 - 5.5 mmol/L    Chloride 111 100 - 111 mmol/L    CO2 19 (L) 21 - 32 mmol/L    Anion gap 12 3.0 - 18 mmol/L    Glucose 114 (H) 74 - 99 mg/dL     (H) 7.0 - 18 MG/DL    Creatinine 5.02 (H) 0.6 - 1.3 MG/DL    BUN/Creatinine ratio 22 (H) 12 - 20      GFR est AA 10 (L) >60 ml/min/1.73m2    GFR est non-AA 9 (L) >60 ml/min/1.73m2    Calcium 8.6 8.5 - 10.1 MG/DL   RBC, ALLOCATE    Collection Time: 09/06/22  7:30 AM   Result Value Ref Range    HISTORY CHECKED?  Historical check performed          Signed By:     Joe Miles MD   Urologic Oncologist  [facility]  [facility]  Office [phone] Ext [phone]                          September 6, 2022

## 2022-09-06 NOTE — PROGRESS NOTES
Problem: Pressure Injury - Risk of  Goal: *Prevention of pressure injury  Description: Document Geoffrey Scale and appropriate interventions in the flowsheet. Outcome: Progressing Towards Goal  Note: Pressure Injury Interventions:       Moisture Interventions: Absorbent underpads    Activity Interventions: Pressure redistribution bed/mattress(bed type)    Mobility Interventions: HOB 30 degrees or less    Nutrition Interventions: Document food/fluid/supplement intake    Friction and Shear Interventions: Foam dressings/transparent film/skin sealants                Problem: Patient Education: Go to Patient Education Activity  Goal: Patient/Family Education  Outcome: Progressing Towards Goal     Problem: Falls - Risk of  Goal: *Absence of Falls  Description: Document Joan Fall Risk and appropriate interventions in the flowsheet. Outcome: Progressing Towards Goal  Note: Fall Risk Interventions:  Mobility Interventions: Bed/chair exit alarm         Medication Interventions: Bed/chair exit alarm    Elimination Interventions: Bed/chair exit alarm, Call light in reach, Patient to call for help with toileting needs              Problem: Patient Education: Go to Patient Education Activity  Goal: Patient/Family Education  Outcome: Progressing Towards Goal     Problem: Pain  Goal: *Control of Pain  Outcome: Progressing Towards Goal  Goal: *PALLIATIVE CARE:  Alleviation of Pain  Outcome: Progressing Towards Goal     Problem: Patient Education: Go to Patient Education Activity  Goal: Patient/Family Education  Outcome: Progressing Towards Goal     Problem: Infection - Risk of, Urinary Catheter-Associated Urinary Tract Infection  Goal: *Absence of infection signs and symptoms  Outcome: Progressing Towards Goal     Problem: Patient Education: Go to Patient Education Activity  Goal: Patient/Family Education  Outcome: Progressing Towards Goal     Problem:  Activity Intolerance  Goal: *Oxygen saturation during activity within specified parameters  Outcome: Progressing Towards Goal  Goal: *Able to remain out of bed as prescribed  Outcome: Progressing Towards Goal     Problem: Patient Education: Go to Patient Education Activity  Goal: Patient/Family Education  Outcome: Progressing Towards Goal     Problem: Patient Education: Go to Patient Education Activity  Goal: Patient/Family Education  Outcome: Progressing Towards Goal     Problem: Patient Education: Go to Patient Education Activity  Goal: Patient/Family Education  Outcome: Progressing Towards Goal

## 2022-09-06 NOTE — PROGRESS NOTES
Continuous IV fluid started in new PIV access at right wrist, awaiting IV PICC team for placement of PICC to start 2nd blood transfusion.

## 2022-09-07 NOTE — ROUTINE PROCESS
Wound Prevention Checklist    Patient: Keesha Liner (65 y.o. female)  Date: 9/7/2022  Diagnosis: Bladder outlet obstruction [N32.0]  WILBUR (acute kidney injury) ([facility] [address] [N17.9]  Uterine mass [N85.8] <principal problem not specified>    Precautions: Fall       []  Heel prevention boots placed on patient    []  Patient turned q2h during shift    []  Lift team ordered    [x]  Patient on [place] bed/Specialty bed    []  Each Wound is documented during shift (Stage, Color, drainage, odor, measurements, and dressings)    [x]  Dual skin checks done at bedside during shift report with KERA Aragon RN

## 2022-09-07 NOTE — ROUTINE PROCESS
Bedside shift change report given to Domi Almodovar LPN (oncoming nurse) by Bobby Wallis RN (offgoing nurse). Report included the following information SBAR, Kardex, Intake/Output, and Recent Results.

## 2022-09-07 NOTE — PROGRESS NOTES
Physician Progress Note      Davey Gabriel  CSN #:                  [id number]  :                       1950  ADMIT DATE:       2022 2:35 PM  [address] [place] DATE:  RESPONDING  PROVIDER #:        Vitor Muñoz MD          QUERY TEXT:    Patient admitted with WILBUR due to obstructive uropathy, noted to have decreasing h/h since admission . If possible, please document in progress notes and discharge summary if you are evaluating and/or treating any of the following: The medical record reflects the following:  Risk Factors: 80 female with history of neoplasm uterus, WILBUR due to obstructive uropathy    Clinical Indicators: H&H since adm. 8.4/23.6 > 7.3/20.9  7.0/20.4 >6.9/19.8  > 6.8/19.8 > 7.0/20.1  6.4/18.4  6.0/17.0    H&H 2022   11.8/34.1      Nephrology note Output from PCN was around 1.2 L, bloody    Treatment: Serial labs, type and cross match,  1 unit transfused, IV fluids        Thank you for your time,    Catia VIDALN, RN, [address], [facility]. [address]  C: [phone]    [email or web address]  Options provided:  -- Acute blood loss anemia  -- h/h  not clinically significant  -- Other - I will add my own diagnosis  -- Disagree - Not applicable / Not valid  -- Disagree - Clinically unable to determine / Unknown  -- Refer to Clinical Documentation Reviewer    PROVIDER RESPONSE TEXT:    This patient has acute blood loss anemia.     Query created by: Elizabeth Jensen on 2022 3:10 PM      Electronically signed by:  Vitor Muñoz MD 2022 11:34 AM

## 2022-09-07 NOTE — PROGRESS NOTES
[facility] Hospitalists  Progress Note    Patient: Keesha Da Silva Age: 71 y.o. : 1950 MR#: [id number] SSN: [id number]  Date: 2022     Subjective/24-hour events:     No new complaints but states, \"I don't feel like I'm getting any better. \"    Assessment:   Obstructive uropathy with bilateral hydronephrosis status post bilateral PCN placement 2022  WILBUR secondary to obstructive uropathy  Iron deficiency anemia  Uterine mass, suspect neoplastic  Hypokalemia  Hyponatremia, improved  Morbid obesity, BMI 41.54    Plan:   Continue sodium bicarbonate and IV fluids, monitor electrolytes and renal indices. Nephrology continuing to follow, assistance appreciated. Replace potassium as necessary. Routine PCN care. PT/OT as able/tolerated. Long discussion held with patient at bedside. She lives alone and is quite worried about her course going forward. Patient tells me that she has been scheduled for a surgery at [facility] on  to deal with her uterine mass and states that this was arranged by Dr. Jesus Del Valle. Given current Pt/OT documentation, I would have concerns about patient returning to home environment once she is medically stable for discharge. Will attempt to see if she can possibly be transferred to [facility] directly for continued care as GYN/Onc services are not available at this facility.     Case discussed with:  [x]Patient  []Family  [x]Nursing  [x]Case Management  DVT Prophylaxis:  []Lovenox  []Hep SQ  []SCDs  []Coumadin   []On Heparin gtt    Objective:   VS: Visit Vitals  /83   Pulse (!) 110   Temp 98.5 °F (36.9 °C)   Resp 18   Ht 5' 3\" (1.6 m)   Wt 106.4 kg (234 lb 8 oz)   SpO2 97%   Breastfeeding No   BMI 41.54 kg/m²      Tmax/24hrs: Temp (24hrs), Av.8 °F (37.1 °C), Min:98.2 °F (36.8 °C), Max:99.5 °F (37.5 °C)    Intake/Output Summary (Last 24 hours) at 2022 1522  Last data filed at 2022 0833  Gross per 24 hour   Intake 445.67 ml   Output 2300 ml   Net -1854.33 ml       General:  In NAD. Nontoxic-appearing. Cardiovascular:  Regular, mildly tachy. Pulmonary:  Lungs clear bilaterally, no wheezes. No accessory muscle use. GI:  Abdomen soft, NTTP. Extremities:  Warm, no edema or ischemia. Neuro:  Awake and alert. Moves extremities spontaneously. Labs:    Recent Results (from the past 24 hour(s))   RBC, ALLOCATE    Collection Time: 09/06/22  3:45 PM   Result Value Ref Range    HISTORY CHECKED?  Historical check performed    HGB & HCT    Collection Time: 09/07/22  8:10 AM   Result Value Ref Range    HGB 8.2 (L) 12.0 - 16.0 g/dL    HCT 23.1 (L) 35.0 - 45.0 %       Signed By: Nadja Darby MD     September 7, 2022

## 2022-09-07 NOTE — PROGRESS NOTES
Problem: Self Care Deficits Care Plan (Adult)  Goal: *Acute Goals and Plan of Care (Insert Text)  Description: Occupational Therapy Goals  Initiated 9/2/2022 within 7 day(s). 1.  Patient will perform grooming with supervision/set-up standing at the sink for >2 min with Fair+ balance. 2.  Patient will perform bathing with supervision/set-up. 3.  Patient will perform lower body dressing with supervision/set-up using AE prn.  4.  Patient will perform toilet transfers with supervision/set-up. 5.  Patient will perform all aspects of toileting with supervision/set-up. 6.  Patient will participate in upper extremity therapeutic exercise/activities with supervision/set-up for 8 minutes to improve endurance and UB strength needed for ADLs    7. Patient will utilize energy conservation techniques during functional activities with verbal cues. Prior Level of Function: Pt reports being independent with ADLs and functional mobility, and was working prior to 2 weeks ago. Since then pt has not been in the shower and requires assistance for LB ADLs. Outcome: Progressing Towards Goal   OCCUPATIONAL THERAPY TREATMENT    Patient: Santos Navarro (81 y.o. female)  Date: 9/7/2022  Diagnosis: Bladder outlet obstruction [N32.0]  WILBUR (acute kidney injury) ([facility] [address] [N17.9]  Uterine mass [N85.8] <principal problem not specified>  Procedure(s) (LRB):  CYSTOSCOPY, insertion of jacinto catheter (N/A) 3 Days Post-Op  Precautions: Fall  PLOF: Pt reports being independent with ADLs and functional mobility, and was working prior to 2 weeks ago. Since then pt has not been in the shower and requires assistance for LB ADLs. Chart, occupational therapy assessment, plan of care, and goals were reviewed. ASSESSMENT:  PT co tx to maximize pt safety and participation. Pt presented supine in bed upon entry, all drains intact/jacinto and agreeable for participation. Pt was assisted to EOB from supine with MOD A  in prep for functional task. Once sitting, she demo G balance and performed simple grooming tasks with S/U. STS transfer MIN A with RW and performed walking SPT to [facility] MIN A. Pt required increased assist with STS transfer from [facility]. She required MAX A for doug area care while in stance 2/2 decreased dyn standing balance and tolerance. Pt returned back to sitting EOB w/ MIN A. She was assisted back to supine MOD A and positioned for comfort. She was left with HOB elevated, bed alarm active, and all needs left within reach. RN made aware of pt's participation and position. Progression toward goals:  []          Improving appropriately and progressing toward goals  [x]          Improving slowly and progressing toward goals  []          Not making progress toward goals and plan of care will be adjusted     PLAN:  Patient continues to benefit from skilled intervention to address the above impairments. Continue treatment per established plan of care. Further Equipment Recommendations for Discharge:  RW and [facility]    AMPAC: Based on an AM-PAC score of 15/24 and their current ADL deficits; it is recommended that the patient have 3-5 sessions per week of Occupational Therapy at d/c to increase the patient's independence. This AMPAC score should be considered in conjunction with interdisciplinary team recommendations to determine the most appropriate discharge setting. Patient's social support, diagnosis, medical stability, and prior level of function should also be taken into consideration. SUBJECTIVE:   Patient stated It [birth year] my [birth year].     OBJECTIVE DATA SUMMARY:   Cognitive/Behavioral Status:  Neurologic State: Alert  Orientation Level: Oriented X4  Cognition: Follows commands  Safety/Judgement: Decreased awareness of need for safety    Functional Mobility and Transfers for ADLs:   Bed Mobility:     Supine to Sit: Moderate assistance  Sit to Supine: Moderate assistance  Scooting:  Moderate assistance   Transfers:  Sit to Stand: Minimum assistance  Stand to Sit: Minimum assistance  Stand Pivot Transfers: Minimum assistance (walking SPT to and from [facility])     Balance:  Sitting: Intact  Standing: Impaired; With support  Standing - Static: Fair  Standing - Dynamic : Fair    ADL Intervention:       Grooming  Position Performed: Seated edge of bed  Washing Face: Set-up  Brushing teeth: S/U    Toileting  Bowel Hygiene: Maximum assistance  Adaptive Equipment: Walker;Elevated seat       Pain:  Pain level pre-treatment: 0/10   Pain level post-treatment: 0/10    Activity Tolerance:    Fair  Please refer to the flowsheet for vital signs taken during this treatment. After treatment:   []  Patient left in no apparent distress sitting up in chair  [x]  Patient left in no apparent distress in bed  [x]  Call bell left within reach  [x]  Nursing notified  []  Caregiver present  [x]  Bed alarm activated    COMMUNICATION/EDUCATION:   [x] Role of Occupational Therapy in the acute care setting  [] Home safety education was provided and the patient/caregiver indicated understanding. [x] Patient/family have participated as able in working towards goals and plan of care. [x] Patient/family agree to work toward stated goals and plan of care. [] Patient understands intent and goals of therapy, but is neutral about his/her participation. [] Patient is unable to participate in goal setting and plan of care. Thank you for this referral.  JAMSHID Ellison  Time Calculation: 29 mins    [facility] AM-PAC® Daily Activity Inpatient Short Form (6-Clicks)    How much HELP from another person does the patient currently need    (If the patient hasn't done an activity recently, how much help from another person do you think he/she would need if he/she tried?)   Total (Total A or Dep)   A Lot  (Mod to Max A)   A Little (Sup or Min A)   None (Mod I to I)   Putting on and taking off regular lower body clothing? [] 1 [x] 2 [] 3 [] 4   2.  Bathing (including washing, rinsing,      drying)? [] 1 [x] 2 [] 3 [] 4   3. Toileting, which includes using toilet, bedpan or urinal?   [] 1 [x] 2 [] 3 [] 4   4. Putting on and taking off regular upper body clothing? [] 1 [] 2 [x] 3 [] 4   5. Taking care of personal grooming such as brushing teeth? [] 1 [] 2 [x] 3 [] 4   6. Eating meals?    [] 1 [] 2 [x] 3 [] 4 none

## 2022-09-07 NOTE — PROGRESS NOTES
In Patient Progress note      Admit Date: 9/1/2022    Impression:  1. Severe acute kidney injury due to obstructive uropathy. Patient had bilateral PCN. There is mild increase in the creatinine but seems to be leveling off. She is making more urine although still has hematuria. No uremic symptoms. No postobstructive diuresis as yet. 2.  Anemia due to iron deficiency. This is not due to chronic kidney disease. Will defer further management to primary team.  Patient may end up needing transfusion. H&H stable. 3.  Mild hyponatremia, sodium is better at 139.  4.  Mild Acidosis likely metabolic  5   hypokalemia     S/p  PCN. Excellent output   She denies any uremic symptoms  Creatinine seems to be improving--> no labs today , have been ordered   But patient appears to be difficult stick   No respiratory symptoms     Plan  1) continue  IVF 1/2 NS + 20 kcl @ 75 cc/hrs   2) replace electrolytes , expect electrolyte abn with post obst phase   3) continue bicarb   4) PCN care per urology  5) no NSAIDs   6) follow labs as still not resulted      Discussed with patient and nursing    Please call with questions    Osiel Tierney MD FASN  Cell [phone]  Pager: [phone]     Subjective:     - s/p PCN draining well  - No acute over night events. - respiratory - stable  - hemodynamics - stable, no pressrs  - UOP-improved   - Nutrition -poor    Objective:     Visit Vitals  /79   Pulse (!) 106   Temp 98.5 °F (36.9 °C)   Resp 18   Ht 5' 3\" (1.6 m)   Wt 106.4 kg (234 lb 8 oz)   SpO2 98%   Breastfeeding No   BMI 41.54 kg/m²         Intake/Output Summary (Last 24 hours) at 9/7/2022 1441  Last data filed at 9/7/2022 0833  Gross per 24 hour   Intake 445.67 ml   Output 2300 ml   Net -1854.33 ml         Physical Exam:     General: NAD, alert and oriented. Neck: No jvd. LUNGS: Clear to Auscultation, No rales, rhonchi or wheezes. CVS EXM: S1, S2  RRR No rub. Abdomen: soft, non tender.    Lower Extremities:  Edema +      Data Review:    Recent Labs     09/07/22  0810 09/06/22  0506   WBC  --  15.7*   RBC  --  2.34*   HCT 23.1* 17.0*   MCV  --  72.6*   MCH  --  25.6   MCHC  --  35.3   RDW  --  16.2*       Recent Labs     09/06/22  0506 09/05/22  0314   * 124*   CREA 5.02* 6.96*   CA 8.6 8.9   K 3.3* 4.0    139    107   CO2 19* 16*   * 77         Denzel Vickers MD

## 2022-09-07 NOTE — PROGRESS NOTES
Problem: Mobility Impaired (Adult and Pediatric)  Goal: *Acute Goals and Plan of Care (Insert Text)  Description: Physical Therapy Goals  Initiated 9/2/2022 and to be accomplished within 7 day(s)  1. Patient will move from supine to sit and sit to supine  in bed with supervision/set-up. 2.  Patient will transfer from bed to chair and chair to bed with supervision/set-up using the least restrictive device. 3.  Patient will perform sit to stand with supervision/set-up. 4.  Patient will ambulate with supervision/set-up for 100 feet with the least restrictive device. 5.  Patient will ascend/descend 3 stairs with 1 handrail(s) with minimal assistance/contact guard assist.    PLOF: independent with mobility without an assistive device, lives alone     Outcome: Progressing Towards Goal     PHYSICAL THERAPY TREATMENT    Patient: Nevaeh Snow (36 y.o. female)  Date: 9/7/2022  Diagnosis: Bladder outlet obstruction [N32.0]  WILBUR (acute kidney injury) ([facility] [address] [N17.9]  Uterine mass [N85.8] <principal problem not specified>  Procedure(s) (LRB):  CYSTOSCOPY, insertion of jacinto catheter (N/A) 3 Days Post-Op  Precautions: Fall    ASSESSMENT:  Pt cleared to participate in PT session, pt received semi-reclined in bed and agreeable to therapy session. Completing with OT to maximize safety and mobility. Pt needing assist with supine to sit, for both LEs towards EOB and for trunk movement. Sitting on EOB with good balance. Pt with 2 drains from kidneys and jacinto. Pt reporting low back pain in sitting. Performing oral care at EOB. Standing with Marquis to RW, walking SPT to [facility] for toileting with Marquis. Pt reporting she has recently just been using briefs for toileting and assisted by son to change when he is able to come over. Pt returned to bed with Marquis, increased verbal and tactile cues to stand from [facility]. Declined OOB to chair to due back pain. ModA back to supine and modA for scooting towards HOB.  Pt positioned for comfort and educated to call for assist before getting up, pt verbalized understanding. Pt left with all needs met and call bell in reach. RN notified of position and participation. Progression toward goals:   []      Improving appropriately and progressing toward goals  [x]      Improving slowly and progressing toward goals  []      Not making progress toward goals and plan of care will be adjusted     PLAN:  Patient continues to benefit from skilled intervention to address the above impairments. Continue treatment per established plan of care. Further Equipment Recommendations for Discharge:  bedside commode and rolling walker    AMPAC: Based on an AM-PAC score of 14/24 and their current functional mobility deficits, it is recommended that the patient have 3-5 sessions per week of Physical Therapy at d/c to increase the patient's independence. This AMPAC score should be considered in conjunction with interdisciplinary team recommendations to determine the most appropriate discharge setting. Patient's social support, diagnosis, medical stability, and prior level of function should also be taken into consideration. SUBJECTIVE:   Patient stated I just need the cancer taken out and I will move better.     OBJECTIVE DATA SUMMARY:   Critical Behavior:  Neurologic State: Alert  Orientation Level: Oriented X4  Cognition: Follows commands  Safety/Judgement: Decreased awareness of need for safety  Functional Mobility Training:  Bed Mobility:     Supine to Sit: Moderate assistance  Sit to Supine: Moderate assistance  Scooting: Moderate assistance    Transfers:  Sit to Stand: Minimum assistance  Stand to Sit: Minimum assistance  Stand Pivot Transfers: Minimum assistance (walking SPT to and from [facility])       Balance:  Sitting: Intact  Standing: Impaired; With support  Standing - Static: Fair  Standing - Dynamic : Fair      Posture:   Posture (WDL): Within defined limits        Pain:  Pain level pre-treatment: 0/10  Pain level post-treatment: 0/10   FLACC     Activity Tolerance:   Fair     Please refer to the flowsheet for vital signs taken during this treatment. After treatment:   [] Patient left in no apparent distress sitting up in chair  [x] Patient left in no apparent distress in bed  [x] Call bell left within reach  [x] Nursing notified  [] Caregiver present  [] Bed alarm activated  [] SCDs applied      COMMUNICATION/EDUCATION:   [x]         Role of Physical Therapy in the acute care setting. [x]         Fall prevention education was provided and the patient/caregiver indicated understanding. [x]         Patient/family have participated as able in working toward goals and plan of care. [x]         Patient/family agree to work toward stated goals and plan of care. []         Patient understands intent and goals of therapy, but is neutral about his/her participation. []         Patient is unable to participate in stated goals/plan of care: ongoing with therapy staff.  []         Other:        Alverto Villa, PT   Time Calculation: 28 mins    [facility] AM-PAC® Basic Mobility Inpatient Short Form (6-Clicks) Version 2    How much HELP from another person does the patient currently need    (If the patient hasn't done an activity recently, how much help from another person do you think he/she would need if he/she tried?)   Total (Total A or Dep)   A Lot  (Mod to Max A)   A Little (Sup or Min A)   None (Mod I to I)   Turning from your back to your side while in a flat bed without using bedrails? [] 1 [] 2 [x] 3 [] 4   2. Moving from lying on your back to sitting on the side of a flat bed without using bedrails? [] 1 [x] 2 [] 3 [] 4   3. Moving to and from a bed to a chair (including a wheelchair)? [] 1 [x] 2 [] 3 [] 4   4. Standing up from a chair using your arms (e.g., wheelchair, or bedside chair)? [] 1 [] 2 [x] 3 [] 4   5. Walking in hospital room? [] 1 [x] 2 [] 3 [] 4   6.  Climbing 3-5 steps with a railing?+   [] 1 [x] 2 [] 3 [] 4   +If stair climbing cannot be assessed, skip item #6. Sum responses from items 1-5.

## 2022-09-07 NOTE — PROGRESS NOTES
Problem: Pressure Injury - Risk of  Goal: *Prevention of pressure injury  Description: Document Geoffrey Scale and appropriate interventions in the flowsheet. Outcome: Progressing Towards Goal  Note: Pressure Injury Interventions:       Moisture Interventions: Absorbent underpads, Apply protective barrier, creams and emollients    Activity Interventions: Pressure redistribution bed/mattress(bed type)    Mobility Interventions: HOB 30 degrees or less    Nutrition Interventions: Document food/fluid/supplement intake    Friction and Shear Interventions: Foam dressings/transparent film/skin sealants                Problem: Patient Education: Go to Patient Education Activity  Goal: Patient/Family Education  Outcome: Progressing Towards Goal     Problem: Falls - Risk of  Goal: *Absence of Falls  Description: Document Joan Fall Risk and appropriate interventions in the flowsheet.   Outcome: Progressing Towards Goal  Note: Fall Risk Interventions:  Mobility Interventions: Bed/chair exit alarm         Medication Interventions: Patient to call before getting OOB, Teach patient to arise slowly    Elimination Interventions: Call light in reach, Patient to call for help with toileting needs              Problem: Patient Education: Go to Patient Education Activity  Goal: Patient/Family Education  Outcome: Progressing Towards Goal     Problem: Pain  Goal: *Control of Pain  Outcome: Progressing Towards Goal  Goal: *PALLIATIVE CARE:  Alleviation of Pain  Outcome: Progressing Towards Goal     Problem: Patient Education: Go to Patient Education Activity  Goal: Patient/Family Education  Outcome: Progressing Towards Goal     Problem: Infection - Risk of, Urinary Catheter-Associated Urinary Tract Infection  Goal: *Absence of infection signs and symptoms  Outcome: Progressing Towards Goal     Problem: Patient Education: Go to Patient Education Activity  Goal: Patient/Family Education  Outcome: Progressing Towards Goal     Problem: Activity Intolerance  Goal: *Oxygen saturation during activity within specified parameters  Outcome: Progressing Towards Goal  Goal: *Able to remain out of bed as prescribed  Outcome: Progressing Towards Goal     Problem: Patient Education: Go to Patient Education Activity  Goal: Patient/Family Education  Outcome: Progressing Towards Goal

## 2022-09-08 NOTE — PROGRESS NOTES
Problem: Pressure Injury - Risk of  Goal: *Prevention of pressure injury  Description: Document Geoffrey Scale and appropriate interventions in the flowsheet. Outcome: Progressing Towards Goal  Note: Pressure Injury Interventions:       Moisture Interventions: Absorbent underpads, Check for incontinence Q2 hours and as needed, Minimize layers    Activity Interventions: Pressure redistribution bed/mattress(bed type), PT/OT evaluation    Mobility Interventions: HOB 30 degrees or less, Pressure redistribution bed/mattress (bed type), PT/OT evaluation    Nutrition Interventions: Document food/fluid/supplement intake    Friction and Shear Interventions: HOB 30 degrees or less, Lift sheet, Minimize layers                Problem: Falls - Risk of  Goal: *Absence of Falls  Description: Document Joan Fall Risk and appropriate interventions in the flowsheet. Outcome: Progressing Towards Goal  Note: Fall Risk Interventions:  Mobility Interventions: Bed/chair exit alarm, OT consult for ADLs, Patient to call before getting OOB, PT Consult for mobility concerns         Medication Interventions: Bed/chair exit alarm, Patient to call before getting OOB, Teach patient to arise slowly    Elimination Interventions: Bed/chair exit alarm, Call light in reach, Patient to call for help with toileting needs, Toilet paper/wipes in reach, Toileting schedule/hourly rounds              Problem: Pain  Goal: *Control of Pain  Outcome: Progressing Towards Goal     Problem: Infection - Risk of, Urinary Catheter-Associated Urinary Tract Infection  Goal: *Absence of infection signs and symptoms  Outcome: Progressing Towards Goal     Problem:  Activity Intolerance  Goal: *Able to remain out of bed as prescribed  Outcome: Progressing Towards Goal

## 2022-09-08 NOTE — PROGRESS NOTES
Bedside shift change report given to Clementina Felix (oncoming nurse) by Don Valdes (offgoing nurse). Report included the following information SBAR, Kardex, Intake/Output, MAR, and Recent Results.    Wound Prevention Checklist    Patient: Tobias Lopez (50 y.o. female)  Date: 9/8/2022  Diagnosis: Bladder outlet obstruction [N32.0]  WILBUR (acute kidney injury) ([facility] [address] [N17.9]  Uterine mass [N85.8] <principal problem not specified>    Precautions: Fall       []  Heel prevention boots placed on patient    []  Patient turned q2h during shift    []  Lift team ordered    []  Patient on Shon bed/Specialty bed    [x]  Each Wound is documented during shift (Stage, Color, drainage, odor, measurements, and dressings)    [x]  Dual skin checks done at bedside during shift report with Florencia Paredes RN

## 2022-09-08 NOTE — PROGRESS NOTES
In Patient Progress note      Admit Date: 9/1/2022    Impression:  1. Severe acute kidney injury due to obstructive uropathy. Patient had bilateral PCN. There is mild increase in the creatinine but seems to be leveling off. She is making more urine although still has hematuria. No uremic symptoms. No postobstructive diuresis as yet. 2.  Anemia due to iron deficiency. This is not due to chronic kidney disease. Will defer further management to primary team.  Patient may end up needing transfusion. H&H stable. 3.  Mild hyponatremia, sodium is better at 139.  4.  Mild Acidosis likely metabolic  5   hypokalemia     S/p  PCN. Excellent output   She denies any uremic symptoms  Creatinine seems to be improving--> no labs today , have been ordered   But patient appears to be difficult stick   No respiratory symptoms     Plan  1) continue  IVF 1/2 NS + 20 kcl @ 75 cc/hrs for another day   2) replace electrolytes , expect electrolyte abn with post obst phase   3) continue bicarb   4) PCN care per urology  5) no NSAIDs   6) follow labs as still not resulted    Renal functions improving nicely  Discussed with patient and nursing    Please call with questions    Rosalie Estevez MD FASN  Cell [phone]  Pager: [phone]     Subjective:     - s/p PCN draining well  - No acute over night events. - respiratory - stable  - hemodynamics - stable, no pressrs  - UOP-improved   - Nutrition -poor    Objective:     Visit Vitals  /79 (BP 1 Location: Left upper arm, BP Patient Position: Semi fowlers; Lying)   Pulse (!) 106   Temp 98.6 °F (37 °C)   Resp 18   Ht 5' 3\" (1.6 m)   Wt 106.4 kg (234 lb 8 oz)   SpO2 91%   Breastfeeding No   BMI 41.54 kg/m²         Intake/Output Summary (Last 24 hours) at 9/8/2022 1711  Last data filed at 9/8/2022 1455  Gross per 24 hour   Intake 960 ml   Output 1801 ml   Net -841 ml         Physical Exam:     General: NAD, alert and oriented. Neck: No jvd.   LUNGS: Clear to Auscultation, No rales, rhonchi or wheezes. CVS EXM: S1, S2  RRR No rub. Abdomen: soft, non tender.    Lower Extremities:  Edema +      Data Review:    Recent Labs     09/07/22  0810 09/06/22  0506   WBC  --  15.7*   RBC  --  2.34*   HCT 23.1* 17.0*   MCV  --  72.6*   MCH  --  25.6   MCHC  --  35.3   RDW  --  16.2*       Recent Labs     09/08/22  0650 09/06/22  0506   BUN 77* 110*   CREA 2.61* 5.02*   CA 8.7 8.6   ALB 2.2*  --    K 3.7 3.3*    142    111   CO2 24 19*   PHOS 3.3  --    * 114*         Dayanara Zarate MD

## 2022-09-08 NOTE — PROGRESS NOTES
Problem: Pressure Injury - Risk of  Goal: *Prevention of pressure injury  Description: Document Geoffrey Scale and appropriate interventions in the flowsheet. Outcome: Progressing Towards Goal  Note: Pressure Injury Interventions:     Moisture Interventions: Check for incontinence Q2 hours and as needed, Absorbent underpads, Internal/External urinary devices    Activity Interventions: PT/OT evaluation, Pressure redistribution bed/mattress(bed type)    Mobility Interventions: HOB 30 degrees or less, Pressure redistribution bed/mattress (bed type)    Nutrition Interventions: Document food/fluid/supplement intake    Friction and Shear Interventions: Foam dressings/transparent film/skin sealants, HOB 30 degrees or less, Apply protective barrier, creams and emollients, Lift team/patient mobility team, Minimize layers    Problem: Falls - Risk of  Goal: *Absence of Falls  Description: Document Joan Fall Risk and appropriate interventions in the flowsheet.   Outcome: Progressing Towards Goal  Note: Fall Risk Interventions:  Mobility Interventions: Bed/chair exit alarm, Patient to call before getting OOB    Medication Interventions: Teach patient to arise slowly, Patient to call before getting OOB, Bed/chair exit alarm    Elimination Interventions: Call light in reach, Bed/chair exit alarm, Toileting schedule/hourly rounds    Problem: Pain  Goal: *Control of Pain  Outcome: Progressing Towards Goal     Problem: Infection - Risk of, Urinary Catheter-Associated Urinary Tract Infection  Goal: *Absence of infection signs and symptoms  Outcome: Progressing Towards Goal

## 2022-09-08 NOTE — PROGRESS NOTES
Bedside and Verbal shift change report given to Michelle Glynn (oncoming nurse) by Iveth Mock RN (offgoing nurse). Report included the following information SBAR, Kardex, Intake/Output, MAR, and Recent Results.

## 2022-09-08 NOTE — PROGRESS NOTES
[facility] Hospitalists  Progress Note    Patient: Brandon Steiner Age: 71 y.o. : 1950 MR#: [id number] SSN: [id number]  Date: 2022     Subjective/24-hour events:     No new events overnight. Reports feeling about the same as yesterday. Assessment:   Obstructive uropathy with bilateral hydronephrosis status post bilateral PCN placement 2022  WILBUR secondary to obstructive uropathy  Iron deficiency anemia  Uterine mass, suspect neoplastic  Hypokalemia  Hyponatremia, improved  Morbid obesity, BMI 41.54    Plan:   Routine PCN care, continue Jacinto for now. Noted urology recommendations regarding Jacinto removal.  Will likely remove jacinto tomorrow and monitor. Renal issues have stabilized but patient will need continued outpatient follow up. Discussed plan regarding management of uterine mass with Dr. Jesse Shirley office and [facility] hospitalist.  No plans for surgical intervention by GYN/Onc at this time. Apparent plan was for patient to be set up with radiation oncology as outpatient. In light of this information, have asked radiation oncology to evaluation patient here and case has been discussed with Dr. Travis Ceron by phone. We will await her recommendations for potential treatment plan going forward. If patient is unable to return home and requires rehab/SNF placement, any potential cancer treatments will be delayed by this and patient has been made aware of this fact. Case d/w Rosana Manrique and José Manuel by phone this afternoon to provide update on recent events. Will ask palliative medicine to see to discuss goals of care. Continue PT/OT as tolerated in interim.     Case discussed with:  [x]Patient  []Family  [x]Nursing  [x]Case Management  DVT Prophylaxis:  []Lovenox  []Hep SQ  [x]SCDs  []Coumadin   []On Heparin gtt    Objective:   VS: Visit Vitals  /81 (BP 1 Location: Left upper arm, BP Patient Position: At rest;Lying)   Pulse (!) 108   Temp 98.4 °F (36.9 °C) Resp 18   Ht 5' 3\" (1.6 m)   Wt 106.4 kg (234 lb 8 oz)   SpO2 100%   Breastfeeding No   BMI 41.54 kg/m²      Tmax/24hrs: Temp (24hrs), Av.4 °F (36.9 °C), Min:98.1 °F (36.7 °C), Max:98.5 °F (36.9 °C)    Intake/Output Summary (Last 24 hours) at 2022 0916  Last data filed at 2022 0622  Gross per 24 hour   Intake --   Output 1550 ml   Net -1550 ml       General:  In NAD. Nontoxic-appearing. Cardiovascular:  Regular, mildly tachy. Pulmonary:  Lungs clear bilaterally, no wheezes. GI:  Abdomen soft, NTTP. Extremities:  Warm, no edema or ischemia. Neuro:  Awake and alert. Moves extremities spontaneously.     Labs:    Recent Results (from the past 24 hour(s))   VANCOMYCIN, RANDOM    Collection Time: 22  6:50 AM   Result Value Ref Range    Vancomycin, random 7.4 5.0 - 40.0 UG/ML   RENAL FUNCTION PANEL    Collection Time: 22  6:50 AM   Result Value Ref Range    Sodium 141 136 - 145 mmol/L    Potassium 3.7 3.5 - 5.5 mmol/L    Chloride 111 100 - 111 mmol/L    CO2 24 21 - 32 mmol/L    Anion gap 6 3.0 - 18 mmol/L    Glucose 104 (H) 74 - 99 mg/dL    BUN 77 (H) 7.0 - 18 MG/DL    Creatinine 2.61 (H) 0.6 - 1.3 MG/DL    BUN/Creatinine ratio 30 (H) 12 - 20      GFR est AA 22 (L) >60 ml/min/1.73m2    GFR est non-AA 18 (L) >60 ml/min/1.73m2    Calcium 8.7 8.5 - 10.1 MG/DL    Phosphorus 3.3 2.5 - 4.9 MG/DL    Albumin 2.2 (L) 3.4 - 5.0 g/dL   GLUCOSE, POC    Collection Time: 22  8:17 AM   Result Value Ref Range    Glucose (POC) 118 (H) 70 - 110 mg/dL       Signed By: Jair House MD     2022

## 2022-09-08 NOTE — PROGRESS NOTES
Urology Progress Note        Assessment/Plan:     Active Problems:    Uterine mass (2022)      WILBUR (acute kidney injury) ([facility] [address] (2022)      Bladder outlet obstruction (2022)      Lobulation, spleen (2022)      Adrenal nodule ([facility] [address] (2022)      Leukocytosis (2022)      Microcytic anemia (2022)      Hyponatremia (2022)      Uremia (2022)      Other hydronephrosis (2022)  ASSESSMENT:   - WILBUR with Mild bilateral hydro, Bladder distension, 1.4L on CT. Cotter placed  with continued rise in Cr to 6.57  Cystoscopy  unable to identify ureteral orifices. Bilateral PCNs placed . - Enlargement of uterine mass, concerning for gynecologic malignancy              Neg UA 22              WBC 15.7<17.2              Afebrile, VSS     - WILBUR              Creat 2.61<5.02<6.96>6.57              Baseline 0.7 in 2022    - Gross Hematuria    Hgb 8.2>6<6.4<7.0     PLAN:    Cxs neg  Continue Cotter and bilateral PCNs. Recommend Cotter removal prior to DC, once creat NADIRS and WBC improves. Will need PCN exchange every 3 months. PCN need to be flushed with 10 cc NS daily. Gross Hematuria improving. Hgb stable. Continue to hold all AC until urine clear for 24 hours. Pt to f/up outpatient to arrange PCN exchange in 3 months. Will arrange. Will sign off. Chacho Diane PA-C  [facility]  Available , [facility]  Pager: [phone]     Subjective:     Daily Progress Note: 2022 5:06 PM    Kimley Liner is doing ok. VSS  and creatinine improving.     Objective:     Visit Vitals  /81 (BP 1 Location: Left upper arm, BP Patient Position: At rest;Lying)   Pulse (!) 108   Temp 98.4 °F (36.9 °C)   Resp 18   Ht 5' 3\" (1.6 m)   Wt 106.4 kg (234 lb 8 oz)   SpO2 100%   Breastfeeding No   BMI 41.54 kg/m²        Temp (24hrs), Av.4 °F (36.9 °C), Min:98.1 °F (36.7 °C), Max:98.5 °F (36.9 °C)      Intake and Output:   1901 -  0700  In: -   Out: 4149 [CLM:[id number]]  09/08 0701 - 09/08 1900  In: 360 [P.O.:360]  Out: -     Physical Exam:   General appearance: alert, cooperative, no distress, appears stated age  Abdomen: Nondistended, nontender. Bilateral PCNs in place- left-pink tinged, right- red tinged. Cotter in place with clear urine, with some blood layering on bottom of tubing.         Data Review:    Recent Results (from the past 24 hour(s))   VANCOMYCIN, RANDOM    Collection Time: 09/08/22  6:50 AM   Result Value Ref Range    Vancomycin, random 7.4 5.0 - 40.0 UG/ML   RENAL FUNCTION PANEL    Collection Time: 09/08/22  6:50 AM   Result Value Ref Range    Sodium 141 136 - 145 mmol/L    Potassium 3.7 3.5 - 5.5 mmol/L    Chloride 111 100 - 111 mmol/L    CO2 24 21 - 32 mmol/L    Anion gap 6 3.0 - 18 mmol/L    Glucose 104 (H) 74 - 99 mg/dL    BUN 77 (H) 7.0 - 18 MG/DL    Creatinine 2.61 (H) 0.6 - 1.3 MG/DL    BUN/Creatinine ratio 30 (H) 12 - 20      GFR est AA 22 (L) >60 ml/min/1.73m2    GFR est non-AA 18 (L) >60 ml/min/1.73m2    Calcium 8.7 8.5 - 10.1 MG/DL    Phosphorus 3.3 2.5 - 4.9 MG/DL    Albumin 2.2 (L) 3.4 - 5.0 g/dL   GLUCOSE, POC    Collection Time: 09/08/22  8:17 AM   Result Value Ref Range    Glucose (POC) 118 (H) 70 - 110 mg/dL         Signed By:     Patrick Whaley MD   Urologic Oncologist  [facility] and Katie Moctezuma  Professor of Urology  [facility]  Office [phone] Ext [phone]                          September 8, 2022

## 2022-09-09 NOTE — PROGRESS NOTES
In Patient Progress note      Admit Date: 9/1/2022    Impression:  1. Severe acute kidney injury due to obstructive uropathy. Patient had bilateral PCN.--> improving    2. Anemia due to iron deficiency. Improved s/p PRBC   3. Mild hypokalemia -resolved   4. Mild Acidosis likely metabolic    S/p  PCN. Excellent output   She denies any uremic symptoms  Creatinine seems to be improving--> no labs today , have been ordered   But patient appears to be difficult stick   No respiratory symptoms     Plan  1) continue  IVF 1/2 NS + 20 kcl @ 50 cc/hrs, recommended patient to drink ~ 50 oz /day   2) replace electrolytes   3) d/c po bicarb   4) PCN care per urology  5) no NSAIDs   6) follow labs as still not resulted    Renal functions improving nicely  Discussed with patient and nursing    Please call with questions    Alcira Salas MD [facility]  Cell [phone]  Pager: [phone]     Subjective:     - s/p PCN draining well  - No acute over night events. - respiratory - stable  - hemodynamics - stable, no pressrs  - UOP-improved   - Nutrition -poor    Objective:     Visit Vitals  /78 (BP 1 Location: Left upper arm, BP Patient Position: At rest)   Pulse (!) 106   Temp 98.8 °F (37.1 °C)   Resp 18   Ht 5' 3\" (1.6 m)   Wt 106.4 kg (234 lb 8 oz)   SpO2 95%   Breastfeeding No   BMI 41.54 kg/m²         Intake/Output Summary (Last 24 hours) at 9/9/2022 0929  Last data filed at 9/9/2022 0736  Gross per 24 hour   Intake 720 ml   Output 1756 ml   Net -1036 ml         Physical Exam:     General: NAD, alert and oriented. Neck: No jvd. LUNGS: Clear to Auscultation, No rales, rhonchi or wheezes. CVS EXM: S1, S2  RRR No rub. Abdomen: soft, non tender.    Lower Extremities:  Edema +      Data Review:    Recent Labs     09/07/22  0810   HCT 23.1*       Recent Labs     09/08/22  0650   BUN 77*   CREA 2.61*   CA 8.7   ALB 2.2*   K 3.7         CO2 24   PHOS 3.3   *         Aaliyah Richards MD

## 2022-09-09 NOTE — PROGRESS NOTES
Comprehensive Nutrition Assessment    Type and Reason for Visit: Reassess, Wound, RD nutrition re-screen/LOS    Nutrition Recommendations/Plan:   Modify supplement: change ensure clear to Gelatein BID  Update food preference in diet order  Continue all other nutrition interventions. Encourage/ monitor po intake of meals and supplements. Pt discussed with Foodservice     Malnutrition Assessment:  Malnutrition Status: At risk for malnutrition (specify) (fair/variable meal intake) (09/08/22 1516)        Nutrition Assessment:    Pt reported fair appetite; variable/ fair meal intake, also partly due to food preferences/ intolerances and pt c/o that the food does not have any flavor, but she also reported being used to eating fast food. Encouraged pt to discussed preferences and ask about other alternatives with Foodservice; this writer also discussed pt with Foodservice about offering meal alternatives. Pt verbalized understanding. Dislikes ensure clear supplement; causes her gas per her report. Agreeable to trying gelatein (for additional protein); referral received due to pt having a pressure injury. MD entered room at time of visit; discussed with MD about patient's meal intake. Patient's RN unavailable; discussed with RN Ana Zaragoza about discontinuing ensure clear supplement; RN Ana Zaragoza to relay report to patient's RN Maria G Amezquita. Estimated protein needs limited due to pt having decreased renal function; although, renal function improving per Nephrology report. Nutrition Related Findings:     9/9 Wound Type: Pressure injury, Stage II    Current Nutrition Intake & Therapies:  Average Meal Intake: 26-50%  Average Supplement Intake: 0%  ADULT ORAL NUTRITION SUPPLEMENT Breakfast, Lunch, Dinner; Clear Liquid  ADULT DIET Easy to Chew; NO spicy food, tea, milk, ice cream, pudding. Pt lactose intolerant.     Anthropometric Measures:  Height: 5' 3\" (160 cm)  Ideal Body Weight (IBW): 115 lbs (52 kg)  Admission Body Weight: 220 lb 0.3 oz  Current Body Wt:  106.4 kg (234 lb 9.1 oz), 204 % IBW.  Bed scale  Current BMI (kg/m2): 41.6  Usual Body Weight: 101.2 kg (223 lb)  % Weight Change (Calculated): 5.2  Weight Adjustment: No adjustment  BMI Category: Obese class 3 (BMI 40.0 or greater)    Estimated Daily Nutrient Needs:  Energy Requirements Based On: Kcal/kg (wt x20-22)  Weight Used for Energy Requirements: Current  Energy (kcal/day): 4465-6713  Weight Used for Protein Requirements: Current  Protein (g/day):  (wt x0.8-1.1)  Method Used for Fluid Requirements: Standard renal  Fluid (ml/day): 500 mL + total output (pt with impaired renal function)    Nutrition Diagnosis:   Inadequate oral intake related to early satiety as evidenced by intake 0-25%, intake 26-50%, intake 51-75%  Increased nutrient needs related to increased demand for energy/nutrients as evidenced by wounds    Nutrition Interventions:   Food and/or Nutrient Delivery: Continue current diet, Mineral supplement, Vitamin supplement, Modify oral nutrition supplement  Nutrition Education/Counseling: Education not indicated  Coordination of Nutrition Care: Continue to monitor while inpatient  Plan of Care discussed with: pt, MD, RN, and Foodservice    Goals:  Previous Goal Met: Progressing toward goal(s)  Goals: Meet at least 75% of estimated needs, by next RD assessment       Nutrition Monitoring and Evaluation:   Behavioral-Environmental Outcomes: None identified  Food/Nutrient Intake Outcomes: Food and nutrient intake, Supplement intake, Vitamin/mineral intake  Physical Signs/Symptoms Outcomes: Biochemical data, Skin, Meal time behavior    Discharge Planning:    Continue oral nutrition supplement, Continue current diet    Graeme Douglass RD  Contact: [phone]

## 2022-09-09 NOTE — PROGRESS NOTES
Problem: Pressure Injury - Risk of  Goal: *Prevention of pressure injury  Description: Document Geoffrey Scale and appropriate interventions in the flowsheet. Outcome: Progressing Towards Goal  Note: Pressure Injury Interventions:       Moisture Interventions: Check for incontinence Q2 hours and as needed, Minimize layers    Activity Interventions: Pressure redistribution bed/mattress(bed type), PT/OT evaluation    Mobility Interventions: HOB 30 degrees or less, Pressure redistribution bed/mattress (bed type), PT/OT evaluation    Nutrition Interventions: Document food/fluid/supplement intake    Friction and Shear Interventions: Foam dressings/transparent film/skin sealants, HOB 30 degrees or less, Lift sheet, Minimize layers                Problem: Falls - Risk of  Goal: *Absence of Falls  Description: Document Joan Fall Risk and appropriate interventions in the flowsheet.   Outcome: Progressing Towards Goal  Note: Fall Risk Interventions:  Mobility Interventions: Bed/chair exit alarm, Patient to call before getting OOB, OT consult for ADLs, PT Consult for mobility concerns         Medication Interventions: Patient to call before getting OOB, Teach patient to arise slowly    Elimination Interventions: Call light in reach, Patient to call for help with toileting needs, Toilet paper/wipes in reach              Problem: Pain  Goal: *Control of Pain  Outcome: Progressing Towards Goal     Problem: Nutrition Deficit  Goal: *Optimize nutritional status  Outcome: Progressing Towards Goal

## 2022-09-09 NOTE — PROGRESS NOTES
Discharge planning    Met patient at bedside to discuss discharge planning  PT is recommending SNF. After a brief conversation, patient wants to discuss discharge plan with son. Provided SNF list and home health list. If patient decides on SNF, she needs transportation set up for radiation treatments prior to discharge. Explained to patient that transportation for radiation treatments will be an out of pocket expense. Patient verbalized understanding. CM will continue to assist with transition of care needs.      MARTHA Aguirre, RN  Pager # [phone]  Care Manager

## 2022-09-09 NOTE — PROGRESS NOTES
Problem: Mobility Impaired (Adult and Pediatric)  Goal: *Acute Goals and Plan of Care (Insert Text)  Description: Physical Therapy Goals  Initiated 9/2/2022, reviewed and continued 9/9/2022 and to be accomplished within 7 day(s)  1. Patient will move from supine to sit and sit to supine  in bed with supervision/set-up. 2.  Patient will transfer from bed to chair and chair to bed with supervision/set-up using the least restrictive device. 3.  Patient will perform sit to stand with supervision/set-up. 4.  Patient will ambulate with supervision/set-up for 100 feet with the least restrictive device. 5.  Patient will ascend/descend 3 stairs with 1 handrail(s) with minimal assistance/contact guard assist.    PLOF: independent with mobility without an assistive device, lives alone     Outcome: Progressing Towards Goal   PHYSICAL THERAPY RE-EVALUATION    Patient: Vivian Trejo (60 y.o. female)  Date: 9/9/2022  Primary Diagnosis: Bladder outlet obstruction [N32.0]  WILBUR (acute kidney injury) ([facility] [address] [N17.9]  Uterine mass [N85.8]  Procedure(s) (LRB):  CYSTOSCOPY, insertion of jacinto catheter (N/A) 5 Days Post-Op   Precautions:   Fall  PLOF: see above    ASSESSMENT :  Based on the objective data described below, the patient presents with decreased strength, balance and activity tolerance resulting in decreased independence with functional mobility. Pt required min/mod A for bed mobility and min A for standing transfers. Pt ambulated 6 feet with RW and CGA to the recliner. Pt was left sitting up in the recliner with needs in reach and nursing notified. Patient will benefit from skilled intervention to address the above impairments.   Patient's rehabilitation potential is considered to be Fair  Factors which may influence rehabilitation potential include:   []         None noted  []         Mental ability/status  [x]         Medical condition  [x]         Home/family situation and support systems  []         Safety awareness  []         Pain tolerance/management  []         Other:      PLAN :  Recommendations and Planned Interventions:   [x]           Bed Mobility Training             []    Neuromuscular Re-Education  [x]           Transfer Training                   []    Orthotic/Prosthetic Training  [x]           Gait Training                          []    Modalities  [x]           Therapeutic Exercises           []    Edema Management/Control  [x]           Therapeutic Activities            []    Family Training/Education  [x]           Patient Education  []           Other (comment):    Frequency/Duration: Patient will be followed by physical therapy 1-2 times per day/4-7 days per week to address goals. Further Equipment Recommendations for Discharge: rolling walker    AMPAC: Based on an AM-PAC score of 15/24  and their current functional mobility deficits, it is recommended that the patient have 3-5 sessions per week of Physical Therapy at d/c to increase the patient's independence. This AMPAC score should be considered in conjunction with interdisciplinary team recommendations to determine the most appropriate discharge setting. Patient's social support, diagnosis, medical stability, and prior level of function should also be taken into consideration. SUBJECTIVE:   Patient stated I'll try to get to the chair today.     OBJECTIVE DATA SUMMARY:   Hospital course since last seen and reason for re-evaluation: pt with drains places and progressing treatment for medical issues  Past Medical History:   Diagnosis Date    Class 2 obesity due to excess calories without serious comorbidity in adult     HTN (hypertension)     Uterine mass 09/01/2022    Vitamin D deficiency      Past Surgical History:   Procedure Laterality Date    HX CATARACT REMOVAL N/A      Barriers to Learning/Limitations: None  Compensate with: N/A  Home Situation:   Home Situation  Home Environment: Private residence  One/Two Story Residence: One story  Living Alone: Yes  Support Systems: Child(garfield)  Patient Expects to be Discharged to[de-identified] Unable to determine at this time  Current DME Used/Available at Home: Wheelchair, Walker  Tub or Shower Type: Tub/Shower combination  Critical Behavior:  Neurologic State: Alert  Orientation Level: Oriented X4  Cognition: Appropriate decision making; Appropriate for age attention/concentration; Appropriate safety awareness; Follows commands     Strength:    Strength: Generally decreased, functional      Tone & Sensation:   Tone: Normal       Range Of Motion:  AROM: Generally decreased, functional   Functional Mobility:  Bed Mobility:  Rolling: Minimum assistance; Moderate assistance  Supine to Sit: Moderate assistance  Transfers:  Sit to Stand: Minimum assistance  Stand to Sit: Contact guard assistance     Balance:   Sitting: Intact  Standing: With support  Standing - Static: Good  Standing - Dynamic : Fair    Ambulation/Gait Training:  Distance (ft): 6 Feet (ft)  Assistive Device: Walker, rolling  Ambulation - Level of Assistance: Contact guard assistance  Gait Abnormalities: Decreased step clearance  Base of Support: Widened    Therapeutic Exercises: Ankle pumps, heel slides, LAQ x5  Pain:  Pain level pre-treatment: 3/10   Pain level post-treatment: 3/10   Pain Intervention(s) :Rest, Repositioning   Response to intervention: Nurse notified, See doc flow    Activity Tolerance:   Fair-  Please refer to the flowsheet for vital signs taken during this treatment. After treatment:   [x]         Patient left in no apparent distress sitting up in chair  []         Patient left in no apparent distress in bed  [x]         Call bell left within reach  [x]         Nursing notified  []         Caregiver present  []         Bed alarm activated  []         SCDs applied    COMMUNICATION/EDUCATION:   [x]         Role of Physical Therapy in the acute care setting.   [x]         Fall prevention education was provided and the patient/caregiver indicated understanding. [x]         Patient/family have participated as able in goal setting and plan of care. [x]         Patient/family agree to work toward stated goals and plan of care. []         Patient understands intent and goals of therapy, but is neutral about his/her participation. []         Patient is unable to participate in goal setting/plan of care: ongoing with therapy staff.  []         Other: Thank you for this referral.  Annie Melgar, PT   Time Calculation: 23 mins    [address]\"" [address] [address] AM-PAC® Basic Mobility Inpatient Short Form (6-Clicks) Version 2    How much HELP from another person does the patient currently need    (If the patient hasn't done an activity recently, how much help from another person do you think he/she would need if he/she tried?)   Total (Total A or Dep)   A Lot  (Mod to Max A)   A Little (Sup or Min A)   None (Mod I to I)   Turning from your back to your side while in a flat bed without using bedrails? [] 1 [x] 2 [] 3 [] 4   2. Moving from lying on your back to sitting on the side of a flat bed without using bedrails? [] 1 [x] 2 [] 3 [] 4   3. Moving to and from a bed to a chair (including a wheelchair)? [] 1 [] 2 [x] 3 [] 4   4. Standing up from a chair using your arms (e.g., wheelchair, or bedside chair)? [] 1 [] 2 [x] 3 [] 4   5. Walking in hospital room? [] 1 [] 2 [x] 3 [] 4   6. Climbing 3-5 steps with a railing?+   [] 1 [x] 2 [] 3 [] 4   +If stair climbing cannot be assessed, skip item #6. Sum responses from items 1-5.

## 2022-09-09 NOTE — PROGRESS NOTES
Problem: Self Care Deficits Care Plan (Adult)  Goal: *Acute Goals and Plan of Care (Insert Text)  Description: Occupational Therapy Goals  Initiated 9/2/2022 within 7 day(s), re-evaluation 9/9/2022, pt making steady progress towards goals    1. Patient will perform grooming with supervision/set-up standing at the sink for >2 min with Fair+ balance. 2.  Patient will perform bathing with supervision/set-up. 3.  Patient will perform lower body dressing with supervision/set-up using AE prn.  4.  Patient will perform toilet transfers with supervision/set-up. 5.  Patient will perform all aspects of toileting with supervision/set-up. 6.  Patient will participate in upper extremity therapeutic exercise/activities with supervision/set-up for 8 minutes to improve endurance and UB strength needed for ADLs    7. Patient will utilize energy conservation techniques during functional activities with verbal cues. Prior Level of Function: Pt reports being independent with ADLs and functional mobility, and was working prior to 2 weeks ago. Since then pt has not been in the shower and requires assistance for LB ADLs. Outcome: Progressing Towards Goal   OCCUPATIONAL THERAPY RE-EVALUATION    Patient: Eliu Jolly (20 y.o. female)  Date: 9/9/2022  Primary Diagnosis: Bladder outlet obstruction [N32.0]  WILBUR (acute kidney injury) ([facility] [address] [N17.9]  Uterine mass [N85.8]  Procedure(s) (LRB):  CYSTOSCOPY, insertion of jacinto catheter (N/A) 5 Days Post-Op   Precautions:   Fall, Skin    ASSESSMENT :  Nursing/RN cleared for pt to participate in OT tx session. Pt presents lying semi-reclined in bed, reports she was unable to tolerate sitting up in chair s/p medical appt this date with abdominal pain 10/10 and fatigue-nursing notified.  Pt agreeable to UE THERE EX: Yellow theraband x 10 reps x 2 sets for shoulder horizontal abduction and triceps extension in order to increase strength for improved self care performance, pt tolerated well. Call bell within reach & pt verbalized understanding and provided return demonstration to utilize for assist e.g. functional transfers in order to prevent falls. Patient will benefit from skilled intervention to address the above impairments. Patient's rehabilitation potential is considered to be Good  Factors which may influence rehabilitation potential include:   []             None noted  []             Mental ability/status  [x]             Medical condition  []             Home/family situation and support systems  []             Safety awareness  [x]             Pain tolerance/management  []             Other:      PLAN :  Recommendations and Planned Interventions:   [x]               Self Care Training                  [x]      Therapeutic Activities  [x]               Functional Mobility Training   []      Cognitive Retraining  [x]               Therapeutic Exercises           [x]      Endurance Activities  [x]               Balance Training                    [x]      Neuromuscular Re-Education  []               Visual/Perceptual Training     [x]      Home Safety Training  [x]               Patient Education                   [x]      Family Training/Education  []               Other (comment):    Frequency/Duration: Patient will be followed by occupational therapy 3-5 times a week to address goals. Further Equipment Recommendations for Discharge: bedside commode, shower chair, and rolling walker    AMPAC:   Based on an AM-PAC score of 14/24 and their current ADL deficits; it is recommended that the patient have 2-3 sessions per week of Occupational Therapy at d/c to increase the patient's independence. This AMPAC score should be considered in conjunction with interdisciplinary team recommendations to determine the most appropriate discharge setting. Patient's social support, diagnosis, medical stability, and prior level of function should also be taken into consideration. SUBJECTIVE:   Patient stated My abdomen hurts.     OBJECTIVE DATA SUMMARY:   Hospital course since last seen and reason for reevaluation: pt making steady progress towards goals  Past Medical History:   Diagnosis Date    Class 2 obesity due to excess calories without serious comorbidity in adult     HTN (hypertension)     Uterine mass 09/01/2022    Vitamin D deficiency      Past Surgical History:   Procedure Laterality Date    HX CATARACT REMOVAL N/A      Barriers to Learning/Limitations: None  Compensate with: visual, verbal, tactile, kinesthetic cues/model    Home Situation:   Home Situation  Home Environment: Private residence  One/Two Story Residence: One story  Living Alone: Yes  Support Systems: Child(garfield)  Patient Expects to be Discharged to[de-identified] Unable to determine at this time  Current DME Used/Available at Home: Wheelchair, Walker  Tub or Shower Type: Tub/Shower combination  []  Right hand dominant   []  Left hand dominant    Cognitive/Behavioral Status:  Neurologic State: Alert  Orientation Level: Oriented X4  Cognition: Follows commands  Safety/Judgement: Fall prevention    Skin: appears intact  Edema: none noted      Vision/Perceptual:  appears intact, able to tell correct time on wall clock    Coordination: BUE  Coordination: Within functional limits  Fine Motor Skills-Upper: Left Intact; Right Intact    Gross Motor Skills-Upper: Left Intact; Right Intact    Balance:  Sitting: Intact  Standing: With support  Standing - Static: Good  Standing - Dynamic : Fair    Strength: BUE  Strength: Generally decreased, functional       Tone & Sensation: BUE  Tone: Normal  Sensation: Intact       Range of Motion: BUE  AROM: Within functional limits    Functional Mobility and Transfers for ADLs:  Bed Mobility:  Rolling: Minimum assistance; Moderate assistance  Supine to Sit: Moderate assistance    Transfers:  Sit to Stand: Minimum assistance  Stand to Sit: Contact guard assistance     ADL Assessment:   Feeding: Modified independent    Oral Facial Hygiene/Grooming: Stand-by assistance    Bathing: Moderate assistance    Upper Body Dressing: Contact guard assistance    Lower Body Dressing: Maximum assistance    Toileting: Maximum assistance     Cognitive Retraining  Safety/Judgement: Fall prevention    Pain:  Pain level pre-treatment: 10/10   Pain level post-treatment: 10/10  Pain Intervention(s): Medication (see MAR); Rest, Ice, Repositioning   Response to intervention: Nurse notified, See doc flow    Activity Tolerance:   poor  Please refer to the flowsheet for vital signs taken during this treatment. After treatment:   [] Patient left in no apparent distress sitting up in chair  [x] Patient left in no apparent distress in bed  [x] Call bell left within reach  [x] Nursing notified  [] Caregiver present  [x] Bed alarm activated    COMMUNICATION/EDUCATION:   [x] Role of Occupational Therapy in the acute care setting  [x] Home safety education was provided and the patient/caregiver indicated understanding. [x] Patient/family have participated as able in goal setting and plan of care. [x] Patient/family agree to work toward stated goals and plan of care. [] Patient understands intent and goals of therapy, but is neutral about his/her participation. [] Patient is unable to participate in goal setting and plan of care. Thank you for this referral.  Nestor Barnett  Time Calculation: 10 mins    [employer] AM-PAC® Daily Activity Inpatient Short Form (6-Clicks)*    How much HELP from another person does the patient currently need    (If the patient hasn't done an activity recently, how much help from another person do you think he/she would need if he/she tried?)   Total (Total A or Dep)   A Lot  (Mod to Max A)   A Little (Sup or Min A)   None (Mod I to I)   Putting on and taking off regular lower body clothing? [] 1 [x] 2 [] 3 [] 4   2. Bathing (including washing, rinsing,      drying)?     [] 1 [x] 2 [] 3 [] 4 3. Toileting, which includes using toilet, bedpan or urinal?   [] 1 [x] 2 [] 3 [] 4   4. Putting on and taking off regular upper body clothing? [] 1 [x] 2 [] 3 [] 4   5. Taking care of personal grooming such as brushing teeth? [] 1 [] 2 [x] 3 [] 4   6. Eating meals?    [] 1 [] 2 [x] 3 [] 4

## 2022-09-09 NOTE — PROGRESS NOTES
New PT order seen and acknowledged. Pt is currently on PT caseload with initial evaluation 9/2/22,  will continue to follow.   Jevon Gill, PT

## 2022-09-09 NOTE — PROGRESS NOTES
Phone: [phone]    Hematology / Oncology Progress Note            Patient: Andressa Rodriguez   MRN: [id number]         CSN: [id number]    YOB: 1950      Admit Date: 2022    Assessment:     Active Problems:    Uterine mass (2022)      WILBUR (acute kidney injury) ([facility] [address] (2022)      Bladder outlet obstruction (2022)      Lobulation, spleen (2022)      Adrenal nodule ([facility] [address] (2022)      Leukocytosis (2022)      Microcytic anemia (2022)      Hyponatremia (2022)      Uremia (2022)      Other hydronephrosis (2022)    Newly dx uterine carcinosarcoma, followed by Dr Franky Burrows,  VOA  Freddy obstructive hydronephrosis, s/p PCN  anemia      Plan:     S/p freddy PCN, Cr trending down  Prbc and IV venofer given  No plan for surgery per gyn onc   Rad onc note appreciated, plan for XRT  PT  Out of bed to chair  D/c planning on vibha Hughes MD  [facility] [phone]      Subjective:     Remains in bed     Objective:     Visit Vitals  /78 (BP 1 Location: Left upper arm, BP Patient Position: At rest)   Pulse (!) 106   Temp 98.8 °F (37.1 °C)   Resp 18   Ht 5' 3\" (1.6 m)   Wt 106.4 kg (234 lb 8 oz)   SpO2 95%   Breastfeeding No   BMI 41.54 kg/m²             Temp (24hrs), Av.6 °F (37 °C), Min:98.3 °F (36.8 °C), Max:98.8 °F (37.1 °C)        Intake/Output Summary (Last 24 hours) at 2022 0843  Last data filed at 2022 0736  Gross per 24 hour   Intake 1080 ml   Output 1756 ml   Net -676 ml       Review of Systems:   Constitutional: fatigue  Eyes: negative for visual disturbance,  icterus  Ears, Nose, Mouth, Throat, and Face: negative for tinnitus, epistaxis, sore mouth and hoarseness  Respiratory: negative for cough, sputum, hemoptysis, pleurisy/chest pain or wheezing  Cardiovascular: negative for chest pain, , palpitations,  syncope, paroxysmal nocturnal dyspnea  Gastrointestinal:positive for bloating,  negative for reflux symptoms, nausea, vomiting, melena, diarrhea, constipation   Genitourinary:negative for dysuria, nocturia, urinary incontinence, hesitancy and hematuria  Endocrine: no polydipsia, no goitre  Integument: negative for rash, skin lesion(s) and pruritus  Hematologic/Lymphatic: negative for easy bruising, bleeding and lymphadenopathy  Musculoskeletal:negative for myalgias, arthralgias and bone pain  Neurological: negative for headaches, dizziness, seizures, paresthesia and weakness    Physical Exam: ECOG : 1  Constitutional: Alert, oriented, obese, laying in bed  Eyes: PERRLA, anicteric,  pallor  ENT: no palpable Lymph nodes, no mucositis, no thrush. Respiratory: bilateral air entry  Cardiovascular: S1S2 regular   Gastrointestinal: soft,  NT, PCN tubes in place  Integument:pallor  Neurological: non focal      Labs:  No results found for this or any previous visit (from the past 24 hour(s)).

## 2022-09-09 NOTE — CONSULTS
RADIATION ONCOLOGY CONSULTATION NOTE    DATE:   9/8/2022  Patient NAME: Nevaeh Snow  AGE:   71 y.o. YOB: 1950  MRN:   [id number]  Sex: female          REFERRING PHYSICIAN: Tea Green MD  DIAGNOSIS CODE: C55 Uterine carcinosarcoma    HPI: I am asked to evaluate this 71year old female for urgent radiotherapy to decrease size of rapidly enlarging uterine mass secondary to carcinosarcoma. Mass is creating an obstructive uropathy with acute kidney injury requiring placement of percutaneous neprostomy tubes. Plan was initially for standard surgery, radiation, and chemotherapy; however, rapid progression of her disease coupled with possibility of metastatic disease necessitates early radiotherapy intervention. The history of illness is as follows:    7/5/22 Presented to the [facility] ED with an acute exacerbation of a 1 week history of vaginal bleeding. She had experienced mild, intermittent low back pain for about a month, but no pelvic pain, weight loss, nausea, or vomiting. 7/5/22:  Pelvic ultrasound showed a  6.5 cm uterine body myometrial fibroid. Endometrium is obscured by the fibroid. The patient declined transvaginal scanning. MR pelvis was recommended for evaluation of the endometrium    Ms. Jones Berkowitz was discharged and followed up at [facility], where endometrial biopsy revealed carcinosarcoma. She was referred to Dr. Leticia Noguera, who performed imaging and planned for surgery followed by radiation/chemotherapy. 8/16/22: CT chest abdomen pelvis ([facility]) showed   1. Enlarged heterogeneous uterus consistent with reported neoplasm. Mild bilateral hydronephrosis is likely secondary to mass effect. 2. Indeterminant splenic mass and left adrenal nodule, multiphase contrast-enhanced MRI may be helpful for further characterization. 3. Indeterminant pulmonary nodules, largest in the right upper lobe measures 6 mm. Recommend follow-up per oncology guidelines.   4. Osseous degenerative changes     9/1/22: Presented to [facility] ED via EMS for back and abdominal pain, difficulty controlling frequent watery bowel movements, and decreased appetite. 9/1/22:  CT abdomen/pelvis showed 8:51 PM CT resulted, continued enlargement of the uterine mass with associated mass-effect upon the pelvic structures. 7 cm lesion within the spleen noted as well concerning for metastatic disease. Distention of the urinary bladder with an estimated 1.4 L of urinary retention and mild bilateral hydronephrosis. Labs were significant for GFR 11, , creatinine 4.85, significant change from July. WBC was 7.2, hemoglobin 8.4 decreased from 11.8 in July. Chest x-rays was unremarkable. She was admitted. 9/4/22: Bilateral percutaneous nephrostomy tubes placed by Dr. Ileana Guerrero    9/6/22: Hemoglobin decreased to 6.0. Patient agreed to receive PRBC, transfusion performed. 9/8/22: Plan was originally to discharge Ms. Miller to home with surgery Chase Broussard) scheduled for 9/20. Due to rapid decline and patient's expected difficulty with ADLs at home (lives alone), she will not be transferred to [facility].      Per my discussion with Dr. Mere Casanova today, he does not plan for surgery in the immediate future given the rapid tumor growth, possibility of splenic metastases, and WILBUR requiring percutaneous nephrostomy tubes. He would like for me to proceed with radiotherapy in hopes of reducing size of uterine mass(es) with small possibility of future surgery, depending upon the behavior of possible distant metastases. Today, Ms. Miller complains of intermittent sharp gas-like abdominal pain, difficulty controlling her urine and bowel movements, and intermittent back/abdominal spasms. She is very concerned about arrangements for radiation and discharge planning. Ms. Jena Riley has no prior history of cancer or radiation therapy, collagen vascular disorder, inflammatory bowel disease, or pacemaker.   Her family history is significant only for breast cancer in her mother.      Patient Active Problem List   Diagnosis Code    Uterine mass N85.8    WILBUR (acute kidney injury) ([facility] [address] N17.9    Bladder outlet obstruction N32.0    Lobulation, spleen Q89.09    Adrenal nodule ([facility]) E27.8    Leukocytosis D72.829    Microcytic anemia D50.9    Hyponatremia E87.1    Uremia N19    Other hydronephrosis N13.39       History:  Past Medical History:   Diagnosis Date    Class 2 obesity due to excess calories without serious comorbidity in adult     HTN (hypertension)     Uterine mass 09/01/2022    Vitamin D deficiency      Past Surgical History:   Procedure Laterality Date    HX CATARACT REMOVAL N/A       Family History   Problem Relation Age of Onset    Cancer Mother     Diabetes Mother      Social History     Tobacco Use    Smoking status: Never    Smokeless tobacco: Not on file   Substance Use Topics    Alcohol use: Not Currently          FAMILY SUPPORT:   Marital status: Single  Living Situation:   home           a  Children:1   Transportation available:My family or friends drive me to appointments, errands, etc.      Allergies:  No Known Allergies    Medications:  Current Facility-Administered Medications   Medication Dose Route Frequency    0.9% sodium chloride infusion 250 mL  250 mL IntraVENous PRN    0.45% sodium chloride with KCl 20 mEq/L infusion   IntraVENous CONTINUOUS    0.9% sodium chloride infusion 250 mL  250 mL IntraVENous PRN    0.9% sodium chloride infusion 250 mL  250 mL IntraVENous PRN    sodium bicarbonate tablet 1,300 mg  1,300 mg Oral BID    L. acidophilus,casei,rhamnosus (BIO-K PLUS) capsule 1 Capsule  1 Capsule Oral DAILY    hydrALAZINE (APRESOLINE) tablet 25 mg  25 mg Oral TID    oxyCODONE IR (ROXICODONE) tablet 5 mg  5 mg Oral Q6H PRN    metoprolol tartrate (LOPRESSOR) tablet 12.5 mg  12.5 mg Oral BID    melatonin (rapid dissolve) tablet 5 mg  5 mg Oral QHS    multivitamin, tx-iron-ca-min (THERA-M w/ IRON) tablet 1 Tablet  1 Tablet Oral DAILY    senna-docusate (PERICOLACE) 8.6-50 mg per tablet 1 Tablet  1 Tablet Oral DAILY    sodium chloride (NS) flush 5-10 mL  5-10 mL IntraVENous PRN    sodium chloride (NS) flush 5-40 mL  5-40 mL IntraVENous Q8H    sodium chloride (NS) flush 5-40 mL  5-40 mL IntraVENous PRN    acetaminophen (TYLENOL) tablet 650 mg  650 mg Oral Q6H PRN    Or    acetaminophen (TYLENOL) suppository 650 mg  650 mg Rectal Q6H PRN    polyethylene glycol (MIRALAX) packet 17 g  17 g Oral DAILY PRN    promethazine (PHENERGAN) tablet 12.5 mg  12.5 mg Oral Q6H PRN    Or    ondansetron (ZOFRAN) injection 4 mg  4 mg IntraVENous Q6H PRN    [Held by provider] heparin (porcine) injection 5,000 Units  5,000 Units SubCUTAneous Q8H    famotidine (PEPCID) tablet 20 mg  20 mg Oral DAILY    nystatin (MYCOSTATIN) 100,000 unit/gram powder   Topical TID         Prior Radiation:  NO    Chemotherapy  NO         REVIEW OF SYSTEMS    Psychological:  No depression, mood swings or instability, phobia, meaghan, loss of memory, nervousness, or disorientation. Neurologic:  Denies headache, visual disturbance, syncope, seizures, difficulty with speech, or memory loss. Endocrine: No problems with intolerance to heat or cold, tremors, polyuria, polydipsia,    polyphagia, goiter, significant weight loss or gain, or exophthalmus  Cardiovascular:  Denies chest pain, palpitations, claudication, weakness, or swelling of extremities. Respiratory: Denies cough, shortness of breath, night sweats, wheezing, or abnormal chest x-ray  GI: No abdominal pain, nausea, vomiting, diarrhea, constipation, dysphagia, heartburn, change in appetite, rectal bleeding, or weight loss.   Genitourinary: No dysuria, hematuria, frequency, urinary tract infections, kidney disease, change in color of urine, kidney stones, venereal disease, discharge, or inability to void  Dermatologic: No new skin lesions, skin biopsies, jaundice, rash, pruritis, bruising,   discoloration, or changes in hair or nails.  Musculoskeletal:  No muscle pain or spasms, weakness, redness, swelling, deformity, loss of range of motion, pain in an extremity or joint, neck or back. PHYSICAL EXAM:    Vitals:  Patient Vitals for the past 8 hrs:   Temp Pulse Resp BP SpO2   09/08/22 1605 98.6 °F (37 °C) (!) 106 18 135/79 91 %   09/08/22 1237 98.3 °F (36.8 °C) 100 18 103/62 97 %       Wt Readings from Last 3 Encounters:   09/05/22 106.4 kg (234 lb 8 oz)   07/05/22 101.2 kg (223 lb)   10/10/14 101.2 kg (223 lb)         General: The patient appears healthy and is alert and oriented times three, in no acute distress. HEENT: Pupils are equally round and reactive to light. Oral cavity is clear with no mucosal lesions  Neck: Palpation of the bilateral neck and supraclavicular fossae reveals no palpable lymphadenopathy, thyromegaly, or  masses. Chest: No accessory muscle use, breathing unlabored, no audible wheezing. Skin: ecchymosis at transfusion and blood draw sites. No rashes, jaundice, or ulcerations  Abdomen: Mildly tender diffusely without rebound or guarding. No hepatosplenomegaly appreciated. Gyn: Pelvic exam deferred. Cotter in place. Uterine fundus palpable at level of umbilicus  Extremities: Extremities are without clubbing, cyanosis or edema. Musculoskeletal: Normal muscle tone, full range of motion in all extremities. Neurologic: Cranial nerves II-XII are without abnormality. Motor 5/5 and symmetric strength in all extremities,  Psych:  Mood good, affect congruent. Asks appropriate questions. IMPRESSION: 71year old female with rapidly progressive, locally advanced vs metastatic uterine carcinosarcoma with obstructive uropathy leading to WILBUR and need for percutaneous nephrostomy tubes, uterine bleeding with anemia requiring transfusion, and question of spleen and adrenal metastases that have arisen in under a month. PLAN:   1) I discussed case with Dr. Desire Fitzpatrick.   He does not foresee surgery in the near future, if ever. He will consider chemotherapy following radiotherapy, if felt to be appropriate at that time. 2) I recommend palliative radiotherapy. Upon review of the recent images, the uterus extends superior to the umbilicus and field size would be quite large. In the setting of likely metastases and the urgent need for systemic therapy, if possible, I would like to treat the uterus to 30 Gy in 10 fractions for palliation. Alternatively, the uterus can be treated to 45 Gy in 25 fractions if planning CT does not show obvious metastatic disease or significant growth of primary mass or suspected metastases. Patient is very concerned about transportation, which would also lean me towards a shorter course of 30 Gy in 10 fractions, 3DCRT. 3) We discussed the potential risks of radiotherapy, including kidney injury, bowel injury, bladder inflammation/scarring, and diarrhea. Ms. Alexander asked appropriate questions and wishes to proceed. 4) I will ask my staff to coordinate transfer to my [facility] office ([address], [place]. [phone]) either 9/9/22 or 9/12/22, depending upon staff availability and any  planned procedures/studies at [facility]. Will have staff coordinate with floor staff at [facility]. A total of  75 minutes was spent with the patient, more than half of which was spent in face to face counseling and discussion. Thank you for the opportunity to participate in the care of your patient.       Sapna Gloria MD  Department of Radiation Oncology  [facility]    9/8/2022

## 2022-09-09 NOTE — PROGRESS NOTES
Attempted to see patient for PT weekly re-assessment however transport was present to take her to radiation. Will re-attempt later in the day.   Monica Perdomo, PT

## 2022-09-09 NOTE — PROGRESS NOTES
Bedside shift change report given to AUNDREA Cornelius (oncoming nurse) by Don Valdes (offgoing nurse). Report included the following information SBAR, Kardex, Intake/Output, MAR, and Recent Results.    Wound Prevention Checklist    Patient: Tobias Lopez (45 y.o. female)  Date: 9/9/2022  Diagnosis: Bladder outlet obstruction [N32.0]  WILBUR (acute kidney injury) ([facility] [address] [N17.9]  Uterine mass [N85.8] <principal problem not specified>    Precautions: Fall       []  Heel prevention boots placed on patient    []  Patient turned q2h during shift    []  Lift team ordered    []  Patient on Shon bed/Specialty bed    [x]  Each Wound is documented during shift (Stage, Color, drainage, odor, measurements, and dressings)    [x]  Dual skin checks done at bedside during shift report with Leo Bergeron RN

## 2022-09-10 NOTE — PROGRESS NOTES
1920 Bedside and Verbal shift change  Received from Maria G Amezquita RN (outgoing nurse), to EMILY Hopson (oncoming)  Pt. Is AOX 4. IV patent and infusing, Pt. denies  pain at this time. Report included the following information  SBAR, Kardex, Procedure Summary, Intake/Output, MAR, Recent Lab Results, and  Cardiac Rhythm @ SR. Will resume care and monitor Pt. Condition. Pt. Educated on call bell when in need of help and assistance. Pt. verbalized understanding. Bed alarm on.    2020  Pt. Head to toe Assessment Done and documented. 2124  Pt. Given gavi per [facility] for pain management. 2200 Pt. Denies discomfort. 2300  Pt made no complaints. 0000  Pt. Resting with eyes closed, easily awaken. 0200  Pt made no complaints. 0345  Pt. Given pain meds per [facility] for abd pain. 0415  Pt. Denies discomfort. 0530  Pt. Given chlorhexidene wipes, bath/back care, incontinent care given, changed linen pads and gown. Pt. Had small loose bm.    0630  Pt. Able to rest and sleep well throughout the shift. Pt. Condition. Report consisted of patients Situation, History, Activities, intake/output,  Background, Assessment and Recommendations(SBAR). Information from the following report(s) Kardex, order Summary, Lab results and MAR was reviewed with the receiving nurse. Opportunity for questions and clarification was provided.

## 2022-09-10 NOTE — PROGRESS NOTES
Problem: Pressure Injury - Risk of  Goal: *Prevention of pressure injury  Description: Document Geoffrey Scale and appropriate interventions in the flowsheet. Outcome: Progressing Towards Goal  Note: Pressure Injury Interventions:       Moisture Interventions: Absorbent underpads, Apply protective barrier, creams and emollients, Maintain skin hydration (lotion/cream), Minimize layers, Moisture barrier    Activity Interventions: Pressure redistribution bed/mattress(bed type)    Mobility Interventions: Pressure redistribution bed/mattress (bed type), HOB 30 degrees or less, Turn and reposition approx. every two hours(pillow and wedges)    Nutrition Interventions: Document food/fluid/supplement intake    Friction and Shear Interventions: HOB 30 degrees or less, Apply protective barrier, creams and emollients, Minimize layers, Lift team/patient mobility team                Problem: Patient Education: Go to Patient Education Activity  Goal: Patient/Family Education  Outcome: Progressing Towards Goal     Problem: Falls - Risk of  Goal: *Absence of Falls  Description: Document Earma Freddie Fall Risk and appropriate interventions in the flowsheet.   Outcome: Progressing Towards Goal  Note: Fall Risk Interventions:  Mobility Interventions: Bed/chair exit alarm, Patient to call before getting OOB, Utilize walker, cane, or other assistive device         Medication Interventions: Bed/chair exit alarm, Evaluate medications/consider consulting pharmacy, Patient to call before getting OOB, Teach patient to arise slowly    Elimination Interventions: Bed/chair exit alarm, Call light in reach, Toileting schedule/hourly rounds, Toilet paper/wipes in reach, Patient to call for help with toileting needs              Problem: Patient Education: Go to Patient Education Activity  Goal: Patient/Family Education  Outcome: Progressing Towards Goal     Problem: Patient Education: Go to Patient Education Activity  Goal: Patient/Family Education  Outcome: Progressing Towards Goal     Problem: Patient Education: Go to Patient Education Activity  Goal: Patient/Family Education  Outcome: Progressing Towards Goal     Problem: Nutrition Deficit  Goal: *Optimize nutritional status  Outcome: Progressing Towards Goal

## 2022-09-10 NOTE — PROGRESS NOTES
conducted a Follow up consultation and Spiritual Assessment for Olivia Stoddard, who is a 71 y.o.,female. The  provided the following Interventions:  Responded to IDT Staff's request to visit patient. Continued the relationship of care and support. Listened empathically. Offered prayer and assurance of continued prayer on patient's behalf. Chart reviewed. Patient has no AMD on file. Patient asked me to explain again what an Advance Medical Directive is. She indicated that she had conversation with other chaplains about this before. Progressed to partially completing one but when signing came, she changed her mind of having her son look at it first before she signs it. The following outcomes were achieved:   assisted patient in completing an AMD but patient decided to defer signature until her son approves of her signing the document. Patient expressed gratitude for 's visit. Assessment:  There are no further spiritual or [employer]ism issues which require Spiritual Care Services interventions at this time. Plan:  Chaplains will continue to follow and will provide pastoral care on an as needed/requested basis.  recommends bedside caregivers page  on duty if patient shows signs of acute spiritual or emotional distress.      Nir Oseguera [address]   [phone]

## 2022-09-10 NOTE — PROGRESS NOTES
[facility]  Progress Note    Patient: Lilliam Peck Age: 71 y.o. : 1950 MR#: [id number] SSN: [id number]  Date: 2022     Subjective/24-hour events:     Back to unit from [facility] for radiation planning. No new complaints overnight. 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 Continued assistance appreciated. Radiation planning/initiation of treatment per radiation oncology. Aggressive PT/OT. Disposition to be determined. Supportive care otherwise. Follow. Case discussed with:  [x]Patient  []Family  [x]Nursing  [x]Case Management  DVT Prophylaxis:  []Lovenox  []Hep SQ  [x]SCDs  []Coumadin   []On Heparin gtt    Objective:   VS: Visit Vitals  /63   Pulse 93   Temp 97.8°F (36.6 °C)   Resp 18   Ht 5' 3\" (1.6 m)   Wt 109 kg (240 lb 6.4 oz)   SpO2 97%   Breastfeeding No   BMI 42.58 kg/m²        General:  In NAD. Nontoxic-appearing. Cardiovascular:  RRR. Pulmonary:  Lungs clear bilaterally, no wheezes. GI:  Abdomen soft, NTTP. Extremities:  Warm, no edema or ischemia. Neuro:  Awake and alert. Moves extremities spontaneously. Labs:    No results found for this or any previous visit (from the past 24 hour(s)).       Signed By: Dominique Dyson MD     2022

## 2022-09-10 NOTE — PROGRESS NOTES
RENAL PROGRESS NOTE        Jayleen Shieldser         Assessment  - WILBUR , secondary to Obstructive Uropathy   - Uterine mass with VELIZ   - Anemia   - resolved hypokalemia / Metabolic alkalosis     Plan   - Kidney function is improving steadily S/P PCN , good UO , continue with IVF   - Lytes look good , replace as needed   - PCN per urology   - Follow daily labs   - Oncology following                                                                                                                                    Subjective:  Patient complaints off: No complaints. No SOB/CP/N/V.       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.8    Leukocytosis D72.829    Microcytic anemia D50.9    Hyponatremia E87.1    Uremia N19    Other hydronephrosis N13.39       Current Facility-Administered Medications   Medication Dose Route Frequency Provider Last Rate Last Admin    0.9% sodium chloride infusion 250 mL  250 mL IntraVENous PRN Francesca Mcintyre MD        0.45% sodium chloride with KCl 20 mEq/L infusion   IntraVENous CONTINUOUS Bichu, Bryan White MD 50 mL/hr at 09/10/22 0342 New Bag at 09/10/22 0342    0.9% sodium chloride infusion 250 mL  250 mL IntraVENous PRN Leo Rodríguez MD        0.9% sodium chloride infusion 250 mL  250 mL IntraVENous PRN Robson Meyer,         L. acidophilus,casei,rhamnosus (BIO-K PLUS) capsule 1 Capsule  1 Capsule Oral DAILY Leo Rodríguez MD   1 Capsule at 09/09/22 1003    hydrALAZINE (APRESOLINE) tablet 25 mg  25 mg Oral TID Leo Rodríguez MD   25 mg at 09/09/22 2115    oxyCODONE IR (ROXICODONE) tablet 5 mg  5 mg Oral Q6H PRN Leo Rodríguez MD   5 mg at 09/10/22 0341    metoprolol tartrate (LOPRESSOR) tablet 12.5 mg  12.5 mg Oral BID Stephon Pineda MD   12.5 mg at 09/09/22 1706    melatonin (rapid dissolve) tablet 5 mg  5 mg Oral QHS Lora Quintero MD   5 mg at 09/09/22 2115    multivitamin, tx-iron-ca-min (THERA-M w/ IRON) tablet 1 Tablet  1 Tablet Oral DAILY Kj Hull MD   1 Tablet at 09/09/22 1003    senna-docusate (Marky Flirt) 8.6-50 mg per tablet 1 Tablet  1 Tablet Oral DAILY Kj Hull MD   1 Tablet at 09/09/22 1003    sodium chloride (NS) flush 5-10 mL  5-10 mL IntraVENous PRN Lora Quintero MD        sodium chloride (NS) flush 5-40 mL  5-40 mL IntraVENous Q8H Manpreet Smith MD   10 mL at 09/10/22 0418    sodium chloride (NS) flush 5-40 mL  5-40 mL IntraVENous PRN Lora Quintero MD        acetaminophen (TYLENOL) tablet 650 mg  650 mg Oral Q6H PRN Lora Quintero MD   650 mg at 09/03/22 4233    Or    acetaminophen (TYLENOL) suppository 650 mg  650 mg Rectal Q6H PRN Lora Quintero MD        polyethylene glycol (MIRALAX) packet 17 g  17 g Oral DAILY PRN Lora Quintero MD   17 g at 09/08/22 1733    promethazine (PHENERGAN) tablet 12.5 mg  12.5 mg Oral Q6H PRN Lora Quintero MD   12.5 mg at 09/02/22 0231    Or    ondansetron (ZOFRAN) injection 4 mg  4 mg IntraVENous Q6H PRN Lora Quintero MD        [facility] [place] by provider] heparin (porcine) injection 5,000 Units  5,000 Units SubCUTAneous Q8H Lora Quintero MD   5,000 Units at 09/05/22 0616    famotidine (PEPCID) tablet 20 mg  20 mg Oral DAILY Lora Quintero MD   20 mg at 09/09/22 1003    nystatin (MYCOSTATIN) 100,000 unit/gram powder   Topical TID Lora Quintero MD   Given at 09/09/22 2124       Objective  Vitals:    09/09/22 1524 09/09/22 1742 09/09/22 2254 09/10/22 0348   BP: 103/63  126/74 119/61   Pulse: 93  (!) 104 (!) 106   Resp: 20  18 18   Temp: 97.8 °F (36.6 °C)  98.8 °F (37.1 °C) 97.9 °F (36.6 °C)   SpO2:   97% 95%   Weight:       Height:  5' 3\" (1.6 m)           Intake/Output Summary (Last 24 hours) at 9/10/2022 0805  Last data filed at 9/10/2022 0530  Gross per 24 hour   Intake 440 ml   Output 1650 ml   Net -1210 ml           Admission weight: Weight: 99.8 kg (220 lb) (09/01/22 1448)  Last Weight Metrics:  Weight Loss Metrics 9/5/2022 7/5/2022 10/10/2014   Today's Wt 234 lb 8 oz 223 lb 223 lb   BMI 41.54 kg/m2 37.11 kg/m2 37.11 kg/m2             Physical Assessment:     General: NAD, alert and oriented. Neck: No jvd. LUNGS: Clear to Auscultation, No rales, rhonchi or wheezes. CVS EXM: S1, S2  RRR, no murmurs/gallops/rubs. Abdomen: soft, non tender. B/L PCN   Lower Extremities:  no edema.        Lab    CBC w/Diff Recent Labs     09/07/22  0810   HGB 8.2*   HCT 23.1*        Chemistry Recent Labs     09/08/22  0650   *      K 3.7      CO2 24   BUN 77*   CREA 2.61*   CA 8.7   AGAP 6   BUCR 30*   ALB 2.2*   PHOS 3.3         Lab Results   Component Value Date/Time    Iron 23 (L) 09/02/2022 03:19 AM    TIBC 176 (L) 09/02/2022 03:19 AM    Iron % saturation 13 (L) 09/02/2022 03:19 AM    Ferritin 823 (H) 09/02/2022 03:19 AM      Lab Results   Component Value Date/Time    Calcium 8.7 09/08/2022 06:50 AM    Phosphorus 3.3 09/08/2022 06:50 AM          Mago Ann MD  9/10/2022  8:05 AM

## 2022-09-10 NOTE — PROGRESS NOTES
Physician Progress Note      Gm Coleman  CSN #:                  [id number]  :                       1950  ADMIT DATE:       2022 2:35 PM  DISCH DATE:  RESPONDING  PROVIDER #:        Cherelle Hansen MD          QUERY TEXT:    Patient admitted with WILBUR due to obstructive uropathy and noted to have elevated WBC, and tachycardia . If possible, please document in progress notes and discharge summary if you are evaluating and/or treating any of the following: The medical record reflects the following:  Risk Factors: 70yo female with neoplasm of uterus scheduled for resection    Clinical Indicators: On admission WBC 17.2 > 15.4 > 16.4 > 17.3 > 15.7  with left shift    Sinus tachycardia noted since     Bun/creatinine on admission 125/4.85    Treatment: Serial labs, Urology consult, Nephrology consult, Insertion of ureteral stents, IV fluids        Thank you for your time,    Reymundo VIDALN, RN, [address], [address]  C: [phone]    [email or web address]  Options provided:  -- SIRS of non-infectious origin due to obstructive uropathy with associated  organ dysfunction of WILBUR  -- Other - I will add my own diagnosis  -- Disagree - Not applicable / Not valid  -- Disagree - Clinically unable to determine / Unknown  -- Refer to Clinical Documentation Reviewer    PROVIDER RESPONSE TEXT:    This patient has SIRS of non-infectious origin due to obstructive uropathy with associated organ dysfunction of WILBUR. Query created by: Sofía Capone on 2022 3:45 PM      QUERY TEXT:    Pt admitted with Obstructive uropathy. Pt noted to have WILBUR. If possible, please document in the progress notes and discharge summary if you are evaluating and/or treating any of the following:     The medical record reflects the following:  Risk Factors: 81 yo female with PMH uterine mass    Since admission bun/cr 125/4.85  >122/4.79 >  121/5.55   > 120/6.57  > 124/6.96 > 110/5.02    Clinical Indicators: Per H&P Obstructive uropathy with bilateral hydronephrosis    Per Nephrology note 9/7 Severe acute kidney injury due to obstructive uropathy.   Patient had bilateral PCN      Treatment: Urology and Urology consults, Insertion of bilateral nephrostomy tubes, serial labs, IV fluids      Thank you for your time,    Catia VIDALN, RN, [address], [facility]. [address]  C: [phone]    [email or web address]  Options provided:  -- Acute kidney failure with acute tubular necrosis  -- Acute kidney failure without acute tubular necrosis  -- Other - I will add my own diagnosis  -- Disagree - Not applicable / Not valid  -- Disagree - Clinically unable to determine / Unknown  -- Refer to Clinical Documentation Reviewer    PROVIDER RESPONSE TEXT:    Severe acute kidney injury due to obstructive uropathy as a result of large uterine mass    Query created by: Elizabeth Jensen on 9/7/2022 4:00 PM      Electronically signed by:  Edie Mace MD 9/10/2022 8:30 AM

## 2022-09-10 NOTE — PROGRESS NOTES
[facility]  Progress Note    Patient: Santos Navarro Age: 71 y.o. : 1950 MR#: [id number] SSN: [id number]  Date: 9/10/2022     Subjective/24-hour events:     Unchanged clinically. Appetite remains poor, no new issues overnight. 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 Continue renal management per nephrology. Repeat labs already ordered. Trend H/H. Palliative medicine evaluation for goals of care discussion. PT/OT as able. tolerated. Supportive care until disposition determined. Follow. Case discussed with:  [x]Patient  []Family  [x]Nursing  []Case Management  DVT Prophylaxis:  []Lovenox  []Hep SQ  [x]SCDs  []Coumadin   []On Heparin gtt    Objective:   VS: Visit Vitals  /74   Pulse (!) 113   Temp 98.2 °F (36.8 °C)   Resp 18   Ht 5' 3\" (1.6 m)   Wt 109 kg (240 lb 6.4 oz)   SpO2 97%   Breastfeeding No   BMI 42.58 kg/m²      Tmax/24hrs: Temp (24hrs), Av.2 °F (36.8 °C), Min:97.8 °F (36.6 °C), Max:98.8 °F (37.1 °C)    Intake/Output Summary (Last 24 hours) at 9/10/2022 0940  Last data filed at 9/10/2022 0530  Gross per 24 hour   Intake 440 ml   Output 1650 ml   Net -1210 ml       General:  In NAD. Nontoxic-appearing. Cardiovascular:  Regular, mildly tachy. Pulmonary:  Lungs clear bilaterally, no wheezes. GI:  Abdomen soft, NTTP. Extremities:  Warm, no edema or ischemia. Neuro:  Awake and alert. Moves extremities spontaneously. Labs:    No results found for this or any previous visit (from the past 24 hour(s)).       Signed By: Duncan Fajardo MD     September 10, 2022

## 2022-09-10 NOTE — ACP (ADVANCE CARE PLANNING)
Advance Care Planning   Advance Care Planning Inpatient Note  [address] [place] [address]   Spiritual Care Department    Today's Date: 9/10/2022  Unit: [address] request from IDT member. Upon review of chart and communication with care team, patient's decision making abilities are not in question. Patient was/were present in the room during visit. Goals of ACP Conversation:  Discuss Advance Care planning documents  Facilitate a discussion related to patient's goals of care as they align with the patient's values and beliefs    Health Care Decision Makers:      Primary Decision Maker: Etienne Linear - [phone]    Secondary Decision Maker: Virginia Jackson - Other Relative - [phone]    Summary:  Documented Next of Kin, per patient report  Patient nearly completed ACP document naming Nicky Broussard (son) from Charisse Castro [phone] as the Primary Decision Maker and Virginia jackson (goddaughter) from Viviane /[phone]. Advance Care Planning Documents (Patient Wishes) on file:  She stated verbally (and written but not signed) her wishes, \" I want to try all treatments to prolong my life. ..but if it's time for me to go let me go. \" When asked for how long she wants to be on treatment, she stated \" I will let my son, Izabella Torres, make that decision for me. \"     Assessment:    Patient states, \"I believe that God is the only one who could heal me. \" When asked how she defines  \"quality of life\", she answers, \"I feel I should try everything to save my life but if it's time for me to go, let me go. \"   Signing legal document such as the AMD is \"scary\" for the patient who stated, \"I have not signed anything since I came to this hospital until my son approves of it. \"  AMD partially completed was given to the patient as requested.   Interventions:  Provided education on documents for clarity and greater understanding  Discussed and provided education on state decision maker hierarchy  Assisted in the completion of documents according to patient's wishes at this time  Encouraged ongoing ACP conversation with future decision makers and loved ones  After completing the Advance Medical Directive forms, patient  decided not to sign it for now until she show it to her son. Care Preferences Communicated:  No    Outcomes/Plan:  Partially completed AMD forms in a folder were given to patient as requested . Please see verbal documentation of conversation with patient on ACP notes.     NELA Levy on 9/10/2022 at 12:05 PM

## 2022-09-11 NOTE — PROGRESS NOTES
Problem: Pressure Injury - Risk of  Goal: *Prevention of pressure injury  Description: Document Geoffrey Scale and appropriate interventions in the flowsheet. Outcome: Progressing Towards Goal  Note: Pressure Injury Interventions:       Moisture Interventions: Absorbent underpads    Activity Interventions: Pressure redistribution bed/mattress(bed type)    Mobility Interventions: Pressure redistribution bed/mattress (bed type)    Nutrition Interventions: Document food/fluid/supplement intake    Friction and Shear Interventions: HOB 30 degrees or less                Problem: Falls - Risk of  Goal: *Absence of Falls  Description: Document Joan Fall Risk and appropriate interventions in the flowsheet.   Outcome: Progressing Towards Goal  Note: Fall Risk Interventions:  Mobility Interventions: Bed/chair exit alarm         Medication Interventions: Bed/chair exit alarm    Elimination Interventions: Bed/chair exit alarm              Problem: Pain  Goal: *Control of Pain  Outcome: Progressing Towards Goal

## 2022-09-11 NOTE — PROGRESS NOTES
RENAL PROGRESS NOTE        Severino Ames         Assessment  - WILBUR , secondary to Obstructive Uropathy   - Uterine mass with VELIZ   - Anemia   - resolved hypokalemia / Metabolic alkalosis     Plan   - Kidney function is improving steadily S/P PCN , good UO , D/C IVF  - Lytes look good , replace as needed   - PCN per urology   - Follow daily labs   - Oncology following                                                                                                                                    Subjective:  Patient complaints off: No complaints. No SOB/CP/N/V.       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 acidophilus,casei,rhamnosus (BIO-K PLUS) capsule 1 Capsule  1 Capsule Oral DAILY Xiomara Quigley MD   1 Capsule at 09/10/22 2034    hydrALAZINE (APRESOLINE) tablet 25 mg  25 mg Oral TID Xiomara Quigley MD   25 mg at 09/10/22 2157    oxyCODONE IR (ROXICODONE) tablet 5 mg  5 mg Oral Q6H PRN Xiomara Quigley MD   5 mg at 09/11/22 9887    metoprolol tartrate (LOPRESSOR) tablet 12.5 mg  12.5 mg Oral BID Cortney Rosa MD   12.5 mg at 09/10/22 1719    melatonin (rapid dissolve) tablet 5 mg  5 mg Oral QHS Cortney Rosa MD   5 mg at 09/10/22 2200    multivitamin, tx-iron-ca-min (THERA-M w/ IRON) tablet 1 Tablet  1 Tablet Oral DAILY Xiomara Quigley MD   1 Tablet at 09/10/22 0907    senna-docusate (PERICOLACE) 8.6-50 mg per tablet 1 Tablet  1 Tablet Oral DAILY Huma August MD   1 Tablet at 09/10/22 4420    sodium chloride (NS) flush 5-10 mL  5-10 mL IntraVENous PRN Shawn Cochran MD        sodium chloride (NS) flush 5-40 mL  5-40 mL IntraVENous Q8H Shawn Cochran MD   10 mL at 09/10/22 2159    sodium chloride (NS) flush 5-40 mL  5-40 mL IntraVENous PRN Shawn Cochran MD        acetaminophen (TYLENOL) tablet 650 mg  650 mg Oral Q6H PRN Shawn Cochran MD   650 mg at 09/03/22 9457    Or    acetaminophen (TYLENOL) suppository 650 mg  650 mg Rectal Q6H PRN Shawn Cochran MD        polyethylene glycol (MIRALAX) packet 17 g  17 g Oral DAILY PRN Shawn Cochran MD   17 g at 09/08/22 1733    promethazine (PHENERGAN) tablet 12.5 mg  12.5 mg Oral Q6H PRN Shawn Cochran MD   12.5 mg at 09/02/22 0231    Or    ondansetron (ZOFRAN) injection 4 mg  4 mg IntraVENous Q6H PRN Shawn Cochran MD        [facility] [place] by provider] heparin (porcine) injection 5,000 Units  5,000 Units SubCUTAneous Q8H Shawn Cochran MD   5,000 Units at 09/05/22 0616    famotidine (PEPCID) tablet 20 mg  20 mg Oral DAILY Shawn Cochran MD   20 mg at 09/10/22 8658    nystatin (MYCOSTATIN) 100,000 unit/gram powder   Topical TID Shawn Cochran MD   Given at 09/10/22 2200       Objective  Vitals:    09/10/22 2007 09/10/22 2157 09/11/22 0042 09/11/22 0445   BP: 138/72 138/76 118/72 115/65   Pulse: (!) 105 (!) 110 (!) 114 (!) 114   Resp: 18 18 18   Temp: 99.2 °F (37.3 °C)  98.4 °F (36.9 °C) 99.2 °F (37.3 °C)   SpO2: 99%  98% 98%   Weight:       Height:             Intake/Output Summary (Last 24 hours) at 9/11/2022 0811  Last data filed at 9/11/2022 0445  Gross per 24 hour   Intake 240 ml   Output 1565 ml   Net -1325 ml             Admission weight: Weight: 99.8 kg (220 lb) (09/01/22 1448)  Last Weight Metrics:  Weight Loss Metrics 9/10/2022 7/5/2022 10/10/2014   Today's Wt 240 lb 6.4 oz 223 lb 223 lb   BMI 42.58 kg/m2 37.11 kg/m2 37.11 kg/m2 Physical Assessment:     General: NAD, alert and oriented. Neck: No jvd. LUNGS: Clear to Auscultation, No rales, rhonchi or wheezes. CVS EXM: S1, S2  RRR, no murmurs/gallops/rubs. Abdomen: soft, non tender. B/L PCN   Lower Extremities:  no edema.        Lab    CBC w/Diff Recent Labs     09/11/22  0100 09/10/22  1724   HGB 7.2* 7.3*   HCT 21.4* 21.5*          Chemistry Recent Labs     09/11/22  0100   GLU 88      K 4.2      CO2 21   BUN 44*   CREA 1.35*   CA 8.9   AGAP 7   BUCR 33*           Lab Results   Component Value Date/Time    Iron 23 (L) 09/02/2022 03:19 AM    TIBC 176 (L) 09/02/2022 03:19 AM    Iron % saturation 13 (L) 09/02/2022 03:19 AM    Ferritin 823 (H) 09/02/2022 03:19 AM      Lab Results   Component Value Date/Time    Calcium 8.9 09/11/2022 01:00 AM    Phosphorus 3.3 09/08/2022 06:50 AM          Rehan Murcia MD  9/11/2022  8:05 AM

## 2022-09-11 NOTE — PROGRESS NOTES
Bedside and Verbal shift change report given to Dominique Wilburn RN (oncoming nurse) by Bailey Chavez RN (offgoing nurse). Report given with SBAR, Kardex, Intake/Output, MAR and Recent Results.

## 2022-09-11 NOTE — PROGRESS NOTES
[facility] Hospitalists  Progress Note    Patient: Jane Pate Age: 71 y.o. : 1950 MR#: [id number] SSN: [id number]  Date: 2022     Subjective/24-hour events:     Remains generally weak but o/w feeling OK. Major complaint is that of pain that has not been relieved well by most recent doses of analgesic. Appetite remains poor. Afebrile. 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 Renal function continues to improve. Replace electrolytes as needed. Continued nephrology management appreciated. Follow H/H - no need for additional transfusion today  Routine catheter/PCN care. Analgesia as necessary. Supportive care o/w. Follow. Case discussed with:  [x]Patient  []Family  [x]Nursing  []Case Management  DVT Prophylaxis:  []Lovenox  []Hep SQ  [x]SCDs  []Coumadin   []On Heparin gtt    Objective:   VS: Visit Vitals  /65   Pulse (!) 114   Temp 99.2 °F (37.3 °C)   Resp 18   Ht 5' 3\" (1.6 m)   Wt 109 kg (240 lb 6.4 oz)   SpO2 98%   Breastfeeding No   BMI 42.58 kg/m²      Tmax/24hrs: Temp (24hrs), Av.6 °F (37 °C), Min:98.2 °F (36.8 °C), Max:99.2 °F (37.3 °C)    Intake/Output Summary (Last 24 hours) at 2022 0815  Last data filed at 2022 0445  Gross per 24 hour   Intake 240 ml   Output 1565 ml   Net -1325 ml       General:  In NAD. Nontoxic-appearing. Cardiovascular:  Regular, mildly tachy. Pulmonary:  Lungs clear bilaterally, no wheezes. GI:  Abdomen soft, NTTP. Extremities:  Warm, no edema or ischemia. Neuro:  Awake and alert. Moves extremities spontaneously.     Labs:    Recent Results (from the past 24 hour(s))   HGB & HCT    Collection Time: 09/10/22 5:24 PM   Result Value Ref Range    HGB 7.3 (L) 12.0 - 16.0 g/dL    HCT 21.5 (L) 35.0 - 22.6 %   METABOLIC PANEL, BASIC    Collection Time: 09/11/22  1:00 AM   Result Value Ref Range    Sodium 138 136 - 145 mmol/L    Potassium 4.2 3.5 - 5.5 mmol/L    Chloride 110 100 - 111 mmol/L    CO2 21 21 - 32 mmol/L    Anion gap 7 3.0 - 18 mmol/L    Glucose 88 74 - 99 mg/dL    BUN 44 (H) 7.0 - 18 MG/DL    Creatinine 1.35 (H) 0.6 - 1.3 MG/DL    BUN/Creatinine ratio 33 (H) 12 - 20      GFR est AA 47 (L) >60 ml/min/1.73m2    GFR est non-AA 39 (L) >60 ml/min/1.73m2    Calcium 8.9 8.5 - 10.1 MG/DL   HGB & HCT    Collection Time: 09/11/22  1:00 AM   Result Value Ref Range    HGB 7.2 (L) 12.0 - 16.0 g/dL    HCT 21.4 (L) 35.0 - 45.0 %         Signed By: Gino Cloud MD     September 11, 2022

## 2022-09-11 NOTE — PROGRESS NOTES
Roxicodone 1 tablet administered orally for complaint of abdominal mass with pain level 10/10. Constant cramping, sharp and shooting pain. 2105 Resting quietly with eyes closed and family member at bedside. No complaint voiced of pain or discomfort at present. 09/11/22    0212 Roxicodone 1 tablet administered orally for complaint of abdominal mass. 0300 Patient resting quietly with eyes closed, no complaint voiced of pain or discomfort at present.

## 2022-09-12 NOTE — PROGRESS NOTES
[facility]    Patient: Deborah Spine Age: 71 y.o. : 1950 MR#: [id number] SSN: [id number]  Date: 2022     Subjective/24-hour events:     No new complaints. Pain in acceptable control - relieved with meds. 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 Transfer to [facility] his afternoon. Trend H/H PRN. Follow up again in AM.  Continue routine catheter/PCN care. PT/OT as able/tolerated. Disposition still TBD. Lives alone - not able to return to previous environment at current level of physical function. Discussed with CM. Case discussed with:  [x]Patient  []Family  [x]Nursing  [x]Case Management  DVT Prophylaxis:  []Lovenox  []Hep SQ  [x]SCDs  []Coumadin   []On Heparin gtt    Objective:   VS: Visit Vitals  /63 (BP 1 Location: Left upper arm, BP Patient Position: At rest)   Pulse 100   Temp 99.3 °F (37.4 °C)   Resp 19   Ht 5' 3\" (1.6 m)   Wt 109 kg (240 lb 6.4 oz)   SpO2 97%   Breastfeeding No   BMI 42.58 kg/m²      Tmax/24hrs: Temp (24hrs), Av.6 °F (37.6 °C), Min:98.9 °F (37.2 °C), Max:100.6 °F (38.1 °C)    Intake/Output Summary (Last 24 hours) at 2022 1236  Last data filed at 2022 0552  Gross per 24 hour   Intake --   Output 455 ml   Net -455 ml       General:  In NAD. Nontoxic-appearing. Cardiovascular:  Regular, mildly tachy. Pulmonary:  Lungs clear bilaterally, no wheezes. GI:  Abdomen soft, NTTP. Extremities:  Warm, no edema or ischemia. Neuro:  Awake and alert. Moves extremities spontaneously.     Labs:    Recent Results (from the past 24 hour(s))   METABOLIC PANEL, BASIC    Collection Time: 22  3:20 AM   Result Value Ref Range    Sodium 138 136 - 145 mmol/L    Potassium 4.4 3.5 - 5.5 mmol/L    Chloride 110 100 - 111 mmol/L    CO2 22 21 - 32 mmol/L    Anion gap 6 3.0 - 18 mmol/L    Glucose 97 74 - 99 mg/dL    BUN 37 (H) 7.0 - 18 MG/DL    Creatinine 1.47 (H) 0.6 - 1.3 MG/DL    BUN/Creatinine ratio 25 (H) 12 - 20      GFR est AA 42 (L) >60 ml/min/1.73m2    GFR est non-AA 35 (L) >60 ml/min/1.73m2    Calcium 9.0 8.5 - 10.1 MG/DL         Signed By: Niles Osuna MD     September 12, 2022

## 2022-09-12 NOTE — PROGRESS NOTES
Roxicodone 2 tabs administered orally for complaint of abdominal mass pain. ''  2320 Resting quietly with eyes closed without complaint voiced pf pain or discomfort at present. 09/112/22  0015 Noted yellowish drainage in left bag of 140 cc . Cotter catheter noted with 50 cc of old dark bloody drainage with flush initiated. Incontinent care provided with of small amount of soft loose stools. 7260 Tylenol 2 tabs administered orally for elevated temp of 100. 6 oral taken prior to medication. Heart rate remains elevated in the upper 100's with patient remaining symptomatic. 0430 Recheck temp 100 orally. No complaint voiced of pain or discomfort.    0725 Bedside and Verbal shift change report given to AUNDREA Harmon (oncoming nurse) by Henrry Garcia RN (offgoing nurse). Report given with SBAR, Kardex, Intake/Output, MAR and Recent Results.

## 2022-09-12 NOTE — PROGRESS NOTES
In Patient Progress note      Admit Date: 9/1/2022    Impression:  1. Severe acute kidney injury due to obstructive uropathy. Patient had bilateral PCN.--> creat slightly worse today   Noted that one of the PCN drains not draining well per nursing   2. Anemia   3   Uterine mass with VELIZ , undergoing radiation   4. Mild hypokalemia -resolved   5. Mild Acidosis likely metabolic- resolved    S/p  PCN. Excellent output   Creatinine improving  No respiratory symptoms  Poor appetite     Plan  1) encourage po intake   2) PCN care per urology/IR,I have asked IR to evaluate   As per nursing PCN not draining well  3) renal ultrasound to follow up on hydro   4) gentle hydration LR @ 75 cc/hrs for 12 hrs   5) onco/radiation following   6) recheck renal functions in AM     Please call with questions,    Kike Gallo MD [facility]  Cell [phone]  Pager: [phone]     Subjective:     - s/p PCN   - respiratory - stable  - hemodynamics - stable, no pressrs  - UOP-improved   - Nutrition -poor    Objective:     Visit Vitals  /71 (BP 1 Location: Left upper arm, BP Patient Position: At rest)   Pulse (!) 105   Temp 97.3 °F (36.3 °C)   Resp 18   Ht 5' 3\" (1.6 m)   Wt 109 kg (240 lb 6.4 oz)   SpO2 98%   Breastfeeding No   BMI 42.58 kg/m²         Intake/Output Summary (Last 24 hours) at 9/12/2022 1609  Last data filed at 9/12/2022 1602  Gross per 24 hour   Intake --   Output 805 ml   Net -805 ml         Physical Exam:     General: NAD, alert and oriented. Neck: No jvd. LUNGS: Clear to Auscultation, No rales, rhonchi or wheezes. CVS EXM: S1, S2  RRR No rub. Abdomen: soft, non tender.    Lower Extremities:  trace Edema     Data Review:    Recent Labs     09/11/22  0100   HCT 21.4*       Recent Labs     09/12/22  0320 09/11/22  0100   BUN 37* 44*   CREA 1.47* 1.35*   CA 9.0 8.9   K 4.4 4.2    138    110   CO2 22 21   GLU 97 88         Zeke Coats MD

## 2022-09-12 NOTE — PROGRESS NOTES
Discharge planning    Met with patient at bedside to discuss plan. PT recommending SNF. Patient stated \" I'm not going to a nursing home. I need information an aide to come to my home and a list of transportation agencies. I rather go home with home health and hire an aide. \"     Freedom of choice for any local home health agency. Will need home health orders. Provided patient with list of personal aide and transportation companies. Patient will talk with son this evening to start calling companies for cost of services out of pocket. CM will continue to assist with transition of care needs.     MARCY HoN, RN  Pager # [phone]  Care Manager

## 2022-09-12 NOTE — PROGRESS NOTES
Problem: Pressure Injury - Risk of  Goal: *Prevention of pressure injury  Description: Document Geoffrey Scale and appropriate interventions in the flowsheet. Outcome: Progressing Towards Goal  Note: Pressure Injury Interventions:  Sensory Interventions: Assess changes in LOC    Moisture Interventions: Maintain skin hydration (lotion/cream)    Activity Interventions: Assess need for specialty bed, Increase time out of bed, Pressure redistribution bed/mattress(bed type)    Mobility Interventions: Assess need for specialty bed, HOB 30 degrees or less, Pressure redistribution bed/mattress (bed type)    Nutrition Interventions: Document food/fluid/supplement intake    Friction and Shear Interventions: Apply protective barrier, creams and emollients                Problem: Patient Education: Go to Patient Education Activity  Goal: Patient/Family Education  Outcome: Progressing Towards Goal     Problem: Falls - Risk of  Goal: *Absence of Falls  Description: Document Joan Fall Risk and appropriate interventions in the flowsheet.   Outcome: Progressing Towards Goal  Note: Fall Risk Interventions:  Mobility Interventions: Patient to call before getting OOB, Assess mobility with egress test         Medication Interventions: Assess postural VS orthostatic hypotension, Patient to call before getting OOB, Teach patient to arise slowly    Elimination Interventions: Bed/chair exit alarm, Call light in reach, Elevated toilet seat, Patient to call for help with toileting needs              Problem: Patient Education: Go to Patient Education Activity  Goal: Patient/Family Education  Outcome: Progressing Towards Goal     Problem: Patient Education: Go to Patient Education Activity  Goal: Patient/Family Education  Outcome: Progressing Towards Goal     Problem: Infection - Risk of, Urinary Catheter-Associated Urinary Tract Infection  Goal: *Absence of infection signs and symptoms  Outcome: Progressing Towards Goal     Problem: Patient Education: Go to Patient Education Activity  Goal: Patient/Family Education  Outcome: Progressing Towards Goal

## 2022-09-12 NOTE — PROGRESS NOTES
Patient being transported by [address] [place] [address],[address] to [facility] for radiation treatment in stable condition.      Visit Vitals  /63 (BP 1 Location: Left upper arm, BP Patient Position: At rest)   Pulse 100   Temp 99.3 °F (37.4 °C)   Resp 19   Ht 5' 3\" (1.6 m)   Wt 109 kg (240 lb 6.4 oz)   SpO2 97%   Breastfeeding No   BMI 42.58 kg/m²

## 2022-09-13 NOTE — PROGRESS NOTES
Second attempt to see patient for PT treatment however she is currently off the floor. Will continue to follow.   Sukhi Edge, PT

## 2022-09-13 NOTE — PROGRESS NOTES
[facility]    Patient: Andressa Rodriguez Age: 71 y.o. : 1950 MR#: [id number] SSN: [id number]  Date: 2022     Subjective/24-hour events:     Patient lying in the bed, no new complaints. Denies any dizziness, no lightheadedness, no chest pain. 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 Discussed with the patient about risk and benefits of transfusion including but not limited to risk of allergic reaction, fever. Patient understood and wants to proceed with transfusion. Continue XRT per radiation oncology  Continue routine catheter/PCN care. PT/OT as able/tolerated. Disposition still TBD. Lives alone - not able to return to previous environment at current level of physical function. Discussed with CM. Per case management, patient wants to hire people to care for her. But on talking to the patient, she does not have money. Patient now open for SNF if her insurance covers. Deferred case management about patient's preference.     Case discussed with:  [x]Patient  []Family  [x]Nursing  [x]Case Management  DVT Prophylaxis:  []Lovenox  []Hep SQ  [x]SCDs  []Coumadin   []On Heparin gtt    Objective:   VS: Visit Vitals  /66   Pulse 96   Temp 98.8 °F (37.1 °C)   Resp 20   Ht 5' 3\" (1.6 m)   Wt 109.4 kg (241 lb 1.6 oz)   SpO2 99%   Breastfeeding No   BMI 42.71 kg/m²      Tmax/24hrs: Temp (24hrs), Av.2 °F (36.8 °C), Min:97.3 °F (36.3 °C), Max:98.8 °F (37.1 °C)    Intake/Output Summary (Last 24 hours) at 2022 1253  Last data filed at 2022 0732  Gross per 24 hour   Intake -- Output 830 ml   Net -830 ml       General:  In NAD. Nontoxic-appearing. Cardiovascular:  Regular, mildly tachy. Pulmonary:  Lungs clear bilaterally, no wheezes. GI:  Abdomen soft, NTTP. Extremities:  Warm, no edema or ischemia. Neuro:  Awake and alert. Moves extremities spontaneously.   Bloody urine Cotter catheter    Labs:    Recent Results (from the past 24 hour(s))   HGB & HCT    Collection Time: 09/13/22  2:51 AM   Result Value Ref Range    HGB 6.5 (L) 12.0 - 16.0 g/dL    HCT 19.5 (L) 35.0 - 23.4 %   METABOLIC PANEL, BASIC    Collection Time: 09/13/22  2:51 AM   Result Value Ref Range    Sodium 138 136 - 145 mmol/L    Potassium 4.3 3.5 - 5.5 mmol/L    Chloride 110 100 - 111 mmol/L    CO2 21 21 - 32 mmol/L    Anion gap 7 3.0 - 18 mmol/L    Glucose 88 74 - 99 mg/dL    BUN 38 (H) 7.0 - 18 MG/DL    Creatinine 1.90 (H) 0.6 - 1.3 MG/DL    BUN/Creatinine ratio 20 12 - 20      GFR est AA 32 (L) >60 ml/min/1.73m2    GFR est non-AA 26 (L) >60 ml/min/1.73m2    Calcium 9.0 8.5 - 10.1 MG/DL         Signed By: Adriana Szymanski MD     September 13, 2022

## 2022-09-13 NOTE — ROUTINE PROCESS
Bedside shift change report given to Won Hoskins RN (oncoming nurse) by Bailey Felder RN (offgoing nurse). Report included the following information SBAR, Kardex, Intake/Output, and MAR.

## 2022-09-13 NOTE — PROGRESS NOTES
Wound Prevention Checklist    Patient: Deborah Spine (66 y.o. female)  Date: 9/13/2022  Diagnosis: Bladder outlet obstruction [N32.0]  WILBUR (acute kidney injury) ([facility] [address] [N17.9]  Uterine mass [N85.8] <principal problem not specified>    Precautions: Fall, Skin       []  Heel prevention boots placed on patient    []  Patient turned q2h during shift    []  Lift team ordered    [x]  Patient on [place] bed/Specialty bed    [x]  Each Wound is documented during shift (Stage, Color, drainage, odor, measurements, and dressings)    [x]  Dual skin checks done at bedside during shift report with Magalys Guerrero RN

## 2022-09-13 NOTE — PROGRESS NOTES
Wound Prevention Checklist    Patient: Jayleen Sims (39 y.o. female)  Date: 9/12/2022  Diagnosis: Bladder outlet obstruction [N32.0]  WILBUR (acute kidney injury) ([facility] [address] [N17.9]  Uterine mass [N85.8] <principal problem not specified>    Precautions: Fall, Skin       []  Heel prevention boots placed on patient    []  Patient turned q2h during shift    []  Lift team ordered    [x]  Patient on [place] bed/Specialty bed    [x]  Each Wound is documented during shift (Stage, Color, drainage, odor, measurements, and dressings)    [x]  Dual skin checks done at bedside during shift report with Becky Dawson RN

## 2022-09-13 NOTE — PROGRESS NOTES
Problem: Self Care Deficits Care Plan (Adult)  Goal: *Acute Goals and Plan of Care (Insert Text)  Description: Occupational Therapy Goals  Initiated 9/2/2022 within 7 day(s), re-evaluation 9/9/2022, pt making steady progress towards goals    1. Patient will perform grooming with supervision/set-up standing at the sink for >2 min with Fair+ balance. 2.  Patient will perform bathing with supervision/set-up. 3.  Patient will perform lower body dressing with supervision/set-up using AE prn.  4.  Patient will perform toilet transfers with supervision/set-up. 5.  Patient will perform all aspects of toileting with supervision/set-up. 6.  Patient will participate in upper extremity therapeutic exercise/activities with supervision/set-up for 8 minutes to improve endurance and UB strength needed for ADLs    7. Patient will utilize energy conservation techniques during functional activities with verbal cues. Prior Level of Function: Pt reports being independent with ADLs and functional mobility, and was working prior to 2 weeks ago. Since then pt has not been in the shower and requires assistance for LB ADLs. Outcome: Progressing Towards Goal   OCCUPATIONAL THERAPY TREATMENT    Patient: Jeff Rayo (10 y.o. female)  Date: 9/13/2022  Diagnosis: Bladder outlet obstruction [N32.0]  WILBUR (acute kidney injury) ([facility] [address] [N17.9]  Uterine mass [N85.8] <principal problem not specified>  Procedure(s) (LRB):  CYSTOSCOPY, insertion of jacinto catheter (N/A) 9 Days Post-Op  Precautions: Fall, Skin  PLOF: Pt reports being independent with ADLs and functional mobility, and was working prior to 2 weeks ago. Since then pt has not been in the shower and requires assistance for LB ADLs. Chart, occupational therapy assessment, plan of care, and goals were reviewed. ASSESSMENT:  Pt presented supine in bed upon entry and agreeable for participation. She was assisted to EOB from supine w/ MOD A in prep for functional task.  Pt was noted to have soiled chux pad from BM smear. STS transfer MIN A with RW. Pt required MAX A with doug area hygiene 2/2 decreased dyn standing balance and tolerance. She was able to tolerate static standing ~ 1 min w/ CGA and B UE support on RW. She was able to take ~ 4 steps laterally towards [facility] CGA with RW. Pt was assisted back to supine with MOD A and positioned for comfort. She was left with HOB elevated, bed alarm active, and all needs left within reach. HR noted to increased to 140 bpm during standing activity, HR assessed at end of session 121 bpm.   Progression toward goals:  []          Improving appropriately and progressing toward goals  [x]          Improving slowly and progressing toward goals  []          Not making progress toward goals and plan of care will be adjusted     PLAN:  Patient continues to benefit from skilled intervention to address the above impairments. Continue treatment per established plan of care. Further Equipment Recommendations for Discharge:  bedside commode, shower chair, and rolling walker  AMPAC: Based on an AM-PAC score of 14/24 and their current ADL deficits; it is recommended that the patient have 3-5 sessions per week of Occupational Therapy at d/c to increase the patient's independence. This AMPAC score should be considered in conjunction with interdisciplinary team recommendations to determine the most appropriate discharge setting. Patient's social support, diagnosis, medical stability, and prior level of function should also be taken into consideration. SUBJECTIVE:   Patient stated I have to get my radiation tx later.     OBJECTIVE DATA SUMMARY:   Cognitive/Behavioral Status:  Neurologic State: Alert, Eyes open spontaneously  Orientation Level: Oriented X4  Cognition: Follows commands  Safety/Judgement: Fall prevention    Functional Mobility and Transfers for ADLs:   Bed Mobility:  Rolling: Minimum assistance  Supine to Sit: Moderate assistance  Sit to Supine: Moderate assistance  Scooting: Minimum assistance   Transfers:  Sit to Stand: Minimum assistance  Stand to Sit: Contact guard assistance      Balance:  Sitting: Intact  Standing: Impaired; With support  Standing - Static: Good  Standing - Dynamic : Fair    ADL Intervention:     Lower Body Bathing  Perineal  : Maximum assistance  Position Performed: Standing  Adaptive Equipment: Jordi Sample; Wipes(personal cleansing cloth)    Pain:  Pain level pre-treatment: 0/10   Pain level post-treatment: 0/10      Activity Tolerance:    Fair   Please refer to the flowsheet for vital signs taken during this treatment. After treatment:   []  Patient left in no apparent distress sitting up in chair  [x]  Patient left in no apparent distress in bed  [x]  Call bell left within reach  [x]  Nursing notified  []  Caregiver present  [x]  Bed alarm activated    COMMUNICATION/EDUCATION:   [x] Role of Occupational Therapy in the acute care setting  [] Home safety education was provided and the patient/caregiver indicated understanding. [x] Patient/family have participated as able in working towards goals and plan of care. [x] Patient/family agree to work toward stated goals and plan of care. [] Patient understands intent and goals of therapy, but is neutral about his/her participation. [] Patient is unable to participate in goal setting and plan of care. Thank you for this referral.  JAMSHID Sorensen  Time Calculation: 24 mins    [facility] AM-PAC® Daily Activity Inpatient Short Form (6-Clicks)    How much HELP from another person does the patient currently need    (If the patient hasn't done an activity recently, how much help from another person do you think he/she would need if he/she tried?)   Total (Total A or Dep)   A Lot  (Mod to Max A)   A Little (Sup or Min A)   None (Mod I to I)   Putting on and taking off regular lower body clothing? [] 1 [x] 2 [] 3 [] 4   2.  Bathing (including washing, rinsing, drying)? [] 1 [x] 2 [] 3 [] 4   3. Toileting, which includes using toilet, bedpan or urinal?   [] 1 [x] 2 [] 3 [] 4   4. Putting on and taking off regular upper body clothing? [] 1 [x] 2 [] 3 [] 4   5. Taking care of personal grooming such as brushing teeth? [] 1 [] 2 [x] 3 [] 4   6. Eating meals?    [] 1 [] 2 [x] 3 [] 4

## 2022-09-13 NOTE — PROGRESS NOTES
Attempted to see patient for PT treatment session however she is refusing stating that she already sat on the side on the bed once today. Will continue to follow.   Do Powers, PT

## 2022-09-13 NOTE — PROGRESS NOTES
Problem: Pressure Injury - Risk of  Goal: *Prevention of pressure injury  Description: Document Geoffrey Scale and appropriate interventions in the flowsheet. Outcome: Progressing Towards Goal  Note: Pressure Injury Interventions:  Sensory Interventions: Assess changes in LOC    Moisture Interventions: Absorbent underpads    Activity Interventions: Assess need for specialty bed, Increase time out of bed, Pressure redistribution bed/mattress(bed type)    Mobility Interventions: HOB 30 degrees or less, Pressure redistribution bed/mattress (bed type)    Nutrition Interventions: Document food/fluid/supplement intake    Friction and Shear Interventions: HOB 30 degrees or less                Problem: Patient Education: Go to Patient Education Activity  Goal: Patient/Family Education  Outcome: Progressing Towards Goal     Problem: Falls - Risk of  Goal: *Absence of Falls  Description: Document Joan Fall Risk and appropriate interventions in the flowsheet. Outcome: Progressing Towards Goal  Note: Fall Risk Interventions:  Mobility Interventions: Assess mobility with egress test, Patient to call before getting OOB         Medication Interventions: Patient to call before getting OOB, Teach patient to arise slowly    Elimination Interventions: Bed/chair exit alarm, Call light in reach, Patient to call for help with toileting needs              Problem: Patient Education: Go to Patient Education Activity  Goal: Patient/Family Education  Outcome: Progressing Towards Goal     Problem: Pain  Goal: *Control of Pain  Outcome: Progressing Towards Goal  Goal: *PALLIATIVE CARE:  Alleviation of Pain  Outcome: Progressing Towards Goal     Problem: Patient Education: Go to Patient Education Activity  Goal: Patient/Family Education  Outcome: Progressing Towards Goal     Problem: Patient Education: Go to Patient Education Activity  Goal: Patient/Family Education  Outcome: Progressing Towards Goal     Problem:  Activity Intolerance  Goal: *Oxygen saturation during activity within specified parameters  Outcome: Progressing Towards Goal  Goal: *Able to remain out of bed as prescribed  Outcome: Progressing Towards Goal     Problem: Patient Education: Go to Patient Education Activity  Goal: Patient/Family Education  Outcome: Progressing Towards Goal     Problem: Patient Education: Go to Patient Education Activity  Goal: Patient/Family Education  Outcome: Progressing Towards Goal     Problem: Nutrition Deficit  Goal: *Optimize nutritional status  Outcome: Progressing Towards Goal

## 2022-09-13 NOTE — PROGRESS NOTES
Problem: Pressure Injury - Risk of  Goal: *Prevention of pressure injury  Description: Document Geoffrey Scale and appropriate interventions in the flowsheet. Outcome: Progressing Towards Goal  Note: Pressure Injury Interventions:  Sensory Interventions: Assess changes in LOC    Moisture Interventions: Absorbent underpads, Apply protective barrier, creams and emollients    Activity Interventions: Pressure redistribution bed/mattress(bed type)    Mobility Interventions: Pressure redistribution bed/mattress (bed type), HOB 30 degrees or less    Nutrition Interventions: Document food/fluid/supplement intake    Friction and Shear Interventions: Minimize layers, Apply protective barrier, creams and emollients, Foam dressings/transparent film/skin sealants                Problem: Patient Education: Go to Patient Education Activity  Goal: Patient/Family Education  Outcome: Progressing Towards Goal     Problem: Falls - Risk of  Goal: *Absence of Falls  Description: Document Joan Fall Risk and appropriate interventions in the flowsheet.   Outcome: Progressing Towards Goal  Note: Fall Risk Interventions:  Mobility Interventions: Patient to call before getting OOB         Medication Interventions: Patient to call before getting OOB, Teach patient to arise slowly    Elimination Interventions: Call light in reach              Problem: Patient Education: Go to Patient Education Activity  Goal: Patient/Family Education  Outcome: Progressing Towards Goal     Problem: Pain  Goal: *Control of Pain  Outcome: Progressing Towards Goal     Problem: Patient Education: Go to Patient Education Activity  Goal: Patient/Family Education  Outcome: Progressing Towards Goal     Problem: Infection - Risk of, Urinary Catheter-Associated Urinary Tract Infection  Goal: *Absence of infection signs and symptoms  Outcome: Progressing Towards Goal     Problem: Patient Education: Go to Patient Education Activity  Goal: Patient/Family Education  Outcome: Progressing Towards Goal     Problem: Patient Education: Go to Patient Education Activity  Goal: Patient/Family Education  Outcome: Progressing Towards Goal     Problem: Patient Education: Go to Patient Education Activity  Goal: Patient/Family Education  Outcome: Progressing Towards Goal     Problem: Nutrition Deficit  Goal: *Optimize nutritional status  Outcome: Progressing Towards Goal

## 2022-09-13 NOTE — ROUTINE PROCESS
Bedside shift change report given to Adri Hopson RN (oncoming nurse) by Albertina Benites RN (offgoing nurse). Report included the following information SBAR, Kardex, Intake/Output, and MAR.

## 2022-09-13 NOTE — PROGRESS NOTES
Wound Prevention Checklist    Patient: Santa Sr (58 y.o. female)  Date: 9/13/2022  Diagnosis: Bladder outlet obstruction [N32.0]  WILBUR (acute kidney injury) ([facility] [address] [N17.9]  Uterine mass [N85.8] <principal problem not specified>    Precautions: Fall, Skin       []  Heel prevention boots placed on patient    []  Patient turned q2h during shift    []  Lift team ordered    [x]  Patient on Shon bed/Specialty bed    []  Each Wound is documented during shift (Stage, Color, drainage, odor, measurements, and dressings)    [x]  Dual skin checks done at bedside during shift report with Lia Valdez RN     Bedside and Verbal shift change report given to Rafiq Reagan (oncoming nurse) by Mayra Scheuermann, RN   (offgoing nurse). Report included the following information SBAR, Kardex, Intake/Output, MAR, and Recent Results.

## 2022-09-14 NOTE — PROGRESS NOTES
In Patient Progress note      Admit Date: 9/1/2022    Impression:  1. Severe acute kidney injury due to obstructive uropathy. Patient had bilateral PCN.--> creat now stabilizing , had a prerenal component d/t   Poor intake and radiation induced insensible losses   Renal USG with improved hydro , IR was consulted to evaluate the drains   2. Anemia   3   Uterine mass with VELIZ , undergoing radiation   4. Mild hypokalemia -resolved   5. Mild Acidosis likely metabolic- resolved    S/p  PCN. decreased output  Creatinine levelled   No respiratory symptoms  Poor appetite  Radiation induced insensible losses     Plan  1) encourage po intake   2) PCN care per urology/IR,I have asked IR to evaluate   :PCN not draining well  3) renal ultrasound , improved hydro   4) continue gentle hydration LR @ 75 cc/hrs for 12 hrs   5) onco/radiation following   6) recheck renal functions in AM     Please call with questions,    Ruthie Vidal MD FASN  Cell [phone]  Pager: [phone]     Subjective:     - s/p PCN   - respiratory - stable  - hemodynamics - stable, no pressrs  - UOP-improved   - Nutrition -poor    Objective:     Visit Vitals  BP (!) 144/80 (BP 1 Location: Left arm)   Pulse (!) 108   Temp 98.3 °F (36.8 °C)   Resp 20   Ht 5' 3\" (1.6 m)   Wt 109.4 kg (241 lb 1.6 oz)   SpO2 100%   Breastfeeding No   BMI 42.71 kg/m²         Intake/Output Summary (Last 24 hours) at 9/14/2022 1353  Last data filed at 9/14/2022 1110  Gross per 24 hour   Intake 395.42 ml   Output 275 ml   Net 120.42 ml         Physical Exam:     General: NAD, alert and oriented. Neck: No jvd. LUNGS: Clear to Auscultation, No rales, rhonchi or wheezes. CVS EXM: S1, S2  RRR No rub. Abdomen: soft, non tender.    Lower Extremities:  trace Edema     Data Review:    Recent Labs     09/14/22  0520   WBC 15.6*   RBC 2.22*   HCT 17.6*   MCV 79.3   MCH 27.0   MCHC 34.1   RDW 21.6*       Recent Labs     09/14/22  0520 09/13/22  0251 09/12/22  0320   BUN 38* 38* 37*   CREA 2.00* 1.90* 1.47*   CA 8.9 9.0 9.0   K 4.2 4.3 4.4    138 138    110 110   CO2 20* 21 22   GLU 88 88 97         Manda Workman MD

## 2022-09-14 NOTE — PROGRESS NOTES
Wound Prevention Checklist    Patient: Nevaeh Snow (70 y.o. female)  Date: 9/14/2022  Diagnosis: Bladder outlet obstruction [N32.0]  [facility] [N17.9]  Uterine mass [N85.8] <principal problem not specified>    Precautions: Fall, Skin       []  Heel prevention boots placed on patient    []  Patient turned q2h during shift    []  Lift team ordered    [x]  Patient on Shon bed/Specialty bed    []  Each Wound is documented during shift (Stage, Color, drainage, odor, measurements, and dressings)    [x]  Dual skin checks done at bedside during shift report with Lisa Ayala RN     Bedside and Verbal shift change report given to 1 S Arpan Vega (oncoming nurse) by Reinaldo Javier RN   (offgoing nurse). Report included the following information SBAR, Kardex, Intake/Output, MAR, and Recent Results. One unit of PRBC was started via PICC to COMFORT at 0722. No distress noted and patient denies having any reactions at this time. Vital signs are stable.

## 2022-09-14 NOTE — PROGRESS NOTES
In Patient Progress note      Admit Date: 9/1/2022    Impression:  1. Severe acute kidney injury due to obstructive uropathy. Patient had bilateral PCN.--> creat slightly worse today   Noted that one of the PCN drains not draining well per nursing   2. Anemia   3   Uterine mass with VELIZ , undergoing radiation   4. Mild hypokalemia -resolved   5. Mild Acidosis likely metabolic- resolved    S/p  PCN. Excellent output   Creatinine improving  No respiratory symptoms  Poor appetite     Plan  1) encourage po intake   2) PCN care per urology/IR,I have asked IR to evaluate   As per nursing PCN not draining well  3) renal ultrasound to follow up on hydro   4) gentle hydration LR @ 75 cc/hrs for 12 hrs   5) onco/radiation following   6) recheck renal functions in AM     Please call with questions,    Ruthie Vidla MD FASN  Cell [phone]  Pager: [phone]     Subjective:     - s/p PCN   - respiratory - stable  - hemodynamics - stable, no pressrs  - UOP-improved   - Nutrition -poor    Objective:     Visit Vitals  BP (!) 144/80 (BP 1 Location: Left arm)   Pulse (!) 108   Temp 98.3 °F (36.8 °C)   Resp 20   Ht 5' 3\" (1.6 m)   Wt 109.4 kg (241 lb 1.6 oz)   SpO2 100%   Breastfeeding No   BMI 42.71 kg/m²         Intake/Output Summary (Last 24 hours) at 9/14/2022 1353  Last data filed at 9/14/2022 1110  Gross per 24 hour   Intake 395.42 ml   Output 275 ml   Net 120.42 ml         Physical Exam:     General: NAD, alert and oriented. Neck: No jvd. LUNGS: Clear to Auscultation, No rales, rhonchi or wheezes. CVS EXM: S1, S2  RRR No rub. Abdomen: soft, non tender.    Lower Extremities:  trace Edema     Data Review:    Recent Labs     09/14/22  0520   WBC 15.6*   RBC 2.22*   HCT 17.6*   MCV 79.3   MCH 27.0   MCHC 34.1   RDW 21.6*       Recent Labs     09/14/22  0520 09/13/22  0251 09/12/22  0320   BUN 38* 38* 37*   CREA 2.00* 1.90* 1.47*   CA 8.9 9.0 9.0   K 4.2 4.3 4.4    138 138    110 110   CO2 20* 21 22 GLU 88 03 97         Angelo Goodpasture, MD

## 2022-09-14 NOTE — PROGRESS NOTES
Paged the IR MD Dr Nikole Robb  Per MD pt may eat, IR will not be seeing pt today.   Pt will be NPO after MId     Pt will eat now and drink     Son at bedside

## 2022-09-14 NOTE — PROGRESS NOTES
Problem: Pressure Injury - Risk of  Goal: *Prevention of pressure injury  Description: Document Geoffrey Scale and appropriate interventions in the flowsheet. Outcome: Progressing Towards Goal  Note: Pressure Injury Interventions:  Sensory Interventions: Assess changes in LOC    Moisture Interventions: Absorbent underpads    Activity Interventions: Pressure redistribution bed/mattress(bed type)    Mobility Interventions: HOB 30 degrees or less, Pressure redistribution bed/mattress (bed type)    Nutrition Interventions: Document food/fluid/supplement intake    Friction and Shear Interventions: HOB 30 degrees or less, Minimize layers                Problem: Patient Education: Go to Patient Education Activity  Goal: Patient/Family Education  Outcome: Progressing Towards Goal     Problem: Falls - Risk of  Goal: *Absence of Falls  Description: Document Joan Fall Risk and appropriate interventions in the flowsheet.   Outcome: Progressing Towards Goal  Note: Fall Risk Interventions:  Mobility Interventions: Patient to call before getting OOB         Medication Interventions: Patient to call before getting OOB, Teach patient to arise slowly    Elimination Interventions: Call light in reach              Problem: Patient Education: Go to Patient Education Activity  Goal: Patient/Family Education  Outcome: Progressing Towards Goal     Problem: Pain  Goal: *Control of Pain  Outcome: Progressing Towards Goal     Problem: Patient Education: Go to Patient Education Activity  Goal: Patient/Family Education  Outcome: Progressing Towards Goal     Problem: Patient Education: Go to Patient Education Activity  Goal: Patient/Family Education  Outcome: Progressing Towards Goal     Problem: Patient Education: Go to Patient Education Activity  Goal: Patient/Family Education  Outcome: Progressing Towards Goal

## 2022-09-14 NOTE — PROGRESS NOTES
Problem: Mobility Impaired (Adult and Pediatric)  Goal: *Acute Goals and Plan of Care (Insert Text)  Description: Physical Therapy Goals  Initiated 9/2/2022, reviewed and continued 9/9/2022 and to be accomplished within 7 day(s)  1. Patient will move from supine to sit and sit to supine  in bed with supervision/set-up. 2.  Patient will transfer from bed to chair and chair to bed with supervision/set-up using the least restrictive device. 3.  Patient will perform sit to stand with supervision/set-up. 4.  Patient will ambulate with supervision/set-up for 100 feet with the least restrictive device. 5.  Patient will ascend/descend 3 stairs with 1 handrail(s) with minimal assistance/contact guard assist.    PLOF: independent with mobility without an assistive device, lives alone     Outcome: Progressing Towards Goal     PHYSICAL THERAPY TREATMENT    Patient: Crystal Winter (37 y.o. female)  Date: 9/14/2022  Diagnosis: Bladder outlet obstruction [N32.0]  WILBUR (acute kidney injury) ([facility] [address] [N17.9]  Uterine mass [N85.8] <principal problem not specified>  Procedure(s) (LRB):  CYSTOSCOPY, insertion of jacinto catheter (N/A) 10 Days Post-Op  Precautions: Fall, Skin  PLOF: see above     ASSESSMENT:  Pt received in bed in NAD and agreeable, pt s/p 1 unit PRBC and fatigued, again has radiation tx this date. Pt required max A to come to EOB at trunk and LE, min A to stand to RW this date, max A for standing pericare 2/2 soiled marina pad. Pt able to take approx 3 ft of lateral steps with CGA for steadying with the RW. Returned to supine and then required mod A for rolling, again for clean marina pad placement. Will continue to follow per POC. Recommend rehab placement at discharge.    Progression toward goals:   [x]      Improving appropriately and progressing toward goals  []      Improving slowly and progressing toward goals  []      Not making progress toward goals and plan of care will be adjusted     PLAN:  Patient continues to benefit from skilled intervention to address the above impairments. Continue treatment per established plan of care. Further Equipment Recommendations for Discharge:  bedside commode, shower chair, rolling walker, and wheelchair     AMPAC: Based on an AM-PAC score of 15/24 (**/20 if omitting stairs) and their current functional mobility deficits, it is recommended that the patient have 3-5 sessions per week of Physical Therapy at d/c to increase the patient's independence. This AMPAC score should be considered in conjunction with interdisciplinary team recommendations to determine the most appropriate discharge setting. Patient's social support, diagnosis, medical stability, and prior level of function should also be taken into consideration. SUBJECTIVE:   Patient stated I am tired today.     OBJECTIVE DATA SUMMARY:   Critical Behavior:  Neurologic State: Alert  Orientation Level: Oriented X4  Cognition: Follows commands  Safety/Judgement: Fall prevention  Functional Mobility Training:  Bed Mobility:  Rolling: Moderate assistance  Supine to Sit: Maximum assistance  Sit to Supine: Maximum assistance  Scooting: Maximum assistance;Assist x2 (towards HOB)         Transfers:  Sit to Stand: Minimum assistance  Stand to Sit: Contact guard assistance          Balance:  Sitting: Intact  Standing: Impaired; With support  Standing - Static: Fair  Standing - Dynamic : Fair        Ambulation/Gait Training:  Distance (ft): 3 Feet (ft)  Assistive Device: Walker, rolling  Ambulation - Level of Assistance: Contact guard assistance        Gait Abnormalities: Decreased step clearance        Base of Support: Narrowed; Center of gravity altered      Pain:  Pain level pre-treatment: 0/10  Pain level post-treatment: 0/10   Pain Intervention(s): Medication (see MAR);  Rest, Ice, Repositioning   Response to intervention: Nurse notified    Activity Tolerance:   Good    Please refer to the flowsheet for vital signs taken during this treatment. After treatment:   [] Patient left in no apparent distress sitting up in chair  [x] Patient left in no apparent distress in bed  [x] Call bell left within reach  [x] Nursing notified  [] Caregiver present  [x] Bed alarm activated  [] SCDs applied      COMMUNICATION/EDUCATION:   [x]         Role of Physical Therapy in the acute care setting. [x]         Fall prevention education was provided and the patient/caregiver indicated understanding. [x]         Patient/family have participated as able in working toward goals and plan of care. [x]         Patient/family agree to work toward stated goals and plan of care. []         Patient understands intent and goals of therapy, but is neutral about his/her participation. []         Patient is unable to participate in stated goals/plan of care: ongoing with therapy staff.  []         Other:        Jesus Hughes   Time Calculation: 24 mins    [employer] AM-PAC® Basic Mobility Inpatient Short Form (6-Clicks) Version 2    How much HELP from another person does the patient currently need    (If the patient hasn't done an activity recently, how much help from another person do you think he/she would need if he/she tried?)   Total (Total A or Dep)   A Lot  (Mod to Max A)   A Little (Sup or Min A)   None (Mod I to I)   Turning from your back to your side while in a flat bed without using bedrails? [] 1 [x] 2 [] 3 [] 4   2. Moving from lying on your back to sitting on the side of a flat bed without using bedrails? [] 1 [x] 2 [] 3 [] 4   3. Moving to and from a bed to a chair (including a wheelchair)? [] 1 [] 2 [x] 3 [] 4   4. Standing up from a chair using your arms (e.g., wheelchair, or bedside chair)? [] 1 [] 2 [x] 3 [] 4   5. Walking in hospital room? [] 1 [] 2 [x] 3 [] 4   6. Climbing 3-5 steps with a railing?+   [] 1 [x] 2 [] 3 [] 4   +If stair climbing cannot be assessed, skip item #6.   Sum responses from items 1-5.       Based on an AM-PAC score of 15/24 (**/20 if omitting stairs) and their current functional mobility deficits, it is recommended that the patient have 3-5 sessions per week of Physical Therapy at d/c to increase the patient's independence.

## 2022-09-14 NOTE — PROGRESS NOTES
[facility] Hospitalists  Progress Note    Patient: Santos Navarro Age: 71 y.o. : 1950 MR#: [id number] SSN: [id number]  Date: 2022     Subjective/24-hour events:     Patient lying in the bed, no new complaints. Denies any dizziness, no lightheadedness, no chest pain. Patient still having bloody urine in the catheter    Assessment:   Obstructive uropathy with bilateral hydronephrosis status post bilateral PCN placement 2022  Hematuria  Acute blood loss anemia  WILBUR secondary to obstructive uropathy  Iron deficiency anemia  Uterine mass, suspect neoplastic  Hypokalemia  Hyponatremia, improved  Morbid obesity, BMI 41.54    Plan:   Hemoglobin better posttransfusion  We will continue to hold subcu heparin  Continue metoprolol, hydralazine and monitor blood pressure  Continue IV fluids per nephrology  IR reconsulted for decreased output in right nephrostomy tube  Continue XRT per radiation oncology  Continue routine catheter/PCN care. Discussed with urology for follow-up given drop in H&H and hematuria  PT/OT as able/tolerated. Disposition still TBD. Lives alone - not able to return to previous environment at current level of physical function. Discussed with CM. Per case management, patient wants to hire people to care for her. But on talking to the patient, she does not have money. Patient now open for SNF if her insurance covers. Deferred case management about patient's preference. Tried calling patient's son Froylan Sifuentes but no response, voicemail left.     Case discussed with:  [x]Patient  []Family  [x]Nursing  [x]Case Management  DVT Prophylaxis:  []Lovenox  []Hep SQ  [x]SCDs  []Coumadin   []On Heparin gtt    Objective:   VS: Visit Vitals  BP (!) 158/74   Pulse (!) 106   Temp 98.3 °F (36.8 °C)   Resp 20   Ht 5' 3\" (1.6 m)   Wt 109.4 kg (241 lb 1.6 oz)   SpO2 98%   Breastfeeding No   BMI 42.71 kg/m²      Tmax/24hrs: Temp (24hrs), Av.6 °F (37 °C), Min:98.3 °F (36.8 °C), Max:98.9 °F (37.2 °C)    Intake/Output Summary (Last 24 hours) at 9/14/2022 1736  Last data filed at 9/14/2022 1110  Gross per 24 hour   Intake 395.42 ml   Output 275 ml   Net 120.42 ml       General:  In NAD. Nontoxic-appearing. Cardiovascular:  Regular, mildly tachy. Pulmonary:  Lungs clear bilaterally, no wheezes. GI:  Abdomen soft, NTTP. Extremities:  Warm, no edema or ischemia. Neuro:  Awake and alert. Moves extremities spontaneously. Bloody urine Cotter catheter    Labs:    Recent Results (from the past 24 hour(s))   METABOLIC PANEL, BASIC    Collection Time: 09/14/22  5:20 AM   Result Value Ref Range    Sodium 141 136 - 145 mmol/L    Potassium 4.2 3.5 - 5.5 mmol/L    Chloride 111 100 - 111 mmol/L    CO2 20 (L) 21 - 32 mmol/L    Anion gap 10 3.0 - 18 mmol/L    Glucose 88 74 - 99 mg/dL    BUN 38 (H) 7.0 - 18 MG/DL    Creatinine 2.00 (H) 0.6 - 1.3 MG/DL    BUN/Creatinine ratio 19 12 - 20      GFR est AA 30 (L) >60 ml/min/1.73m2    GFR est non-AA 25 (L) >60 ml/min/1.73m2    Calcium 8.9 8.5 - 10.1 MG/DL   CBC WITH AUTOMATED DIFF    Collection Time: 09/14/22  5:20 AM   Result Value Ref Range    WBC 15.6 (H) 4.6 - 13.2 K/uL    RBC 2.22 (L) 4.20 - 5.30 M/uL    HGB 6.0 (L) 12.0 - 16.0 g/dL    HCT 17.6 (LL) 35.0 - 45.0 %    MCV 79.3 78.0 - 100.0 FL    MCH 27.0 24.0 - 34.0 PG    MCHC 34.1 31.0 - 37.0 g/dL    RDW 21.6 (H) 11.6 - 14.5 %    PLATELET 475 318 - 767 K/uL    MPV 9.9 9.2 - 11.8 FL    NRBC 0.0 0  WBC    ABSOLUTE NRBC 0.00 0.00 - 0.01 K/uL    NEUTROPHILS 87 (H) 40 - 73 %    LYMPHOCYTES 7 (L) 21 - 52 %    MONOCYTES 5 3 - 10 %    EOSINOPHILS 1 0 - 5 %    BASOPHILS 0 0 - 2 %    IMMATURE GRANULOCYTES 1 (H) 0.0 - 0.5 %    ABS. NEUTROPHILS 13.5 (H) 1.8 - 8.0 K/UL    ABS. LYMPHOCYTES 1.1 0.9 - 3.6 K/UL    ABS. MONOCYTES 0.8 0.05 - 1.2 K/UL    ABS. EOSINOPHILS 0.1 0.0 - 0.4 K/UL    ABS. BASOPHILS 0.0 0.0 - 0.1 K/UL    ABS. IMM.  GRANS. 0.1 (H) 0.00 - 0.04 K/UL    DF AUTOMATED     HGB & HCT Collection Time: 09/14/22 12:50 PM   Result Value Ref Range    HGB 7.7 (L) 12.0 - 16.0 g/dL    HCT 22.8 (L) 35.0 - 45.0 %         Signed By: Jose Pate MD     September 14, 2022

## 2022-09-14 NOTE — PROGRESS NOTES
Problem: Self Care Deficits Care Plan (Adult)  Goal: *Acute Goals and Plan of Care (Insert Text)  Description: Occupational Therapy Goals  Initiated 9/2/2022 within 7 day(s), re-evaluation 9/9/2022, pt making steady progress towards goals    1. Patient will perform grooming with supervision/set-up standing at the sink for >2 min with Fair+ balance. 2.  Patient will perform bathing with supervision/set-up. 3.  Patient will perform lower body dressing with supervision/set-up using AE prn.  4.  Patient will perform toilet transfers with supervision/set-up. 5.  Patient will perform all aspects of toileting with supervision/set-up. 6.  Patient will participate in upper extremity therapeutic exercise/activities with supervision/set-up for 8 minutes to improve endurance and UB strength needed for ADLs    7. Patient will utilize energy conservation techniques during functional activities with verbal cues. Prior Level of Function: Pt reports being independent with ADLs and functional mobility, and was working prior to 2 weeks ago. Since then pt has not been in the shower and requires assistance for LB ADLs. Outcome: Progressing Towards Goal   OCCUPATIONAL THERAPY TREATMENT    Patient: Jane Pate (66 y.o. female)  Date: 9/14/2022  Diagnosis: Bladder outlet obstruction [N32.0]  WILBUR (acute kidney injury) ([facility] [address] [N17.9]  Uterine mass [N85.8] <principal problem not specified>  Procedure(s) (LRB):  CYSTOSCOPY, insertion of jacinto catheter (N/A) 10 Days Post-Op  Precautions: Fall, Skin  PLOF: Pt reports being independent with ADLs and functional mobility, and was working prior to 2 weeks ago. Since then pt has not been in the shower and requires assistance for LB ADLs. Chart, occupational therapy assessment, plan of care, and goals were reviewed. ASSESSMENT:  PT co tx to maximize pt safety and participation. Pt presented supine in bed upon entry requesting to be cleaned up.  She engaged in UB bathing w/ S/U and required MAX A for LB ADL's. Pt was observed having 2 episodes of loose BMs requiring MAX A for doug area care in supine. She came to EOB w/ MAXA and stood MIN A with RW. Pt able to tolerate ~ 1 min in stance for continued doug area care. Once completed she took ~ 3 steps laterally CGA with RW. Pt returned back to supine and positioned for comfort. Pt left with HOB elevated, all drains intact, and needs within reach. RN made aware of pt's participation and position. Progression toward goals:  []          Improving appropriately and progressing toward goals  [x]          Improving slowly and progressing toward goals  []          Not making progress toward goals and plan of care will be adjusted     PLAN:  Patient continues to benefit from skilled intervention to address the above impairments. Continue treatment per established plan of care. Further Equipment Recommendations for Discharge:  bedside commode, shower chair, and rolling walker    AMPAC:   Based on an AM-PAC score of 15/24 and their current ADL deficits; it is recommended that the patient have 3-5 sessions per week of Occupational Therapy at d/c to increase the patient's independence    This AMPAC score should be considered in conjunction with interdisciplinary team recommendations to determine the most appropriate discharge setting. Patient's social support, diagnosis, medical stability, and prior level of function should also be taken into consideration. SUBJECTIVE:   Patient stated I am worn out.     OBJECTIVE DATA SUMMARY:   Cognitive/Behavioral Status:  Neurologic State: Alert, Eyes open spontaneously  Orientation Level: Oriented X4  Cognition: Follows commands  Safety/Judgement: Fall prevention    Functional Mobility and Transfers for ADLs:   Bed Mobility:  Rolling:  Moderate assistance  Supine to Sit: Maximum assistance  Sit to Supine: Maximum assistance  Scooting: Maximum assistance;Assist x2 (towards HOB)   Transfers:   Sit to stand: MIN A  Stand to sit: CGA      Balance:  Sitting: Intact  Standing: Impaired; With support  Standing - Static: Fair  Standing - Dynamic : Fair    ADL Intervention:     Upper Body Bathing  Bathing Assistance: Set-up  Position Performed: Other (comment) (supine)    Lower Body Bathing  Perineal  : Maximum assistance  Position Performed: Supine  Lower Body : Maximum assistance  Position Performed: Supine    Upper [address]: Stand-by assistance    Lower Body Dressing Assistance  Socks: Total assistance (dependent)       Pain:  Pain level pre-treatment: 0/10   Pain level post-treatment: 0/10      Activity Tolerance:    Fair   Please refer to the flowsheet for vital signs taken during this treatment. After treatment:   []  Patient left in no apparent distress sitting up in chair  [x]  Patient left in no apparent distress in bed  [x]  Call bell left within reach  [x]  Nursing notified  []  Caregiver present  [x]  Bed alarm activated    COMMUNICATION/EDUCATION:   [x] Role of Occupational Therapy in the acute care setting  [] Home safety education was provided and the patient/caregiver indicated understanding. [x] Patient/family have participated as able in working towards goals and plan of care. [x] Patient/family agree to work toward stated goals and plan of care. [] Patient understands intent and goals of therapy, but is neutral about his/her participation. [] Patient is unable to participate in goal setting and plan of care.       Thank you for this referral.  JAMSHID Melendez  Time Calculation: 24 mins    [facility] AM-PAC® Daily Activity Inpatient Short Form (6-Clicks)    How much HELP from another person does the patient currently need    (If the patient hasn't done an activity recently, how much help from another person do you think he/she would need if he/she tried?)   Total (Total A or Dep)   A Lot  (Mod to Max A)   A Little (Sup or Min A)   None (Mod I to I)   Putting on and taking off regular lower body clothing? [] 1 [x] 2 [] 3 [] 4   2. Bathing (including washing, rinsing,      drying)? [] 1 [x] 2 [] 3 [] 4   3. Toileting, which includes using toilet, bedpan or urinal?   [] 1 [x] 2 [] 3 [] 4   4. Putting on and taking off regular upper body clothing? [] 1 [] 2 [x] 3 [] 4   5. Taking care of personal grooming such as brushing teeth? [] 1 [] 2 [x] 3 [] 4   6. Eating meals?    [] 1 [] 2 [x] 3 [] 4

## 2022-09-15 NOTE — PROGRESS NOTES
Patient was last seen in the office in March 2020.  Dr. Christopher Christensen did an upper endoscopy on the patient on January 31, 2019 for history of duodenal polyp.  He did a biopsy of the minor papilla and this showed focal adenomatous changes.  Patient had a follow-up endoscopy on April 4, 2019.  He saw an 8 mm duodenal polyp and remove this and this was an adenoma.  He had some benign fundic gland polyps of the stomach.  He recommended a follow-up upper endoscopy with him in April 2020.  Patient has had a history of nutcracker esophagus as well as spasms and was given Levsin in the past which helped.  Her last colonoscopy was done by Dr. Cassy Edgar on June 8, 2015 that showed left-sided diverticulosis and a 3 mm descending polyp that was not removed.  Patient is now due for both her endoscopy and colonoscopy.  Pt has not been able to return to have these done. She was not able to find out which meds caused a reaction. She felt her glands were swollen- she had a hard time swallowing but no rash.  She is ok with doing again. Does not want to see an allergist.  Pt has a special needs son- is my pt and has GERD Has another son working.  Her  works around [employer].        Pt did sell electonics for a living. Preprocedure Assessment    Today 9/15/2022     Indication/Symptoms:  Almas Patricia is a 71 y. o.female with obstructive uropathy and minimal output from right nephrostomy placed 9/04/22 with rising creatinine and KUB showing potentially displaced right nephrostomy here for a right nephrostogram with possible replacement. The H & P and/or progress notes and any available imaging were reviewed. The risks, indications and possible alternatives to the procedure, including doing nothing, were discussed and informed consent was obtained. Physical Exam:     General: A&O x 4, NAD   Heart:   RRR  Lungs:    Normal work of breathing    The patient is an appropriate candidate to undergo the planned procedure and sedation.     ARSALAN Flores

## 2022-09-15 NOTE — PROGRESS NOTES
Right Neph replacement   Pt remains on room air.      Report from [employer] taken   All questions answered

## 2022-09-15 NOTE — PROGRESS NOTES
Bedside and Verbal shift change report given to [address]  (oncoming nurse) by Perez Larsen RN (offgoing nurse). Report included the following information SBAR, Kardex, Intake/Output, and MAR.

## 2022-09-15 NOTE — PROGRESS NOTES
Bedside and Verbal shift change report given to Sr Ramos Rn  (oncoming nurse) by Viola Lin RN   (offgoing nurse). Report included the following information SBAR, Intake/Output, MAR, Accordion, and Med Rec Status.

## 2022-09-15 NOTE — PROGRESS NOTES
Patient out for IR procedure  Renal parameters slightly better  IR to reposition PCN   Will follow up tomorrow   Encourage po intake     Please call with questions    Courtney Crawford MD FASN  Cell [phone]  Pager: [phone]

## 2022-09-15 NOTE — PROCEDURES
RADIOLOGY POST PROCEDURE NOTE     September 15, 2022       11:30 AM     Preoperative Diagnosis:   Dislodged right nephrostomy tube. Postoperative Diagnosis:  Same. :  Dr. Saba Trejo    Assistant:  None. Type of Anesthesia: 1% local lidocaine and IV moderate sedation with Versed and fentanyl. Procedure/Description: Image guided right antegrade nephrostogram and right nephrostomy tube placement. Findings:   Given complete dislodgment of previously placed nephrostomy tube now nephrostomy tube catheter in the right collecting system was placed. Estimated blood Loss: Minimal    Specimen Removed:   No    Blood transfusions:  None. Implants: 8 [place] right PCN to gravity drainage.     Complications: None    Condition: Stable    Discharge Plan: Continue present therapy    Dolores Romo MD

## 2022-09-15 NOTE — PROGRESS NOTES
[facility] Hospitalists  Progress Note    Patient: Tatyana Molina Age: 71 y.o. : 1950 MR#: [id number] SSN: [id number]  Date: 9/15/2022     Subjective/24-hour events:     Patient lying in the bed, no new complaints. Denies any dizziness, no lightheadedness, no chest pain. 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 If it goes further down will transfuse  Patient hemodynamically stable  Urology input noted  We will continue to hold subcu heparin  Continue metoprolol, hydralazine and monitor blood pressure  Continue IV fluids per nephrology  IR readjusted nephrostomy tube today, draining well. Continue XRT per radiation oncology  Continue routine catheter/PCN care. PT/OT as able/tolerated. Disposition still TBD. Lives alone - not able to return to previous environment at current level of physical function. Patient now agreeable to go to SNF. Also spoke to patient's son Oj Dillard, he wants patient to go to SNF. Updated patient and son in detail about current medical condition and prognosis. Last temperature 95.5, discussed with RN to repeat temperature.     Case discussed with:  [x]Patient  []Family  [x]Nursing  [x]Case Management  DVT Prophylaxis:  []Lovenox  []Hep SQ  [x]SCDs  []Coumadin   []On Heparin gtt    Objective:   VS: Visit Vitals  /81   Pulse 90   Temp (!) 95.5 °F (35.3 °C)   Resp 16   Ht 5' 3\" (1.6 m)   Wt 109.3 kg (241 lb)   SpO2 98%   Breastfeeding No   BMI 42.69 kg/m²      Tmax/24hrs: Temp (24hrs), Av.6 °F (36.4 °C), Min:95.5 °F (35.3 °C), Max:98.7 °F (37.1 °C)    Intake/Output Summary (Last 24 hours) at 9/15/2022 1600  Last data filed at 9/15/2022 0900  Gross per 24 hour   Intake 0 ml   Output 1155 ml   Net -1155 ml       General:  In NAD. Nontoxic-appearing. Cardiovascular:  Regular, mildly tachy. Pulmonary:  Lungs clear bilaterally, no wheezes. GI:  Abdomen soft, NTTP. Extremities:  Warm, no edema or ischemia. Neuro:  Awake and alert. Moves extremities spontaneously. Bloody urine Cotter catheter    Labs:    Recent Results (from the past 24 hour(s))   METABOLIC PANEL, BASIC    Collection Time: 09/15/22  7:45 AM   Result Value Ref Range    Sodium 140 136 - 145 mmol/L    Potassium 4.2 3.5 - 5.5 mmol/L    Chloride 112 (H) 100 - 111 mmol/L    CO2 20 (L) 21 - 32 mmol/L    Anion gap 8 3.0 - 18 mmol/L    Glucose 96 74 - 99 mg/dL    BUN 37 (H) 7.0 - 18 MG/DL    Creatinine 1.82 (H) 0.6 - 1.3 MG/DL    BUN/Creatinine ratio 20 12 - 20      GFR est AA 33 (L) >60 ml/min/1.73m2    GFR est non-AA 27 (L) >60 ml/min/1.73m2    Calcium 8.9 8.5 - 10.1 MG/DL   CBC WITH AUTOMATED DIFF    Collection Time: 09/15/22  7:45 AM   Result Value Ref Range    WBC 14.1 (H) 4.6 - 13.2 K/uL    RBC 2.57 (L) 4.20 - 5.30 M/uL    HGB 6.9 (L) 12.0 - 16.0 g/dL    HCT 20.4 (L) 35.0 - 45.0 %    MCV 79.4 78.0 - 100.0 FL    MCH 26.8 24.0 - 34.0 PG    MCHC 33.8 31.0 - 37.0 g/dL    RDW 20.6 (H) 11.6 - 14.5 %    PLATELET 885 372 - 383 K/uL    MPV 9.4 9.2 - 11.8 FL    NRBC 0.0 0  WBC    ABSOLUTE NRBC 0.00 0.00 - 0.01 K/uL    NEUTROPHILS 90 (H) 40 - 73 %    LYMPHOCYTES 4 (L) 21 - 52 %    MONOCYTES 4 3 - 10 %    EOSINOPHILS 0 0 - 5 %    BASOPHILS 0 0 - 2 %    IMMATURE GRANULOCYTES 1 (H) 0.0 - 0.5 %    ABS. NEUTROPHILS 12.8 (H) 1.8 - 8.0 K/UL    ABS. LYMPHOCYTES 0.6 (L) 0.9 - 3.6 K/UL    ABS. MONOCYTES 0.6 0.05 - 1.2 K/UL    ABS. EOSINOPHILS 0.1 0.0 - 0.4 K/UL    ABS. BASOPHILS 0.0 0.0 - 0.1 K/UL    ABS. IMM.  GRANS. 0.1 (H) 0.00 - 0.04 K/UL    DF AUTOMATED     GLUCOSE, POC    Collection Time: 09/15/22  8:15 AM   Result Value Ref Range    Glucose (POC) 105 70 - 110 mg/dL         Signed By: Vito Contreras MD     September 15, 2022

## 2022-09-15 NOTE — PROGRESS NOTES
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 Jacinto placed 9/2 with continued rise in Cr to 6.57  Cystoscopy 9/4 unable to identify ureteral orifices. Bilateral PCNs placed 9/4. S/p R PCN exchange on 9/15/22     - Enlargement of uterine mass, concerning for gynecologic malignancy              Neg UA 9/1/22              WBC 14.1<15.6              Afebrile, VSS     - WILBUR              Creat 1.82<2.00>1.90              Baseline 0.7 in 7/2022    - Gross Hematuria    Hgb 6.9<7.7>6.0<6.7 Hgb on presentation 8.4     PLAN:    Re- consulted due to concern regarding gross hematuria. Patient not sure if she is having vaginal bleeding? This may be source of gross hematuria in jacinto catheter. Jacinto catheter- yellow in tubing with some red debris, red in bag. Left PCN with clear, yellow urine. R PCN recently changed-  red tinged urine. Obtain repeat UA/UCX to rule out infection as source of gross hematuria. Could consider CTA if concerned for acute renal hemorrhage? Hgb has not precipitously dropped. Cxs neg  Continue Jacinto and bilateral PCNs. Recommend Jacinto removal prior to DC, once creat NADIRS and WBC improves. Will need PCN exchange every 3 months. PCN need to be flushed with 10 cc NS daily. Continue to hold all AC until urine clear for 24 hours. Pt to f/up outpatient to arrange PCN exchange in 3 months. Will arrange. Will follow.       Olivia Cotton PA-C  [facility]  Available M-Boogie Jeong  Pager: [phone]     Addendum:  Patient seen and evaluated independently. I have reviewed and agree with above assessment and plan. Will need Gyn/Onc evaluation of large uterine mass which is cause of ureteral obstructions. Alexandria Aponte MD, FACS  9/15/2022      Subjective:     Daily Progress Note: 9/15/2022 5:06 PM    Santos Navarro is doing ok. She says she is going to [facility] today. Objective:     Visit Vitals  /70 (BP 1 Location: Left upper arm, BP Patient Position: At rest)   Pulse 100   Temp 98 °F (36.7 °C)   Resp 18   Ht 5' 3\" (1.6 m)   Wt 109.3 kg (241 lb)   SpO2 95%   Breastfeeding No   BMI 42.69 kg/m²        Temp (24hrs), Av.4 °F (36.9 °C), Min:98 °F (36.7 °C), Max:98.7 °F (37.1 °C)      Intake and Output:   1901 - 09/15 0700  In: 395.4   Out: 1155 [Urine:1155]  No intake/output data recorded. Physical Exam:   General appearance: alert, cooperative, no distress, appears stated age  Abdomen: Nondistended, nontender. Bilateral PCNs in place- left-clear, yellow, right- red tinged. Cotter in place with clear urine, with some blood layering on bottom of tubing, red in bag. Data Review:    KUB 22:    Retracted and laterally displaced right PCN in comparison to fluoroscopic images  2022. The PCN remains within right upper quadrant. BARRETT 22: IMPRESSION     1. Mild right hydronephrosis, improved from prior. 2. Improved left hydronephrosis with residual pelviectasis.   3. Right renal cyst.    Recent Results (from the past 24 hour(s))   HGB & HCT    Collection Time: 22 12:50 PM   Result Value Ref Range    HGB 7.7 (L) 12.0 - 16.0 g/dL    HCT 22.8 (L) 35.0 - 48.3 %   METABOLIC PANEL, BASIC    Collection Time: 09/15/22  7:45 AM   Result Value Ref Range    Sodium 140 136 - 145 mmol/L    Potassium 4.2 3.5 - 5.5 mmol/L    Chloride 112 (H) 100 - 111 mmol/L    CO2 20 (L) 21 - 32 mmol/L    Anion gap 8 3.0 - 18 mmol/L    Glucose 96 74 - 99 mg/dL    BUN 37 (H) 7.0 - 18 MG/DL    Creatinine 1.82 (H) 0.6 - 1.3 MG/DL BUN/Creatinine ratio 20 12 - 20      GFR est AA 33 (L) >60 ml/min/1.73m2    GFR est non-AA 27 (L) >60 ml/min/1.73m2    Calcium 8.9 8.5 - 10.1 MG/DL   CBC WITH AUTOMATED DIFF    Collection Time: 09/15/22  7:45 AM   Result Value Ref Range    WBC 14.1 (H) 4.6 - 13.2 K/uL    RBC 2.57 (L) 4.20 - 5.30 M/uL    HGB 6.9 (L) 12.0 - 16.0 g/dL    HCT 20.4 (L) 35.0 - 45.0 %    MCV 79.4 78.0 - 100.0 FL    MCH 26.8 24.0 - 34.0 PG    MCHC 33.8 31.0 - 37.0 g/dL    RDW 20.6 (H) 11.6 - 14.5 %    PLATELET 963 074 - 649 K/uL    MPV 9.4 9.2 - 11.8 FL    NRBC 0.0 0  WBC    ABSOLUTE NRBC 0.00 0.00 - 0.01 K/uL    NEUTROPHILS 90 (H) 40 - 73 %    LYMPHOCYTES 4 (L) 21 - 52 %    MONOCYTES 4 3 - 10 %    EOSINOPHILS 0 0 - 5 %    BASOPHILS 0 0 - 2 %    IMMATURE GRANULOCYTES 1 (H) 0.0 - 0.5 %    ABS. NEUTROPHILS 12.8 (H) 1.8 - 8.0 K/UL    ABS. LYMPHOCYTES 0.6 (L) 0.9 - 3.6 K/UL    ABS. MONOCYTES 0.6 0.05 - 1.2 K/UL    ABS. EOSINOPHILS 0.1 0.0 - 0.4 K/UL    ABS. BASOPHILS 0.0 0.0 - 0.1 K/UL    ABS. IMM.  GRANS. 0.1 (H) 0.00 - 0.04 K/UL    DF AUTOMATED     GLUCOSE, POC    Collection Time: 09/15/22  8:15 AM   Result Value Ref Range    Glucose (POC) 105 70 - 110 mg/dL         Signed By:     Joe Miles MD   Urologic Oncologist  [facility]  [facility]  Office [phone] Ext [phone]                          September 15, 2022

## 2022-09-15 NOTE — PROGRESS NOTES
Discharge planning    Returned call from Sherri Coffey, son, concerning discharge plan. Per message, patient and son decided on SNF placement and wants to see if had accepting facilities. Left a voice message and waiting for a return call. CM will continue to assist with transition of care needs.      MARCY PerezN, RN  Pager # [phone]  Care Manager

## 2022-09-16 NOTE — PROGRESS NOTES
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 Jacinto placed 9/2 with continued rise in Cr to 6.57  Cystoscopy 9/4 unable to identify ureteral orifices. Bilateral PCNs placed 9/4. S/p R PCN exchange on 9/15/22     - Enlargement of uterine mass, concerning for gynecologic malignancy  Started palliativeXRT per rad/onc on Mon              Neg UA 9/1/22              WBC 14.1<15.6              Afebrile, VSS     - WILBUR              Creat 1.82<2.00>1.90              Baseline 0.7 in 7/2022    - Gross Hematuria 2/2 Radiation Cystitis? BARRETT 9/13: No clot    Hgb 6.9<7.7>6.0<6.7 Hgb on presentation 8.4     PLAN:    Re- consulted due to concern regarding gross hematuria. Patient started palliative radiation on Monday. 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 Recommend removal of jacinto catheter in AM since this can further irritate bladder. Left PCN with clear, yellow urine. R PCN recently changed-  dark, yellow urine. Obtain repeat UA/UCX to rule out infection as source of gross hematuria. Could consider CTA if concerned for acute renal hemorrhage? Hgb has not precipitously dropped. Cxs neg  Continue Jacinto and bilateral PCNs. Will need PCN exchange every 3 months. PCN need to be flushed with 10 cc NS daily. Continue to hold all AC until urine clear for 24 hours. Pt to f/up outpatient to arrange PCN exchange in 3 months. Will arrange. Recommend Gyn/Onc evaluation for large uterine mass. Will follow.       Sandra Ahumada PA-C  [facility]  Available M-Fri, Boogie Park[place]: [phone]       Addendum:  Patient seen and evaluated independently. I have reviewed and agree with above assessment and plan. Alexandria Aponte MD, FACS  2022        Subjective:     Daily Progress Note: 2022 5:06 PM    Santos Navarro is doing ok. She is going to radiation therapy today. Complains of abdominal pain. Objective:     Visit Vitals  /63 (BP 1 Location: Left lower arm, BP Patient Position: Semi fowlers; Lying)   Pulse (!) 110   Temp 98.6 °F (37 °C)   Resp 18   Ht 5' 3\" (1.6 m)   Wt 109.3 kg (241 lb)   SpO2 97%   Breastfeeding No   BMI 42.69 kg/m²        Temp (24hrs), Av.5 °F (36.4 °C), Min:95.5 °F (35.3 °C), Max:98.6 °F (37 °C)      Intake and Output:   1901 -  0700  In: 0   Out: 2147 [Urine:1355]  No intake/output data recorded. Physical Exam:   General appearance: alert, cooperative, no distress, appears stated age  Abdomen: Round, mass palpated above umbilicus, TTP. : Bilateral PCNs in place- left-clear, yellow, right- dark, yellow. Cotter in place with light red urine. Data Review:    KUB 22:    Retracted and laterally displaced right PCN in comparison to fluoroscopic images  2022. The PCN remains within right upper quadrant. BARRETT 22: IMPRESSION     1. Mild right hydronephrosis, improved from prior. 2. Improved left hydronephrosis with residual pelviectasis.   3. Right renal cyst.    Recent Results (from the past 24 hour(s))   GLUCOSE, POC    Collection Time: 22  9:40 AM   Result Value Ref Range    Glucose (POC) 123 (H) 70 - 110 mg/dL         Signed By:     Herman Nieto MD   Urologic Oncologist  [facility]  Professor of Urology  [facility]  Office [phone] Ext [phone]                          2022

## 2022-09-16 NOTE — ROUTINE PROCESS
OT re-evaluation attempted at 13:30 with pt declining stating she is in pain and wants to complete her scheduled radiation first. 14:17 pt now in radiation therapy. Will continue to follow as pt's schedule allows.  Thank you, Ramon Hall, OTRL

## 2022-09-16 NOTE — PROGRESS NOTES
In Patient Progress note      Admit Date: 9/1/2022    Impression:  1. Severe acute kidney injury due to obstructive uropathy. Patient had bilateral PCN.--> creat now stabilizing , had a prerenal component d/t   Poor intake and radiation induced insensible losses   Renal USG with improved hydro , IR was consulted to evaluate the drains   2. Anemia   3   Uterine mass with VELIZ , undergoing radiation   4. Mild hypokalemia -resolved   5. Mild Acidosis likely metabolic- resolved    S/p rt PCN repositioned yesterday  Draining well   No labs resulted today   Radiation daily , induced insensible losses     Plan  1) encourage po intake   2) gentle hydration , follow labs   3) onco/radiation following   4) recheck renal functions in AM     Please call with questions,    Jason Gamez MD FASN  Cell [phone]  Pager: [phone]     Subjective:     - s/p PCN repositioned  - respiratory - stable  - hemodynamics - stable, no pressrs  - UOP-improved   - Nutrition -poor    Objective:     Visit Vitals  /75 (BP 1 Location: Left upper arm, BP Patient Position: Semi fowlers; Lying)   Pulse (!) 103   Temp 98.8 °F (37.1 °C)   Resp 18   Ht 5' 3\" (1.6 m)   Wt 109.3 kg (241 lb)   SpO2 98%   Breastfeeding No   BMI 42.69 kg/m²         Intake/Output Summary (Last 24 hours) at 9/16/2022 1243  Last data filed at 9/16/2022 0600  Gross per 24 hour   Intake --   Output 800 ml   Net -800 ml         Physical Exam:     General: NAD, alert and oriented. Neck: No jvd. LUNGS: Clear to Auscultation, No rales, rhonchi or wheezes. CVS EXM: S1, S2  RRR No rub. Abdomen: soft, non tender.    Lower Extremities:  trace Edema     Data Review:    Recent Labs     09/16/22  1152   WBC 11.8   RBC 2.55*   HCT 20.5*   MCV 80.4   MCH 27.1   MCHC 33.7   RDW 20.9*       Recent Labs     09/15/22  0745 09/14/22  0520   BUN 37* 38*   CREA 1.82* 2.00*   CA 8.9 8.9   K 4.2 4.2    141   * 111   CO2 20* 20*   GLU 96 88         Francisco Manrique, MD

## 2022-09-16 NOTE — PROGRESS NOTES
Bedside and Verbal shift change report given to [address] [place] [address]  (oncoming nurse) by Matti Marquez RN   (offgoing nurse). Report included the following information SBAR, Intake/Output, MAR, and Recent Results.

## 2022-09-16 NOTE — ROUTINE PROCESS
Bedside shift change report given to Brendan Claude, RN (oncoming nurse) by Edgar Verdin RN (offgoing nurse). Report included the following information SBAR, Kardex, Intake/Output, and Recent Results.

## 2022-09-16 NOTE — PROGRESS NOTES
Physician Progress Note      Vishal Tristan  CSN #:                  [id number]  :                       1950  ADMIT DATE:       2022 2:35 PM  DISCH DATE:  RESPONDING  PROVIDER #:        Aodlph Serrano MD          QUERY TEXT:    Pt admitted with WILBUR due to obstructive uropathy. Noted documentation of Moderate malnutrition on  by ordered RD consultant.   If possible, please document in progress notes and discharge summary:    The medical record reflects the following:  Risk Factors:  72 yo female with uterine mass, Uropathy obstruction    Clinical Indicators:  RD consult note Malnutrition Status: Mild malnutrition Context: Acute illness  Energy Intake:  75% or less of est energy req for 7 or more days    Treatment: RD consult, Nutrtional supplements, monitor oral intake        Thank you for your time,    Lainey VIDALN, RN, [address] [place] [address] [address]  C: [phone]    [email or web address]  Options provided:  -- Respond - Create new note now  -- Disagree - Not applicable / Not valid  -- Disagree - Clinically unable to determine / Unknown  -- Refer to Clinical Documentation Reviewer    PROVIDER RESPONSE TEXT:    Mild malnutrition    Query created by: Sid Mortensen on 2022 5:00 PM      Electronically signed by:  Adolph Serrano MD 2022 5:34 PM

## 2022-09-16 NOTE — PROGRESS NOTES
1620- CM attempted to make contact with pt's son, Zaire Victor, to discuss pt's transitional plan of care. CM did not receive a response. CM left voicemail message with CM contact information. 2970- CM received contact from pt's son, Zaire Victor, to discuss pt's transitional plan of care. Pt's son stated that he will provide transportation for pt to and from radiation treatment. Pt's son stated that he will consent to pt going to [facility] 210 made [facility]'s [facility] plan of care to [facility]. Araceli Medina with [facility] facilities accepted pt for SNF at [address].             ANGELINA Reyna  Care Manager

## 2022-09-16 NOTE — ROUTINE PROCESS
Wound Prevention Checklist    Patient: Olivia Stoddard (32 y.o. female)  Date: 9/16/2022  Diagnosis: Bladder outlet obstruction [N32.0]  WILBUR (acute kidney injury) ([facility] [N17.9]  Uterine mass [N85.8] <principal problem not specified>    Precautions: Fall, Skin       []  Heel prevention boots placed on patient    []  Patient turned q2h during shift    []  Lift team ordered    [x]  Patient on [facility] bed/Specialty bed    []  Each Wound is documented during shift (Stage, Color, drainage, odor, measurements, and dressings)    [x]  Dual skin checks done at bedside during shift report with AUNDREA Graham RN

## 2022-09-16 NOTE — PROGRESS NOTES
[facility] Hospital[facility]  Progress Note    Patient: Lynette Galindo Age: 71 y.o. : 1950 MR#: [id number] SSN: [id number]  Date: 2022     Subjective/24-hour events:     Patient lying in the bed, no new complaints. Denies any dizziness, no lightheadedness, no chest pain. 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 Urology to see and make further recommendation  Patient hemodynamically stable  We will continue to hold subcu heparin  Continue metoprolol, hydralazine and monitor blood pressure  Continue IV fluids per nephrology  Bilateral nephrostomy tube draining well, right slightly bloody  Continue XRT per radiation oncology  Continue routine catheter/PCN care. PT/OT as able/tolerated. Disposition: SNF, currently awaiting placement    Discussed with patient at bedside, agrees with SNF placement. Discussed with case management, currently awaiting bed availability    Case discussed with:  [x]Patient  []Family  [x]Nursing  [x]Case Management  DVT Prophylaxis:  []Lovenox  []Hep SQ  [x]SCDs  []Coumadin   []On Heparin gtt    Objective:   VS: Visit Vitals  /75 (BP 1 Location: Left upper arm, BP Patient Position: Semi fowlers; Lying)   Pulse (!) 103   Temp 98.8 °F (37.1 °C)   Resp 18   Ht 5' 3\" (1.6 m)   Wt 109.3 kg (241 lb)   SpO2 98%   Breastfeeding No   BMI 42.69 kg/m²      Tmax/24hrs: Temp (24hrs), Av.7 °F (36.5 °C), Min:95.5 °F (35.3 °C), Max:98.8 °F (37.1 °C)    Intake/Output Summary (Last 24 hours) at 9/16/2022 1326  Last data filed at 9/16/2022 0600  Gross per 24 hour   Intake --   Output 800 ml   Net -800 ml       General:  In NAD. Nontoxic-appearing. Cardiovascular:  Regular, mildly tachy. Pulmonary:  Lungs clear bilaterally, no wheezes. GI:  Abdomen soft, NTTP. Extremities:  Warm, no edema or ischemia. Neuro:  Awake and alert. Moves extremities spontaneously. Bloody urine Cotter catheter    Labs:    Recent Results (from the past 24 hour(s))   GLUCOSE, POC    Collection Time: 09/16/22  9:40 AM   Result Value Ref Range    Glucose (POC) 123 (H) 70 - 562 mg/dL   METABOLIC PANEL, BASIC    Collection Time: 09/16/22 11:52 AM   Result Value Ref Range    Sodium 140 136 - 145 mmol/L    Potassium 4.3 3.5 - 5.5 mmol/L    Chloride 110 100 - 111 mmol/L    CO2 23 21 - 32 mmol/L    Anion gap 7 3.0 - 18 mmol/L    Glucose 112 (H) 74 - 99 mg/dL    BUN 43 (H) 7.0 - 18 MG/DL    Creatinine 1.59 (H) 0.6 - 1.3 MG/DL    BUN/Creatinine ratio 27 (H) 12 - 20      GFR est AA 39 (L) >60 ml/min/1.73m2    GFR est non-AA 32 (L) >60 ml/min/1.73m2    Calcium 9.3 8.5 - 10.1 MG/DL   CBC WITH AUTOMATED DIFF    Collection Time: 09/16/22 11:52 AM   Result Value Ref Range    WBC 11.8 4.6 - 13.2 K/uL    RBC 2.55 (L) 4.20 - 5.30 M/uL    HGB 6.9 (L) 12.0 - 16.0 g/dL    HCT 20.5 (L) 35.0 - 45.0 %    MCV 80.4 78.0 - 100.0 FL    MCH 27.1 24.0 - 34.0 PG    MCHC 33.7 31.0 - 37.0 g/dL    RDW 20.9 (H) 11.6 - 14.5 %    PLATELET 860 896 - 982 K/uL    MPV 9.4 9.2 - 11.8 FL    NRBC 0.0 0  WBC    ABSOLUTE NRBC 0.00 0.00 - 0.01 K/uL    NEUTROPHILS 88 (H) 40 - 73 %    LYMPHOCYTES 4 (L) 21 - 52 %    MONOCYTES 5 3 - 10 %    EOSINOPHILS 0 0 - 5 %    BASOPHILS 0 0 - 2 %    IMMATURE GRANULOCYTES 2 (H) 0.0 - 0.5 %    ABS. NEUTROPHILS 10.4 (H) 1.8 - 8.0 K/UL    ABS. LYMPHOCYTES 0.5 (L) 0.9 - 3.6 K/UL    ABS. MONOCYTES 0.6 0.05 - 1.2 K/UL    ABS. EOSINOPHILS 0.1 0.0 - 0.4 K/UL    ABS. BASOPHILS 0.0 0.0 - 0.1 K/UL    ABS. IMM.  GRANS. 0.3 (H) 0.00 - 0.04 K/UL DF AUTOMATED           Signed By: Emily Randle MD     September 16, 2022

## 2022-09-16 NOTE — PROGRESS NOTES
Problem: Pressure Injury - Risk of  Goal: *Prevention of pressure injury  Description: Document Geoffrey Scale and appropriate interventions in the flowsheet. Outcome: Progressing Towards Goal  Note: Pressure Injury Interventions:  Sensory Interventions: Assess changes in LOC, Check visual cues for pain    Moisture Interventions: Absorbent underpads    Activity Interventions: Pressure redistribution bed/mattress(bed type)    Mobility Interventions: Pressure redistribution bed/mattress (bed type)    Nutrition Interventions: Document food/fluid/supplement intake    Friction and Shear Interventions: Minimize layers                Problem: Patient Education: Go to Patient Education Activity  Goal: Patient/Family Education  Outcome: Progressing Towards Goal     Problem: Falls - Risk of  Goal: *Absence of Falls  Description: Document Joan Fall Risk and appropriate interventions in the flowsheet. Outcome: Progressing Towards Goal  Note: Fall Risk Interventions:  Mobility Interventions: Bed/chair exit alarm         Medication Interventions: Bed/chair exit alarm    Elimination Interventions: Call light in reach, Patient to call for help with toileting needs              Problem: Patient Education: Go to Patient Education Activity  Goal: Patient/Family Education  Outcome: Progressing Towards Goal     Problem: Pain  Goal: *Control of Pain  Outcome: Progressing Towards Goal  Goal: *PALLIATIVE CARE:  Alleviation of Pain  Outcome: Progressing Towards Goal     Problem: Patient Education: Go to Patient Education Activity  Goal: Patient/Family Education  Outcome: Progressing Towards Goal     Problem: Infection - Risk of, Urinary Catheter-Associated Urinary Tract Infection  Goal: *Absence of infection signs and symptoms  Outcome: Progressing Towards Goal     Problem: Patient Education: Go to Patient Education Activity  Goal: Patient/Family Education  Outcome: Progressing Towards Goal     Problem:  Activity Intolerance  Goal: *Oxygen saturation during activity within specified parameters  Outcome: Progressing Towards Goal  Goal: *Able to remain out of bed as prescribed  Outcome: Progressing Towards Goal     Problem: Patient Education: Go to Patient Education Activity  Goal: Patient/Family Education  Outcome: Progressing Towards Goal

## 2022-09-16 NOTE — PROGRESS NOTES
Comprehensive Nutrition Assessment    Type and Reason for Visit: Reassess, Wound    Nutrition Recommendations/Plan:   Continue current diet and oral nutrition supplements: Gelatein 20 (each provides 80 kcal, 20g protein) BID  Continue daily MVI      Malnutrition Assessment:  Malnutrition Status: Mild malnutrition  Context: Acute illness  Findings of clinical characteristics of malnutrition:   Energy Intake:  75% or less of est energy req for 7 or more days  Weight Loss:  No significant weight loss     Body Fat Loss:  No significant body fat loss,     Muscle Mass Loss:  No significant muscle mass loss,    Fluid Accumulation:  No significant fluid accumulation,     Strength:  Not performed      Nutrition Assessment:    PO documentation 9/10 was poor (1-25% meals consumed), no documentation in chart since. Per patient, meal intake and appetite remains about the same, consuming about 25-50% of meals. Likes the [facility] and is consuming 50% of it. Noted possible SNF placement on discharge. Bilateral PCN placement  on 9/4/22, became dislodged and was readjusted 9/15/22 -  NPO for procedure most of the day. Nutrition Related Findings:    Last BM 09/15/22 Labs reviewed - Hgb trending down. Pertinent Meds: pepcid, bio-k plus, MVI, periocolace, sodium bicarb. Wound Type: Pressure injury, Stage II    Current Nutrition Intake & Therapies:  Average Meal Intake: 26-50%  Average Supplement Intake: 26-50%  ADULT ORAL NUTRITION SUPPLEMENT Breakfast, Dinner; Other Supplement; Gelatein  ADULT DIET Regular; NO spicy food, tea, milk, ice cream, pudding. Pt lactose intolerant. LIKES Mixed Fruit cup. Please offer additional seasonings to pt. Anthropometric Measures:  Height: 5' 3\" (160 cm)  Ideal Body Weight (IBW): 115 lbs (52 kg)  Admission Body Weight: 220 lb 0.3 oz  Current Body Wt:  106.4 kg (234 lb 9.1 oz), 204 % IBW.  Bed scale  Current BMI (kg/m2): 41.6  Usual Body Weight: 101.2 kg (223 lb)  % Weight Change (Calculated): 5.2  Weight Adjustment: No adjustment                 BMI Category: Obese class 3 (BMI 40.0 or greater)    Estimated Daily Nutrient Needs:  Energy Requirements Based On: Kcal/kg (wt x20-22)  Weight Used for Energy Requirements: Current  Energy (kcal/day): 7459-3031  Weight Used for Protein Requirements: Current  Protein (g/day):  (wt x0.8-1.1)  Method Used for Fluid Requirements: Standard renal  Fluid (ml/day): 500 mL + total output (pt with impaired renal function)    Nutrition Diagnosis:   Inadequate oral intake related to early satiety as evidenced by intake 26-50%  Increased nutrient needs related to increased demand for energy/nutrients as evidenced by wounds    Nutrition Interventions:   Food and/or Nutrient Delivery: Continue current diet, Continue oral nutrition supplement  Nutrition Education/Counseling: Education not indicated  Coordination of Nutrition Care: Continue to monitor while inpatient  Plan of Care discussed with: pt, MD, RN, and Foodservice    Goals:  Previous Goal Met: Progressing toward goal(s)  Goals: Meet at least 75% of estimated needs, by next RD assessment       Nutrition Monitoring and Evaluation:   Behavioral-Environmental Outcomes: None identified  Food/Nutrient Intake Outcomes: Food and nutrient intake, Supplement intake, Vitamin/mineral intake  Physical Signs/Symptoms Outcomes: Biochemical data, Skin, Meal time behavior    Discharge Planning:    Continue oral nutrition supplement, Continue current diet    Kimberly Rubio RD  Contact: [phone]

## 2022-09-16 NOTE — PROGRESS NOTES
Problem: Mobility Impaired (Adult and Pediatric)  Goal: *Acute Goals and Plan of Care (Insert Text)  Description: Physical Therapy Goals  Initiated 9/2/2022, reviewed and continued 9/9/2022 and to be accomplished within 7 day(s), re-evaluated 9/16/22  1. Patient will move from supine to sit and sit to supine  in bed with supervision/set-up. 2.  Patient will transfer from bed to chair and chair to bed with supervision/set-up using the least restrictive device. 3.  Patient will perform sit to stand with supervision/set-up. 4.  Patient will ambulate with supervision/set-up for 100 feet with the least restrictive device. 5.  Patient will ascend/descend 3 stairs with 1 handrail(s) with minimal assistance/contact guard assist.    PLOF: independent with mobility without an assistive device, lives alone     Outcome: Progressing Towards Goal     PHYSICAL THERAPY RE-EVALUATION    Patient: Jayleen Sims (60 y.o. female)  Date: 9/16/2022  Primary Diagnosis: Bladder outlet obstruction [N32.0]  WILBUR (acute kidney injury) ([address] [N17.9]  Uterine mass [N85.8]  Procedure(s) (LRB):  CYSTOSCOPY, insertion of jacinto catheter (N/A) 12 Days Post-Op   Precautions:   Fall, Skin  PLOF: see above     ASSESSMENT :  Based on the objective data described below, the patient presents with generalized weakness, impaired functional mobility, decreased activity tolerance and increased pain. Pt with generally decreased AROM and strength to BLE. Session limited to bed level and pt reporting increased pain and fatigue as she just got cleaned up and is awaiting her radiation appt this date. Pt encouraged pressure relief and wedged to the L with pillow. Pt left in bed with all needs met, will continue to recommend rehab for placement at discharge. Patient will benefit from skilled intervention to address the above impairments.   Patient's rehabilitation potential is considered to be Good  Factors which may influence rehabilitation potential include:   []         None noted  []         Mental ability/status  [x]         Medical condition  [x]         Home/family situation and support systems  []         Safety awareness  []         Pain tolerance/management  []         Other:      PLAN :  Recommendations and Planned Interventions:   [x]           Bed Mobility Training             [x]    Neuromuscular Re-Education  [x]           Transfer Training                   []    Orthotic/Prosthetic Training  [x]           Gait Training                          []    Modalities  [x]           Therapeutic Exercises           []    Edema Management/Control  [x]           Therapeutic Activities            [x]    Family Training/Education  [x]           Patient Education  []           Other (comment):    Frequency/Duration: Patient will be followed by physical therapy 1-2 times per day/4-7 days per week to address goals. Further Equipment Recommendations for Discharge: bedside commode, rolling walker, and wheelchair     AMPAC: Based on an AM-PAC score of 16/24 (**/20 if omitting stairs) and their current functional mobility deficits, it is recommended that the patient have 3-5 sessions per week of Physical Therapy at d/c to increase the patient's independence. This AMPAC score should be considered in conjunction with interdisciplinary team recommendations to determine the most appropriate discharge setting. Patient's social support, diagnosis, medical stability, and prior level of function should also be taken into consideration. SUBJECTIVE:   Patient stated I just laid back down .     OBJECTIVE DATA SUMMARY:   Hospital course since last seen and reason for re-evaluation: Pt making slow but steady progress towards goals, pt limited by pain, has been receiving daily radiation treatments this week  Past Medical History:   Diagnosis Date    Class 2 obesity due to excess calories without serious comorbidity in adult     HTN (hypertension)     Uterine mass 09/01/2022    Vitamin D deficiency      Past Surgical History:   Procedure Laterality Date    HX CATARACT REMOVAL N/A      Barriers to Learning/Limitations: yes;  physical  Compensate with: Visual Cues, Verbal Cues, and Tactile Cues  Home Situation:   Home Situation  Home Environment: Private residence  One/Two Story Residence: One story  Living Alone: Yes  Support Systems: Child(garfield)  Patient Expects to be Discharged to[de-identified] Unable to determine at this time  Current DME Used/Available at Home: Wheelchair, Walker  Tub or Shower Type: Tub/Shower combination  Critical Behavior:                Strength:    Strength: Generally decreased, functional             Tone & Sensation:   Tone: Normal    Sensation: Intact        Range Of Motion:  AROM: Generally decreased, functional    Functional Mobility:  Bed Mobility:  Rolling: Minimum assistance        Scooting: Maximum assistance;Assist x2  Transfers:  Pt deferred this date  Pain:  Pain level pre-treatment: 8/10   Pain level post-treatment: 8/10   Pain Intervention(s) : Medication (see MAR); Rest, Ice, Repositioning   Response to intervention: Nurse notified    Activity Tolerance:   Fair    Please refer to the flowsheet for vital signs taken during this treatment. After treatment:   []         Patient left in no apparent distress sitting up in chair  [x]         Patient left in no apparent distress in bed  [x]         Call bell left within reach  [x]         Nursing notified  []         Caregiver present  [x]         Bed alarm activated  []         SCDs applied    COMMUNICATION/EDUCATION:   [x]         Role of Physical Therapy in the acute care setting. [x]         Fall prevention education was provided and the patient/caregiver indicated understanding. [x]         Patient/family have participated as able in goal setting and plan of care. [x]         Patient/family agree to work toward stated goals and plan of care.   []         Patient understands intent and goals of therapy, but is neutral about his/her participation. []         Patient is unable to participate in goal setting/plan of care: ongoing with therapy staff.  []         Other: Thank you for this referral.  Sia Weeks   Time Calculation: 8 mins    Rajat Wheat AM-PAC® Basic Mobility Inpatient Short Form (6-Clicks) Version 2    How much HELP from another person does the patient currently need    (If the patient hasn't done an activity recently, how much help from another person do you think he/she would need if he/she tried?)   Total (Total A or Dep)   A Lot  (Mod to Max A)   A Little (Sup or Min A)   None (Mod I to I)   Turning from your back to your side while in a flat bed without using bedrails? [] 1 [] 2 [x] 3 [] 4   2. Moving from lying on your back to sitting on the side of a flat bed without using bedrails? [] 1 [x] 2 [] 3 [] 4   3. Moving to and from a bed to a chair (including a wheelchair)? [] 1 [] 2 [x] 3 [] 4   4. Standing up from a chair using your arms (e.g., wheelchair, or bedside chair)? [] 1 [] 2 [x] 3 [] 4   5. Walking in hospital room? [] 1 [] 2 [x] 3 [] 4   6. Climbing 3-5 steps with a railing?+   [] 1 [x] 2 [] 3 [] 4   +If stair climbing cannot be assessed, skip item #6. Sum responses from items 1-5. Based on an AM-PAC score of 16/24 (**/20 if omitting stairs) and their current functional mobility deficits, it is recommended that the patient have 3-5 sessions per week of Physical Therapy at d/c to increase the patient's independence.

## 2022-09-17 NOTE — CONSULTS
Nutrition Note    Consult noted for nutrition management. RD already following, please see initial eval from 9/16. Pt eating breakfast at time of visit. States not having a big appetite but does try to consume the Gelatein. Nutrition Recommendations/Plan:   Continue current diet and oral supplements. Encourage PO intake. Continue daily multivitamin.       Electronically signed by Kati Soares RD on 9/17/2022 at 11:10 AM    Contact: [phone]

## 2022-09-17 NOTE — PROGRESS NOTES
[facility]  Progress Note    Patient: Eliu Jolly Age: 71 y.o. : 1950 MR#: [id number] SSN: [id number]  Date: 2022     Subjective/24-hour events:     Transfused 1 unit PRBC with appropriate increase in hematocrit. Patient seen and examined, she denies chest pain. Denies itching or shortness of breath w/ transfusion. Had BM early this am. Appetite okay, getting ready to eat some breakfast. States her son is working on a place for her to go. Jacinto not draining. PCN draining slightly blood tinged urine. Assessment:   Obstructive uropathy with bilateral hydronephrosis status post bilateral PCN placement 2022  Hematuria  Acute blood loss anemia  WILBUR secondary to obstructive uropathy  Iron deficiency anemia  Uterine mass, suspect neoplastic  Hypokalemia  Hyponatremia, improved  Morbid obesity, BMI 41.54    Plan:   Hemoglobin trending down, transfused 1 unit of blood  hx radiation cystitis  Removal of jacinto per Urology. Patient hemodynamically stable  continue to hold subcu heparin  Continue metoprolol, hydralazine and monitor blood pressure  Continue IV fluids per nephrology  Bilateral nephrostomy tube draining well, right slightly bloody  Continue XRT per radiation oncology  Continue routine catheter/PCN care. PT/OT as tolerated. Disposition: SNF, currently awaiting placement    Discussed with patient and nsg at bedside.     Case discussed with:  [x]Patient  []Family  [x]Nursing  []Case Management  DVT Prophylaxis:  []Lovenox  []Hep SQ  [x]SCDs  []Coumadin   []On Heparin gtt    Objective:   VS: Visit Vitals  /81   Pulse (!) 104   Temp 98.4 °F (36.9 °C)   Resp 20   Ht 5' 3\" (1.6 m)   Wt 109.2 kg (240 lb 11.2 oz)   SpO2 98%   Breastfeeding No   BMI 42.64 kg/m²        Tmax/24hrs: Temp (24hrs), Av.1 °F (36.7 °C), Min:97.7 °F (36.5 °C), Max:98.8 °F (37.1 °C)    Intake/Output Summary (Last 24 hours) at 2022 1007  Last data filed at 9/17/2022 0628  Gross per 24 hour   Intake 895.83 ml   Output 1550 ml   Net -654.17 ml         General:  In NAD. Nontoxic-appearing. Heent: ncat. Perrl. Oropharynx clear. Cardiovascular: tachycardic w/ regular rhythm. Pulmonary:  Lungs clear bilaterally, no wheezes. GI:  Abdomen soft, NTTP nd   PCN intact, draining slightly blood tinged urine. Cotter catheter no drainage. Extremities:  Warm, no edema or ischemia. Neuro:  Awake and alert. Moves extremities spontaneously. Skin: excoriations under right breast, no erythema or drainage. Labs:    Recent Results (from the past 24 hour(s))   METABOLIC PANEL, BASIC    Collection Time: 09/16/22 11:52 AM   Result Value Ref Range    Sodium 140 136 - 145 mmol/L    Potassium 4.3 3.5 - 5.5 mmol/L    Chloride 110 100 - 111 mmol/L    CO2 23 21 - 32 mmol/L    Anion gap 7 3.0 - 18 mmol/L    Glucose 112 (H) 74 - 99 mg/dL    BUN 43 (H) 7.0 - 18 MG/DL    Creatinine 1.59 (H) 0.6 - 1.3 MG/DL    BUN/Creatinine ratio 27 (H) 12 - 20      GFR est AA 39 (L) >60 ml/min/1.73m2    GFR est non-AA 32 (L) >60 ml/min/1.73m2    Calcium 9.3 8.5 - 10.1 MG/DL   CBC WITH AUTOMATED DIFF    Collection Time: 09/16/22 11:52 AM   Result Value Ref Range    WBC 11.8 4.6 - 13.2 K/uL    RBC 2.55 (L) 4.20 - 5.30 M/uL    HGB 6.9 (L) 12.0 - 16.0 g/dL    HCT 20.5 (L) 35.0 - 45.0 %    MCV 80.4 78.0 - 100.0 FL    MCH 27.1 24.0 - 34.0 PG    MCHC 33.7 31.0 - 37.0 g/dL    RDW 20.9 (H) 11.6 - 14.5 %    PLATELET 781 234 - 600 K/uL    MPV 9.4 9.2 - 11.8 FL    NRBC 0.0 0  WBC    ABSOLUTE NRBC 0.00 0.00 - 0.01 K/uL    NEUTROPHILS 88 (H) 40 - 73 %    LYMPHOCYTES 4 (L) 21 - 52 %    MONOCYTES 5 3 - 10 %    EOSINOPHILS 0 0 - 5 %    BASOPHILS 0 0 - 2 %    IMMATURE GRANULOCYTES 2 (H) 0.0 - 0.5 %    ABS. NEUTROPHILS 10.4 (H) 1.8 - 8.0 K/UL    ABS. LYMPHOCYTES 0.5 (L) 0.9 - 3.6 K/UL    ABS. MONOCYTES 0.6 0.05 - 1.2 K/UL    ABS. EOSINOPHILS 0.1 0.0 - 0.4 K/UL    ABS. BASOPHILS 0.0 0.0 - 0.1 K/UL    ABS. IMM. GRANS. 0.3 (H) 0.00 - 0.04 K/UL    DF AUTOMATED     RBC, ALLOCATE    Collection Time: 09/16/22  1:30 PM   Result Value Ref Range    HISTORY CHECKED?  Historical check performed    METABOLIC PANEL, BASIC    Collection Time: 09/17/22  3:14 AM   Result Value Ref Range    Sodium 141 136 - 145 mmol/L    Potassium 4.3 3.5 - 5.5 mmol/L    Chloride 112 (H) 100 - 111 mmol/L    CO2 24 21 - 32 mmol/L    Anion gap 5 3.0 - 18 mmol/L    Glucose 108 (H) 74 - 99 mg/dL    BUN 47 (H) 7.0 - 18 MG/DL    Creatinine 1.66 (H) 0.6 - 1.3 MG/DL    BUN/Creatinine ratio 28 (H) 12 - 20      GFR est AA 37 (L) >60 ml/min/1.73m2    GFR est non-AA 30 (L) >60 ml/min/1.73m2    Calcium 9.0 8.5 - 10.1 MG/DL   HGB & HCT    Collection Time: 09/17/22  3:14 AM   Result Value Ref Range    HGB 7.8 (L) 12.0 - 16.0 g/dL    HCT 23.3 (L) 35.0 - 45.0 %         Signed By: Anne Marie Rivera MD     September 17, 2022

## 2022-09-17 NOTE — PROGRESS NOTES
Problem: Pressure Injury - Risk of  Goal: *Prevention of pressure injury  Description: Document Geoffrey Scale and appropriate interventions in the flowsheet. Outcome: Progressing Towards Goal  Note: Pressure Injury Interventions:  Sensory Interventions: Assess changes in LOC    Moisture Interventions: Absorbent underpads, Check for incontinence Q2 hours and as needed    Activity Interventions: Pressure redistribution bed/mattress(bed type)    Mobility Interventions: Pressure redistribution bed/mattress (bed type), PT/OT evaluation    Nutrition Interventions: Document food/fluid/supplement intake    Friction and Shear Interventions: Apply protective barrier, creams and emollients, HOB 30 degrees or less, Minimize layers                Problem: Patient Education: Go to Patient Education Activity  Goal: Patient/Family Education  Outcome: Progressing Towards Goal     Problem: Falls - Risk of  Goal: *Absence of Falls  Description: Document Joan Fall Risk and appropriate interventions in the flowsheet.   Outcome: Progressing Towards Goal  Note: Fall Risk Interventions:  Mobility Interventions: Bed/chair exit alarm, PT Consult for mobility concerns, Utilize walker, cane, or other assistive device         Medication Interventions: Bed/chair exit alarm, Patient to call before getting OOB, Teach patient to arise slowly    Elimination Interventions: Bed/chair exit alarm, Call light in reach, Toileting schedule/hourly rounds              Problem: Patient Education: Go to Patient Education Activity  Goal: Patient/Family Education  Outcome: Progressing Towards Goal     Problem: Pain  Goal: *Control of Pain  Outcome: Progressing Towards Goal  Goal: *PALLIATIVE CARE:  Alleviation of Pain  Outcome: Progressing Towards Goal     Problem: Patient Education: Go to Patient Education Activity  Goal: Patient/Family Education  Outcome: Progressing Towards Goal     Problem: Infection - Risk of, Urinary Catheter-Associated Urinary Tract Infection  Goal: *Absence of infection signs and symptoms  Outcome: Progressing Towards Goal     Problem: Patient Education: Go to Patient Education Activity  Goal: Patient/Family Education  Outcome: Progressing Towards Goal     Problem:  Activity Intolerance  Goal: *Oxygen saturation during activity within specified parameters  Outcome: Progressing Towards Goal  Goal: *Able to remain out of bed as prescribed  Outcome: Progressing Towards Goal     Problem: Patient Education: Go to Patient Education Activity  Goal: Patient/Family Education  Outcome: Progressing Towards Goal     Problem: Patient Education: Go to Patient Education Activity  Goal: Patient/Family Education  Outcome: Progressing Towards Goal     Problem: Patient Education: Go to Patient Education Activity  Goal: Patient/Family Education  Outcome: Progressing Towards Goal     Problem: Nutrition Deficit  Goal: *Optimize nutritional status  Outcome: Progressing Towards Goal     Problem: Acute Renal Failure: Discharge Outcomes  Goal: *Optimal pain control at patient's stated goal  Outcome: Progressing Towards Goal  Goal: *Urinary output within identified parameters  Outcome: Progressing Towards Goal  Goal: *Hemodynamically stable  Outcome: Progressing Towards Goal  Goal: *Tolerating diet  Outcome: Progressing Towards Goal  Goal: *Lab values stabilized  Outcome: Progressing Towards Goal  Goal: *Verbalizes understanding and describes medication purposes and frequencies  Outcome: Progressing Towards Goal  Goal: *Medication reconciliation  Outcome: Progressing Towards Goal

## 2022-09-17 NOTE — PROGRESS NOTES
Problem: Pressure Injury - Risk of  Goal: *Prevention of pressure injury  Description: Document Geoffrey Scale and appropriate interventions in the flowsheet. Outcome: Progressing Towards Goal  Note: Pressure Injury Interventions:  Sensory Interventions: Assess changes in LOC, Chair cushion, Check visual cues for pain, Float heels, Keep linens dry and wrinkle-free, Minimize linen layers, Pressure redistribution bed/mattress (bed type), Turn and reposition approx. every two hours (pillows and wedges if needed)    Moisture Interventions: Absorbent underpads, Apply protective barrier, creams and emollients, Internal/External urinary devices, Check for incontinence Q2 hours and as needed    Activity Interventions: Increase time out of bed, Pressure redistribution bed/mattress(bed type), PT/OT evaluation    Mobility Interventions: Float heels, Pressure redistribution bed/mattress (bed type), HOB 30 degrees or less    Nutrition Interventions: Document food/fluid/supplement intake    Friction and Shear Interventions: Apply protective barrier, creams and emollients, Foam dressings/transparent film/skin sealants, HOB 30 degrees or less, Lift sheet, Minimize layers                Problem: Patient Education: Go to Patient Education Activity  Goal: Patient/Family Education  Outcome: Progressing Towards Goal     Problem: Falls - Risk of  Goal: *Absence of Falls  Description: Document Joan Fall Risk and appropriate interventions in the flowsheet.   Outcome: Progressing Towards Goal  Note: Fall Risk Interventions:  Mobility Interventions: Bed/chair exit alarm, Communicate number of staff needed for ambulation/transfer, Patient to call before getting OOB         Medication Interventions: Bed/chair exit alarm, Patient to call before getting OOB, Teach patient to arise slowly    Elimination Interventions: Bed/chair exit alarm, Call light in reach, Elevated toilet seat, Patient to call for help with toileting needs, Toilet paper/wipes in reach, Toileting schedule/hourly rounds              Problem: Patient Education: Go to Patient Education Activity  Goal: Patient/Family Education  Outcome: Progressing Towards Goal     Problem: Pain  Goal: *Control of Pain  Outcome: Progressing Towards Goal  Goal: *PALLIATIVE CARE:  Alleviation of Pain  Outcome: Progressing Towards Goal     Problem: Patient Education: Go to Patient Education Activity  Goal: Patient/Family Education  Outcome: Progressing Towards Goal     Problem: Infection - Risk of, Urinary Catheter-Associated Urinary Tract Infection  Goal: *Absence of infection signs and symptoms  Outcome: Progressing Towards Goal     Problem: Patient Education: Go to Patient Education Activity  Goal: Patient/Family Education  Outcome: Progressing Towards Goal     Problem:  Activity Intolerance  Goal: *Oxygen saturation during activity within specified parameters  Outcome: Progressing Towards Goal  Goal: *Able to remain out of bed as prescribed  Outcome: Progressing Towards Goal     Problem: Patient Education: Go to Patient Education Activity  Goal: Patient/Family Education  Outcome: Progressing Towards Goal     Problem: Nutrition Deficit  Goal: *Optimize nutritional status  Outcome: Progressing Towards Goal     Problem: Acute Renal Failure: Discharge Outcomes  Goal: *Optimal pain control at patient's stated goal  Outcome: Progressing Towards Goal  Goal: *Urinary output within identified parameters  Outcome: Progressing Towards Goal  Goal: *Hemodynamically stable  Outcome: Progressing Towards Goal  Goal: *Tolerating diet  Outcome: Progressing Towards Goal  Goal: *Lab values stabilized  Outcome: Progressing Towards Goal  Goal: *Verbalizes understanding and describes medication purposes and frequencies  Outcome: Progressing Towards Goal  Goal: *Medication reconciliation  Outcome: Progressing Towards Goal

## 2022-09-17 NOTE — PROGRESS NOTES
Bedside shift change report given to Nehemiah Feldman RN (oncoming nurse) by Ruthie Garcia RN (offgoing nurse). Report included the following information SBAR, Kardex, Intake/Output, MAR, and Quality Measures.

## 2022-09-17 NOTE — PROGRESS NOTES
RENAL PROGRESS NOTE        Jimena Lechuga         Assessment/Plan:     1. WILBUR: resolving on relief of obstructive uropathy. Patient had bilateral PCN. Poor intake and radiation induced insensible losses. S/p R PCN exchange 9/15/22  2. Anemia   3   Uterine mass with VELIZ , undergoing radiation   4. Mild hypokalemia -resolved   5. Mild Acidosis likely metabolic- resolved. Subjective:  Patient complaints of: No complaints. No SOB/CP/N/V.       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    Adrenal nodule (HCC) E27.8    Leukocytosis D72.829    Microcytic anemia D50.9    Hyponatremia E87.1    Uremia N19    Other hydronephrosis N13.39       Current Facility-Administered Medications   Medication Dose Route Frequency Provider Last Rate Last Admin    metoprolol tartrate (LOPRESSOR) tablet 25 mg  25 mg Oral BID Selene Santos MD   25 mg at 09/17/22 1016    0.9% sodium chloride infusion 250 mL  250 mL IntraVENous PRN Selene Santos MD        sodium bicarbonate tablet 650 mg  650 mg Oral BID Francisco Manrique MD   650 mg at 09/17/22 1016    0.9% sodium chloride infusion 250 mL  250 mL IntraVENous PRN Jose Juan Doll MD        oxyCODONE IR (ROXICODONE) tablet 5-10 mg  5-10 mg Oral Q4H PRN Robbin Mccarty MD   10 mg at 09/17/22 1077    morphine injection 2 mg  2 mg IntraVENous Q4H PRN Robbin Mccarty MD   2 mg at 09/16/22 0947    simethicone (MYLICON) tablet 80 mg  80 mg Oral QID PRN Robbin Mccarty MD   80 mg at 09/10/22 1243    L. acidophilus,casei,rhamnosus (BIO-K PLUS) capsule 1 Capsule  1 Capsule Oral DAILY Lauren Beck MD   1 Capsule at 09/17/22 1016    hydrALAZINE (APRESOLINE) tablet 25 mg  25 mg Oral TID Paty Olvera MD   25 mg at 09/17/22 1016    melatonin (rapid dissolve) tablet 5 mg  5 mg Oral QHS Shaila Roque MD   5 mg at 09/16/22 2251    multivitamin, tx-iron-ca-min (THERA-M w/ IRON) tablet 1 Tablet  1 Tablet Oral DAILY Paty Olvera MD   1 Tablet at 09/17/22 1016    senna-docusate (Kevon Saint) 8.6-50 mg per tablet 1 Tablet  1 Tablet Oral DAILY Paty Olvera MD   1 Tablet at 09/17/22 1016    sodium chloride (NS) flush 5-10 mL  5-10 mL IntraVENous PRN Shaila Roque MD        sodium chloride (NS) flush 5-40 mL  5-40 mL IntraVENous Q8H Manpreet Ryan MD   10 mL at 09/17/22 0612    sodium chloride (NS) flush 5-40 mL  5-40 mL IntraVENous PRN Shaila Roque MD        acetaminophen (TYLENOL) tablet 650 mg  650 mg Oral Q6H PRN Shaila Roque MD   650 mg at 09/12/22 1223    Or    acetaminophen (TYLENOL) suppository 650 mg  650 mg Rectal Q6H PRN Shaila Roque MD        polyethylene glycol (MIRALAX) packet 17 g  17 g Oral DAILY PRN Shaila Roque MD   17 g at 09/08/22 1733    promethazine (PHENERGAN) tablet 12.5 mg  12.5 mg Oral Q6H PRN Shaila Roque MD   12.5 mg at 09/02/22 0231    Or    ondansetron (ZOFRAN) injection 4 mg  4 mg IntraVENous Q6H PRN Shaila Roque MD        [facility] heparin (porcine) injection 5,000 Units  5,000 Units SubCUTAneous Q8H Shaila Roque MD   5,000 Units at 09/05/22 0616    famotidine (PEPCID) tablet 20 mg  20 mg Oral DAILY Shaila Roque MD   20 mg at 09/17/22 1016    nystatin (MYCOSTATIN) 100,000 unit/gram powder   Topical TID Shaila Roque MD   Given at 09/17/22 1017     Facility-Administered Medications Ordered in Other Encounters   Medication Dose Route Frequency Provider Last Rate Last Admin    naloxone (NARCAN) injection 0.2 mg  0.2 mg IntraVENous PRMICHAEL Seals MD        flumazeniL (ROMAZICON) 0.1 mg/mL injection 0.2 mg  0.2 mg IntraVENous PRN Marky Seals MD           Objective  Vitals:    09/17/22 0035 09/17/22 0314 09/17/22 0826 09/17/22 1228   BP:  (!) 144/78 135/81 123/73   Pulse:  (!) 103 (!) 104 93   Resp:  18 20 20   Temp:  97.9 °F (36.6 °C) 98.4 °F (36.9 °C) 98.3 °F (36.8 °C)   SpO2:  98% 98% 97%   Weight: 109.2 kg (240 lb 11.2 oz)      Height:             Intake/Output Summary (Last 24 hours) at 9/17/2022 1236  Last data filed at 9/17/2022 1215  Gross per 24 hour   Intake 895.83 ml   Output 1675 ml   Net -779.17 ml           Admission weight: Weight: 99.8 kg (220 lb) (09/01/22 1448)  Last Weight Metrics:  Weight Loss Metrics 9/17/2022 7/5/2022 10/10/2014   Today's Wt 240 lb 11.2 oz 223 lb 223 lb   BMI 42.64 kg/m2 37.11 kg/m2 37.11 kg/m2             Physical Assessment:     General: NAD, alert and oriented. Neck: No jvd. LUNGS: Clear to Auscultation, No rales, rhonchi or wheezes. CVS EXM: S1, S2  RRR, no murmurs/gallops/rubs. Abdomen: soft, non tender. Lower Extremities:  no edema.        Lab    CBC w/Diff Recent Labs     09/17/22 0314 09/16/22  1152 09/15/22  0745   WBC  --  11.8 14.1*   RBC  --  2.55* 2.57*   HGB 7.8* 6.9* 6.9*   HCT 23.3* 20.5* 20.4*   PLT  --  264 234   GRANS  --  88* 90*   LYMPH  --  4* 4*   EOS  --  0 0        Chemistry Recent Labs     09/17/22  0314 09/16/22  1152 09/15/22  0745   * 112* 96    140 140   K 4.3 4.3 4.2   * 110 112*   CO2 24 23 20*   BUN 47* 43* 37*   CREA 1.66* 1.59* 1.82*   CA 9.0 9.3 8.9   AGAP 5 7 8   BUCR 28* 27* 20         Lab Results   Component Value Date/Time    Iron 23 (L) 09/02/2022 03:19 AM    TIBC 176 (L) 09/02/2022 03:19 AM    Iron % saturation 13 (L) 09/02/2022 03:19 AM    Ferritin 823 (H) 09/02/2022 03:19 AM      Lab Results   Component Value Date/Time    Calcium 9.0 09/17/2022 03:14 AM    Phosphorus 3.3 09/08/2022 06:50 AM        Sharmin Steve MD  [facility]  Pager: [phone]

## 2022-09-17 NOTE — PROGRESS NOTES
Problem: Pressure Injury - Risk of  Goal: *Prevention of pressure injury  Description: Document Geoffrey Scale and appropriate interventions in the flowsheet. Outcome: Progressing Towards Goal  Note: Pressure Injury Interventions:  Sensory Interventions: Assess changes in LOC    Moisture Interventions: Absorbent underpads, Check for incontinence Q2 hours and as needed    Activity Interventions: Pressure redistribution bed/mattress(bed type)    Mobility Interventions: Pressure redistribution bed/mattress (bed type), PT/OT evaluation    Nutrition Interventions: Document food/fluid/supplement intake    Friction and Shear Interventions: Apply protective barrier, creams and emollients, HOB 30 degrees or less, Minimize layers                Problem: Patient Education: Go to Patient Education Activity  Goal: Patient/Family Education  Outcome: Progressing Towards Goal     Problem: Falls - Risk of  Goal: *Absence of Falls  Description: Document Joan Fall Risk and appropriate interventions in the flowsheet.   Outcome: Progressing Towards Goal  Note: Fall Risk Interventions:  Mobility Interventions: Bed/chair exit alarm, PT Consult for mobility concerns, Utilize walker, cane, or other assistive device         Medication Interventions: Bed/chair exit alarm, Patient to call before getting OOB, Teach patient to arise slowly    Elimination Interventions: Bed/chair exit alarm, Call light in reach, Toileting schedule/hourly rounds              Problem: Patient Education: Go to Patient Education Activity  Goal: Patient/Family Education  Outcome: Progressing Towards Goal     Problem: Pain  Goal: *Control of Pain  Outcome: Progressing Towards Goal  Goal: *PALLIATIVE CARE:  Alleviation of Pain  Outcome: Progressing Towards Goal     Problem: Patient Education: Go to Patient Education Activity  Goal: Patient/Family Education  Outcome: Progressing Towards Goal     Problem: Infection - Risk of, Urinary Catheter-Associated Urinary Tract Infection  Goal: *Absence of infection signs and symptoms  Outcome: Progressing Towards Goal     Problem: Patient Education: Go to Patient Education Activity  Goal: Patient/Family Education  Outcome: Progressing Towards Goal     Problem:  Activity Intolerance  Goal: *Oxygen saturation during activity within specified parameters  Outcome: Progressing Towards Goal  Goal: *Able to remain out of bed as prescribed  Outcome: Progressing Towards Goal     Problem: Patient Education: Go to Patient Education Activity  Goal: Patient/Family Education  Outcome: Progressing Towards Goal     Problem: Patient Education: Go to Patient Education Activity  Goal: Patient/Family Education  Outcome: Progressing Towards Goal     Problem: Patient Education: Go to Patient Education Activity  Goal: Patient/Family Education  Outcome: Progressing Towards Goal     Problem: Nutrition Deficit  Goal: *Optimize nutritional status  Outcome: Progressing Towards Goal

## 2022-09-17 NOTE — ROUTINE PROCESS
Bedside and Verbal shift change report given to AUNDREA Caban (oncoming nurse) by Komal Davis RN (offgoing nurse). Report included the following information SBAR, Kardex, MAR and Recent Results.     SITUATION:  Code Status: Full Code  Reason for Admission: Bladder outlet obstruction [N32.0]  WILBUR (acute kidney injury) ([facility] [address] [N17.9]  Uterine mass [N85.8]  Hospital day: 16  Problem List:       Hospital Problems  Never Reviewed            Codes Class Noted POA    Other hydronephrosis ICD-10-CM: N13.39  ICD-9-CM: 551  9/2/2022 Unknown        Uterine mass ICD-10-CM: N85.8  ICD-9-CM: 625.8  9/1/2022 Unknown        [facility] ICD-10-CM: N17.9  ICD-9-CM: 584.9  9/1/2022 Unknown        Bladder outlet obstruction ICD-10-CM: N32.0  ICD-9-CM: 596.0  9/1/2022 Unknown        Lobulation, spleen ICD-10-CM: Q89.09  ICD-9-CM: 759.0  9/1/2022 Unknown        [facility] ICD-10-CM: E27.8  ICD-9-CM: 255.8  9/1/2022 Unknown        Leukocytosis ICD-10-CM: D72.829  ICD-9-CM: 288.60  9/1/2022 Unknown        Microcytic anemia ICD-10-CM: D50.9  ICD-9-CM: 280.9  9/1/2022 Unknown        Hyponatremia ICD-10-CM: E87.1  ICD-9-CM: 276.1  9/1/2022 Unknown        Uremia ICD-10-CM: N19  ICD-9-CM: 407  9/1/2022 Unknown           BACKGROUND:   Past Medical History:   Past Medical History:   Diagnosis Date    Class 2 obesity due to excess calories without serious comorbidity in adult     HTN (hypertension)     Uterine mass 09/01/2022    Vitamin D deficiency       Patient taking anticoagulants no    Patient has a defibrillator: no    History of shots YES for example, flu, pneumonia, tetanus   Isolation History NO for example, MRSA, CDiff    ASSESSMENT:  Changes in Assessment Throughout Shift: NONE  Significant Changes in 24 hours (for example, RR/code, fall)  Patient has Central Line: yes Reasons if yes: Limited Vein access  Patient has Cotter Cath: yes Reasons if yes: Bladder Outlet Obstruction   Mobility Issues  PT  IV Patency  OR Checklist  Pending Tests    Last Vitals:  Vitals w/ MEWS Score (last day)       Date/Time MEWS Score Pulse Resp Temp BP Level of Consciousness SpO2    09/17/22 0314 2 103 18 97.9 °F (36.6 °C) 144/78 0 98 %    09/16/22 2247 1 99 18 97.9 °F (36.6 °C) 118/74 0 98 %    09/16/22 2034 2 101 18 97.7 °F (36.5 °C) 123/70 0 98 %    09/16/22 2017 3 103 20 97.7 °F (36.5 °C) 100/65 0 --    09/16/22 1620 3 115 18 98.4 °F (36.9 °C) 127/78 0 --    09/16/22 1235 3 103 18 98.8 °F (37.1 °C) 123/75 1.0 98 %    09/16/22 0935 2 110 18 98.6 °F (37 °C) 125/63 0 97 %    09/16/22 0400 2 108 18 98.3 °F (36.8 °C) 112/67 0 98 %    09/16/22 0000 2 107 18 97.3 °F (36.3 °C) 107/65 0 97 %          PAIN    Pain Assessment    Pain Intensity 1: 10 (09/17/22 0628)    Pain Location 1: Abdomen    Pain Intervention(s) 1: Medication (see MAR)    Patient Stated Pain Goal: 0  Intervention effective: yes  Time of last intervention: 0628 Reassessment Completed: yes   Other actions taken for pain: Distraction    Last 3 Weights:  Last 3 Recorded Weights in this Encounter    09/13/22 0903 09/14/22 2016 09/17/22 0035   Weight: 109.4 kg (241 lb 1.6 oz) 109.3 kg (241 lb) 109.2 kg (240 lb 11.2 oz)   Weight change:     INTAKE/OUPUT    Current Shift: No intake/output data recorded. Last three shifts: 09/15 1901 - 09/17 0700  In: 895.8 [P.O.:600]  Out: 1700 [Urine:1700]    RECOMMENDATIONS AND DISCHARGE PLANNING  Patient needs and requests: lAssistance with ADL's, Pain Management    Pending tests/procedures: labs     Discharge plan for patient: SNF    Discharge planning Needs or Barriers: Placement    Estimated Discharge Date: 9/18/2022 Posted on Whiteboard in Patients Room: yes       \"HEALS\" SAFETY CHECK  A safety check occurred in the patient's room between off going nurse and oncoming nurse listed above.     The safety check included the below items:    H  High Alert Medications Verify all high alert medication drips (heparin, PCA, etc.)  E  Equipment Suction is set up for ALL patients (with seamus)  Red plugs utilized for all equipment (IV pumps, etc.)  WOWs wiped down at end of shift. Room stocked with oxygen, suction, and other unit-specific supplies  A  Alarms Bed alarm is set for fall risk patients  Ensure chair alarm is in place and activated if patient is up in a chair  L  Lines Check IV for any infiltration  Cotter bag is empty if patient has a Cotter   Tubing and IV bags are labeled  S  Safety  Room is clean, patient is clean, and equipment is clean. Hallways are clear from equipment besides carts. Fall bracelet on for fall risk patients  Ensure room is clear and free of clutter  Suction is set up for ALL patients (with seamus)  Hallways are clear from equipment besides carts.    Isolation precautions followed, supplies available outside room, sign posted    Geno Coronel RN

## 2022-09-18 NOTE — PROGRESS NOTES
Hospitalist Progress Note    Patient: Jimena Lechuga Age: 71 y.o. : 1950 MR#: [id number] SSN: [id number]  Date/Time: 2022 3:34 PM    DOA: 2022  PCP: Kennedy Lopes MD    Subjective:     She expressed feeling better  She has her Cotter catheter withdrawal  Urinary output is good on her nephrostomy tube  H&H remained stable  She tolerated transfusion  She asked when she can go to SNF as she may not be able to go home safely. 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 Currently her renal function is stabilizing with her bilateral nephrostomy tube.   Continue maintaining nephrostomy tube, urology follows  Continue metoprolol, hydralazine, monitor  Appreciate nephrology follows  Pepcid  ICS  PT OT    Full code      Disposition planning: Likely need SNF in 1 to 2 days  Family updated (.  None.)  Additional Notes:    Time spent > 30 minutes    Case discussed with:  [x]Patient  []Family  [x]Nursing  []Case Management  DVT Prophylaxis:  []Lovenox  []Hep SQ  [x]SCDs  []Coumadin   []On Heparin gtt    Signed By: Dinorah Rangel MD       3:34 PM              Objective:   VS: Visit Vitals  /66 (BP 1 Location: Left upper arm, BP Patient Position: Semi fowlers; Lying)   Pulse 87   Temp 98.2 °F (36.8 °C)   Resp 20   Ht 5' 3\" (1.6 m)   Wt 108.9 kg (240 lb 1.6 oz)   SpO2 95%   Breastfeeding No   BMI 42.53 kg/m²      Tmax/24hrs: Temp (24hrs), Av.1 °F (36.7 °C), Min:97.7 °F (36.5 °C), Max:98.9 °F (37.2 °C)    Intake/Output Summary (Last 24 hours) at 2022 1534  Last data filed at 2022 3960  Gross per 24 hour   Intake 360 ml   Output 950 ml   Net -590 ml       Tele:   General:  Cooperative, Not in acute distress, speaks in full sentence while in bed  HEENT: PERRL, EOMI, supple neck, no JVD, dry oral mucosa  Cardiovascular: S1S2 regular, no rub/gallop   Pulmonary: air entry bilaterally, no wheezing, no crackle  GI:  Soft, non tender, non distended, +bs, no guarding   : PCN tube   Extremities:  No pedal edema, +distal pulses appreciated   Neuro: AOx3, moving all extremities    Additional:       Current Facility-Administered Medications   Medication Dose Route Frequency    iron sucrose (VENOFER) 300 mg in 0.9% sodium chloride 250 mL IVPB  300 mg IntraVENous Q24H    metoprolol tartrate (LOPRESSOR) tablet 25 mg  25 mg Oral BID    0.9% sodium chloride infusion 250 mL  250 mL IntraVENous PRN    sodium bicarbonate tablet 650 mg  650 mg Oral BID    0.9% sodium chloride infusion 250 mL  250 mL IntraVENous PRN    oxyCODONE IR (ROXICODONE) tablet 5-10 mg  5-10 mg Oral Q4H PRN    morphine injection 2 mg  2 mg IntraVENous Q4H PRN    simethicone (MYLICON) tablet 80 mg  80 mg Oral QID PRN    L. acidophilus,casei,rhamnosus (BIO-K PLUS) capsule 1 Capsule  1 Capsule Oral DAILY    hydrALAZINE (APRESOLINE) tablet 25 mg  25 mg Oral TID    melatonin (rapid dissolve) tablet 5 mg  5 mg Oral QHS    multivitamin, tx-iron-ca-min (THERA-M w/ IRON) tablet 1 Tablet  1 Tablet Oral DAILY    senna-docusate (PERICOLACE) 8.6-50 mg per tablet 1 Tablet  1 Tablet Oral DAILY    sodium chloride (NS) flush 5-10 mL  5-10 mL IntraVENous PRN    sodium chloride (NS) flush 5-40 mL  5-40 mL IntraVENous Q8H    sodium chloride (NS) flush 5-40 mL  5-40 mL IntraVENous PRN    acetaminophen (TYLENOL) tablet 650 mg  650 mg Oral Q6H PRN    Or    acetaminophen (TYLENOL) suppository 650 mg  650 mg Rectal Q6H PRN    polyethylene glycol (MIRALAX) packet 17 g  17 g Oral DAILY PRN    promethazine (PHENERGAN) tablet 12.5 mg  12.5 mg Oral Q6H PRN    Or    ondansetron (ZOFRAN) injection 4 mg  4 mg IntraVENous Q6H PRN    [Held by provider] heparin (porcine) injection 5,000 Units  5,000 Units SubCUTAneous Q8H    famotidine (PEPCID) tablet 20 mg  20 mg Oral DAILY    nystatin (MYCOSTATIN) 100,000 unit/gram powder   Topical TID     Facility-Administered Medications Ordered in Other Encounters   Medication Dose Route Frequency    naloxone (NARCAN) injection 0.2 mg  0.2 mg IntraVENous PRN    flumazeniL (ROMAZICON) 0.1 mg/mL injection 0.2 mg  0.2 mg IntraVENous PRN            Lab/Data Review:  Labs: Results:       Chemistry Recent Labs     09/18/22 0324 09/17/22 0314 09/16/22  1152   * 108* 112*    141 140   K 4.0 4.3 4.3    112* 110   CO2 23 24 23   BUN 46* 47* 43*   CREA 1.35* 1.66* 1.59*   BUCR 34* 28* 27*   AGAP 7 5 7   CA 9.3 9.0 9.3     No results for input(s): TBIL, ALT, ALKP, TP, ALB, GLOB, AGRAT in the last 72 hours. No lab exists for component: SGOT   CBC w/Diff Recent Labs     09/18/22 0324 09/17/22 0314 09/16/22  1152   WBC 8.2  --  11.8   RBC 2.99*  --  2.55*   HGB 7.4* 7.8* 6.9*   HCT 22.4* 23.3* 20.5*   MCV 74.9*  --  80.4   MCH 24.7  --  27.1   MCHC 33.0  --  33.7   RDW 24.9*  --  20.9*     --  264   GRANS 82*  --  88*   LYMPH 7*  --  4*   EOS 1  --  0      Coagulation No results for input(s): PTP, INR, APTT, INREXT in the last 72 hours.     Iron/Ferritin Lab Results   Component Value Date/Time    Iron 23 (L) 09/02/2022 03:19 AM    TIBC 176 (L) 09/02/2022 03:19 AM Iron % saturation 13 (L) 09/02/2022 03:19 AM    Ferritin 823 (H) 09/02/2022 03:19 AM       BNP    Cardiac Enzymes No results found for: CPK, RCK1, RCK2, RCK3, RCK4, CKMB, CKNDX, CKND1, TROPT, TROIQ, BNPP, BNP     Lactic Acid    Thyroid Studies          All Micro Results       Procedure Component Value Units Date/Time    CULTURE, BLOOD [[id number]] Collected: 09/01/22 1803    Order Status: Completed Specimen: Blood Updated: 09/07/22 0745     Special Requests: NO SPECIAL REQUESTS        Culture result: NO GROWTH 6 DAYS       CULTURE, BLOOD [[id number]] Collected: 09/01/22 1833    Order Status: Completed Specimen: Blood Updated: 09/07/22 0745     Special Requests: NO SPECIAL REQUESTS        Culture result: NO GROWTH 6 DAYS       CULTURE, URINE [[id number]] Collected: 09/01/22 1708    Order Status: Completed Specimen: Cath Urine Updated: 09/02/22 1839     Special Requests: NO SPECIAL REQUESTS        Culture result:       No significant growth, <10,000 CFU/mL                    Images:    CT (Most Recent). XRAY (Most Recent)      EKG No results found for this or any previous visit.      2D ECHO

## 2022-09-18 NOTE — ROUTINE PROCESS
Bedside and Verbal shift change report given to AUNDREA Caban (oncoming nurse) by William José RN (offgoing nurse). Report included the following information SBAR, Kardex, MAR and Recent Results.     SITUATION:  Code Status: Full Code  Reason for Admission: Bladder outlet obstruction [N32.0]  WILBUR (acute kidney injury) ([facility] [facility] [N17.9]  Uterine mass [N85.8]  Hospital day: 17  Problem List:       Hospital Problems  Never Reviewed            Codes Class Noted POA    Other hydronephrosis ICD-10-CM: N13.39  ICD-9-CM: 114  9/2/2022 Unknown        Uterine mass ICD-10-CM: N85.8  ICD-9-CM: 625.8  9/1/2022 Unknown        WILBUR (acute kidney injury) ([facility] [address] ICD-10-CM: N17.9  ICD-9-CM: 584.9  9/1/2022 Unknown        Bladder outlet obstruction ICD-10-CM: N32.0  ICD-9-CM: 596.0  9/1/2022 Unknown        Lobulation, spleen ICD-10-CM: Q89.09  ICD-9-CM: 759.0  9/1/2022 Unknown        Adrenal nodule ([facility] ICD-10-CM: E27.8  ICD-9-CM: 255.8  9/1/2022 Unknown        Leukocytosis ICD-10-CM: D72.829  ICD-9-CM: 288.60  9/1/2022 Unknown        Microcytic anemia ICD-10-CM: D50.9  ICD-9-CM: 280.9  9/1/2022 Unknown        Hyponatremia ICD-10-CM: E87.1  ICD-9-CM: 276.1  9/1/2022 Unknown        Uremia ICD-10-CM: N19  ICD-9-CM: 277  9/1/2022 Unknown         BACKGROUND:   Past Medical History:   Past Medical History:   Diagnosis Date    Class 2 obesity due to excess calories without serious comorbidity in adult     HTN (hypertension)     Uterine mass 09/01/2022    Vitamin D deficiency       Patient taking anticoagulants no    Patient has a defibrillator: no    History of shots YES for example, flu, pneumonia, tetanus   Isolation History NO for example, MRSA, CDiff    ASSESSMENT:  Changes in Assessment Throughout Shift: NONE  Significant Changes in 24 hours (for example, RR/code, fall)  Patient has Central Line: yes Reasons if yes: Limited Vein access  Patient has Cotter Cath: yes Reasons if yes: Bladder Outlet Obstruction   Mobility Issues  PT  IV Patency  OR Checklist  Pending Tests    Last Vitals:  Vitals w/ MEWS Score (last day)       Date/Time MEWS Score Pulse Resp Temp BP Level of Consciousness SpO2    09/18/22 0502 1 90 20 97.9 °F (36.6 °C) 126/63 0 92 %    09/17/22 2313 2 102 20 97.7 °F (36.5 °C) 102/63 0 95 %    09/17/22 1938 1 97 18 97.8 °F (36.6 °C) 136/80 0 97 %    09/17/22 1638 2 101 20 98.2 °F (36.8 °C) 112/72 0 97 %    09/17/22 1228 1 93 20 98.3 °F (36.8 °C) 123/73 0 97 %    09/17/22 0826 2 104 20 98.4 °F (36.9 °C) 135/81 0 98 %    09/17/22 0314 2 103 18 97.9 °F (36.6 °C) 144/78 0 98 %          PAIN    Pain Assessment    Pain Intensity 1: 10 (09/18/22 0725)    Pain Location 1: Abdomen    Pain Intervention(s) 1: Medication (see MAR)    Patient Stated Pain Goal: 0  Intervention effective: yes  Time of last intervention: 0725 Reassessment Completed: yes   Other actions taken for pain: Distraction    Last 3 Weights:  Last 3 Recorded Weights in this Encounter    09/13/22 0903 09/14/22 2016 09/17/22 0035   Weight: 109.4 kg (241 lb 1.6 oz) 109.3 kg (241 lb) 109.2 kg (240 lb 11.2 oz)   Weight change:     INTAKE/OUPUT    Current Shift: No intake/output data recorded. Last three shifts: 09/16 1901 - 09/18 0700  In: 895.8 [P.O.:600]  Out: 2943 [Urine:1825]    RECOMMENDATIONS AND DISCHARGE PLANNING  Patient needs and requests: lAssistance with ADL's, Pain Management    Pending tests/procedures: labs     Discharge plan for patient: SNF    Discharge planning Needs or Barriers: Placement    Estimated Discharge Date: 9/18/2022 Posted on Whiteboard in Patients Room: yes       \"HEALS\" SAFETY CHECK  A safety check occurred in the patient's room between off going nurse and oncoming nurse listed above.     The safety check included the below items:    H  High Alert Medications Verify all high alert medication drips (heparin, PCA, etc.)  E  Equipment Suction is set up for ALL patients (with seamus)  Red plugs utilized for all equipment (IV pumps, etc.)  WOWs wiped down at end of shift. Room stocked with oxygen, suction, and other unit-specific supplies  A  Alarms Bed alarm is set for fall risk patients  Ensure chair alarm is in place and activated if patient is up in a chair  L  Lines Check IV for any infiltration  Cotter bag is empty if patient has a Cotter   Tubing and IV bags are labeled  S  Safety  Room is clean, patient is clean, and equipment is clean. Hallways are clear from equipment besides carts. Fall bracelet on for fall risk patients  Ensure room is clear and free of clutter  Suction is set up for ALL patients (with seamus)  Hallways are clear from equipment besides carts.    Isolation precautions followed, supplies available outside room, sign posted    Melina Shirley RN

## 2022-09-18 NOTE — PROGRESS NOTES
Problem: Pressure Injury - Risk of  Goal: *Prevention of pressure injury  Description: Document Geoffrey Scale and appropriate interventions in the flowsheet. Outcome: Progressing Towards Goal  Note: Pressure Injury Interventions:  Sensory Interventions: Assess changes in LOC, Check visual cues for pain, Keep linens dry and wrinkle-free, Minimize linen layers, Pressure redistribution bed/mattress (bed type), Turn and reposition approx. every two hours (pillows and wedges if needed)    Moisture Interventions: Absorbent underpads, Apply protective barrier, creams and emollients, Internal/External urinary devices    Activity Interventions: PT/OT evaluation, Pressure redistribution bed/mattress(bed type), Increase time out of bed    Mobility Interventions: Float heels, HOB 30 degrees or less, Pressure redistribution bed/mattress (bed type), PT/OT evaluation    Nutrition Interventions: Document food/fluid/supplement intake    Friction and Shear Interventions: Foam dressings/transparent film/skin sealants, HOB 30 degrees or less, Lift sheet, Minimize layers                Problem: Patient Education: Go to Patient Education Activity  Goal: Patient/Family Education  Outcome: Progressing Towards Goal     Problem: Falls - Risk of  Goal: *Absence of Falls  Description: Document Joan Fall Risk and appropriate interventions in the flowsheet.   Outcome: Progressing Towards Goal  Note: Fall Risk Interventions:  Mobility Interventions: Bed/chair exit alarm, Communicate number of staff needed for ambulation/transfer, Patient to call before getting OOB, Utilize walker, cane, or other assistive device         Medication Interventions: Bed/chair exit alarm, Patient to call before getting OOB, Teach patient to arise slowly    Elimination Interventions: Bed/chair exit alarm, Call light in reach, Toilet paper/wipes in reach, Toileting schedule/hourly rounds              Problem: Patient Education: Go to Patient Education Activity  Goal: Patient/Family Education  Outcome: Progressing Towards Goal     Problem: Pain  Goal: *Control of Pain  Outcome: Progressing Towards Goal  Goal: *PALLIATIVE CARE:  Alleviation of Pain  Outcome: Progressing Towards Goal     Problem: Patient Education: Go to Patient Education Activity  Goal: Patient/Family Education  Outcome: Progressing Towards Goal     Problem: Infection - Risk of, Urinary Catheter-Associated Urinary Tract Infection  Goal: *Absence of infection signs and symptoms  Outcome: Progressing Towards Goal     Problem: Patient Education: Go to Patient Education Activity  Goal: Patient/Family Education  Outcome: Progressing Towards Goal     Problem:  Activity Intolerance  Goal: *Oxygen saturation during activity within specified parameters  Outcome: Progressing Towards Goal  Goal: *Able to remain out of bed as prescribed  Outcome: Progressing Towards Goal     Problem: Patient Education: Go to Patient Education Activity  Goal: Patient/Family Education  Outcome: Progressing Towards Goal     Problem: Nutrition Deficit  Goal: *Optimize nutritional status  Outcome: Progressing Towards Goal     Problem: Acute Renal Failure: Discharge Outcomes  Goal: *Optimal pain control at patient's stated goal  Outcome: Progressing Towards Goal  Goal: *Urinary output within identified parameters  Outcome: Progressing Towards Goal  Goal: *Hemodynamically stable  Outcome: Progressing Towards Goal  Goal: *Tolerating diet  Outcome: Progressing Towards Goal  Goal: *Lab values stabilized  Outcome: Progressing Towards Goal  Goal: *Verbalizes understanding and describes medication purposes and frequencies  Outcome: Progressing Towards Goal  Goal: *Medication reconciliation  Outcome: Progressing Towards Goal

## 2022-09-18 NOTE — PROGRESS NOTES
Bedside shift change report given to Tyler Saleh RN (oncoming nurse) by Kathleen Palma RN (offgoing nurse). Report included the following information SBAR, Kardex, Intake/Output, MAR, and Quality Measures. Wound Prevention Checklist    Patient: Andressa Rodriguez (9 y.o. female)  Date: 9/18/2022  Diagnosis: Bladder outlet obstruction [N32.0]  WILBUR (acute kidney injury) ([facility] [address] [N17.9]  Uterine mass [N85.8] <principal problem not specified>    Precautions: Fall, Skin       []  Heel prevention boots placed on patient    []  Patient turned q2h during shift    []  Lift team ordered    [x]  Patient on [place] bed/Specialty bed    [x]  Each Wound is documented during shift (Stage, Color, drainage, odor, measurements, and dressings)    [x]  Dual skin checks done at bedside during shift report with Tyler Saleh RN.     Pilar Saleem RN

## 2022-09-18 NOTE — PROGRESS NOTES
AFVSS  Hgb stable at 7  Cr improved to 1.35  UOP good at about 1 L from each PCN. Will see tomorrow.     Josh Cummins MD  [facility]

## 2022-09-18 NOTE — PROGRESS NOTES
Problem: Pressure Injury - Risk of  Goal: *Prevention of pressure injury  Description: Document Geoffrey Scale and appropriate interventions in the flowsheet. Outcome: Progressing Towards Goal  Note: Pressure Injury Interventions:  Sensory Interventions: Assess changes in LOC, Check visual cues for pain, Keep linens dry and wrinkle-free, Minimize linen layers, Pressure redistribution bed/mattress (bed type), Turn and reposition approx. every two hours (pillows and wedges if needed)    Moisture Interventions: Absorbent underpads, Apply protective barrier, creams and emollients, Check for incontinence Q2 hours and as needed, Internal/External urinary devices, Minimize layers, Moisture barrier, Offer toileting Q_hr    Activity Interventions: Increase time out of bed, Pressure redistribution bed/mattress(bed type)    Mobility Interventions: Float heels, HOB 30 degrees or less, Pressure redistribution bed/mattress (bed type), Turn and reposition approx. every two hours(pillow and wedges)    Nutrition Interventions: Document food/fluid/supplement intake    Friction and Shear Interventions: Apply protective barrier, creams and emollients, Feet elevated on foot rest, Foam dressings/transparent film/skin sealants, HOB 30 degrees or less, Lift sheet, Minimize layers                Problem: Patient Education: Go to Patient Education Activity  Goal: Patient/Family Education  Outcome: Progressing Towards Goal     Problem: Falls - Risk of  Goal: *Absence of Falls  Description: Document Joan Fall Risk and appropriate interventions in the flowsheet.   Outcome: Progressing Towards Goal  Note: Fall Risk Interventions:  Mobility Interventions: Bed/chair exit alarm, Patient to call before getting OOB, Utilize walker, cane, or other assistive device         Medication Interventions: Bed/chair exit alarm, Patient to call before getting OOB, Teach patient to arise slowly    Elimination Interventions: Bed/chair exit alarm, Call light in reach, Elevated toilet seat, Patient to call for help with toileting needs, Stay With Me (per policy), Toilet paper/wipes in reach, Toileting schedule/hourly rounds, Urinal in reach              Problem: Patient Education: Go to Patient Education Activity  Goal: Patient/Family Education  Outcome: Progressing Towards Goal     Problem: Pain  Goal: *Control of Pain  Outcome: Progressing Towards Goal  Goal: *PALLIATIVE CARE:  Alleviation of Pain  Outcome: Progressing Towards Goal     Problem: Patient Education: Go to Patient Education Activity  Goal: Patient/Family Education  Outcome: Progressing Towards Goal     Problem: Infection - Risk of, Urinary Catheter-Associated Urinary Tract Infection  Goal: *Absence of infection signs and symptoms  Outcome: Progressing Towards Goal     Problem: Patient Education: Go to Patient Education Activity  Goal: Patient/Family Education  Outcome: Progressing Towards Goal     Problem:  Activity Intolerance  Goal: *Oxygen saturation during activity within specified parameters  Outcome: Progressing Towards Goal  Goal: *Able to remain out of bed as prescribed  Outcome: Progressing Towards Goal     Problem: Patient Education: Go to Patient Education Activity  Goal: Patient/Family Education  Outcome: Progressing Towards Goal     Problem: Patient Education: Go to Patient Education Activity  Goal: Patient/Family Education  Outcome: Progressing Towards Goal     Problem: Patient Education: Go to Patient Education Activity  Goal: Patient/Family Education  Outcome: Progressing Towards Goal     Problem: Nutrition Deficit  Goal: *Optimize nutritional status  Outcome: Progressing Towards Goal     Problem: Acute Renal Failure: Discharge Outcomes  Goal: *Optimal pain control at patient's stated goal  Outcome: Progressing Towards Goal  Goal: *Urinary output within identified parameters  Outcome: Progressing Towards Goal  Goal: *Hemodynamically stable  Outcome: Progressing Towards Goal  Goal: *Tolerating diet  Outcome: Progressing Towards Goal  Goal: *Lab values stabilized  Outcome: Progressing Towards Goal  Goal: *Verbalizes understanding and describes medication purposes and frequencies  Outcome: Progressing Towards Goal  Goal: *Medication reconciliation  Outcome: Progressing Towards Goal

## 2022-09-19 NOTE — PROGRESS NOTES
Problem: Pressure Injury - Risk of  Goal: *Prevention of pressure injury  Description: Document Geoffrey Scale and appropriate interventions in the flowsheet. Outcome: Progressing Towards Goal  Note: Pressure Injury Interventions:  Sensory Interventions: Assess changes in LOC, Check visual cues for pain, Float heels, Keep linens dry and wrinkle-free, Minimize linen layers, Pressure redistribution bed/mattress (bed type), Turn and reposition approx. every two hours (pillows and wedges if needed)    Moisture Interventions: Absorbent underpads, Apply protective barrier, creams and emollients, Check for incontinence Q2 hours and as needed, Internal/External urinary devices, Contain wound drainage, Limit adult briefs, Minimize layers, Moisture barrier, Offer toileting Q_hr, Maintain skin hydration (lotion/cream)    Activity Interventions: Increase time out of bed, Pressure redistribution bed/mattress(bed type)    Mobility Interventions: Float heels, HOB 30 degrees or less, PT/OT evaluation, Turn and reposition approx. every two hours(pillow and wedges)    Nutrition Interventions: Document food/fluid/supplement intake    Friction and Shear Interventions: Apply protective barrier, creams and emollients, Feet elevated on foot rest, Foam dressings/transparent film/skin sealants, HOB 30 degrees or less, Lift sheet, Minimize layers                Problem: Patient Education: Go to Patient Education Activity  Goal: Patient/Family Education  Outcome: Progressing Towards Goal     Problem: Falls - Risk of  Goal: *Absence of Falls  Description: Document Joan Fall Risk and appropriate interventions in the flowsheet.   Outcome: Progressing Towards Goal  Note: Fall Risk Interventions:  Mobility Interventions: Bed/chair exit alarm, Patient to call before getting OOB, Utilize walker, cane, or other assistive device         Medication Interventions: Bed/chair exit alarm, Patient to call before getting OOB, Teach patient to arise slowly    Elimination Interventions: Bed/chair exit alarm, Call light in reach, Elevated toilet seat, Patient to call for help with toileting needs, Stay With Me (per policy), Toilet paper/wipes in reach, Toileting schedule/hourly rounds              Problem: Patient Education: Go to Patient Education Activity  Goal: Patient/Family Education  Outcome: Progressing Towards Goal     Problem: Pain  Goal: *Control of Pain  Outcome: Progressing Towards Goal  Goal: *PALLIATIVE CARE:  Alleviation of Pain  Outcome: Progressing Towards Goal     Problem: Patient Education: Go to Patient Education Activity  Goal: Patient/Family Education  Outcome: Progressing Towards Goal     Problem: Infection - Risk of, Urinary Catheter-Associated Urinary Tract Infection  Goal: *Absence of infection signs and symptoms  Outcome: Progressing Towards Goal     Problem: Patient Education: Go to Patient Education Activity  Goal: Patient/Family Education  Outcome: Progressing Towards Goal     Problem:  Activity Intolerance  Goal: *Oxygen saturation during activity within specified parameters  Outcome: Progressing Towards Goal  Goal: *Able to remain out of bed as prescribed  Outcome: Progressing Towards Goal     Problem: Patient Education: Go to Patient Education Activity  Goal: Patient/Family Education  Outcome: Progressing Towards Goal     Problem: Patient Education: Go to Patient Education Activity  Goal: Patient/Family Education  Outcome: Progressing Towards Goal     Problem: Patient Education: Go to Patient Education Activity  Goal: Patient/Family Education  Outcome: Progressing Towards Goal     Problem: Nutrition Deficit  Goal: *Optimize nutritional status  Outcome: Progressing Towards Goal     Problem: Acute Renal Failure: Discharge Outcomes  Goal: *Optimal pain control at patient's stated goal  Outcome: Progressing Towards Goal  Goal: *Urinary output within identified parameters  Outcome: Progressing Towards Goal  Goal: *Hemodynamically stable  Outcome: Progressing Towards Goal  Goal: *Tolerating diet  Outcome: Progressing Towards Goal  Goal: *Lab values stabilized  Outcome: Progressing Towards Goal  Goal: *Verbalizes understanding and describes medication purposes and frequencies  Outcome: Progressing Towards Goal  Goal: *Medication reconciliation  Outcome: Progressing Towards Goal

## 2022-09-19 NOTE — PROGRESS NOTES
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 Cotter placed 9/2 with continued rise in Cr to 6.57  Cystoscopy 9/4 unable to identify ureteral orifices. Bilateral PCNs placed 9/4. S/p R PCN exchange on 9/15/22     - Enlargement of uterine mass, concerning for gynecologic malignancy  Started palliative XRT per rad/onc on Mon              Neg UA 9/1/22              WBC 8.2<14.1<15.6              Afebrile, VSS     - WILBUR              Creat 1.35<1.66              Baseline 0.7 in 7/2022    - Gross Hematuria 2/2 Radiation Cystitis? BARRETT 9/13: No clot    Hgb 7.5>7.4<7.8>6.9 Hgb on   presentation 8.4     PLAN:    Appreciate overall management per medicine. Obtain repeat UA/UCX to rule out infection as source of gross hematuria- still not done yet. Cxs neg  Continue bilateral PCNs. Will need PCN exchange every 3 months. PCN need to be flushed with 10 cc NS daily. Continue to hold all AC until urine clear for 24 hours. Pt to f/up outpatient to arrange PCN exchange in 3 months. Will arrange. Recommend Gyn/Onc evaluation for large uterine mass. Will follow, patient MARLYN when I went to see her. Nat Coronel PA-C  [facility]  Available M-Fri, [place]  Pager: [phone]         Subjective:     Daily Progress Note: 9/19/2022 5:06 PM    Santa Sr was off the floor when I went to see her. Hgb appears stable.     Objective:     Visit Vitals  /79 (BP 1 Location: Left upper arm, BP Patient Position: At rest)   Pulse (!) 101   Temp 98.3 °F (36.8 °C)   Resp 18 Ht 5' 3\" (1.6 m)   Wt 108.9 kg (240 lb 1.6 oz)   SpO2 97%   Breastfeeding No   BMI 42.53 kg/m²        Temp (24hrs), Av.2 °F (36.8 °C), Min:97.9 °F (36.6 °C), Max:98.6 °F (37 °C)      Intake and Output:   1901 -  0700  In: 1320 [P.O.:1320]  Out:  [Urine:]  No intake/output data recorded. Unable to examine MARLYN. Data Review:    KUB 22:    Retracted and laterally displaced right PCN in comparison to fluoroscopic images  2022. The PCN remains within right upper quadrant. BARRETT 22: IMPRESSION     1. Mild right hydronephrosis, improved from prior. 2. Improved left hydronephrosis with residual pelviectasis.   3. Right renal cyst.    Recent Results (from the past 24 hour(s))   HGB & HCT    Collection Time: 22  3:35 AM   Result Value Ref Range    HGB 7.5 (L) 12.0 - 16.0 g/dL    HCT 22.1 (L) 35.0 - 45.0 %   GLUCOSE, POC    Collection Time: 22  9:29 AM   Result Value Ref Range    Glucose (POC) 140 (H) 70 - 110 mg/dL   GLUCOSE, POC    Collection Time: 22 12:34 PM   Result Value Ref Range    Glucose (POC) 147 (H) 70 - 110 mg/dL         Signed By:     Lacy Shaffer MD   Urologic Oncologist  [facility] and Cici Sanchez  Professor of Urology  [facility]  Office [phone] Ext [phone]                          2022

## 2022-09-19 NOTE — PROGRESS NOTES
CM uploaded clinicals in [facility] to SNF.              Onofre Mehta RN  Case Management [phone]

## 2022-09-19 NOTE — PROGRESS NOTES
[facility]  Progress Note    Patient: Bossman Michele Age: 71 y.o. : 1950 MR#: [id number] SSN: [id number]  Date: 2022     Subjective/24-hour events:     Patient lying in the bed, no new complaints. 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 PT/OT as able/tolerated. Disposition: SNF, currently awaiting placement    Discussed with patient at bedside, agrees with SNF placement. Discussed with case management, currently awaiting bed availability    Case discussed with:  [x]Patient  []Family  [x]Nursing  [x]Case Management  DVT Prophylaxis:  []Lovenox  []Hep SQ  [x]SCDs  []Coumadin   []On Heparin gtt    Objective:   VS: Visit Vitals  /64 (BP 1 Location: Left upper arm, BP Patient Position: At rest)   Pulse (!) 107   Temp 98.3 °F (36.8 °C)   Resp 16   Ht 5' 3\" (1.6 m)   Wt 108.9 kg (240 lb 1.6 oz)   SpO2 99%   Breastfeeding No   BMI 42.53 kg/m²      Tmax/24hrs: Temp (24hrs), Av.1 °F (36.7 °C), Min:97.9 °F (36.6 °C), Max:98.3 °F (36.8 °C)    Intake/Output Summary (Last 24 hours) at 2022 1729  Last data filed at 2022 1517  Gross per 24 hour   Intake 720 ml   Output 1650 ml   Net -930 ml       General:  In NAD. Nontoxic-appearing. Cardiovascular:  Regular, mildly tachy.   Pulmonary:  Lungs clear bilaterally, no wheezes. GI:  Abdomen soft, NTTP. Extremities:  Warm, no edema or ischemia. Neuro:  Awake and alert. Moves extremities spontaneously.   Bilateral nephrostomy tubes with clear urine    Labs:    Recent Results (from the past 24 hour(s))   HGB & HCT    Collection Time: 09/19/22  3:35 AM   Result Value Ref Range    HGB 7.5 (L) 12.0 - 16.0 g/dL    HCT 22.1 (L) 35.0 - 45.0 %   GLUCOSE, POC    Collection Time: 09/19/22  9:29 AM   Result Value Ref Range    Glucose (POC) 140 (H) 70 - 110 mg/dL   GLUCOSE, POC    Collection Time: 09/19/22 12:34 PM   Result Value Ref Range    Glucose (POC) 147 (H) 70 - 110 mg/dL         Signed By: Titus Starr MD     September 19, 2022

## 2022-09-19 NOTE — ROUTINE PROCESS
Bedside and Verbal shift change report given to Dk Boyd RN (oncoming nurse) by Christian Guerrero RN (offgoing nurse). Report included the following information SBAR, Kardex, MAR and Recent Results.     SITUATION:  Code Status: Full Code  Reason for Admission: Bladder outlet obstruction [N32.0]  WILBUR (acute kidney injury) ([facility] [address] [N17.9]  Uterine mass [N85.8]  Hospital day: 18  Problem List:       Hospital Problems  Never Reviewed            Codes Class Noted POA    Other hydronephrosis ICD-10-CM: N13.39  ICD-9-CM: 656  9/2/2022 Unknown        Uterine mass ICD-10-CM: N85.8  ICD-9-CM: 625.8  9/1/2022 Unknown        WILBUR (acute kidney injury) ([facility] [address] ICD-10-CM: N17.9  ICD-9-CM: 584.9  9/1/2022 Unknown        Bladder outlet obstruction ICD-10-CM: N32.0  ICD-9-CM: 596.0  9/1/2022 Unknown        Lobulation, spleen ICD-10-CM: Q89.09  ICD-9-CM: 759.0  9/1/2022 Unknown        Adrenal nodule ([facility] [address] ICD-10-CM: E27.8  ICD-9-CM: 255.8  9/1/2022 Unknown        Leukocytosis ICD-10-CM: D72.829  ICD-9-CM: 288.60  9/1/2022 Unknown        Microcytic anemia ICD-10-CM: D50.9  ICD-9-CM: 280.9  9/1/2022 Unknown        Hyponatremia ICD-10-CM: E87.1  ICD-9-CM: 276.1  9/1/2022 Unknown        Uremia ICD-10-CM: N19  ICD-9-CM: 314  9/1/2022 Unknown       BACKGROUND:   Past Medical History:   Past Medical History:   Diagnosis Date    Class 2 obesity due to excess calories without serious comorbidity in adult     HTN (hypertension)     Uterine mass 09/01/2022    Vitamin D deficiency       Patient taking anticoagulants no    Patient has a defibrillator: no    History of shots YES for example, flu, pneumonia, tetanus   Isolation History NO for example, MRSA, CDiff    ASSESSMENT:  Changes in Assessment Throughout Shift: NONE  Significant Changes in 24 hours (for example, RR/code, fall)  Patient has Central Line: yes Reasons if yes: Limited Vein access  Patient has Cotter Cath: yes Reasons if yes: Bladder Outlet Obstruction   Mobility Issues  PT  IV Patency  OR Checklist  Pending Tests    Last Vitals:  Vitals w/ MEWS Score (last day)       Date/Time MEWS Score Pulse Resp Temp BP Level of Consciousness SpO2    09/19/22 0337 2 105 17 97.9 °F (36.6 °C) 118/75 0 97 %    09/18/22 2335 2 105 15 98 °F (36.7 °C) 102/65 0 98 %    09/18/22 1919 1 98 18 98.1 °F (36.7 °C) 121/75 0 97 %    09/18/22 1635 3 112 20 98.6 °F (37 °C) 161/67 0 95 %    09/18/22 1245 1 87 20 98.2 °F (36.8 °C) 101/66 0 95 %    09/18/22 0913 3 110 20 98.9 °F (37.2 °C) 124/69 1.0 97 %    09/18/22 0324 1 97 20 97.9 °F (36.6 °C) 113/65 0 96 %          PAIN    Pain Assessment    Pain Intensity 1: 0 (09/19/22 0505)    Pain Location 1: Abdomen    Pain Intervention(s) 1: Medication (see MAR)    Patient Stated Pain Goal: 0  Intervention effective: yes  Time of last intervention: 0407 Reassessment Completed: yes   Other actions taken for pain: Distraction    Last 3 Weights:  Last 3 Recorded Weights in this Encounter    09/14/22 2016 09/17/22 0035 09/18/22 0950   Weight: 109.3 kg (241 lb) 109.2 kg (240 lb 11.2 oz) 108.9 kg (240 lb 1.6 oz)   Weight change:     INTAKE/OUPUT    Current Shift: No intake/output data recorded. Last three shifts: 09/17 1901 - 09/19 0700  In: 1320 [P.O.:1320]  Out: 2000 [Urine:2000]    RECOMMENDATIONS AND DISCHARGE PLANNING  Patient needs and requests: lAssistance with ADL's, Pain Management    Pending tests/procedures: labs     Discharge plan for patient: SNF    Discharge planning Needs or Barriers: Placement    Estimated Discharge Date: 9/19/2022 Posted on Whiteboard in Patients Room: yes       \"HEALS\" SAFETY CHECK  A safety check occurred in the patient's room between off going nurse and oncoming nurse listed above.     The safety check included the below items:    H  High Alert Medications Verify all high alert medication drips (heparin, PCA, etc.)  E  Equipment Suction is set up for ALL patients (with seamus)  Red plugs utilized for all equipment (IV pumps, etc.)  WOWs wiped down at end of shift. Room stocked with oxygen, suction, and other unit-specific supplies  A  Alarms Bed alarm is set for fall risk patients  Ensure chair alarm is in place and activated if patient is up in a chair  L  Lines Check IV for any infiltration  Cotter bag is empty if patient has a Cotter   Tubing and IV bags are labeled  S  Safety  Room is clean, patient is clean, and equipment is clean. Hallways are clear from equipment besides carts. Fall bracelet on for fall risk patients  Ensure room is clear and free of clutter  Suction is set up for ALL patients (with seamus)  Hallways are clear from equipment besides carts.    Isolation precautions followed, supplies available outside room, sign posted    Reyes Pal, RN

## 2022-09-19 NOTE — PROGRESS NOTES
CM called and spoke with patient's son Edwige Hernadez [phone], CM clarified that they would like SNF [facility]. Patient's son said Medical transport would need to be scheduled for SNF after 2:30 pm (11:30-2:30pm radiation daily,  last radiation is this Friday). Patient's son said he would transport patient to and from Radiation therapy at SNF. Patient's son said patient has been Covid Vaccinated with 1 Booster shot, and patient owns her own home. CM opened the chart in [facility] for [facility].              Ashley Strickland, RN  Case Management [phone]

## 2022-09-19 NOTE — PROGRESS NOTES
Problem: Self Care Deficits Care Plan (Adult)  Goal: *Acute Goals and Plan of Care (Insert Text)  Description: Occupational Therapy Goals  Initiated 9/2/2022 within 7 day(s), re-evaluation 9/9/2022 and again 9/19/22 pt making slow progress towards goals. Goals downgraded to fit within scope of attainment. 1.  Patient will perform grooming with supervision/set-up sitting at edge of bed for >5 min with Good balance. 2.  Patient will perform bathing with minimal assistance. 3.  Patient will perform lower body dressing with supervision/set-up using AE prn.  4.  Patient will perform toilet transfers with supervision/set-up. 5.  Patient will perform all aspects of toileting with minimal assistance. 6.  Patient will participate in upper extremity therapeutic exercise/activities with supervision/set-up for 8 minutes to improve endurance and UB strength needed for ADLs    7. Patient will utilize energy conservation techniques during functional activities with verbal cues. Prior Level of Function: Pt reports being independent with ADLs and functional mobility, and was working prior to 2 weeks ago. Since then pt has not been in the shower and requires assistance for LB ADLs. Outcome: Progressing Towards Goal   OCCUPATIONAL THERAPY RE-EVALUATION    Patient: Ashli Sharp (50 y.o. female)  Date: 9/19/2022  Primary Diagnosis: Bladder outlet obstruction [N32.0]  WILBUR (acute kidney injury) ([facility] [address] [N17.9]  Uterine mass [N85.8]  Procedure(s) (LRB):  CYSTOSCOPY, insertion of jacinto catheter (N/A) 15 Days Post-Op   Precautions:   Fall, Skin  PLOF: see aobve    ASSESSMENT :  Based on the objective data described below, the patient is making slow progress towards goals in POC and continues to demonstrate decreased UB strength, impaired functional activity endurance, decreased functional mobility impacting independence in self care.  Pt semi reclined in bed and stating that she is upset because she has been having loose stools repetitively. Pt declining any OOB activity, despite max encouragement. Pt agreeable to scoot up in bed in prep for self feeding. Pt performed with altered bed and min A to hold BLE in knee flexion. Pt noted to have BM on chux pad. Pt performed rolling R and L with min A for doug care performed with TD. Pt eating jello at end of session demonstrating MI. Pt with all needs in reach at end of session. Patient will benefit from skilled intervention to address the above impairments. Patient's rehabilitation potential is considered to be Fair  Factors which may influence rehabilitation potential include:   []             None noted  []             Mental ability/status  [x]             Medical condition  [x]             Home/family situation and support systems  []             Safety awareness  []             Pain tolerance/management  []             Other:      PLAN :  Recommendations and Planned Interventions:   [x]               Self Care Training                  [x]      Therapeutic Activities  [x]               Functional Mobility Training   []      Cognitive Retraining  [x]               Therapeutic Exercises           [x]      Endurance Activities  [x]               Balance Training                    [x]      Neuromuscular Re-Education  []               Visual/Perceptual Training     [x]      Home Safety Training  [x]               Patient Education                   [x]      Family Training/Education  []               Other (comment):    Frequency/Duration: Patient will be followed by occupational therapy 1-2 times per day/4-7 days per week to address goals. Further Equipment Recommendations for Discharge: bedside commode, shower chair, and walker    AMPAC: Based on an AM-PAC score of 15/24 and their current ADL deficits; it is recommended that the patient have 3-5 sessions per week of Occupational Therapy at d/c to increase the patient's independence.      This AMPAC score should be considered in conjunction with interdisciplinary team recommendations to determine the most appropriate discharge setting. Patient's social support, diagnosis, medical stability, and prior level of function should also be taken into consideration. SUBJECTIVE:   Patient stated Every time I move I poop.     OBJECTIVE DATA SUMMARY:   Hospital course since last seen and reason for reevaluation: Pt has made slow progress towards OT POC goals and is limited by fatigue, frequent BM, and decreased motivation for OOB activity. Pt has been able to practice sit<>stand at bedside and self care seated EOB 9/14. Pt would benefit from continued OT services to increase independence in self care for return to PLOF. Past Medical History:   Diagnosis Date    Class 2 obesity due to excess calories without serious comorbidity in adult     HTN (hypertension)     Uterine mass 09/01/2022    Vitamin D deficiency      Past Surgical History:   Procedure Laterality Date    HX CATARACT REMOVAL N/A      Barriers to Learning/Limitations: None  Compensate with: visual, verbal, tactile, kinesthetic cues/model    Home Situation:   Home Situation  Home Environment: Private residence  One/Two Story Residence: One story  Living Alone: Yes  Support Systems: Child(garfield)  Patient Expects to be Discharged to[de-identified] Unable to determine at this time  Current DME Used/Available at Home: Wheelchair, Walker  Tub or Shower Type: Tub/Shower combination  [x]  Right hand dominant   []  Left hand dominant    Cognitive/Behavioral Status:  Neurologic State: Alert  Orientation Level: Oriented X4  Cognition: Follows commands; Appropriate decision making; Appropriate for age attention/concentration  Safety/Judgement: Fall prevention;Home safety    Skin: intact  Edema: BLE edematous, pt educated on ankle pumps, adduction/adduction, heel slides.      Vision/Perceptual:       intact       Coordination: BUE   intact     Strength: BUE  Generally decreased, functional   Tone & Sensation: BUE  Normal, intact     Range of Motion: BUE  wFL   Functional Mobility and Transfers for ADLs:  Bed Mobility:  Rolling: Minimum assistance   Scooting: Minimum assistance;Bed Modified      ADL Intervention:  Feeding  Feeding Assistance: Modified independent  Toileting  Bowel Hygiene: Total assistance (dependent)    Cognitive Retraining  Safety/Judgement: Fall prevention;Home safety      Pain:  Pain level pre-treatment: 0/10   Pain level post-treatment: 0/10  Pain Intervention(s): Medication (see MAR); Rest, Ice, Repositioning   Response to intervention: Nurse notified, See doc flow    Activity Tolerance:   Fair   Please refer to the flowsheet for vital signs taken during this treatment. After treatment:   [] Patient left in no apparent distress sitting up in chair  [x] Patient left in no apparent distress in bed  [x] Call bell left within reach  [] Nursing notified  [] Caregiver present  [] Bed alarm activated    COMMUNICATION/EDUCATION:   [x] Role of Occupational Therapy in the acute care setting  [x] Home safety education was provided and the patient/caregiver indicated understanding. [x] Patient/family have participated as able in goal setting and plan of care. [x] Patient/family agree to work toward stated goals and plan of care. [] Patient understands intent and goals of therapy, but is neutral about his/her participation. [] Patient is unable to participate in goal setting and plan of care. Thank you for this referral.  Fannie Almonte OT  Time Calculation: 26 mins    [facility]-PAC® Daily Activity Inpatient Short Form (6-Clicks)*    How much HELP from another person does the patient currently need    (If the patient hasn't done an activity recently, how much help from another person do you think he/she would need if he/she tried?)   Total (Total A or Dep)   A Lot  (Mod to Max A)   A Little (Sup or Min A)   None (Mod I to I)   Putting on and taking off regular lower body clothing?    [] 1 [x] 2 [] 3 [] 4   2. Bathing (including washing, rinsing,      drying)? [] 1 [x] 2 [] 3 [] 4   3. Toileting, which includes using toilet, bedpan or urinal?   [] 1 [x] 2 [] 3 [] 4   4. Putting on and taking off regular upper body clothing? [] 1 [] 2 [x] 3 [] 4   5. Taking care of personal grooming such as brushing teeth? [] 1 [] 2 [x] 3 [] 4   6. Eating meals?    [] 1 [] 2 [x] 3 [] 4

## 2022-09-20 NOTE — PROGRESS NOTES
Problem: Mobility Impaired (Adult and Pediatric)  Goal: *Acute Goals and Plan of Care (Insert Text)  Description: Physical Therapy Goals  Initiated 9/2/2022, reviewed and continued 9/9/2022 and to be accomplished within 7 day(s), re-evaluated 9/16/22  1. Patient will move from supine to sit and sit to supine  in bed with supervision/set-up. 2.  Patient will transfer from bed to chair and chair to bed with supervision/set-up using the least restrictive device. 3.  Patient will perform sit to stand with supervision/set-up. 4.  Patient will ambulate with supervision/set-up for 100 feet with the least restrictive device. 5.  Patient will ascend/descend 3 stairs with 1 handrail(s) with minimal assistance/contact guard assist.    PLOF: independent with mobility without an assistive device, lives alone     Outcome: Progressing Towards Goal     PHYSICAL THERAPY TREATMENT    Patient: Keesha Da Silva (86 y.o. female)  Date: 9/20/2022  Diagnosis: Bladder outlet obstruction [N32.0]  WILBUR (acute kidney injury) ([facility] [address] [N17.9]  Uterine mass [N85.8] <principal problem not specified>  Procedure(s) (LRB):  CYSTOSCOPY, insertion of jacinto catheter (N/A) 16 Days Post-Op  Precautions: Fall, Skin    ASSESSMENT:  Pt found semi-reclined in bed, in NAD, willing to work with PT. She reports feeling tired. Pt found to have large, liquid BM. Sup-sit with modAx1. Pt attempted x2 trials to stand without success. On 3rd trial, pt used rocking back and forth for momentum and was able to stand with modAx1 and RW. Dependence for pericare. Pt only able to stand for ~30 seconds. Sit-supine with modA, rolling to each side for further pericare. Scooting up towards [facility] with maxAx2. Pt was left semi-reclined with call bell and all needs met.    Progression toward goals:   []      Improving appropriately and progressing toward goals  [x]      Improving slowly and progressing toward goals  []      Not making progress toward goals and plan of care will be adjusted     PLAN:  Patient continues to benefit from skilled intervention to address the above impairments. Continue treatment per established plan of care. Further Equipment Recommendations for Discharge:  N/A    AMPAC: Based on an AM-PAC score of 10/24 (**/20 if omitting stairs) and their current functional mobility deficits, it is recommended that the patient have 3-5 sessions per week of Physical Therapy at d/c to increase the patient's independence. This AMPAC score should be considered in conjunction with interdisciplinary team recommendations to determine the most appropriate discharge setting. Patient's social support, diagnosis, medical stability, and prior level of function should also be taken into consideration. SUBJECTIVE:   Patient stated I feel so weak.     OBJECTIVE DATA SUMMARY:   Critical Behavior:  Neurologic State: Alert  Orientation Level: Oriented X4  Cognition: Follows commands  Safety/Judgement: Fall prevention  Functional Mobility Training:  Bed Mobility:  Rolling: Minimum assistance;Assist x1  Supine to Sit: Moderate assistance; Additional time;Assist x1  Sit to Supine: Moderate assistance;Assist x1;Additional time  Scooting: Maximum assistance;Assist x2         Transfers:  Sit to Stand: Moderate assistance  Stand to Sit: Minimum assistance       Balance:  Sitting: Intact; With support  Standing: Impaired; With support  Standing - Static: Fair  Standing - Dynamic : Fair      Pain:  Pain level pre-treatment: 0/10  Pain level post-treatment: 0/10   Pain Intervention(s): Medication (see MAR); Rest, Ice, Repositioning   Response to intervention: Nurse notified, See doc flow    Activity Tolerance:   Pt tolerated mobility fair  Please refer to the flowsheet for vital signs taken during this treatment.   After treatment:   [] Patient left in no apparent distress sitting up in chair  [x] Patient left in no apparent distress in bed  [x] Call bell left within reach  [x] Nursing notified  [] Caregiver present  [x] Bed alarm activated  [] SCDs applied      COMMUNICATION/EDUCATION:   [x]         Role of Physical Therapy in the acute care setting. [x]         Fall prevention education was provided and the patient/caregiver indicated understanding. [x]         Patient/family have participated as able in working toward goals and plan of care. []         Patient/family agree to work toward stated goals and plan of care. []         Patient understands intent and goals of therapy, but is neutral about his/her participation. []         Patient is unable to participate in stated goals/plan of care: ongoing with therapy staff.  []         Other:        Alec Robertson   Time Calculation: 38 mins    Tonie Bejarano AM-PAC® Basic Mobility Inpatient Short Form (6-Clicks) Version 2    How much HELP from another person does the patient currently need    (If the patient hasn't done an activity recently, how much help from another person do you think he/she would need if he/she tried?)   Total (Total A or Dep)   A Lot  (Mod to Max A)   A Little (Sup or Min A)   None (Mod I to I)   Turning from your back to your side while in a flat bed without using bedrails? [] 1 [x] 2 [] 3 [] 4   2. Moving from lying on your back to sitting on the side of a flat bed without using bedrails? [] 1 [x] 2 [] 3 [] 4   3. Moving to and from a bed to a chair (including a wheelchair)? [] 1 [x] 2 [] 3 [] 4   4. Standing up from a chair using your arms (e.g., wheelchair, or bedside chair)? [] 1 [x] 2 [] 3 [] 4   5. Walking in hospital room? [x] 1 [] 2 [] 3 [] 4   6. Climbing 3-5 steps with a railing?+   [x] 1 [] 2 [] 3 [] 4   +If stair climbing cannot be assessed, skip item #6. Sum responses from items 1-5.        Based on an AM-PAC score of 10/24 (**/20 if omitting stairs) and their current functional mobility deficits, it is recommended that the patient have 3-5 sessions per week of Physical Therapy at d/c to increase the patient's independence.

## 2022-09-20 NOTE — PROGRESS NOTES
Problem: Pressure Injury - Risk of  Goal: *Prevention of pressure injury  Description: Document Geoffrey Scale and appropriate interventions in the flowsheet. Outcome: Progressing Towards Goal  Note: Pressure Injury Interventions:  Sensory Interventions: Assess changes in LOC    Moisture Interventions: Absorbent underpads    Activity Interventions: Pressure redistribution bed/mattress(bed type)    Mobility Interventions: HOB 30 degrees or less    Nutrition Interventions: Document food/fluid/supplement intake    Friction and Shear Interventions: HOB 30 degrees or less                Problem: Patient Education: Go to Patient Education Activity  Goal: Patient/Family Education  Outcome: Progressing Towards Goal     Problem: Falls - Risk of  Goal: *Absence of Falls  Description: Document Joan Fall Risk and appropriate interventions in the flowsheet. Outcome: Progressing Towards Goal  Note: Fall Risk Interventions:  Mobility Interventions: Bed/chair exit alarm         Medication Interventions: Bed/chair exit alarm    Elimination Interventions: Call light in reach              Problem: Patient Education: Go to Patient Education Activity  Goal: Patient/Family Education  Outcome: Progressing Towards Goal     Problem: Pain  Goal: *Control of Pain  Outcome: Progressing Towards Goal  Goal: *PALLIATIVE CARE:  Alleviation of Pain  Outcome: Progressing Towards Goal     Problem: Patient Education: Go to Patient Education Activity  Goal: Patient/Family Education  Outcome: Progressing Towards Goal     Problem: Infection - Risk of, Urinary Catheter-Associated Urinary Tract Infection  Goal: *Absence of infection signs and symptoms  Outcome: Progressing Towards Goal     Problem: Patient Education: Go to Patient Education Activity  Goal: Patient/Family Education  Outcome: Progressing Towards Goal     Problem:  Activity Intolerance  Goal: *Oxygen saturation during activity within specified parameters  Outcome: Progressing Towards Goal  Goal: *Able to remain out of bed as prescribed  Outcome: Progressing Towards Goal     Problem: Patient Education: Go to Patient Education Activity  Goal: Patient/Family Education  Outcome: Progressing Towards Goal

## 2022-09-20 NOTE — PROGRESS NOTES
CHIDI spoke with Richard Galan with [employer] [facility], said she was not aware that patient was receiving Radiation, and will need to follow-up with CM, because SNF do not usually take patient's on radiation or chemotherapy.              Luba Rubalcava, RN  Case Management [phone]

## 2022-09-20 NOTE — PROGRESS NOTES
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 Cotter placed 9/2 with continued rise in Cr to 6.57  Cystoscopy 9/4 unable to identify ureteral orifices. Bilateral PCNs placed 9/4. 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 Per nursing staff, no hematuria from urethra. Obtain repeat UA/UCX to rule out infection as source of gross hematuria- still not done yet. Cxs neg  Continue bilateral PCNs. Will need PCN exchange every 3 months. PCN need to be flushed with 10 cc NS daily. Continue to hold all AC until urine clear for 24 hours. Pt to f/up outpatient to arrange PCN exchange in 3 months. Will arrange. Recommend Gyn/Onc evaluation for large uterine mass. Hgb stable. Will sign off. Preet Friedman PA-C  [facility]  Available M-Fri, [facility]  Pager: [phone]         Subjective:     Daily Progress Note: 9/20/2022 5:06 PM    Jimena Lechuga is doing well. Bilateral PCN with clear, yellow urine. Creat improved. Noted to be tachy today.     Objective:     Visit Vitals  /68 (BP 1 Location: Left upper arm, BP Patient Position: At rest)   Pulse (!) 124   Temp 98.4 °F (36.9 °C)   Resp 18   Ht 5' 3\" (1.6 m)   Wt 108.9 kg (240 lb 1.6 oz)   SpO2 97%   Breastfeeding No   BMI 42.53 kg/m²        Temp (24hrs), Av.5 °F (36.9 °C), Min:98.3 °F (36.8 °C), Max:98.6 °F (37 °C)      Intake and Output:  1901 - 700  In: 685 [P.O.:720]  Out: 2000 [Urine:2000]  701 - 1900  In: 120 [P.O.:120]  Out: -       General appearance: alert, cooperative, no distress, appears stated age  Abdomen: Round, mass palpated above umbilicus, TTP. : Bilateral PCNs in place- with clear, yellow urine. Cards: BLE edema. Data Review:    KUB 22:    Retracted and laterally displaced right PCN in comparison to fluoroscopic images  2022. The PCN remains within right upper quadrant. BARRETT 22: IMPRESSION     1. Mild right hydronephrosis, improved from prior. 2. Improved left hydronephrosis with residual pelviectasis.   3. Right renal cyst.    Recent Results (from the past 24 hour(s))   GLUCOSE, POC    Collection Time: 22  9:29 AM   Result Value Ref Range    Glucose (POC) 140 (H) 70 - 110 mg/dL   GLUCOSE, POC    Collection Time: 22 12:34 PM   Result Value Ref Range    Glucose (POC) 147 (H) 70 - 110 mg/dL   TSH 3RD GENERATION    Collection Time: 22  3:00 AM   Result Value Ref Range    TSH 2.57 0.36 - 3.74 uIU/mL   T4, FREE    Collection Time: 22  3:00 AM   Result Value Ref Range    T4, Free 1.5 0.7 - 1.5 NG/DL   METABOLIC PANEL, BASIC    Collection Time: 22  3:00 AM   Result Value Ref Range    Sodium 143 136 - 145 mmol/L    Potassium 3.8 3.5 - 5.5 mmol/L    Chloride 112 (H) 100 - 111 mmol/L    CO2 23 21 - 32 mmol/L    Anion gap 8 3.0 - 18 mmol/L    Glucose 108 (H) 74 - 99 mg/dL    BUN 43 (H) 7.0 - 18 MG/DL    Creatinine 1.16 0.6 - 1.3 MG/DL    BUN/Creatinine ratio 37 (H) 12 - 20      GFR est AA 56 (L) >60 ml/min/1.73m2    GFR est non-AA 46 (L) >60 ml/min/1.73m2    Calcium 9.3 8.5 - 10.1 MG/DL   HGB & HCT    Collection Time: 09/20/22  3:00 AM   Result Value Ref Range    HGB 7.2 (L) 12.0 - 16.0 g/dL    HCT 21.8 (L) 35.0 - 45.0 %         Signed By:     Pascale Hill MD   Urologic Oncologist  [facility]  [facility]  Office [phone] Ext [phone]                          September 20, 2022

## 2022-09-20 NOTE — PROGRESS NOTES
CM called patient's son Ivan Schmidt [phone], received voicmail, CM left a message that SNF [facility] cannot accept patient while currently receiving Radiation treatment, so discharge plan is for SNF on Friday afternoon after last Radiation treatment.            Noa Sigala, RN  Case Management [phone]

## 2022-09-20 NOTE — PROGRESS NOTES
09/20/22 1527   Vitals   Temp 98.7 °F (37.1 °C)   Temp Source Oral   Pulse (Heart Rate) (!) 127   Heart Rate Source Monitor   Resp Rate 18   O2 Sat (%) 95 %   Level of Consciousness 0   BP (!) 151/76   MAP (Calculated) 101   BP 1 Location Left upper arm   BP 1 Method Automatic   BP Patient Position At rest   MEWS Score 3   Reported to MD at the nurse's station.

## 2022-09-20 NOTE — PROGRESS NOTES
Patient's son Oskar Dempsey [phone] called CM, upset with why CM last week asking him how patient was going to be transported at [facility] with Radiation, if SNF won't take patient on Radiation. CM answered all questions to best of CM ability. Patient's son said patient cannot discharge Friday to SNF because the radiologist is trying to shrink tumor, and then decide whether they can take tumor out, and if they take tumor out, will need Chemotherapy. Patient's son said no sense sending patient to SNF if patient will need surgery or chemotherapy, then patient will need to leave SNF, but said patient cannot go home alone, no one to care for patient. CM let patient's son know that CM will update CM Manager and ., but up to the Dr for discharge order. CHIDI spoke with Aspen Prajapati, and updated. CM updated Dr. René Corea via [employer].            Frederic Goode, RN  Case Management [phone]

## 2022-09-20 NOTE — PROGRESS NOTES
Placed SCDs and compression stockings. Educated pt on the benefit and risk of scds and compression. Pt was not happy about it. Refused but family was able to encourage her to put them on.

## 2022-09-20 NOTE — PROGRESS NOTES
In Patient Progress note      Admit Date: 9/1/2022    Impression:  1. Severe acute kidney injury due to obstructive uropathy. Patient had bilateral PCN.--> creat now stabilizing , had a prerenal component d/t   Poor intake and radiation induced insensible losses   Renal USG with improved hydro , IR was consulted to evaluate the drains   2. Anemia   3   Uterine mass with VELIZ , undergoing radiation   4. Mild hypokalemia -resolved   5. Mild Acidosis likely metabolic- resolved    Renal functions close to baseline   S/p rt PCN repositioned 9/15  Draining well   No labs resulted today   Radiation daily , induced insensible losses     Plan  1) encourage po intake   2) follow labs   3) onco/radiation following   4) recheck renal functions in AM     Nephro will sign off   Needs follow up after discharge in 3 weeks     Please call with questions,    Marjorie Sun MD [facility]  Cell [phone]  Pager: [phone]     Subjective:     - s/p PCN repositioned  - respiratory - stable  - hemodynamics - stable, no pressrs  - UOP-improved   - Nutrition -poor    Objective:     Visit Vitals  /62 (BP 1 Location: Left upper arm, BP Patient Position: At rest)   Pulse (!) 106   Temp 98.2 °F (36.8 °C)   Resp 18   Ht 5' 3\" (1.6 m)   Wt 108.9 kg (240 lb 1.6 oz)   SpO2 98%   Breastfeeding No   BMI 42.53 kg/m²         Intake/Output Summary (Last 24 hours) at 9/20/2022 1256  Last data filed at 9/20/2022 1239  Gross per 24 hour   Intake 340 ml   Output 1100 ml   Net -760 ml         Physical Exam:     General: NAD, alert and oriented. Neck: No jvd. LUNGS: Clear to Auscultation, No rales, rhonchi or wheezes. CVS EXM: S1, S2  RRR No rub. Abdomen: soft, non tender.    Lower Extremities:  trace Edema     Data Review:    Recent Labs     09/20/22  0300 09/19/22  0335 09/18/22  0324   WBC  --   --  8.2   RBC  --   --  2.99*   HCT 21.8*   < > 22.4*   MCV  --   --  74.9*   MCH  --   --  24.7   MCHC  --   --  33.0   RDW  --   --  24.9*    < > = values in this interval not displayed.        Recent Labs     09/20/22  0300 09/18/22  0324   BUN 43* 46*   CREA 1.16 1.35*   CA 9.3 9.3   K 3.8 4.0    141   * 111   CO2 23 23   * 114*         Bharati Chavarria MD

## 2022-09-20 NOTE — PROGRESS NOTES
Pt is out of the Hospital at at [facility] for  Radiation treatment. Pt left in stable condition.

## 2022-09-20 NOTE — PROGRESS NOTES
[facility]    Patient: Bossman Michele Age: 71 y.o. : 1950 MR#: [id number] SSN: [id number]  Date: 2022     Subjective/24-hour events:     Patient lying in the bed, no new complaints. Patient denies any new pain, no abdominal pain, no nausea or vomiting. She denies any chest pain, no chest tightness, no shortness of breath. Overnight patient remained slightly tachycardic  Telemetry monitoring shows sinus tachycardia, no atrial fibrillation or flutter    Assessment:   Obstructive uropathy with bilateral hydronephrosis status post bilateral PCN placement 2022  Hematuria, improved  Acute blood loss anemia  WILBUR secondary to obstructive uropathy  Iron deficiency anemia  Uterine mass, suspect neoplastic  Hypokalemia  Hyponatremia, improved  Morbid obesity, BMI 41.54  Mild sinus tachycardia  Bilateral lower extremity edema    Plan:   TSH and free T4 normal, EKG sinus tachycardia  Will increase metoprolol, discontinue hydralazine  We will get lower extremity duplex to rule out DVT  Hemoglobin stable posttransfusion  Patient hemodynamically stable  Continue subcu heparin  monitor blood pressure  Bilateral nephrostomy tube draining well, no hematuria  Continue XRT per radiation oncology  Continue routine catheter/PCN care. PT/OT as able/tolerated. Disposition: SNF, currently awaiting placement    Discussed with patient at bedside, agrees with SNF placement. Discussed with case management, patient has an acceptance at [facility] but will not be excepting the patient till Friday. SNF wants patient to finish her XRT before she transferred there.     Case discussed with:  [x]Patient  []Family  [x]Nursing  [x]Case Management  DVT Prophylaxis:  []Lovenox  []Hep SQ  [x]SCDs  []Coumadin   []On Heparin gtt    Objective:   VS: Visit Vitals  /62 (BP 1 Location: Left upper arm, BP Patient Position: At rest)   Pulse (!) 106   Temp 98.2 °F (36.8 °C)   Resp 18   Ht 5' 3\" (1.6 m)   Wt 108.9 kg (240 lb 1.6 oz)   SpO2 98%   Breastfeeding No   BMI 42.53 kg/m²      Tmax/24hrs: Temp (24hrs), Av.4 °F (36.9 °C), Min:98.2 °F (36.8 °C), Max:98.6 °F (37 °C)    Intake/Output Summary (Last 24 hours) at 2022 1419  Last data filed at 2022 1333  Gross per 24 hour   Intake 340 ml   Output 1400 ml   Net -1060 ml       General:  In NAD. Nontoxic-appearing. Cardiovascular:  Regular, mildly tachy. Pulmonary:  Lungs clear bilaterally, no wheezes. GI:  Abdomen soft, NTTP. Extremities: Nonpitting edema bilateral lower extremity, no calf tenderness  Neuro:  Awake and alert. Moves extremities spontaneously.   Bilateral nephrostomy tubes with clear urine    Labs:    Recent Results (from the past 24 hour(s))   TSH 3RD GENERATION    Collection Time: 22  3:00 AM   Result Value Ref Range    TSH 2.57 0.36 - 3.74 uIU/mL   T4, FREE    Collection Time: 22  3:00 AM   Result Value Ref Range    T4, Free 1.5 0.7 - 1.5 NG/DL   METABOLIC PANEL, BASIC    Collection Time: 22  3:00 AM   Result Value Ref Range    Sodium 143 136 - 145 mmol/L    Potassium 3.8 3.5 - 5.5 mmol/L    Chloride 112 (H) 100 - 111 mmol/L    CO2 23 21 - 32 mmol/L    Anion gap 8 3.0 - 18 mmol/L    Glucose 108 (H) 74 - 99 mg/dL    BUN 43 (H) 7.0 - 18 MG/DL    Creatinine 1.16 0.6 - 1.3 MG/DL    BUN/Creatinine ratio 37 (H) 12 - 20      GFR est AA 56 (L) >60 ml/min/1.73m2    GFR est non-AA 46 (L) >60 ml/min/1.73m2    Calcium 9.3 8.5 - 10.1 MG/DL   HGB & HCT    Collection Time: 22  3:00 AM   Result Value Ref Range    HGB 7.2 (L) 12.0 - 16.0 g/dL    HCT 21.8 (L) 35.0 - 45.0 %   EKG, 12 LEAD, INITIAL    Collection Time: 22 10:22 AM   Result Value Ref Range    Ventricular Rate 102 BPM    Atrial Rate 102 BPM    P-R Interval 168 ms    QRS Duration 76 ms    Q-T Interval 300 ms    QTC Calculation (Bezet) 391 ms    Calculated P Axis 32 degrees    Calculated R Axis -19 degrees    Calculated T Axis 76 degrees Diagnosis       Sinus tachycardia  Voltage criteria for left ventricular hypertrophy  Possible Lateral infarct (cited on or before 01-SEP-2022)  Inferior infarct (cited on or before 01-SEP-2022)  Abnormal ECG  When compared with ECG of 01-SEP-2022 18:56,  QRS duration has decreased  Questionable change in initial forces of Lateral leads  QT has shortened  Confirmed by Francy Miner ([id number]) on 9/20/2022 1:01:23 PM           Signed By: Vito Contreras MD     September 20, 2022

## 2022-09-20 NOTE — PROGRESS NOTES
Problem: Pressure Injury - Risk of  Goal: *Prevention of pressure injury  Description: Document Geoffrey Scale and appropriate interventions in the flowsheet. Outcome: Progressing Towards Goal  Note: Pressure Injury Interventions:  Sensory Interventions: Assess changes in LOC    Moisture Interventions: Absorbent underpads    Activity Interventions: Pressure redistribution bed/mattress(bed type)    Mobility Interventions: HOB 30 degrees or less    Nutrition Interventions: Document food/fluid/supplement intake    Friction and Shear Interventions: HOB 30 degrees or less                Problem: Patient Education: Go to Patient Education Activity  Goal: Patient/Family Education  Outcome: Progressing Towards Goal     Problem: Falls - Risk of  Goal: *Absence of Falls  Description: Document Joan Fall Risk and appropriate interventions in the flowsheet.   Outcome: Progressing Towards Goal  Note: Fall Risk Interventions:  Mobility Interventions: Bed/chair exit alarm         Medication Interventions: Bed/chair exit alarm    Elimination Interventions: Call light in reach

## 2022-09-20 NOTE — PROGRESS NOTES
09/20/22 0901   Pain 1   Pain Scale 1 Numeric (0 - 10)   Pain Intensity 1 9   Patient Stated Pain Goal 0   Pain Reassessment 1 Patient resting w/respiratory rate greater than 10   Pain Onset 1 chronic   Pain Location 1 Abdomen   Pain Orientation 1 Anterior; Lower   Pain Description 1 Aching;Cramping   Pain Intervention(s) 1 Medication (see MAR)   Medicated with PRN Oxycodone. Pt remains alert.

## 2022-09-20 NOTE — PROGRESS NOTES
CHID[facility] on radiation for SNF. Discharge plan is for patient to go Friday after radiation (11:30-2:30 pm) to SNF [address] [facility].              Duncan Soto, RN  Case Management [phone]

## 2022-09-20 NOTE — PROGRESS NOTES
Pt received in bed. Complaining of pain in legs and general pain. No s/s of any distress noted. Call bell within reach. Bed is in the lowest position. Will continue to monitor.

## 2022-09-21 NOTE — PROGRESS NOTES
Bedside and Verbal shift change report given to [address] (oncoming nurse) by Marquita Pearce RN (offgoing nurse). Report included the following information SBAR, Kardex, Intake/Output, MAR, and Recent Results.

## 2022-09-21 NOTE — WOUND CARE
Physical Exam  Room 473: focused wound assess    Left & right buttocks, fleshy part of skin, not located over bony prominences, partial thickness skin damage & loss, these are friction injuries exacerbated by frequent incontinence of stool, periwound hyperpigmentation noted. Sacrum is intact with silicone heart dressing in use for prevention. Both heels are intact & floated off pillow. Bilateral feet noted to be edematous. Wound care orders to left & right buttocks: unit nursing staff to apply Vitamin A&D oint TID & after stooling. Discussed with primary nurse Jerry Palomo RN. Will turn over care to nursing staff at this time.   Izabella Bran BSN, RN, [facility] [place], [address]

## 2022-09-21 NOTE — PROGRESS NOTES
[facility]    Patient: Lynette Galindo Age: 71 y.o. : 1950 MR#: [id number] SSN: [id number]  Date: 2022     Subjective/24-hour events:     Patient lying in the bed, no new complaints. Patient denies any new pain, no abdominal pain, no nausea or vomiting. She denies any chest pain, no chest tightness, no shortness of breath. RN called me this morning that blood pressure lower side and also tachycardic. BP responded well with IV fluid bolus. 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  Hypokalemia  Hyponatremia, improved  Morbid obesity, BMI 41.54  Mild sinus tachycardia  Bilateral lower extremity edema    Plan:   Blood pressure better post IV fluid bolus  We will also give monitor blood transfusion, monitor H&H  Awaiting lower extremity duplex to rule out DVT  No signs of bleeding  Continue subcu heparin  monitor blood pressure  Bilateral nephrostomy tube draining well, no hematuria  Continue XRT per radiation oncology  Discussed with oncology, repeat CT in 3 to 4 weeks after XRT to reevaluate tumor and follow-up with outpatient oncology  Continue routine catheter/PCN care. PT/OT as able/tolerated. Disposition: SNF, currently awaiting placement    Discussed with patient at bedside, agrees with SNF placement. Discussed with patient's son over the phone and explained in detail about medical plan care.   Patient's son understood and agreed with plan    Case discussed with:  [x]Patient  []Family  [x]Nursing  [x]Case Management  DVT Prophylaxis:  []Lovenox  [x]Hep SQ  [x]SCDs  []Coumadin   []On Heparin gtt    Objective:   VS: Visit Vitals  /68 Pulse (!) 110   Temp 98.6 °F (37 °C)   Resp 20   Ht 5' 3\" (1.6 m)   Wt 112.6 kg (248 lb 4.8 oz)   SpO2 95%   Breastfeeding No   BMI 43.98 kg/m²      Tmax/24hrs: Temp (24hrs), Av.1 °F (37.3 °C), Min:98.5 °F (36.9 °C), Max:100 °F (37.8 °C)    Intake/Output Summary (Last 24 hours) at 2022 1907  Last data filed at 2022 1519  Gross per 24 hour   Intake 480 ml   Output 775 ml   Net -295 ml       General:  In NAD. Nontoxic-appearing. Cardiovascular:  Regular, mildly tachy. Pulmonary:  Lungs clear bilaterally, no wheezes. GI:  Abdomen soft, NTTP. Extremities: Nonpitting edema bilateral lower extremity, no calf tenderness  Neuro:  Awake and alert. Moves extremities spontaneously.   Bilateral nephrostomy tubes with clear urine    Labs:    Recent Results (from the past 24 hour(s))   METABOLIC PANEL, BASIC    Collection Time: 22  5:55 AM   Result Value Ref Range    Sodium 141 136 - 145 mmol/L    Potassium 4.0 3.5 - 5.5 mmol/L    Chloride 111 100 - 111 mmol/L    CO2 21 21 - 32 mmol/L    Anion gap 9 3.0 - 18 mmol/L    Glucose 164 (H) 74 - 99 mg/dL    BUN 48 (H) 7.0 - 18 MG/DL    Creatinine 1.44 (H) 0.6 - 1.3 MG/DL    BUN/Creatinine ratio 33 (H) 12 - 20      GFR est AA 43 (L) >60 ml/min/1.73m2    GFR est non-AA 36 (L) >60 ml/min/1.73m2    Calcium 9.3 8.5 - 10.1 MG/DL   CBC WITH AUTOMATED DIFF    Collection Time: 22  5:55 AM   Result Value Ref Range    WBC 13.2 4.6 - 13.2 K/uL    RBC 2.76 (L) 4.20 - 5.30 M/uL    HGB 6.9 (L) 12.0 - 16.0 g/dL    HCT 20.6 (L) 35.0 - 45.0 %    MCV 74.6 (L) 78.0 - 100.0 FL    MCH 25.0 24.0 - 34.0 PG    MCHC 33.5 31.0 - 37.0 g/dL    RDW 25.2 (H) 11.6 - 14.5 %    PLATELET 660 420 - 191 K/uL    MPV 10.6 9.2 - 11.8 FL    NRBC 0.3 (H) 0  WBC    ABSOLUTE NRBC 0.04 (H) 0.00 - 0.01 K/uL    NEUTROPHILS 94 (H) 40 - 73 %    BAND NEUTROPHILS 1 %    LYMPHOCYTES 4 (L) 21 - 52 %    MONOCYTES 1 (L) 3 - 10 %    EOSINOPHILS 0 0 - 5 %    BASOPHILS 0 0 - 2 %    IMMATURE GRANULOCYTES 0 0.0 - 0.5 %    ABS. NEUTROPHILS 12.6 (H) 1.8 - 8.0 K/UL    ABS. LYMPHOCYTES 0.5 (L) 0.9 - 3.6 K/UL    ABS. MONOCYTES 0.1 0.05 - 1.2 K/UL    ABS. EOSINOPHILS 0.0 0.0 - 0.4 K/UL    ABS. BASOPHILS 0.0 0.0 - 0.1 K/UL    ABS. IMM. GRANS. 0.0 0.00 - 0.04 K/UL    DF MANUAL      PLATELET COMMENTS ADEQUATE PLATELETS      RBC COMMENTS ANISOCYTOSIS  1+        RBC COMMENTS POLYCHROMASIA  1+        RBC COMMENTS TARGET CELLS  2+       PHOSPHORUS    Collection Time: 09/21/22  5:55 AM   Result Value Ref Range    Phosphorus 3.7 2.5 - 4.9 MG/DL   HGB & HCT    Collection Time: 09/21/22  9:54 AM   Result Value Ref Range    HGB 6.2 (L) 12.0 - 16.0 g/dL    HCT 18.6 (L) 35.0 - 45.0 %   TYPE & SCREEN    Collection Time: 09/21/22  9:54 AM   Result Value Ref Range    Crossmatch Expiration 09/24/2022,3639     ABO/Rh(D) Derotha Agreste POSITIVE     Antibody screen NEG     CALLED TO: MARIJA IN [facility] ON [id number] AT 8163 TO [facility]     Unit number [id number]     Blood component type  LR     Unit division 00     Status of unit ISSUED     Crossmatch result Compatible    RBC, ALLOCATE    Collection Time: 09/21/22 10:45 AM   Result Value Ref Range    HISTORY CHECKED?  Historical check performed          Signed By: Joon Feldman MD     September 21, 2022

## 2022-09-21 NOTE — PROGRESS NOTES
#1 Uterine mass, b/l hydro, b/l neph tubes     Called by nursing. One of tubes doesn't flush. Only 50cc output over last 12 hours. Recommend NPO after midnight and IR interrogation tomorrow for neph tube exchange.

## 2022-09-21 NOTE — PROGRESS NOTES
Problem: Self Care Deficits Care Plan (Adult)  Goal: *Acute Goals and Plan of Care (Insert Text)  Description: Occupational Therapy Goals  Initiated 9/2/2022 within 7 day(s), re-evaluation 9/9/2022 and again 9/19/22 pt making slow progress towards goals. Goals downgraded to fit within scope of attainment. 1.  Patient will perform grooming with supervision/set-up sitting at edge of bed for >5 min with Good balance. 2.  Patient will perform bathing with minimal assistance. 3.  Patient will perform lower body dressing with supervision/set-up using AE prn.  4.  Patient will perform toilet transfers with supervision/set-up. 5.  Patient will perform all aspects of toileting with minimal assistance. 6.  Patient will participate in upper extremity therapeutic exercise/activities with supervision/set-up for 8 minutes to improve endurance and UB strength needed for ADLs    7. Patient will utilize energy conservation techniques during functional activities with verbal cues. Prior Level of Function: Pt reports being independent with ADLs and functional mobility, and was working prior to 2 weeks ago. Since then pt has not been in the shower and requires assistance for LB ADLs. Outcome: Progressing Towards Goal   OCCUPATIONAL THERAPY TREATMENT    Patient: Crystal Winter (10 y.o. female)  Date: 9/21/2022  Diagnosis: Bladder outlet obstruction [N32.0]  WILBUR (acute kidney injury) ([facility] [address] [N17.9]  Uterine mass [N85.8] <principal problem not specified>  Procedure(s) (LRB):  CYSTOSCOPY, insertion of jacinto catheter (N/A) 17 Days Post-Op  Precautions: Fall, Skin  PLOF: Pt reports being independent with ADLs and functional mobility, and was working prior to 2 weeks ago. Since then pt has not been in the shower and requires assistance for LB ADLs. Chart, occupational therapy assessment, plan of care, and goals were reviewed. ASSESSMENT:  Pt presented supine in bed upon entry and agreeable to work with OT.  Pt reports she feels weak this morning. She was found to be soiled from continued loose BM x 2 episodes. She required MOD A rolling L/R to change soiled chux pads and TOTAL A for doug area care. She required MAX A x 2 to scoot towards [facility] for optimal bed positioning. Pt declining EOB activity at this time despite encouragement and education. She agreed to performed simple grooming tasks @ bed level w/ S/U. Pt educated on importance of performing PLB technique to prevent SOB, pain mgmt, and for optimal oxygenation, she verbalized understanding. Pt positioning for comfort and left with HOB elevated, RN present, and all needs left within reach. Progression toward goals:  []          Improving appropriately and progressing toward goals  [x]          Improving slowly and progressing toward goals  []          Not making progress toward goals and plan of care will be adjusted     PLAN:  Patient continues to benefit from skilled intervention to address the above impairments. Continue treatment per established plan of care. Further Equipment Recommendations for Discharge:  [facility], Shower chair. RW    AMPAC: Based on an AM-PAC score of 14/24 and their current ADL deficits; it is recommended that the patient have 3-5 sessions per week of Occupational Therapy at d/c to increase the patient's independence. This AMPAC score should be considered in conjunction with interdisciplinary team recommendations to determine the most appropriate discharge setting. Patient's social support, diagnosis, medical stability, and prior level of function should also be taken into consideration. SUBJECTIVE:   Patient stated I am so weak today.     OBJECTIVE DATA SUMMARY:   Cognitive/Behavioral Status:  Neurologic State: Alert  Orientation Level: Oriented X4  Cognition: Appropriate decision making, Appropriate for age attention/concentration, Appropriate safety awareness, Follows commands  Safety/Judgement: Fall prevention    Functional Mobility and Transfers for ADLs:   Bed Mobility:  Rolling: Moderate assistance        Scooting: Maximum assistance;Assist x2       Balance:  Sitting: Impaired; With support    ADL Intervention:       Grooming  Position Performed: Other (comment) (supine in bed with HOB elevated)  Washing Face: Set-up  Brushing Teeth: Set-up         Toileting  Bowel Hygiene: Total assistance (dependent) (supine)      Pain:  Pain level pre-treatment: 0/10   Pain level post-treatment: 0/10    Activity Tolerance:    Fair   Please refer to the flowsheet for vital signs taken during this treatment. After treatment:   []  Patient left in no apparent distress sitting up in chair  [x]  Patient left in no apparent distress in bed  [x]  Call bell left within reach  [x]  Nursing notified  []  Caregiver present  [x]  Bed alarm activated    COMMUNICATION/EDUCATION:   [x] Role of Occupational Therapy in the acute care setting  [] Home safety education was provided and the patient/caregiver indicated understanding. [x] Patient/family have participated as able in working towards goals and plan of care. [x] Patient/family agree to work toward stated goals and plan of care. [] Patient understands intent and goals of therapy, but is neutral about his/her participation. [] Patient is unable to participate in goal setting and plan of care. Thank you for this referral.  JAMSHID Hopkins  Time Calculation: 39 mins    Sarah De Jesus AM-PAC® Daily Activity Inpatient Short Form (6-Clicks)    How much HELP from another person does the patient currently need    (If the patient hasn't done an activity recently, how much help from another person do you think he/she would need if he/she tried?)   Total (Total A or Dep)   A Lot  (Mod to Max A)   A Little (Sup or Min A)   None (Mod I to I)   Putting on and taking off regular lower body clothing? [] 1 [x] 2 [] 3 [] 4   2. Bathing (including washing, rinsing,      drying)? [] 1 [x] 2 [] 3 [] 4   3. Toileting, which includes using toilet, bedpan or urinal?   [x] 1 [] 2 [] 3 [] 4   4. Putting on and taking off regular upper body clothing? [] 1 [] 2 [x] 3 [] 4   5. Taking care of personal grooming such as brushing teeth? [] 1 [] 2 [x] 3 [] 4   6. Eating meals?    [] 1 [] 2 [x] 3 [] 4

## 2022-09-21 NOTE — PROGRESS NOTES
09/21/22 0750   Vital Signs   Temp 99.4 °F (37.4 °C)   Temp Source Oral   Pulse (Heart Rate) (!) 123   Heart Rate Source Brachial   Cardiac Rhythm Sinus Tachy   Resp Rate 22   O2 Sat (%) 96 %   Level of Consciousness 0   BP (!) 88/44   MAP (Calculated) (!) 59   BP 1 Method Automatic   BP 1 Location Left upper arm   BP Patient Position At rest;Lying   MEWS Score 5   Patient Observation   Repositioned Head of bed elevated (degrees)   Patient Turned Turns self   Observations vitals   Ambulate No (Comment)   Activity In bed;Resting quietly     Pt H/H 6.9/20.6, MEWS score 5 related to elevated HR and low BP. MD made aware. Orders received.

## 2022-09-21 NOTE — PROGRESS NOTES
Comprehensive Nutrition Assessment    Type and Reason for Visit: Reassess, Wound    Nutrition Recommendations/Plan:   Continue daily MVI with iron supplement  Continue po diet and nutrition supplements; encourage/ monitor po intake of meals. Continue current nutrition interventions  Recommended considering starting appetite stimulant versus NGT placement/ initiation of supplemental TF - will not start either as both not medically appropriate per MD.     Malnutrition Assessment:  Malnutrition Status:  Mild malnutrition (09/16/22 1246)    Context:  Acute illness     Findings of the 6 clinical characteristics of malnutrition:   Energy Intake:  75% or less of est energy req for 7 or more days  Weight Loss:  No significant weight loss     Body Fat Loss:  No significant body fat loss,     Muscle Mass Loss:  No significant muscle mass loss,    Fluid Accumulation:  No significant fluid accumulation,     Strength:  Not performed     Nutrition Assessment:    Pt lethargic at time of visit. Reported poor meal intake (noted poor/ no po intake per chart documentation). Tolerating diet. Fair intake of gelatein supplement; pt asking what it is; explained to her again that it is a protein supplement, she verbalized understanding. Pt has previously declined to receive Ensure Enlive (she does not tolerate it) or Ensure Pudding or Magic cup (she does not tolerate pudding or ice cream). Encouraged pt to increase po intake of meals and supplements. Pt discussed with MD; this Collette South recommended starting pt on appetite stimulant versus considering NGT placement and initiating supplemental tube feedings. Per MD, appetite stimulant and NGT/ initiation of TF, both are not medically appropriate. MD recommending continuing with oral nutrition supplement and daily MVI. Nutrition Related Findings:     9/20. Pertinent meds:  pepcid, bio K plus, pain medication, bowel regimen, antibiotic, thera-m with iron.  Wound Type: Pressure injury, Stage II      Current Nutrition Intake & Therapies:  Average Meal Intake: 1-25%  Average Supplement Intake: 26-50%  ADULT ORAL NUTRITION SUPPLEMENT Breakfast, Dinner; Other Supplement; Gelatein  ADULT DIET Regular; NO spicy food, tea, milk, ice cream, pudding. Pt lactose intolerant. LIKES Mixed Fruit cup. Please offer additional seasonings to pt. Anthropometric Measures:  Height: 5' 3\" (160 cm)  Ideal Body Weight (IBW): 115 lbs (52 kg)  Admission Body Weight: 220 lb 0.3 oz  Current Body Wt:  112.6 kg (248 lb 3.8 oz), 215.9 % IBW.  Bed scale  Current BMI (kg/m2): 44  Usual Body Weight: 101.2 kg (223 lb)  % Weight Change (Calculated): 5.2  Weight Adjustment: No adjustment  BMI Category: Obese class 3 (BMI 40.0 or greater)    Estimated Daily Nutrient Needs:  Energy Requirements Based On: Kcal/kg (wt x20-22)  Weight Used for Energy Requirements: Current  Energy (kcal/day): 4600-4720  Weight Used for Protein Requirements: Current  Protein (g/day):  (wt x0.8-1.1)  Method Used for Fluid Requirements: Standard renal  Fluid (ml/day): 500 mL + total output (pt with impaired renal function)    Nutrition Diagnosis:   Inadequate oral intake related to early satiety as evidenced by intake 26-50%  Increased nutrient needs related to increased demand for energy/nutrients as evidenced by wounds    Nutrition Interventions:   Food and/or Nutrient Delivery: Continue current diet, Mineral supplement, Vitamin supplement, Continue oral nutrition supplement  Nutrition Education/Counseling: Education not indicated  Coordination of Nutrition Care: Continue to monitor while inpatient  Plan of Care discussed with: patient and MD    Goals:  Previous Goal Met: Progress towards goal(s) declining  Goals: Meet at least 75% of estimated needs, by next RD assessment       Nutrition Monitoring and Evaluation:   Behavioral-Environmental Outcomes: None identified  Food/Nutrient Intake Outcomes: Food and nutrient intake, Supplement intake, Vitamin/mineral intake  Physical Signs/Symptoms Outcomes: Biochemical data, Meal time behavior, Nutrition focused physical findings, Skin, Weight    Discharge Planning:    Continue oral nutrition supplement, Continue current diet    Yang Adkins RD  Contact: [phone]

## 2022-09-21 NOTE — PROGRESS NOTES
CM completed UAI/LTSS for patient, on [employer], Status is Successfully Submitted, tracking # [id number]. Jarad Weaver and asked if he can review and approve UAI/LTSS when possible.                Valencia Humphrey, RN  Case Management [phone]

## 2022-09-21 NOTE — PROGRESS NOTES
Problem: Pressure Injury - Risk of  Goal: *Prevention of pressure injury  Description: Document Geoffrey Scale and appropriate interventions in the flowsheet. Outcome: Progressing Towards Goal  Note: Pressure Injury Interventions:  Sensory Interventions: Assess changes in LOC, Keep linens dry and wrinkle-free, Maintain/enhance activity level, Minimize linen layers, Check visual cues for pain    Moisture Interventions: Minimize layers, Moisture barrier, Absorbent underpads    Activity Interventions: Pressure redistribution bed/mattress(bed type), PT/OT evaluation    Mobility Interventions: HOB 30 degrees or less, Float heels, Pressure redistribution bed/mattress (bed type), PT/OT evaluation, Suspension boots    Nutrition Interventions: Document food/fluid/supplement intake    Friction and Shear Interventions: HOB 30 degrees or less, Minimize layers       Problem: Falls - Risk of  Goal: *Absence of Falls  Description: Document Joan Fall Risk and appropriate interventions in the flowsheet.   Outcome: Progressing Towards Goal  Note: Fall Risk Interventions:  Mobility Interventions: Bed/chair exit alarm, Patient to call before getting OOB    Medication Interventions: Bed/chair exit alarm, Patient to call before getting OOB    Elimination Interventions: Call light in reach, Bed/chair exit alarm, Toileting schedule/hourly rounds, Patient to call for help with toileting needs      Problem: Pain  Goal: *Control of Pain  Outcome: Progressing Towards Goal

## 2022-09-22 NOTE — PROGRESS NOTES
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 Cotter placed 9/2 with continued rise in Cr to 6.57  Cystoscopy 9/4 unable to identify ureteral orifices. Bilateral PCNs placed 9/4. S/p R PCN exchange on 9/15/22     - Enlargement of uterine mass, concerning for gynecologic malignancy  Started palliative XRT per rad/onc on Mon 9/12/22              Neg UA 9/1/22              WBC 12<13.2     - WILBUR-resolved              Creat 1.22<1.44>1.16<1.35<1.66              Baseline 0.7 in 7/2022    - Gross Hematuria 2/2 Radiation Cystitis?- resolved   BARRETT 9/13: No clot    Hgb 7.6>6.2<6.9 Hgb on   presentation 8.4     PLAN:    Appreciate overall management per medicine. Per nursing staff, no hematuria from urethra. Obtain repeat UA/UCX to rule out infection as source of gross hematuria- still not done yet. Continue bilateral PCNs. IR consulted for PCN exchange. Per Dr. Markus Rizzo, recommends PCNU placement. IR has decided to upsize instead. Keep NPO   Hold all AC  Will need PCN exchange every 3 months. PCN need to be flushed with 10 cc NS daily ATLEAST. Continue to hold all AC until urine clear for 24 hours. Pt to f/up outpatient to arrange PCN exchange in 3 months. Will arrange. Recommend Gyn/Onc evaluation for large uterine mass. Will follow. Erasmo Hanson PA-C  [facility]  Available MBoogie Marquis  Pager: [phone]     Attestation by Shyann Jacome.  Michael Betancourt MD    I agree with the findings as documented, and participated in the development of the care plan, any changes were made to the note as needed and any additional comments are below:        Kiel Ramírez. Roberto Enriquez MD  [facility] (pager)               Subjective:     Daily Progress Note: 2022 5:06 PM    Tatyana Molina is MARLNY, unable to see. Tmax 110.2 with tachycardia. Objective:     Visit Vitals  BP (!) 156/85 (BP 1 Location: Left upper arm, BP Patient Position: At rest)   Pulse (!) 115   Temp 99.5 °F (37.5 °C)   Resp 18   Ht 5' 3\" (1.6 m)   Wt 114.7 kg (252 lb 14.4 oz)   SpO2 93%   Breastfeeding No   BMI 44.80 kg/m²        Temp (24hrs), Av.4 °F (37.4 °C), Min:98.5 °F (36.9 °C), Max:100.2 °F (37.9 °C)      Intake and Output:   1901 -  0700  In: 1002.5 [P.O.:720]  Out: 3906 [Urine:1525]  No intake/output data recorded. Unable to examine MARLYN. Data Review:    KUB 22:    Retracted and laterally displaced right PCN in comparison to fluoroscopic images  2022. The PCN remains within right upper quadrant. BARRETT 22: IMPRESSION     1. Mild right hydronephrosis, improved from prior. 2. Improved left hydronephrosis with residual pelviectasis. 3. Right renal cyst.    Recent Results (from the past 24 hour(s))   RBC, ALLOCATE    Collection Time: 22 10:45 AM   Result Value Ref Range    HISTORY CHECKED?  Historical check performed    METABOLIC PANEL, BASIC    Collection Time: 22  4:55 AM   Result Value Ref Range    Sodium 142 136 - 145 mmol/L    Potassium 3.9 3.5 - 5.5 mmol/L    Chloride 111 100 - 111 mmol/L    CO2 22 21 - 32 mmol/L    Anion gap 9 3.0 - 18 mmol/L    Glucose 115 (H) 74 - 99 mg/dL    BUN 45 (H) 7.0 - 18 MG/DL    Creatinine 1.22 0.6 - 1.3 MG/DL    BUN/Creatinine ratio 37 (H) 12 - 20      GFR est AA 53 (L) >60 ml/min/1.73m2    GFR est non-AA 43 (L) >60 ml/min/1.73m2    Calcium 8.9 8.5 - 10.1 MG/DL   CBC WITH AUTOMATED DIFF    Collection Time: 22  4:55 AM   Result Value Ref Range WBC 12.0 4.6 - 13.2 K/uL    RBC 2.89 (L) 4.20 - 5.30 M/uL    HGB 7.6 (L) 12.0 - 16.0 g/dL    HCT 22.1 (L) 35.0 - 45.0 %    MCV 76.5 (L) 78.0 - 100.0 FL    MCH 26.3 24.0 - 34.0 PG    MCHC 34.4 31.0 - 37.0 g/dL    RDW 23.9 (H) 11.6 - 14.5 %    PLATELET 177 316 - 303 K/uL    MPV 9.9 9.2 - 11.8 FL    NRBC 0.3 (H) 0  WBC    ABSOLUTE NRBC 0.03 (H) 0.00 - 0.01 K/uL    NEUTROPHILS 92 (H) 40 - 73 %    BAND NEUTROPHILS 1 0 - 5 %    LYMPHOCYTES 1 (L) 21 - 52 %    MONOCYTES 5 3 - 10 %    EOSINOPHILS 0 0 - 5 %    BASOPHILS 0 0 - 2 %    METAMYELOCYTES 1 (H) 0 %    IMMATURE GRANULOCYTES 0 %    ABS. NEUTROPHILS 11.2 (H) 1.8 - 8.0 K/UL    ABS. LYMPHOCYTES 0.1 (L) 0.9 - 3.6 K/UL    ABS. MONOCYTES 0.6 0.05 - 1.2 K/UL    ABS. EOSINOPHILS 0.0 0.0 - 0.4 K/UL    ABS. BASOPHILS 0.0 0.0 - 0.1 K/UL    ABS. IMM.  GRANS. 0.0 K/UL    DF MANUAL      PLATELET COMMENTS ADEQUATE PLATELETS      RBC COMMENTS TARGET CELLS  1+        RBC COMMENTS ANISOCYTOSIS  1+        RBC COMMENTS MACROCYTOSIS  1+       GLUCOSE, POC    Collection Time: 09/22/22  8:27 AM   Result Value Ref Range    Glucose (POC) 135 (H) 70 - 110 mg/dL         Signed By:     Crispin Sandy MD   Urologic Oncologist  [facility]  Professor of Urology  [facility]  Office [phone] Ext [phone]                          September 22, 2022

## 2022-09-22 NOTE — PROGRESS NOTES
CM talked with pt's son, Terese Bush, to discuss pt's transitional plan of care of SNF . Pt's son stated that he is in agreement of pt transitioning to LTC, [facility] after pt receives radiation. Pt's son stated pt is to receive last radiation treatment on 9/23/22. CHIDI talked with Rico Millard with [facility] to discuss pt's transitional plan of care of LTC to [facility]. Rico Millard with [facility] stated can be accepted into the facility on 9/23/22. CM will provide all parties with pt's dc transport details when received.          Radha Ramirez, MSW  Care Manager

## 2022-09-22 NOTE — PROCEDURES
RADIOLOGY POST PROCEDURE NOTE     September 22, 2022       12:33 PM     Preoperative Diagnosis:   Obstructive uropathy    Postoperative Diagnosis:  Same. :  ARSALAN Hopkins    Assistant:  None. Type of Anesthesia: 1% plain lidocaine, moderate sedation    Procedure/Description:  Image guided left nephrostomy exchange and upsize    Findings:  Left nephrostomy obstructed with sediment, but still within the left renal pelvis. Exchanged over wire and upsized to 10 Fr left nephrostomy    Estimated blood Loss:  Minimal    Specimen Removed:   yes    Blood transfusions:  None. Implants:  None.     Complications: None    Condition: Stable    Blood thinning medications: OK to resume in 24 hours unless otherwise indicated    Discharge Plan:  continue present therapy    ARSALAN Bhat

## 2022-09-22 NOTE — PROGRESS NOTES
[facility]  Progress Note    Patient: Jimena Lechuga Age: 71 y.o. : 1950 MR#: [id number] SSN: [id number]  Date: 2022     Subjective/24-hour events:     Patient lying in the bed, no new complaints. Patient denies any new pain, no abdominal pain, no nausea or vomiting. She denies any chest pain, no chest tightness, no shortness of breath. Yesterday patient's nephrostomy tube came off, unclear if patient pulled accidentally. Assessment:   Obstructive uropathy with bilateral hydronephrosis status post bilateral PCN placement 2022  Hematuria, improved  Acute blood loss anemia, hemoglobin dropped again  Mild hypotension  WILBUR secondary to obstructive uropathy  Iron deficiency anemia  Uterine mass, suspect neoplastic  Hypokalemia  Hyponatremia, improved  Morbid obesity, BMI 41.54  Mild sinus tachycardia, suspect physiological.  Thyroid function normal  Bilateral lower extremity edema    Plan:   BP trending up, will increase metoprolol  We will also get an echocardiogram given her tachycardia. IR replaced nephrostomy tube today  H&H stable posttransfusion  Lower extremity duplex negative for DVT  No signs of bleeding  Continue subcu heparin  Continue XRT per radiation oncology, tomorrow last day  Discussed with oncology, repeat CT in 3 to 4 weeks after XRT to reevaluate tumor and oncology input noted  Continue routine catheter/PCN care. PT/OT as able/tolerated. Disposition: SNF tomorrow after XRT  L TSS signed    Discussed with patient at bedside, agrees with SNF placement. Discussed with patient's son yesterday about transfer to SNF tomorrow after XRT, he agrees with the plan    Case discussed with:  [x]Patient  []Family  [x]Nursing  [x]Case Management  DVT Prophylaxis:  []Lovenox  [x]Hep SQ  [x]SCDs  []Coumadin   []On Heparin gtt    Objective:   VS: Visit Vitals  BP (!) 147/78 (BP 1 Location: Left lower arm, BP Patient Position: Semi fowlers; Lying) Pulse (!) 122   Temp 99.6 °F (37.6 °C)   Resp 20   Ht 5' 3\" (1.6 m)   Wt 114.7 kg (252 lb 14.4 oz)   SpO2 97%   Breastfeeding No   BMI 44.80 kg/m²      Tmax/24hrs: Temp (24hrs), Av.5 °F (37.5 °C), Min:98.6 °F (37 °C), Max:100.2 °F (37.9 °C)    Intake/Output Summary (Last 24 hours) at 2022 1724  Last data filed at 2022 1558  Gross per 24 hour   Intake 1122.5 ml   Output 750 ml   Net 372.5 ml       General:  In NAD. Nontoxic-appearing. Cardiovascular:  Regular, mildly tachy. Pulmonary:  Lungs clear bilaterally, no wheezes. GI:  Abdomen soft, NTTP. Extremities: Nonpitting edema bilateral lower extremity, no calf tenderness  Neuro:  Awake and alert. Moves extremities spontaneously.   Bilateral nephrostomy tubes with clear urine    Labs:    Recent Results (from the past 24 hour(s))   METABOLIC PANEL, BASIC    Collection Time: 22  4:55 AM   Result Value Ref Range    Sodium 142 136 - 145 mmol/L    Potassium 3.9 3.5 - 5.5 mmol/L    Chloride 111 100 - 111 mmol/L    CO2 22 21 - 32 mmol/L    Anion gap 9 3.0 - 18 mmol/L    Glucose 115 (H) 74 - 99 mg/dL    BUN 45 (H) 7.0 - 18 MG/DL    Creatinine 1.22 0.6 - 1.3 MG/DL    BUN/Creatinine ratio 37 (H) 12 - 20      GFR est AA 53 (L) >60 ml/min/1.73m2    GFR est non-AA 43 (L) >60 ml/min/1.73m2    Calcium 8.9 8.5 - 10.1 MG/DL   CBC WITH AUTOMATED DIFF    Collection Time: 22  4:55 AM   Result Value Ref Range    WBC 12.0 4.6 - 13.2 K/uL    RBC 2.89 (L) 4.20 - 5.30 M/uL    HGB 7.6 (L) 12.0 - 16.0 g/dL    HCT 22.1 (L) 35.0 - 45.0 %    MCV 76.5 (L) 78.0 - 100.0 FL    MCH 26.3 24.0 - 34.0 PG    MCHC 34.4 31.0 - 37.0 g/dL    RDW 23.9 (H) 11.6 - 14.5 %    PLATELET 675 369 - 888 K/uL    MPV 9.9 9.2 - 11.8 FL    NRBC 0.3 (H) 0  WBC    ABSOLUTE NRBC 0.03 (H) 0.00 - 0.01 K/uL    NEUTROPHILS 92 (H) 40 - 73 %    BAND NEUTROPHILS 1 0 - 5 %    LYMPHOCYTES 1 (L) 21 - 52 %    MONOCYTES 5 3 - 10 %    EOSINOPHILS 0 0 - 5 %    BASOPHILS 0 0 - 2 %    METAMYELOCYTES 1 (H) 0 %    IMMATURE GRANULOCYTES 0 %    ABS. NEUTROPHILS 11.2 (H) 1.8 - 8.0 K/UL    ABS. LYMPHOCYTES 0.1 (L) 0.9 - 3.6 K/UL    ABS. MONOCYTES 0.6 0.05 - 1.2 K/UL    ABS. EOSINOPHILS 0.0 0.0 - 0.4 K/UL    ABS. BASOPHILS 0.0 0.0 - 0.1 K/UL    ABS. IMM.  GRANS. 0.0 K/UL    DF MANUAL      PLATELET COMMENTS ADEQUATE PLATELETS      RBC COMMENTS TARGET CELLS  1+        RBC COMMENTS ANISOCYTOSIS  1+        RBC COMMENTS MACROCYTOSIS  1+       GLUCOSE, POC    Collection Time: 09/22/22  8:27 AM   Result Value Ref Range    Glucose (POC) 135 (H) 70 - 110 mg/dL         Signed By: Catherine Steele MD     September 22, 2022

## 2022-09-22 NOTE — PROGRESS NOTES
21  Pt went to BLE Duplex and L nephrostomy tube replacement procedure. [facility] transport rescheduled from 11 am to [facility] 79 pm.    [address] [place] [address] Sw called and stated unable to accommodate radiation for pt today due to time conflict. MD made aware.

## 2022-09-22 NOTE — PROGRESS NOTES
Problem: Pressure Injury - Risk of  Goal: *Prevention of pressure injury  Description: Document Geoffrey Scale and appropriate interventions in the flowsheet. Outcome: Progressing Towards Goal  Note: Pressure Injury Interventions:  Sensory Interventions: Keep linens dry and wrinkle-free, Maintain/enhance activity level, Minimize linen layers, Assess changes in LOC    Moisture Interventions: Absorbent underpads, Minimize layers    Activity Interventions: Pressure redistribution bed/mattress(bed type)    Mobility Interventions: HOB 30 degrees or less, Pressure redistribution bed/mattress (bed type)    Nutrition Interventions: Document food/fluid/supplement intake    Friction and Shear Interventions: HOB 30 degrees or less, Minimize layers       Problem: Falls - Risk of  Goal: *Absence of Falls  Description: Document Joan Fall Risk and appropriate interventions in the flowsheet.   Outcome: Progressing Towards Goal  Note: Fall Risk Interventions:  Mobility Interventions: Bed/chair exit alarm, Patient to call before getting OOB, PT Consult for mobility concerns    Mentation Interventions: Door open when patient unattended, Adequate sleep, hydration, pain control    Medication Interventions: Patient to call before getting OOB, Bed/chair exit alarm    Elimination Interventions: Call light in reach, Bed/chair exit alarm, Patient to call for help with toileting needs       Problem: Pain  Goal: *Control of Pain  Outcome: Progressing Towards Goal

## 2022-09-22 NOTE — PROGRESS NOTES
Phone: [phone]    Hematology / Oncology Progress Note            Patient: Anitra Delgado   MRN: [id number]         CSN: [id number]    YOB: 1950      Admit Date: 2022    Assessment:     Active Problems:    Uterine mass (2022)      WILBUR (acute kidney injury) ([facility] (2022)      Bladder outlet obstruction (2022)      Lobulation, spleen (2022)      Adrenal nodule ([facility] (2022)      Leukocytosis (2022)      Microcytic anemia (2022)      Hyponatremia (2022)      Uremia (2022)      Other hydronephrosis (2022)    Newly dx uterine carcinosarcoma, 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6 m)   Wt 114.7 kg (252 lb 14.4 oz)   SpO2 93%   Breastfeeding No   BMI 44.80 kg/m²             Temp (24hrs), Av.4 °F (37.4 °C), Min:98.5 °F (36.9 °C), Max:100.2 °F (37.9 °C)        Intake/Output Summary (Last 24 hours) at 2022 0839  Last data filed at 2022 0525  Gross per 24 hour   Intake 1002.5 ml   Output 1050 ml   Net -47.5 ml       Review of Systems:   Constitutional: fatigue  Eyes: negative for visual disturbance,  icterus  Ears, Nose, Mouth, Throat, and Face: negative for tinnitus, epistaxis, sore mouth and hoarseness  Respiratory: negative for cough, sputum, hemoptysis, pleurisy/chest pain or wheezing  Cardiovascular: negative for chest pain, , palpitations,  syncope, paroxysmal nocturnal dyspnea  Gastrointestinal:positive for bloating,  negative for reflux symptoms, nausea, vomiting, melena, diarrhea, constipation   Genitourinary:negative for dysuria, nocturia, urinary incontinence, hesitancy and hematuria  Endocrine: no polydipsia, no goitre  Integument: negative for rash, skin lesion(s) and pruritus  Hematologic/Lymphatic: negative for easy bruising, bleeding and lymphadenopathy  Musculoskeletal:negative for myalgias, arthralgias and bone pain  Neurological: negative for headaches, dizziness, seizures, paresthesia and weakness    Physical Exam: ECOG : 1  Constitutional: Alert, oriented, obese, laying in bed  Eyes: PERRLA, anicteric,  pallor  ENT: no palpable Lymph nodes, no mucositis, no thrush. Respiratory: bilateral air entry  Cardiovascular: S1S2 regular   Gastrointestinal: soft,  NT, PCN tubes in place  Integument:pallor  Neurological: non focal      Labs:  Recent Results (from the past 24 hour(s))   HGB & HCT    Collection Time: 09/21/22  9:54 AM   Result Value Ref Range    HGB 6.2 (L) 12.0 - 16.0 g/dL    HCT 18.6 (L) 35.0 - 45.0 %   TYPE & SCREEN    Collection Time: 09/21/22  9:54 AM   Result Value Ref Range    Crossmatch Expiration 09/24/2022,2359     ABO/Rh(D) Sheryl Stai POSITIVE     Antibody screen NEG     CALLED TO: MARIJA IN [facility] ON [id number] AT 9880 TO [facility]     Unit number [id number]     Blood component type  LR     Unit division 00     Status of unit ISSUED     Crossmatch result Compatible    RBC, ALLOCATE    Collection Time: 09/21/22 10:45 AM   Result Value Ref Range    HISTORY CHECKED?  Historical check performed    METABOLIC PANEL, BASIC    Collection Time: 09/22/22  4:55 AM   Result Value Ref Range    Sodium 142 136 - 145 mmol/L    Potassium 3.9 3.5 - 5.5 mmol/L    Chloride 111 100 - 111 mmol/L    CO2 22 21 - 32 mmol/L    Anion gap 9 3.0 - 18 mmol/L    Glucose 115 (H) 74 - 99 mg/dL    BUN 45 (H) 7.0 - 18 MG/DL    Creatinine 1.22 0.6 - 1.3 MG/DL BUN/Creatinine ratio 37 (H) 12 - 20      GFR est AA 53 (L) >60 ml/min/1.73m2    GFR est non-AA 43 (L) >60 ml/min/1.73m2    Calcium 8.9 8.5 - 10.1 MG/DL   CBC WITH AUTOMATED DIFF    Collection Time: 09/22/22  4:55 AM   Result Value Ref Range    WBC 12.0 4.6 - 13.2 K/uL    RBC 2.89 (L) 4.20 - 5.30 M/uL    HGB 7.6 (L) 12.0 - 16.0 g/dL    HCT 22.1 (L) 35.0 - 45.0 %    MCV 76.5 (L) 78.0 - 100.0 FL    MCH 26.3 24.0 - 34.0 PG    MCHC 34.4 31.0 - 37.0 g/dL    RDW 23.9 (H) 11.6 - 14.5 %    PLATELET 711 819 - 000 K/uL    MPV 9.9 9.2 - 11.8 FL    NRBC 0.3 (H) 0  WBC    ABSOLUTE NRBC 0.03 (H) 0.00 - 0.01 K/uL    NEUTROPHILS 92 (H) 40 - 73 %    BAND NEUTROPHILS 1 0 - 5 %    LYMPHOCYTES 1 (L) 21 - 52 %    MONOCYTES 5 3 - 10 %    EOSINOPHILS 0 0 - 5 %    BASOPHILS 0 0 - 2 %    METAMYELOCYTES 1 (H) 0 %    IMMATURE GRANULOCYTES 0 %    ABS. NEUTROPHILS 11.2 (H) 1.8 - 8.0 K/UL    ABS. LYMPHOCYTES 0.1 (L) 0.9 - 3.6 K/UL    ABS. MONOCYTES 0.6 0.05 - 1.2 K/UL    ABS. EOSINOPHILS 0.0 0.0 - 0.4 K/UL    ABS. BASOPHILS 0.0 0.0 - 0.1 K/UL    ABS. IMM.  GRANS. 0.0 K/UL    DF MANUAL      PLATELET COMMENTS ADEQUATE PLATELETS      RBC COMMENTS TARGET CELLS  1+        RBC COMMENTS ANISOCYTOSIS  1+        RBC COMMENTS MACROCYTOSIS  1+       GLUCOSE, POC    Collection Time: 09/22/22  8:27 AM   Result Value Ref Range    Glucose (POC) 135 (H) 70 - 110 mg/dL

## 2022-09-22 NOTE — CONSULTS
Interventional Radiology Progress Note    Patient: Vivian Trejo               Sex: female          DOA: 9/1/2022       YOB: 1950      Age:  71 y.o.        LOS:  LOS: 21 days       Assessment/Plan     Patient is POD# 7 s/p right nephrostomy placement by Dr. Lidya Menjivar and POD # 25 s/p bilateral nephrostomy placement. Minimal output and difficulty flushing the left nephrostomy now, concerns for left nephrostomy dislodgement     The patient will be brought to IR for left PCN replacement     The replaced and upsized right PCN is in good position and functioning well    NPO prior to the procedure for moderate sedation    Subjective: The patient endorses leaking from left flank procedure site. Denies flank pain    The patient denies N/V, HA, dizziness, fever, chills, abdominal pain, dyspnea    Objective:      Visit Vitals  /81 (BP 1 Location: Left upper arm, BP Patient Position: At rest)   Pulse (!) 117   Temp 99.9 °F (37.7 °C)   Resp 18   Ht 5' 3\" (1.6 m)   Wt 114.7 kg (252 lb 14.4 oz)   SpO2 97%   Breastfeeding No   BMI 44.80 kg/m²     Lab/Data Reviewed:  Crt stable  H&H stable    Recent images:  Procedure images are available    Interval history:  Left PCN does not flush, leaking urine around the drain    Physical Exam:  Constitutional: NAD. A&Ox4. Respiratory: Normal respiratory effort. Symmetrical rise and fall of chest.    Cardiovascular: Regular rate. Gastrointestinal: Soft, NT, ND.   Procedure site: urine leak from left flank, left PCN not flushing, suture intact, drain 20 cm at the skin site    Thank you,  Rogelio Madison, [place]

## 2022-09-22 NOTE — PROGRESS NOTES
Bedside and Verbal shift change report given to [facility]US[facility] RICH[facility] (oncoming nurse) by Shahriar Boggs RN (offgoing nurse). Report included the following information SBAR, Kardex, Intake/Output, MAR, and Recent Results.

## 2022-09-22 NOTE — PROGRESS NOTES
Bedside and Verbal shift change report given to [employer] (oncoming nurse) by Frandy Carrington RN  (offgoing nurse). Report included the following information SBAR, Kardex, Intake/Output, MAR, and Recent Results.

## 2022-09-23 NOTE — PROGRESS NOTES
Discharge instruction and education completed with patient and her son. Opportunities for questions given and answers provided. Patient was cleaned, sacral wound cleaned and dressing changed, armband cut off, VS rechecked, and patient was discharged  in stable condition.     Visit Vitals  /76 (BP 1 Location: Left upper arm, BP Patient Position: At rest)   Pulse 93   Temp 97.6 °F (36.4 °C)   Resp 18   Ht 5' 2\" (1.575 m)   Wt 114.3 kg (252 lb)   SpO2 95%   Breastfeeding No   BMI 46.09 kg/m²

## 2022-09-23 NOTE — PROGRESS NOTES
Patient being transported to [facility]  by [address] [place] [address],[address] for radiation therapy in stable condition.     Visit Vitals  /67 (BP 1 Location: Right lower arm, BP Patient Position: At rest)   Pulse (!) 109   Temp 97.9 °F (36.6 °C)   Resp 18   Ht 5' 2\" (1.575 m)   Wt 114.3 kg (252 lb)   SpO2 96%   Breastfeeding No   BMI 46.09 kg/m²

## 2022-09-23 NOTE — PROGRESS NOTES
Problem: Pressure Injury - Risk of  Goal: *Prevention of pressure injury  Description: Document Geoffrey Scale and appropriate interventions in the flowsheet. Outcome: Progressing Towards Goal  Note: Pressure Injury Interventions:  Sensory Interventions: Assess changes in LOC    Moisture Interventions: Absorbent underpads, Maintain skin hydration (lotion/cream)    Activity Interventions: Increase time out of bed, Pressure redistribution bed/mattress(bed type)    Mobility Interventions: Assess need for specialty bed, HOB 30 degrees or less, Pressure redistribution bed/mattress (bed type)    Nutrition Interventions: Document food/fluid/supplement intake    Friction and Shear Interventions: Apply protective barrier, creams and emollients                Problem: Patient Education: Go to Patient Education Activity  Goal: Patient/Family Education  Outcome: Progressing Towards Goal     Problem: Falls - Risk of  Goal: *Absence of Falls  Description: Document Joan Fall Risk and appropriate interventions in the flowsheet.   Outcome: Progressing Towards Goal  Note: Fall Risk Interventions:  Mobility Interventions: Patient to call before getting OOB    Mentation Interventions: Adequate sleep, hydration, pain control, More frequent rounding    Medication Interventions: Patient to call before getting OOB, Teach patient to arise slowly    Elimination Interventions: Bed/chair exit alarm, Call light in reach, Patient to call for help with toileting needs, Toileting schedule/hourly rounds              Problem: Patient Education: Go to Patient Education Activity  Goal: Patient/Family Education  Outcome: Progressing Towards Goal     Problem: Pain  Goal: *Control of Pain  Outcome: Progressing Towards Goal  Goal: *PALLIATIVE CARE:  Alleviation of Pain  Outcome: Progressing Towards Goal     Problem: Patient Education: Go to Patient Education Activity  Goal: Patient/Family Education  Outcome: Progressing Towards Goal     Problem: Infection - Risk of, Urinary Catheter-Associated Urinary Tract Infection  Goal: *Absence of infection signs and symptoms  Outcome: Progressing Towards Goal     Problem: Patient Education: Go to Patient Education Activity  Goal: Patient/Family Education  Outcome: Progressing Towards Goal     Problem:  Activity Intolerance  Goal: *Oxygen saturation during activity within specified parameters  Outcome: Progressing Towards Goal  Goal: *Able to remain out of bed as prescribed  Outcome: Progressing Towards Goal     Problem: Patient Education: Go to Patient Education Activity  Goal: Patient/Family Education  Outcome: Progressing Towards Goal     Problem: Patient Education: Go to Patient Education Activity  Goal: Patient/Family Education  Outcome: Progressing Towards Goal

## 2022-09-23 NOTE — PROGRESS NOTES
PICC line removed as ordered. Pressure dressing applied, no noted blood loss , and pt tolerated procedure with no stated distress.      Visit Vitals  /81 (BP 1 Location: Left upper arm, BP Patient Position: At rest;Semi fowlers)   Pulse (!) 101   Temp 98.5 °F (36.9 °C)   Resp 18   Ht 5' 2\" (1.575 m)   Wt 114.3 kg (252 lb)   SpO2 95%   Breastfeeding No   BMI 46.09 kg/m²

## 2022-09-23 NOTE — PROGRESS NOTES
..Transition of Care Plan to SNF/Rehab    SNF/Rehab Transition:  Patient has been accepted to [facility] and meets criteria for admission. Patient will transported by [address] and expected to leave at 6 PM.    Communication to Patient/Family:  Met with patient and talked with patient's son, Shabbir Morel and they are agreeable to the transition plan. Communication to SNF/Rehab:  Bedside RN, Angelia Seals, has been notified to update the transition plan to the facility and call report (phone number [phone]). Discharge information has been updated on the AVS.     Discharge instructions to be fax'd to facility faxed through [employer] once received        Nursing Please include all hard scripts for controlled substances, med rec and dc summary, and AVS in packet. Reviewed and confirmed with facility, [facility], can manage the patient care needs for the following:     Bengali Fetch with (X) only those applicable:    Medication:  []  Medications will be available at the facility  []  IV Antibiotics   []  Controlled Substance - hard copy to be sent with patient   []  Weekly Labs   Documents:  [] Hard RX  [] MAR  [] Kardex  [] AVS  []Transfer Summary  [x]Discharge Summary when received faxed through [employer] [address]:  []  CPAP/BiPAP  []  Wound Vacuum  []  Cotter or Urinary Device  []  PICC/Central Line  []  Nebulizer  []  Ventilator   Treatment:  []Isolation (for MRSA, VRE, etc.)  []Surgical Drain Management  []Tracheostomy Care  []Dressing Changes  []Dialysis with transportation and chair time   []PEG Care  []Oxygen  []Daily Weights for Heart Failure   Dietary:  []Any diet limitations  []Tube Feedings   []Total Parenteral Management (TPN)   Eligible for Medicaid Long Term Services and Supports  Yes:  [] Eligible for medical assistance or will become eligible within 180 days and UAI completed. [] Provider/Patient and/or support system has requested screening.   [x] UAI copy provided to patient or responsible party, [facility]  [] UAI unavailable at discharge will send once processed to SNF provider. [] UAI unavailable at discharged mailed to patient  No:   [] Private pay and is not financially eligible for Medicaid within the next 180 days. [] Reside out-of-state.   [] A residents of a state owned/operated facility that is licensed  by [facility] and [facility] or [facility]  [] Enrollment in [facility] hospice services  [] [address] [place] [address] [facility]  [] Patient /Family declines to have screening completed or provide financial information for screening     Financial Resources:  Medicaid    [] Initiated and application pending   [] Full coverage     Advanced Care Plan:  []Surrogate Decision Maker of Care  []POA  []Communicated Code Status  Full   Other

## 2022-09-23 NOTE — PROGRESS NOTES
CM made contact with [facility] to schedule pt's dc transport. CM talked with Monica at [address] [facility] who scheduled pt's dc transport for this date, Friday, 9/23/22 from [facility] to [facility] at 1800hrs (6 PM). CM made contact with Guillermina Olson at [facility] facilities to provide the above information. Maria De Jesus with [facility] facilities is I agreement to pt's transitional plan. CM attempted to make contact with pt's son Liberty Knight, to discuss pt's transitional plan of care. CM provided the above information and pt's son is in agreement of pt's transitional plan of care. 1- CM notified Dr. Miguel Saleem of the above information and in need of pt' dc summary via [address] [place] [address].         Silas Miranda MSW  Care Manager

## 2022-09-23 NOTE — PROGRESS NOTES
Attempted to see patient for weekly re-assessment however transport had just arrived on the floor to take her to radiation. Will continue to follow.   Angely Saleh, PT

## 2022-09-23 NOTE — DISCHARGE INSTRUCTIONS
DISCHARGE SUMMARY from Nurse    PATIENT INSTRUCTIONS:    After general anesthesia or intravenous sedation, for 24 hours or while taking prescription Narcotics:  Limit your activities  Do not drive and operate hazardous machinery  Do not make important personal or business decisions  Do  not drink alcoholic beverages  If you have not urinated within 8 hours after discharge, please contact your surgeon on call. Report the following to your surgeon:  Excessive pain, swelling, redness or odor of or around the surgical area  Temperature over 100.5  Nausea and vomiting lasting longer than 4 hours or if unable to take medications  Any signs of decreased circulation or nerve impairment to extremity: change in color, persistent  numbness, tingling, coldness or increase pain  Any questions    What to do at Home:  Recommended activity: Activity as tolerated, with assistance    If you experience any of the following symptoms chest pain, shortness of breath, fever greater than 100.5, nausea, vomiting, pain unrelieved by medication, please follow up with Marcelo Sheets    *  Please give a list of your current medications to your Primary Care Provider. *  Please update this list whenever your medications are discontinued, doses are      changed, or new medications (including over-the-counter products) are added. *  Please carry medication information at all times in case of emergency situations. These are general instructions for a healthy lifestyle:    No smoking/ No tobacco products/ Avoid exposure to second hand smoke  Surgeon General's Warning:  Quitting smoking now greatly reduces serious risk to your health.     Obesity, smoking, and sedentary lifestyle greatly increases your risk for illness    A healthy diet, regular physical exercise & weight monitoring are important for maintaining a healthy lifestyle    You may be retaining fluid if you have a history of heart failure or if you experience any of the following symptoms:  Weight gain of 3 pounds or more overnight or 5 pounds in a week, increased swelling in our hands or feet or shortness of breath while lying flat in bed. Please call your doctor as soon as you notice any of these symptoms; do not wait until your next office visit. Patient armband removed and shredded   MyChart Activation    Thank you for requesting access to [employer]. Please follow the instructions below to securely access and download your online medical record. [employer] allows you to send messages to your doctor, view your test results, renew your prescriptions, schedule appointments, and more. How Do I Sign Up? In your internet browser, go to [email or web address]  Click on the First Time User? Click Here link in the Sign In box. You will be redirect to the New Member Sign Up page. Enter your [employer] Access Code exactly as it appears below. You will not need to use this code after youve completed the sign-up process. If you do not sign up before the expiration date, you must request a new code. [employer] Access Code: [email or web address]  Expires: 10/16/2022  2:49 PM (This is the date your [employer] access code will )    Enter the last four digits of your Social Security Number (xxxx) and Date of Birth (mm/dd/yyyy) as indicated and click Submit. You will be taken to the next sign-up page. Create a [employer] ID. This will be your [employer] login ID and cannot be changed, so think of one that is secure and easy to remember. Create a [employer] password. You can change your password at any time. Enter your Password Reset Question and Answer. This can be used at a later time if you forget your password. Enter your e-mail address. You will receive e-mail notification when new information is available in [address] Click Sign Up. You can now view and download portions of your medical record. Click the [employer]attr link to download a portable copy of your medical information.     Additional Information    If you have questions, please visit the Frequently Asked Questions section of the [employer] website at [email or web address] Remember, [employer] is NOT to be used for urgent needs. For medical emergencies, dial 911. The discharge information has been reviewed with the patient and Son. The patient and son verbalized understanding. Discharge medications reviewed with the patient and son and appropriate educational materials and side effects teaching were provided.   ___________________________________________________________________________________________________________________________________

## 2022-09-23 NOTE — PROGRESS NOTES
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 Cotter placed 9/2 with continued rise in Cr to 6.57  Cystoscopy 9/4 unable to identify ureteral orifices. Bilateral PCNs placed 9/4. S/p R PCN exchange and upsize on 9/15/22  S/p L PCN exchange and upsize on 9/22/22     - Enlargement of uterine mass, concerning for gynecologic malignancy  Started palliative XRT per rad/onc on Mon 9/12/22              Neg UA 9/1/22              WBC 12<13.2     - WILBUR-resolved              Creat 1.22<1.44>1.16<1.35<1.66              Baseline 0.7 in 7/2022    - Gross Hematuria 2/2 Radiation Cystitis?- resolved   BARRETT 9/13: No clot    Hgb 7.6>6.2<6.9 Hgb on   presentation 8.4     PLAN:    Appreciate overall management per medicine. Obtain repeat UA/UCX to rule out infection as source of gross hematuria- still not done yet. Continue bilateral PCNs. Will need PCN exchange every 3 months. PCN need to be flushed with 10 cc NS daily ATLEAST. Continue to hold all AC until urine clear for 24 hours. Pt to f/up outpatient to arrange PCN exchange in 3 months. Will arrange. Recommend Gyn/Onc evaluation for large uterine mass. Will sign off      Gloria Sarmiento PA-C  [facility]  Available M-Fri, [place]  Pager: [phone]       Subjective:     Daily Progress Note: 9/23/2022 5:06 PM    Eliu Jolly is s/p PCN exchange. Tachyt to 115 with Tmax 99.4. Patient denies any blood in urine.     Objective:     Visit Vitals  /75   Pulse (!) 115 Temp 99.4 °F (37.4 °C)   Resp 20   Ht 5' 2\" (1.575 m)   Wt 114.3 kg (252 lb)   SpO2 95%   Breastfeeding No   BMI 46.09 kg/m²        Temp (24hrs), Av.1 °F (37.3 °C), Min:98.3 °F (36.8 °C), Max:99.6 °F (37.6 °C)      Intake and Output:   1901 -  0700  In: 1122.5 [P.O.:840]  Out: 4822 [Urine:1475]  No intake/output data recorded. General appearance: alert, cooperative, no distress, appears stated age  Abdomen: Round, mass palpated above umbilicus, TTP. : Bilateral PCNs in place- with clear, yellow urine. Left PCN with dark brown urine. Cards: BLE edema. Data Review:    KUB 22:    Retracted and laterally displaced right PCN in comparison to fluoroscopic images  2022. The PCN remains within right upper quadrant. BARRETT 22: IMPRESSION     1. Mild right hydronephrosis, improved from prior. 2. Improved left hydronephrosis with residual pelviectasis.   3. Right renal cyst.    Recent Results (from the past 24 hour(s))   GLUCOSE, POC    Collection Time: 22 10:07 PM   Result Value Ref Range    Glucose (POC) 160 (H) 70 - 110 mg/dL   OCCULT BLOOD, STOOL    Collection Time: 22  8:25 AM   Result Value Ref Range    Occult blood, stool Negative NEG     ECHO ADULT COMPLETE    Collection Time: 22  9:16 AM   Result Value Ref Range    IVSd 0.9 0.6 - 0.9 cm    LVIDd 3.7 (A) 3.9 - 5.3 cm    LVIDs 3.0 cm    LVOT Diameter 2.1 cm    LVPWd 1.2 (A) 0.6 - 0.9 cm    LVOT Peak Gradient 8 mmHg    LVOT Mean Gradient 4 mmHg    LVOT SV 79.3 ml    LVOT Peak Velocity 1.4 m/s    LVOT VTI 22.9 cm    LA Volume A/L 52 mL    LA Volume 2C 43 22 - 52 mL    LA Volume 4C 50 22 - 52 mL    MV A Velocity 1.24 m/s    MV E Wave Deceleration Time 134.4 ms    MV E Velocity 0.62 m/s    LV E' Lateral Velocity 8 cm/s    Aortic Root 3.2 cm    Fractional Shortening 2D 19 28 - 44 %    LVIDd Index 1.75 cm/m2    LVIDs Index 1.42 cm/m2    LV RWT Ratio 0.65     LV Mass 2D 120.8 67 - 162 g    LV Mass 2D Index 57.2 43 - 95 g/m2    MV E/A 0.50     E/E' Lateral 7.75     LA Volume Index A/L 25 16 - 34 mL/m2    LVOT Stroke Volume Index 37.6 mL/m2    LVOT Area 3.5 cm2    LA Volume Index 2C 20 16 - 34 mL/m2    LA Volume Index 4C 24 16 - 34 mL/m2    Ao Root Index 1.52 cm/m2    E/E' Ratio (Averaged) 8.30     LV E' Septal Velocity 7 cm/s    E/E' Septal 8.86     TAPSE 2.2 1.7 cm         Signed By:     Nehemias Gregory MD   Urologic Oncologist  [facility]  [facility]  Office [phone] Ext [phone]                          September 23, 2022

## 2022-09-23 NOTE — PROGRESS NOTES
Called report to nurse Paul Jones, at [facility]. Report included the following information SBAR, Kardex, OR Summary, Procedure Summary, Intake/Output, and MAR.

## 2022-09-23 NOTE — DISCHARGE SUMMARY
[facility]  [facility] Summary       Patient: Vivian Trejo Age: 71 y.o. : 1950 MR#: [id number] SSN: [id number]  PCP on record: Rufina Olvera MD  Admit date: 2022  Discharge date: 2022    Consults:  MAURICE Terry,-IR  - EFRAIN Graham,-radiation oncology  -BRAYAN Menezes,-nephrology  -DANICA Parker,-urology  Procedure/Description:  on 22: Image guided left nephrostomy exchange and upsize  -  on  9/15/22  Image guided right antegrade nephrostogram and right nephrostomy tube placement  -on 22 Procedure/Description:  US and fluoro guided bilateral PCN  Significant Diagnostic Studies: -CT abd pelv wo cont on 22: IMPRESSION  1. Compared to prior ultrasound and CT, continued enlargement of the uterus  with mass effect upon the pelvic structures. Overall concerning for gynecologic  malignancy. 2.  Lobular, 7 cm heterogeneous lesion within the spleen are increased compared  exam. Findings could represent metastatic disease. 3.  Soft tissue nodule left adrenal gland measuring up to 4 cm. 4.  Distention of the urinary bladder with approximately estimated volume of 1.4  L. Mild resultant bilateral hydronephrosis. Correlate for urinary retention.    - KUB on 22:   : Supine view of the abdomen     Retracted and laterally displaced right PCN in comparison to fluoroscopic images  2022. The PCN remains within right upper quadrant. Minimally retracted left PCN in comparison to fluoroscopic image 2022. Urinary catheter noted. Gas throughout small and large bowel loops. Nonobstructed bowel gas pattern. No  evidence of intraperitoneal air on this supine projection. Redemonstration of  known massive uterus displacing bowel loops.     -Duplex lower ext benous bilateral 22:  Technically difficult exam due to: patient body habitus, marked edema, inability to withstand probe pressure, tissue density and inability to externally rotate leg. No evidence of deep venous thrombosis in the lower extremities bilaterally.    -Cardiac echo on 9/23/22:  Result status: Final result       Left Ventricle: Hyperdynamic left ventricular systolic function with a visually estimated EF of 65 - 70%. Left ventricle is smaller than normal. Mild posterior thickening. Normal wall motion. Discharge Diagnoses:   -obstructive uropathy  -Hematuria  -Acute anemia  -WILBUR  -Uterine mass  -Hypokalemia, hyponatremia                                        Patient Active Problem List   Diagnosis Code    Uterine mass N85.8    WILBUR (acute kidney injury) ([address] N17.9    Bladder outlet obstruction N32.0    Lobulation, spleen Q89.09    Adrenal nodule (H[facility]) E27.8    Leukocytosis D72.829    Microcytic anemia D50.9    Hyponatremia E87.1    Uremia N19    Other hydronephrosis N13.39       Hospital Course by Problem     80year old female w/ multiple medical conditions including uterine mass admitted after she presented with cc of abdominal pain. She, at the time, had expressed that she was scheduled for uterine mass resection but had presented due to persistent abdominal pain. Upon initial eval she was noted to have evidence of WILBUR, enlarging uterine mass w/ mass effect upon the pelvic structures,  splenic lesion concerning for metastatic disease and adrenal nodule. Pt was seen by urology and had procedural intervention including PCN placement with subsequent R PCN exchange and upsize on 9/15/22 S/p L PCN exchange and upsize on 9/22/22. She had Improvement of her renal function with these procedures. She also had onset of hematuria which was thought to be due to radiation cystitis possibly. This has resolved. She has had some drop in her hemoglobin especially lately and has had to have blood transfusion. She has been tachycardic also and this could be due to blood loss.   As the hematuria has resolved and she has had blood transfusion the hope is that the tachycardia will also improve and resolve. Please continue to monitor her hemoglobin on a regular basis, monitor hemodynamics. Patient has received radiation for the uterine mass. 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 PLAN:   1) I discussed case with Dr. Paul Costello. He does not foresee surgery in the near future, if ever. He will consider chemotherapy following radiotherapy, if felt to be appropriate at that time. 2) I recommend palliative radiotherapy. Upon review of the recent images, the uterus extends superior to the umbilicus and field size would be quite large. In the setting of likely metastases and the urgent need for systemic therapy, if possible, I would like to treat the uterus to 30 Gy in 10 fractions for palliation. Alternatively, the uterus can be treated to 45 Gy in 25 fractions if planning CT does not show obvious metastatic disease or significant growth of primary mass or suspected metastases. Patient is very concerned about transportation, which would also lean me towards a shorter course of 30 Gy in 10 fractions, 3DCRT. 3) We discussed the potential risks of radiotherapy, including kidney injury, bowel injury, bladder inflammation/scarring, and diarrhea. Ms. Zaki Lerma asked appropriate questions and wishes to proceed. 4) I will ask my staff to coordinate transfer to my [facility] office ([address], [place]. [phone]) either 9/9/22 or 9/12/22, depending upon staff availability and any  planned procedures/studies at [facility]. Will have staff coordinate with floor staff at [facility]. \"      Recommendation made by the oncologist Dr. Coronado Ort to repeat CAT scan for restaging in 2-3 weeks with follow-up visit Dr. Juan Gold.     Today's examination of the patient revealed:     Subjective:     Objective:   VS: Visit Vitals  /67 (BP 1 Location: Right lower arm, BP Patient Position: At rest)   Pulse (!) 109   Temp 97.9 °F (36.6 °C)   Resp 18   Ht 5' 2\" (1.575 m)   Wt 114.3 kg (252 lb)   SpO2 96%   Breastfeeding No   BMI 46.09 kg/m²      Tmax/24hrs: Temp (24hrs), Av °F (37.2 °C), Min:97.9 °F (36.6 °C), Max:99.8 °F (37.7 °C)     Input/Output:   Intake/Output Summary (Last 24 hours) at 2022 1443  Last data filed at 2022 0659  Gross per 24 hour   Intake 600 ml   Output 925 ml   Net -325 ml       General:  alert, awake, in NAD  Cardiovascular:  RRR, no murmurs  Pulmonary:  ctab  GI:  soft, nt, nd  Extremities:  No edema  Additional:      Labs:    Recent Results (from the past 24 hour(s))   GLUCOSE, POC    Collection Time: 22 10:07 PM   Result Value Ref Range    Glucose (POC) 160 (H) 70 - 110 mg/dL   OCCULT BLOOD, STOOL    Collection Time: 22  8:25 AM   Result Value Ref Range    Occult blood, stool Negative NEG     ECHO ADULT COMPLETE    Collection Time: 22  9:16 AM   Result Value Ref Range    IVSd 0.9 0.6 - 0.9 cm    LVIDd 3.7 (A) 3.9 - 5.3 cm    LVIDs 3.0 cm    LVOT Diameter 2.1 cm    LVPWd 1.2 (A) 0.6 - 0.9 cm    LVOT Peak Gradient 8 mmHg    LVOT Mean Gradient 4 mmHg    LVOT SV 79.3 ml    LVOT Peak Velocity 1.4 m/s    LVOT VTI 22.9 cm    LA Volume A/L 52 mL    LA Volume 2C 43 22 - 52 mL    LA Volume 4C 50 22 - 52 mL    MV A Velocity 1.24 m/s    MV E Wave Deceleration Time 134.4 ms    MV E Velocity 0.62 m/s    LV E' Lateral Velocity 8 cm/s    Aortic Root 3.2 cm    Fractional Shortening 2D 19 28 - 44 %    LVIDd Index 1.75 cm/m2    LVIDs Index 1.42 cm/m2    LV RWT Ratio 0.65     LV Mass 2D 120.8 67 - 162 g    LV Mass 2D Index 57.2 43 - 95 g/m2    MV E/A 0.50     E/E' Lateral 7.75     LA Volume Index A/L 25 16 - 34 mL/m2    LVOT Stroke Volume Index 37.6 mL/m2    LVOT Area 3.5 cm2    LA Volume Index 2C 20 16 - 34 mL/m2    LA Volume Index 4C 24 16 - 34 mL/m2    Ao Root Index 1.52 cm/m2    E/E' Ratio (Averaged) 8.30     LV E' Septal Velocity 7 cm/s    E/E' Septal 8.86     TAPSE 2.2 1.7 cm     Additional Data Reviewed:     Condition on discharge:guarded   Disposition:    []Home   []Home with Home Health   [x]SNF/NH   []Rehab   []Home with family   []Alternate Facility:____________________      Discharge Medications:     Current Discharge Medication List        START taking these medications    Details   L. acidophilus,casei,rhamnosus (BIO-K PLUS) 50 billion cell cpDR capsule Take 1 Capsule by mouth daily for 30 days. Qty: 30 Capsule, Refills: 0      metoprolol tartrate (LOPRESSOR) 50 mg tablet Take 1 Tablet by mouth two (2) times a day for 30 days. Qty: 60 Tablet, Refills: 0      oxyCODONE IR (ROXICODONE) 5 mg immediate release tablet Take 1-2 Tablets by mouth every four (4) hours as needed for Pain for up to 3 days. Max Daily Amount: 60 mg.  Qty: 20 Tablet, Refills: 0    Associated Diagnoses: Neoplasm causing mass effect on adjacent structures      senna-docusate (PERICOLACE) 8.6-50 mg per tablet Take 1 Tablet by mouth daily for 30 days. Qty: 30 Tablet, Refills: 0      simethicone (MYLICON) 80 mg chewable tablet Take 1 Tablet by mouth four (4) times daily as needed for GI Pain. Qty: 20 Tablet, Refills: 0      sodium bicarbonate 650 mg tablet Take 1 Tablet by mouth two (2) times a day for 30 days. Qty: 60 Tablet, Refills: 0      vits A and D-white pet-lanolin (A&D) oint ointment Apply  to affected area three (3) times daily. Wound care orders to left & right buttocks: unit nursing staff to apply Vitamin A&D oint TID & after stooling.      Nursing, document site in comments  Qty: 15 g, Refills: 0           CONTINUE these medications which have NOT CHANGED    Details   Vitamin D2 1,250 mcg (50,000 unit) capsule TAKE 1 CAPSULE BY MOUTH 2 TIMES WEEKLY           STOP taking these medications       bisoprolol-hydroCHLOROthiazide (ZIAC) 5-6.25 mg per tablet Comments:   Reason for Stopping:         lisinopril-hydroCHLOROthiazide (PRINZIDE, ZESTORETIC) 20-12.5 mg per tablet Comments:   Reason for Stopping:         traMADoL (ULTRAM) 50 mg tablet Comments:   Reason for Stopping: Follow-up Appointments:   1. Your PCP: Jacy Barragan MD, within 7-10days  2.  Dr Star Townsend in 1-2 wks            >30 minutes spent coordinating this discharge (review instructions/follow-up, prescriptions, preparing report for sign off)    Signed:  Jeanette Zamora MD  9/23/2022  2:43 PM

## 2022-10-04 PROBLEM — K92.2 GI BLEED: Status: ACTIVE | Noted: 2022-01-01

## 2022-10-04 NOTE — H&P
History & Physical    Patient: Zhanna Espinosa MRN: [id number]  CSN: [id number]    YOB: 1950  Age: 71 y.o. Sex: female      DOA: 10/3/2022    Chief Complaint   Patient presents with    Abnormal Lab Results         Dragon medical dictation software was used for portions of this report. Unintended errors may occur. If you have any questions regarding the note or its content please set up a time to discuss by calling the nurse on the floor. Assessment/Plan       Acute GI bleed: Unclear etiology at this time. Etiology could be related to gastric ulcer versus diverticular bleed versus radiation colitis. Continue to monitor hemoglobin closely. Patient is already receiving 2 units PRBC. GI consultation was requested and we will plan on giving the patient n.p.o. Hypertension: Holding off on Lopressor at this time. Monitor blood pressures closely and avoid any hypotension. 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 Valeria Fischer as outpatient. Read a repeat CAT scan was recommended by Dr. Pretty Akers couple of weeks ago. Continue oxycodone as needed for pain control. DVT prophylaxis: SCDs at this time. Active Problems:    GI bleed (10/4/2022)         HPI:     Zhanna Espinosa is a 71 y.o. female who has a known history of uterine cancer and also received radiation treatments, hypertension, obesity and is at the nursing facility and was sent to the emergency room because she was noted to have low hemoglobin. Patient was doing fine except having fatigue and lack of energy but she was just woken up in the nursing facility was sent to the emergency department. Patient denies any abdomen pain nausea vomiting blood in her stools or melena fevers or chills chest pain or shortness of breath. Patient denies having any new symptoms. In the emergency room patient was noted to have a hemoglobin of 4.8 and was typed and screened and asked for 2 units PRBC.   Stool occult done in the emergency room was positive and GI consult was requested. Dr. Ragini Martinez was called from the emergency room. Past Medical History:   Diagnosis Date    Class 2 obesity due to excess calories without serious comorbidity in adult     HTN (hypertension)     Uterine mass 09/01/2022    Vitamin D deficiency        Past Surgical History:   Procedure Laterality Date    HX CATARACT REMOVAL N/A     IR CHANGE NEPHROSTOMY PYELOS TUBE LT  9/22/2022    IR NEPHROSTOMY PERC LT PLC CATH  SI  9/4/2022    IR NEPHROSTOMY PERC RT PLC CATH  SI  9/4/2022    IR NEPHROSTOMY PERC RT PLC CATH  SI  9/15/2022       Family History   Problem Relation Age of Onset    Cancer Mother     Diabetes Mother        Social History     Socioeconomic History    Marital status:    Tobacco Use    Smoking status: Never   Substance and Sexual Activity    Alcohol use: Not Currently       Prior to Admission medications    Medication Sig Start Date End Date Taking? Authorizing Provider   L. acidophilus,casei,rhamnosus (BIO-K PLUS) 50 billion cell cpDR capsule Take 1 Capsule by mouth daily for 30 days. 9/24/22 10/24/22  Robson Gong MD   metoprolol tartrate (LOPRESSOR) 50 mg tablet Take 1 Tablet by mouth two (2) times a day for 30 days. 9/23/22 10/23/22  Robson Gong MD   senna-docusate (PERICOLACE) 8.6-50 mg per tablet Take 1 Tablet by mouth daily for 30 days. 9/24/22 10/24/22  Robson Gong MD   simethicone (MYLICON) 80 mg chewable tablet Take 1 Tablet by mouth four (4) times daily as needed for GI Pain. 9/23/22   Robson Gong MD   sodium bicarbonate 650 mg tablet Take 1 Tablet by mouth two (2) times a day for 30 days. 9/23/22 10/23/22  Robson Gong MD   vits A and D-white pet-lanolin (A&D) oint ointment Apply  to affected area three (3) times daily. Wound care orders to left & right buttocks: unit nursing staff to apply Vitamin A&D oint TID & after stooling.      Nursing, document site in comments 9/23/22   Robson Gong MD Vitamin D2 1,250 mcg (50,000 unit) capsule TAKE 1 CAPSULE BY MOUTH 2 TIMES WEEKLY 22   Provider, Historical       No Known Allergies      Review of Systems  GENERAL: No fevers or chills. + fatigue. HEENT: No change in vision, no earache, tinnitus, sore throat or sinus congestion. NECK: No pain or stiffness. CARDIOVASCULAR: No chest pain or pressure. No palpitations. PULMONARY: No shortness of breath, cough or wheeze. GASTROINTESTINAL: No abdominal pain, nausea, vomiting or diarrhea, melena or bright red blood per rectum. GENITOURINARY: No urinary frequency, urgency, hesitancy or dysuria. MUSCULOSKELETAL: No joint or muscle pain, no back pain, no recent trauma. DERMATOLOGIC: No rash, no itching, no lesions. ENDOCRINE: No polyuria, polydipsia, no heat or cold intolerance. No recent change in weight. HEMATOLOGICAL: No anemia or easy bruising or bleeding. NEUROLOGIC: No headache, seizures, numbness, tingling or weakness. PSYCHIATRIC: No depression, anxiety, mood disorder, no loss of interest in normal activity or change in sleep pattern. Physical Exam:     Physical Exam:  Visit Vitals  BP (!) 112/57   Pulse (!) 103   Temp 97.8 °F (36.6 °C)   Resp 16   Ht 5' 5\" (1.651 m)   Wt 114.3 kg (252 lb)   SpO2 99%   BMI 41.93 kg/m²      O2 Device: None (Room air)    Temp (24hrs), Av.5 °F (36.4 °C), Min:97.3 °F (36.3 °C), Max:97.9 °F (36.6 °C)         General:  Alert, cooperative, no distress, appears stated age. Head:  Normocephalic, without obvious abnormality, atraumatic. Eyes:  Conjunctivae/corneas clear. PERRL, EOMs intact. Nose: Nares normal. No drainage or sinus tenderness. Throat: Lips, mucosa, and tongue normal. Teeth and gums normal.   Neck: Supple, symmetrical, trachea midline, no adenopathy, thyroid: no enlargement/tenderness/nodules, no carotid bruit and no JVD. Back:   ROM normal. No CVA tenderness. Lungs:   Clear to auscultation bilaterally.    Chest wall:  No tenderness or deformity. Heart:  Regular rate and rhythm, S1, S2 normal, no murmur, click, rub or gallop. Abdomen: Soft, non-tender. Bowel sounds normal. No masses,  No organomegaly. Extremities: Extremities normal, atraumatic, no cyanosis or edema. Pulses: 2+ and symmetric all extremities. Skin: Skin color, texture, turgor normal. No rashes or lesions   Neurologic: CNII-XII intact. No focal motor or sensory deficit. Labs Reviewed:    Recent Results (from the past 24 hour(s))   OCCULT BLOOD, STOOL    Collection Time: 10/03/22  9:25 PM   Result Value Ref Range    Occult blood, stool Positive (A) NEG     TROPONIN-HIGH SENSITIVITY    Collection Time: 10/03/22 10:51 PM   Result Value Ref Range    Troponin-High Sensitivity 14 0 - 54 ng/L   CBC WITH AUTOMATED DIFF    Collection Time: 10/03/22 10:57 PM   Result Value Ref Range    WBC 8.9 4.6 - 13.2 K/uL    RBC 1.87 (L) 4.20 - 5.30 M/uL    HGB 4.8 (LL) 12.0 - 16.0 g/dL    HCT 14.8 (LL) 35.0 - 45.0 %    MCV 79.1 78.0 - 100.0 FL    MCH 25.7 24.0 - 34.0 PG    MCHC 32.4 31.0 - 37.0 g/dL    RDW 26.9 (H) 11.6 - 14.5 %    PLATELET 120 843 - 990 K/uL    MPV 10.4 9.2 - 11.8 FL    NRBC 1.3 (H) 0  WBC    ABSOLUTE NRBC 0.12 (H) 0.00 - 0.01 K/uL    NEUTROPHILS 83 (H) 40 - 73 %    BAND NEUTROPHILS 6 (H) 0 - 5 %    LYMPHOCYTES 5 (L) 21 - 52 %    MONOCYTES 5 3 - 10 %    EOSINOPHILS 1 0 - 5 %    BASOPHILS 0 0 - 2 %    IMMATURE GRANULOCYTES 0 %    ABS. NEUTROPHILS 8.0 1.8 - 8.0 K/UL    ABS. LYMPHOCYTES 0.4 (L) 0.9 - 3.6 K/UL    ABS. MONOCYTES 0.4 0.05 - 1.2 K/UL    ABS. EOSINOPHILS 0.1 0.0 - 0.4 K/UL    ABS. BASOPHILS 0.0 0.0 - 0.1 K/UL    ABS. IMM.  GRANS. 0.0 K/UL    DF MANUAL      PLATELET COMMENTS ADEQUATE PLATELETS      RBC COMMENTS MACROCYTOSIS  1+        RBC COMMENTS POLYCHROMASIA  1+        RBC COMMENTS MINISTERIO CELLS  1+        RBC COMMENTS TARGET CELLS  1+        RBC COMMENTS HYPOCHROMIA  1+        RBC COMMENTS RBC Comment  4 NRBC        WBC COMMENTS VACUOLATED POLYS     METABOLIC PANEL, COMPREHENSIVE    Collection Time: 10/03/22 10:57 PM   Result Value Ref Range    Sodium 142 136 - 145 mmol/L    Potassium 3.5 3.5 - 5.5 mmol/L    Chloride 110 100 - 111 mmol/L    CO2 24 21 - 32 mmol/L    Anion gap 8 3.0 - 18 mmol/L    Glucose 119 (H) 74 - 99 mg/dL    BUN 94 (H) 7.0 - 18 MG/DL    Creatinine 1.61 (H) 0.6 - 1.3 MG/DL    BUN/Creatinine ratio 58 (H) 12 - 20      eGFR 34 (L) >60 ml/min/1.73m2    Calcium 8.3 (L) 8.5 - 10.1 MG/DL    Bilirubin, total 0.8 0.2 - 1.0 MG/DL    ALT (SGPT) 28 13 - 56 U/L    AST (SGOT) 58 (H) 10 - 38 U/L    Alk. phosphatase 99 45 - 117 U/L    Protein, total 5.9 (L) 6.4 - 8.2 g/dL    Albumin 1.5 (L) 3.4 - 5.0 g/dL    Globulin 4.4 (H) 2.0 - 4.0 g/dL    A-G Ratio 0.3 (L) 0.8 - 1.7     MAGNESIUM    Collection Time: 10/03/22 10:57 PM   Result Value Ref Range    Magnesium 2.1 1.6 - 2.6 mg/dL   TYPE & SCREEN    Collection Time: 10/03/22 10:57 PM   Result Value Ref Range    Crossmatch Expiration 10/06/2022,2359     ABO/Rh(D) [facility] POSITIVE     Antibody screen NEG     CALLED TO: Abel Rodriguez RN ER, AT 0025, ON 10/04/2022, BY MICHELLE     Unit number [id number]     Blood component type  LR,1     Unit division 00     Status of unit ISSUED     Crossmatch result Compatible     Unit number [id number]     Blood component type  LR     Unit division 00     Status of unit ALLOCATED     Crossmatch result Compatible    RBC, ALLOCATE    Collection Time: 10/04/22 12:00 AM   Result Value Ref Range    HISTORY CHECKED?  Historical check performed    EKG, 12 LEAD, INITIAL    Collection Time: 10/04/22 12:24 AM   Result Value Ref Range    Ventricular Rate 108 BPM    Atrial Rate 108 BPM    P-R Interval 134 ms    QRS Duration 78 ms    Q-T Interval 336 ms    QTC Calculation (Bezet) 450 ms    Calculated P Axis -2 degrees    Calculated R Axis -18 degrees    Calculated T Axis 164 degrees    Diagnosis       Sinus tachycardia  Minimal voltage criteria for LVH, may be normal variant ( R in aVL )  Inferior infarct (cited on or before 01-SEP-2022)  Anterolateral infarct (cited on or before 01-SEP-2022)  Abnormal ECG  When compared with ECG of 20-SEP-2022 10:22,  Questionable change in initial forces of Lateral leads  Nonspecific T wave abnormality now evident in Inferior leads  Inverted T waves have replaced nonspecific T wave abnormality in Lateral   leads  QT has lengthened         Procedures/imaging: see electronic medical records for all procedures/Xrays and details which were not copied into this note but were reviewed prior to creation of Rose Marie Castillo MD  October 4, 2022  [phone].

## 2022-10-04 NOTE — ED PROVIDER NOTES
73-year-old female past medical history of uterine cancer on radiation therapy obesity hypertension presents emergency department with fatigue. Patient lives in nursing home and she sent in because her hemoglobin was 5.6. Patient was woken up and sent to the emergency department she did not have any complaints except fatigue she was surprised she came here. Patient denies any vomiting nausea dark stool blood in her stool. Patient has no abdominal pain no fevers no chills no chest pain or shortness of breath. No other issues expressed.         Past Medical History:   Diagnosis Date    Class 2 obesity due to excess calories without serious comorbidity in adult     HTN (hypertension)     Uterine mass 09/01/2022    Vitamin D deficiency        Past Surgical History:   Procedure Laterality Date    HX CATARACT REMOVAL N/A     IR CHANGE NEPHROSTOMY PYELOS TUBE LT  9/22/2022    IR NEPHROSTOMY PERC LT PLC CATH  SI  9/4/2022    IR NEPHROSTOMY PERC RT PLC CATH  SI  9/4/2022    IR NEPHROSTOMY PERC RT PLC CATH  SI  9/15/2022         Family History:   Problem Relation Age of Onset    Cancer Mother     Diabetes Mother        Social History     Socioeconomic History    Marital status:      Spouse name: Not on file    Number of children: Not on file    Years of education: Not on file    Highest education level: Not on file   Occupational History    Not on file   Tobacco Use    Smoking status: Never    Smokeless tobacco: Not on file   Substance and Sexual Activity    Alcohol use: Not Currently    Drug use: Not on file    Sexual activity: Not on file   Other Topics Concern    Not on file   Social History Narrative    Not on file     Social Determinants of Health     Financial Resource Strain: Not on file   Food Insecurity: Not on file   Transportation Needs: Not on file   Physical Activity: Not on file   Stress: Not on file   Social Connections: Not on file   Intimate Partner Violence: Not on file   Housing Stability: Not on file         ALLERGIES: Patient has no known allergies. Review of Systems   Constitutional: Negative. HENT: Negative. Respiratory: Negative. Cardiovascular: Negative. Gastrointestinal: Negative. Neurological:  Positive for weakness. Hematological: Negative. All other systems reviewed and are negative. Vitals:    10/03/22 1935 10/03/22 2300   BP: 133/66 (!) 118/56   Pulse: (!) 115 (!) 109   Resp: 26 23   Temp: 97.9 °F (36.6 °C)    SpO2: 100%    Weight: 114.3 kg (252 lb)    Height: 5' 5\" (1.651 m)             Physical Exam  Vitals and nursing note reviewed. Constitutional:       General: She is not in acute distress. Appearance: She is not ill-appearing, toxic-appearing or diaphoretic. HENT:      Head: Normocephalic and atraumatic. Nose: Nose normal.   Neck:      Vascular: No carotid bruit. Cardiovascular:      Rate and Rhythm: Normal rate and regular rhythm. Pulses: Normal pulses. Heart sounds: Normal heart sounds. No murmur heard. No friction rub. No gallop. Pulmonary:      Effort: Pulmonary effort is normal. No respiratory distress. Breath sounds: Normal breath sounds. No stridor. No wheezing, rhonchi or rales. Chest:      Chest wall: No tenderness. Abdominal:      General: There is no distension. Palpations: Abdomen is soft. There is no mass. Tenderness: There is no abdominal tenderness. There is no right CVA tenderness, left CVA tenderness, guarding or rebound. Hernia: No hernia is present. Genitourinary:     Rectum: Guaiac result positive. Comments: Black stool   Musculoskeletal:         General: No swelling, tenderness, deformity or signs of injury. Normal range of motion. Cervical back: Neck supple. No rigidity or tenderness. Right lower leg: No edema. Left lower leg: No edema. Lymphadenopathy:      Cervical: No cervical adenopathy. Skin:     General: Skin is warm.       Capillary Refill: Capillary refill takes less than 2 seconds. Coloration: Skin is not jaundiced or pale. Findings: No bruising, erythema, lesion or rash. Neurological:      General: No focal deficit present. Mental Status: She is alert and oriented to person, place, and time. MDM  Number of Diagnoses or Management Options  Diagnosis management comments: 26-year-old female presents to the emergency department with anemia. Patient has a rectal exam significant for a for GI bleed. Contacted hospitalist put a page out to GI on-call will add him to the treatment team Dr. Janelle Moya. Patient's vital signs are normal.  Blood is ordered she is being transfused no anticoagulation and her records. Patient's only complaint is fatigue she is nontoxic chronically ill-appearing. Differential diagnosis anemia symptomatic anemia GI bleed           Critical Care  Performed by: Tomasz Strickland MD  Authorized by:  Tomasz Strickland MD     Critical care provider statement:     Critical care time (minutes):  40    Critical care start time:  10/4/2022 12:00 AM    Critical care end time:  10/4/2022 12:30 AM    Critical care was necessary to treat or prevent imminent or life-threatening deterioration of the following conditions:  Circulatory failure, cardiac failure and respiratory failure    Critical care was time spent personally by me on the following activities:  Development of treatment plan with patient or surrogate, discussions with consultants, discussions with primary provider and examination of patient    I assumed direction of critical care for this patient from another provider in my specialty: yes      Care discussed with: admitting provider

## 2022-10-04 NOTE — PROGRESS NOTES
Problem: Pressure Injury - Risk of  Goal: *Prevention of pressure injury  Description: Document Geoffrey Scale and appropriate interventions in the flowsheet. Outcome: Progressing Towards Goal  Note: Pressure Injury Interventions:       Moisture Interventions: Absorbent underpads, Apply protective barrier, creams and emollients, Minimize layers    Activity Interventions: Pressure redistribution bed/mattress(bed type)    Mobility Interventions: HOB 30 degrees or less, Pressure redistribution bed/mattress (bed type)    Nutrition Interventions: Document food/fluid/supplement intake, Offer support with meals,snacks and hydration                     Problem: Falls - Risk of  Goal: *Absence of Falls  Description: Document Joan Fall Risk and appropriate interventions in the flowsheet. Outcome: Progressing Towards Goal  Note: Fall Risk Interventions:  Mobility Interventions: Bed/chair exit alarm, Patient to call before getting OOB              Elimination Interventions:  Toileting schedule/hourly rounds, Patient to call for help with toileting needs, Call light in reach

## 2022-10-04 NOTE — PROGRESS NOTES
Wound Prevention Checklist    Patient: Zenon Pierce (53 y.o. female)  Date: 10/4/2022  Diagnosis: GI bleed [K92.2] <principal problem not specified>    Precautions:         [x]  Heel prevention boots placed on patient    [x]  Patient turned q2h during shift    [x]  Lift team ordered    [x]  Patient on [facility] bed/Specialty bed    [x]  Each Wound is documented during shift (Stage, Color, drainage, odor, measurements, and dressings)    [x]  Dual skin check done with Ventura Dale RN Verdell Buerger, RN

## 2022-10-04 NOTE — PROGRESS NOTES
Verbal bedside shift report received from [address] [place] [address]. Patient awake resting in bed watching tv. Blood transfusion in process.  NAD

## 2022-10-04 NOTE — PROGRESS NOTES
Patient was admitted earlier today by my colleague. I saw patient in follow-up. Patient is laying in bed in no apparent distress. Denies any discomfort at this time. Patient is status post 2 unit PRBCs. Hemoglobin is still 6.4. Will transfuse 1 more unit. Gastroenterology is following. Continue PPI. Discussed with patient. I have advised radiation oncologist Dr. Mirian Gupta that patient is admitted to the hospital.  Did not formally consult.   Palliative care consult

## 2022-10-04 NOTE — PROGRESS NOTES
Comprehensive Nutrition Assessment    Type and Reason for Visit: Initial, Positive nutrition screen, NPO/clear liquid    Nutrition Recommendations/Plan:   Advance diet when medically appropriate, will add oral supplements to reflect current diet (pt requested [place] or [facility] [place]). Monitor readiness to initiate PO intake, weight, labs, and plan of care during admission. Malnutrition Assessment:  Malnutrition Status: At risk for malnutrition (specify) (r/t NPO, wounds and advance age.) (10/04/22 1601)      Nutrition History and Allergies:   Past medical history: vitamin D deficiency, uterine cancer and also received radiation treatments, hypertension, obesity. NKFA. Weight history per chart review:  CBW: 10/04/22 : 103.4 kg (228 lb), 90 days:   07/05/22 : 101.2 kg (223 lb). Weight stable x 90 days. Nutrition Assessment:    Visited pt in room, laying in bed, alert and able to communicate. Pt admitted for low hemoglobin; having fatigue and lack of energy. PTA pt reported varied/fair PO intake with additional oral supplements (Boost). At the time of visit, pt NPO status. Current lab shows elevated BUN (94), Creatinine (1.61), low Calcium (8.3), H/H (6.4/19.9). Per chart review, transfusion today and showed improvement in H/H but remains low. Pt denies d/c/n/v and has decreased abdominal pain. Wounds noted. Nutrition Related Findings:    Last BM 10/4. Output: 0mL (urine-not documented). Pertinent Medications: pantoprazole. Wound Type: Multiple, Pressure injury, Skin tears     Current Nutrition Intake & Therapies:  Average Meal Intake: NPO  Average Supplement Intake: NPO  DIET NPO    Anthropometric Measures:  Height: 5' 3\" (160 cm)  Ideal Body Weight (IBW): 115 lbs (52 kg)  Admission Body Weight: 227 lb 15.3 oz  Current Body Wt:  103.4 kg (227 lb 15.3 oz), 198.2 % IBW.  Bed scale  Current BMI (kg/m2): 40.4  Usual Body Weight: 102.1 kg (225 lb)  % Weight Change (Calculated): 1.3  Weight Adjustment: No adjustment  BMI Category: Obese class 3 (BMI 40.0 or greater)    Estimated Daily Nutrient Needs:  Energy Requirements Based On: Formula (MSJ x 1.2-1.3)  Weight Used for Energy Requirements: Current  Energy (kcal/day): 4185-5612  Weight Used for Protein Requirements: Current (0.8-1.0)  Protein (g/day):   Method Used for Fluid Requirements: 1 ml/kcal  Fluid (ml/day): 5175-1422    Nutrition Diagnosis:   Inadequate oral intake related to early satiety, altered GI function as evidenced by NPO or clear liquid status due to medical condition, GI abnormality    Nutrition Interventions:   Food and/or Nutrient Delivery: Start oral diet  Nutrition Education/Counseling: Education not indicated, No recommendations at this time  Coordination of Nutrition Care: Continue to monitor while inpatient  Plan of Care discussed with: patient    Goals:     Goals: Meet at least 75% of estimated needs, by next RD assessment       Nutrition Monitoring and Evaluation:   Behavioral-Environmental Outcomes: None identified  Food/Nutrient Intake Outcomes: Diet advancement/tolerance  Physical Signs/Symptoms Outcomes: Biochemical data, GI status, Weight, Nutrition focused physical findings, Meal time behavior    Discharge Planning:     Too soon to determine    Jorge Up, GRAYN, LD   Contact: [phone]

## 2022-10-04 NOTE — ED NOTES
TRANSFER - OUT REPORT:    Verbal report given to Aicha Rooney on Darin Solis  being transferred to [address] [place] [address] for routine progression of care  for inpatient admission    Report consisted of patients Situation, Background, Assessment and   Recommendations(SBAR). Information from the following report(s) SBAR, ED Summary, and MAR was reviewed with the receiving nurse. Lines:   Peripheral IV Left Antecubital (Active)   Site Assessment Clean, dry, & intact 10/03/22 2305   Phlebitis Assessment 0 10/03/22 2305   Infiltration Assessment 0 10/03/22 2305   Dressing Status Clean, dry, & intact 10/03/22 2305        Opportunity for questions and clarification was provided. Patient transported with:   Registered Nurse and transfusing packed red blood cells.

## 2022-10-04 NOTE — ED TRIAGE NOTES
Patient from [address] [place] [address]. Patient sent to the emergency room from facility for abnormal labs.

## 2022-10-04 NOTE — CONSULTS
[email or web address]  [phone]    GASTROENTEROLOGY CONSULT      Impression:   1. Acute on chronic anemia - w/o overt bleeding, abdominal pain, or vomiting. Patient does note some chronic nausea and BLQ TTP present x 4 weeks since #5 diagnosis. Presenting symptom was fatigue w/ low OP hemoglobin. Denies significant NSAID or etoh use. 10/3 Hgb of 4.8, 9/22 7.6, 9/21 6.9, 9/5 6.8. 11/2017 colonoscopy w/ Dr. Abby Gates significant for diverticulosis and 1 TA polyp w/ 5yr recall  2. Fatigue - secondary to #1  3. WILBUR  4. HTN  5. Uterine carcinosarcoma - newer dx followed by Dr. De Jesus Cancer w/ [facility]. CT noted below. Patient previously received palliative XRT. 6. Recent [facility] admission - 9/1-23 for abdominal pain related to #5. 9/1 CT Abd Pelvis w/o Contrast IMPRESSION  1. Compared to prior ultrasound and CT, continued enlargement of the uterus with mass effect upon the pelvic structures. Overall concerning for gynecologic malignancy. 2.  Lobular, 7 cm heterogeneous lesion within the spleen are increased compared exam. Findings could represent metastatic disease. 3.  Soft tissue nodule left adrenal gland measuring up to 4 cm. 4.  Distention of the urinary bladder with approximately estimated volume of 1.4  L. Mild resultant bilateral hydronephrosis. Correlate for urinary retention. Plan:     1. Recommend supportive care for now. Consider EGD if overt bleeding w/ subsequent drop in H/H.    2. Monitor H/H, transfuse if Hgb <7.  3. IV PPI BID  4. Continue medical management per primary team.    Chief Complaint: anemia      HPI:  Dianne Lincoln is a 71 y.o. female w/ PMH HTN and uterine carcinosarcoma who I am being asked to see in consultation for an opinion regarding the above. Patient transferred from nursing facility to the ED 10/4 for a low hemoglobin w/ fatigue. Patient denies fever, vomiting, abdominal pain, or bloody/black stools. Denies significant NSAID or etoh use. ED workup revealed Hgb of 4.8, FOB positive. 11/2017 colonoscopy w/ Dr. Aguero Filter significant for diverticulosis and 1 TA polyp w/ 5yr recall      PMH:   Past Medical History:   Diagnosis Date    Class 2 obesity due to excess calories without serious comorbidity in adult     HTN (hypertension)     Uterine mass 09/01/2022    Vitamin D deficiency        PSH:   Past Surgical History:   Procedure Laterality Date    HX CATARACT REMOVAL N/A     IR CHANGE NEPHROSTOMY PYELOS TUBE LT  9/22/2022    IR NEPHROSTOMY PERC LT PLC CATH  SI  9/4/2022    IR NEPHROSTOMY PERC RT PLC CATH  SI  9/4/2022    IR NEPHROSTOMY PERC RT PLC CATH  SI  9/15/2022       Social HX:   Social History     Socioeconomic History    Marital status:      Spouse name: Not on file    Number of children: Not on file    Years of education: Not on file    Highest education level: Not on file   Occupational History    Not on file   Tobacco Use    Smoking status: Not on file    Smokeless tobacco: Never   Substance and Sexual Activity    Alcohol use: Not Currently    Drug use: Not on file    Sexual activity: Not on file   Other Topics Concern    Not on file   Social History Narrative    Not on file     Social Determinants of Health     Financial Resource Strain: Not on file   Food Insecurity: Not on file   Transportation Needs: Not on file   Physical Activity: Not on file   Stress: Not on file   Social Connections: Not on file   Intimate Partner Violence: Not on file   Housing Stability: Not on file       FHX:   Family History   Problem Relation Age of Onset    Cancer Mother     Diabetes Mother        Allergy:   No Known Allergies    Patient Active Problem List   Diagnosis Code    Uterine mass N85.8    WILBUR (acute kidney injury) ([facility] [address] N17.9    Bladder outlet obstruction N32.0    Lobulation, spleen Q89.09    Adrenal nodule (HCC) E27.8    Leukocytosis D72.829    Microcytic anemia D50.9    Hyponatremia E87.1    Uremia N19    Other hydronephrosis N13.39    GI bleed K92.2       Home Medications: Medications Prior to Admission   Medication Sig    L. acidophilus,casei,rhamnosus (BIO-K PLUS) 50 billion cell cpDR capsule Take 1 Capsule by mouth daily for 30 days. metoprolol tartrate (LOPRESSOR) 50 mg tablet Take 1 Tablet by mouth two (2) times a day for 30 days. senna-docusate (PERICOLACE) 8.6-50 mg per tablet Take 1 Tablet by mouth daily for 30 days. simethicone (MYLICON) 80 mg chewable tablet Take 1 Tablet by mouth four (4) times daily as needed for GI Pain.    sodium bicarbonate 650 mg tablet Take 1 Tablet by mouth two (2) times a day for 30 days. vits A and D-white pet-lanolin (A&D) oint ointment Apply  to affected area three (3) times daily. Wound care orders to left & right buttocks: unit nursing staff to apply Vitamin A&D oint TID & after stooling. Nursing, document site in comments    Vitamin D2 1,250 mcg (50,000 unit) capsule TAKE 1 CAPSULE BY MOUTH 2 TIMES WEEKLY       Review of Systems:     Constitutional: No fevers, chills, weight loss, fatigue. Skin: No rashes, pruritis, jaundice, ulcerations, erythema. HENT: No headaches, nosebleeds, sinus pressure, rhinorrhea, sore throat. Eyes: No visual changes, blurred vision, eye pain, photophobia, jaundice. Cardiovascular: No chest pain, heart palpitations. Respiratory: No cough, SOB, wheezing, chest discomfort, orthopnea. Gastrointestinal: See HPI    Genitourinary: No dysuria, bleeding, discharge, pyuria. Musculoskeletal: No weakness, arthralgias, wasting. Endo: No sweats. Heme: No bruising, easy bleeding. Allergies: As noted. Neurological: Cranial nerves intact. Alert and oriented. Gait not assessed. Psychiatric:  No anxiety, depression, hallucinations.           Visit Vitals  /74   Pulse (!) 104   Temp (!) 94.7 °F (34.8 °C)   Resp 20   Ht 5' 5\" (1.651 m)   Wt (!) 223.1 kg (491 lb 13.5 oz)   SpO2 97%   Breastfeeding No   BMI 81.85 kg/m²       Physical Assessment:     constitutional: well developed, well nourished, normal habitus, in no acute distress. skin: no rashes, ulcers, icterus or other lesions  eyes: normal conjunctivae and lids; no jaundice pupils: normal  HEENT: normocephalic, atraumatic  neck: supple, normal ROM   respiratory: normal chest excursion; no intercostal retraction or accessory muscle use; clear to ascultation bilaterally    cardiovascular: regular rate and rhythm, no murmur, rub or gallop.   abdominal: non-distended, active bowel sounds, soft, mild-mod chronic BLQ TTP, non-acute, no palpable masses or hernias. liver/spleen: not palpable. extremities: no significant deformity or contracture, no edema. Gait not assessed   neurologic: cranial nerves: grossly normal.  psychiatric: judgement/insight: within normal limits. memory: within normal limits for recent and remote events. mood and affect: no evidence of depression, anxiety or agitation. orientation: oriented to time, space and person. Basic Metabolic Profile   Recent Labs     10/03/22  2257      K 3.5      CO2 24   BUN 94*   *   CA 8.3*   MG 2.1         CBC w/Diff    Recent Labs     10/03/22  2257   WBC 8.9   RBC 1.87*   HGB 4.8*   HCT 14.8*   MCV 79.1   MCH 25.7   MCHC 32.4   RDW 26.9*       Recent Labs     10/03/22  2257   GRANS 83*   LYMPH 5*   EOS 1        Hepatic Function   Recent Labs     10/03/22  2257   ALB 1.5*   TP 5.9*   TBILI 0.8   AP 99        Coags   No results for input(s): PTP, INR, APTT, INREXT in the last 72 hours. Dk Maynard PA-C.   10/04/22, 8:29 AM   [facility]  [email or web address]  Phone: [phone]  Pager: [phone]

## 2022-10-04 NOTE — PROGRESS NOTES
conducted an initial consultation and Spiritual Assessment for Darin Solis, who is a 71 y.o.,female. Patients Primary Language is: [place]. According to the patients EMR Nondenominational Affiliation is: Shinto.     The reason the Patient came to the hospital is:   Patient Active Problem List    Diagnosis Date Noted    GI bleed 10/04/2022    Other hydronephrosis 09/02/2022    Uterine mass 09/01/2022    WILBUR (acute kidney injury) ([facility] [address] 09/01/2022    Bladder outlet obstruction 09/01/2022    Lobulation, spleen 09/01/2022    Adrenal nodule ([facility] [address] 09/01/2022    Leukocytosis 09/01/2022    Microcytic anemia 09/01/2022    Hyponatremia 09/01/2022    Uremia 09/01/2022        The  provided the following Interventions:  Initiated a relationship of care and support. Offered prayer and assurance of continued prayers on patient's behalf. Chart reviewed. Plan:  Chaplains will continue to follow and will provide pastoral care on an as needed/requested basis.  recommends bedside caregivers page  on duty if patient shows signs of acute spiritual or emotional distress.     [address]  [phone])

## 2022-10-04 NOTE — PROGRESS NOTES
Patient received 2u PRBC's. Post transfusion H/H came back at 6.4/19.9. Salvador Solis MD made aware.

## 2022-10-05 NOTE — PROGRESS NOTES
[email or web address]  [phone]    Gastroenterology Progress Note    Impression:  1. Acute on chronic anemia - w/o overt bleeding, abdominal pain, or vomiting. Patient does note some chronic nausea and BLQ TTP present x 4 weeks since #5 diagnosis. Presenting symptom was fatigue w/ low OP hemoglobin. Denies significant NSAID or etoh use. 10/3 Hgb of 4.8 improved to 7.1 10/5 s/p 3 units PRBC. 9/22 7.6, 9/21 6.9, 9/5 6.8. 11/2017 colonoscopy w/ Dr. Darshan Gamez significant for diverticulosis and 1 TA polyp w/ 5yr recall  2. Fatigue - secondary to #1  3. WILBUR  4. HTN  5. Uterine carcinosarcoma - newer dx followed by Dr. Nickie Guallpa w/           [facility]. CT noted below. Patient previously received palliative XRT. 6. [facility] - 9/1-23 for abdominal pain related to #5. 9/1 CT Abd Pelvis w/o Contrast IMPRESSION  1. Compared to prior ultrasound and CT, continued enlargement of the uterus with mass effect upon the pelvic structures. Overall concerning for gynecologic malignancy. 2.  Lobular, 7 cm heterogeneous lesion within the spleen are increased compared exam. Findings could represent metastatic disease. 3.  Soft tissue nodule left adrenal gland measuring up to 4 cm. 4.  Distention of the urinary bladder with approximately estimated volume of 1.4  L. Mild resultant bilateral hydronephrosis. Correlate for urinary retention. Plan:  1. Recommend supportive care for now. Consider EGD if overt bleeding w/ subsequent significant drop in H/H.    2. Monitor H/H, transfuse if Hgb <7.  3. IV PPI BID  4. Continue medical management per primary team.       Chief Complaint: anemia      Subjective:  1 bloody smear BM yesterday followed by two brown BM since. Denies abd pain but does have more chronic TTP attributed to uterine mass. ROS: Denies any fevers, chills, nausea, vomiting.        General: well developed, well nourished, no acute distress  Eyes: conjunctiva normal, EOM normal  Cardiovascular: heart normal, intact distal pulses, normal rate and regular rhythm  Pulmonary: breath sounds normal and effort normal  Abdominal: non-distended, active bowel sounds, soft, mild-mod BLQ TTP, non-acute  Patient Active Problem List   Diagnosis Code    Uterine mass N85.8    WILBUR (acute kidney injury) ([facility] [address] N17.9    Bladder outlet obstruction N32.0    Lobulation, spleen Q89.09    Adrenal nodule (HCC) E27.8    Leukocytosis D72.829    Microcytic anemia D50.9    Hyponatremia E87.1    Uremia N19    Other hydronephrosis N13.39    GI bleed K92.2         Visit Vitals  /78 (BP 1 Location: Right upper arm, BP Patient Position: At rest)   Pulse (!) 116   Temp 98.2 °F (36.8 °C)   Resp 18   Ht 5' 3\" (1.6 m)   Wt 103.4 kg (228 lb)   SpO2 98%   Breastfeeding No   BMI 40.39 kg/m²           Intake/Output Summary (Last 24 hours) at 10/5/2022 0741  Last data filed at 10/5/2022 0459  Gross per 24 hour   Intake 614.33 ml   Output 1000 ml   Net -385.67 ml       CBC w/Diff    Lab Results   Component Value Date/Time    WBC 8.3 10/05/2022 02:28 AM    RBC 2.58 (L) 10/05/2022 02:28 AM    HGB 7.1 (L) 10/05/2022 02:28 AM    HCT 22.6 (L) 10/05/2022 02:28 AM    MCV 87.6 10/05/2022 02:28 AM    MCH 27.5 10/05/2022 02:28 AM    MCHC 31.4 10/05/2022 02:28 AM    RDW 23.9 (H) 10/05/2022 02:28 AM     10/05/2022 02:28 AM    Lab Results   Component Value Date/Time    GRANS 79 (H) 10/05/2022 02:28 AM    LYMPH 2 (L) 10/05/2022 02:28 AM    EOS 0 10/05/2022 02:28 AM    BANDS 8 (H) 10/05/2022 02:28 AM    BASOS 0 10/05/2022 02:28 AM    METAS 2 (H) 10/05/2022 02:28 AM    PRO 1 (H) 10/05/2022 02:28 AM      Basic Metabolic Profile   Recent Labs     10/05/22  0228   *   K 3.3*   *   CO2 20*   BUN 79*   CA 8.6   MG 2.1        Hepatic Function    Lab Results   Component Value Date/Time    ALB 1.5 (L) 10/03/2022 10:57 PM    TP 5.9 (L) 10/03/2022 10:57 PM    AP 99 10/03/2022 10:57 PM    No results found for: TBIL       Coags   No results for input(s): PTP, INR, APTT, INREXT in the last 72 hours. Kelechi Carlin PA-C.   10/05/22, 8:20 AM   [facility]  [email or web address]  Phone: [phone]  Pager: [phone]

## 2022-10-05 NOTE — PROGRESS NOTES
Discharge/Transition Planning     Patient came from [facility] under SNF and has been there since discharge on 9/23 till admit on 10/4. Patient had requested to come back to discuss dc plan.

## 2022-10-05 NOTE — CONSULTS
[id number] [facility]: [phone]  [facility]: [address] [place] [address] Way: [phone]    Patient Name: Aubrey Good  YOB: 1950    Date of Initial Consult: 10/5/2022  Reason for Consult: goals of care   Requesting Provider: Dr Juan Luis Moreno   Primary Care Physician: Carlene Singh MD      SUMMARY:   Aubrey Good is a 71y.o. year old with a past history of uterine cancer, s/p radiation treatments, hypertension, obesity , who was admitted on 10/3/2022 from [facility]  with a diagnosis of anemia. Current medical issues leading to Palliative Medicine involvement include: 71year old female who has a history of uterine cancer s/p RT. Of note was not able to have hysterectomy due to size of tumor. Plan was surgery once the tumor had decreased in size. Patient was transferred from [facility] with increased fatigue and unwell feeling. In Er she was found to have a hemoglobin of 4.8. Palliative medicine is consulted for goals of care discussions. PALLIATIVE DIAGNOSES:   Goals of care   Advanced care plan discussions  Uterine carcinoma   Anemia        PLAN:   Goals of care patient seen along with Ms Arsh Warren RN and Ms Jac Bergman. She is alert and oriented x 3 and can participate in conversation. She however during our conversation repeated several times, stated \" I don't know what is wrong. She referred to her uterine cancer as her \" cyst\". She understood she went to [facility] but never named radiation therapy. It is very hard to understand if she truly does not understand her medical illness or it is denial. Appreciate oncology consult moving forward. We did not discuss goals of care with Ms Jenniffer Prescott today. Goals of care full code with full interventions. Discussed with attending. Advanced care plan discussion no AMD on file. She shares she is a . Had 2 biology children, one son has passed. Her son Samanta Found is legal next of kin.    Uterine carcinoma s/p radiation therapy followed by Dr Franklin Rodriguez and has seen Dr Carlos Messer in the past   Anemia admitted with hemoglobin of 4.8. GI has been consulted. Possible EGD if overt bleeding per GI. S/p PRBC's   Initial consult note routed to primary continuity provider  Communicated plan of care with: Palliative IDT, attending and patient     Patient/Health Care Proxy Stated Goals: Prolong life      TREATMENT PREFERENCES:   Code Status: Full Code    Advance Care Planning:  [] The [facility] Interdisciplinary Team has updated the ACP Navigator with [address] and Patient Capacity    Primary Decision [facility] ([address]):   Primary Decision Maker: Jasson Arizmendi - [phone]    Secondary Decision Maker: Virginia Salas - Other Relative - [phone]    Medical Interventions: Full interventions           Other:  As far as possible, the palliative care team has discussed with patient / health care proxy about goals of care / treatment preferences for patient. HISTORY:     History obtained from: chart and patient     CHIEF COMPLAINT: GI bleed     HPI/SUBJECTIVE:    The patient is:   [x] Verbal and participatory  [] Non-participatory due to:   Please see summary      Clinical Pain Assessment (nonverbal scale for nonverbal patients): Clinical Pain Assessment  Severity: 0          Duration: for how long has pt been experiencing pain (e.g., 2 days, 1 month, years)  Frequency: how often pain is an issue (e.g., several times per day, once every few days, constant)     FUNCTIONAL ASSESSMENT:     Palliative Performance Scale (PPS):50       ECOG  ECOG Status : Limited self-care     PSYCHOSOCIAL/SPIRITUAL SCREENING:      Any spiritual / Nondenominational concerns:  [] Yes /  [x] No    Caregiver Burnout:  [] Yes /  [] No /  [x] No Caregiver Present      Anticipatory grief assessment:   [x] Normal  / [] Maladaptive        REVIEW OF SYSTEMS:     Positive and pertinent negative findings in ROS are noted above in HPI.   The following systems were [x] reviewed / [] unable to be reviewed as noted in HPI  Other findings are noted below. Systems: constitutional, ears/nose/mouth/throat, respiratory, gastrointestinal, genitourinary, musculoskeletal, integumentary, neurologic, psychiatric, endocrine. Positive findings noted below. Modified ESAS Completed by: provider   Fatigue: 7 Drowsiness: 0   Depression: 0 Pain: 0   Anxiety: 0 Nausea: 2     Dyspnea: 0           Stool Occurrence(s): 1        PHYSICAL EXAM:     Wt Readings from Last 3 Encounters:   10/04/22 103.4 kg (228 lb)   09/23/22 114.3 kg (252 lb)   07/05/22 101.2 kg (223 lb)     Blood pressure 126/72, pulse (!) 123, temperature 98 °F (36.7 °C), resp. rate 17, height 5' 3\" (1.6 m), weight 103.4 kg (228 lb), SpO2 97 %, not currently breastfeeding. Last bowel movement: x1 10/4/2022     Constitutional: chronically ill appearing female who is very pleasant, lying in bed in NAD   Eyes: pupils equal  ENMT: dry MM   Cardiovascular: tachycardia   Respiratory: respirations not labored   Gastrointestinal: soft non-tender  Skin: warm, dry  Neurologic: alert and oriented x 3   Psychiatric: full affect, no hallucinations, calm        HISTORY:     Active Problems:    GI bleed (10/4/2022)    Past Medical History:   Diagnosis Date    Class 2 obesity due to excess calories without serious comorbidity in adult     HTN (hypertension)     Uterine mass 09/01/2022    Vitamin D deficiency       Past Surgical History:   Procedure Laterality Date    HX CATARACT REMOVAL N/A     IR CHANGE NEPHROSTOMY PYELOS TUBE LT  9/22/2022    IR NEPHROSTOMY PERC LT PLC CATH  SI  9/4/2022    IR NEPHROSTOMY PERC RT PLC CATH  SI  9/4/2022    IR NEPHROSTOMY PERC RT PLC CATH  SI  9/15/2022      Family History   Problem Relation Age of Onset    Cancer Mother     Diabetes Mother      History reviewed, no pertinent family history.   Social History     Tobacco Use    Smoking status: Not on file    Smokeless tobacco: Never   Substance Use Topics    Alcohol use: Not Currently     No Known Allergies   Current Facility-Administered Medications   Medication Dose Route Frequency    0.9% sodium chloride infusion 250 mL  250 mL IntraVENous PRN    pantoprazole (PROTONIX) 40 mg in 0.9% sodium chloride 10 mL injection  40 mg IntraVENous Q12H    0.9% sodium chloride infusion  100 mL/hr IntraVENous PRN    acetaminophen (TYLENOL) tablet 500 mg  500 mg Oral Q4H PRN    0.9% sodium chloride infusion  75 mL/hr IntraVENous CONTINUOUS    0.9% sodium chloride infusion 250 mL  250 mL IntraVENous PRN        LAB AND IMAGING FINDINGS:     Lab Results   Component Value Date/Time    WBC 8.3 10/05/2022 02:28 AM    HGB 7.1 (L) 10/05/2022 02:28 AM    PLATELET 141 19/59/9860 02:28 AM     Lab Results   Component Value Date/Time    Sodium 148 (H) 10/05/2022 02:28 AM    Potassium 3.3 (L) 10/05/2022 02:28 AM    Chloride 117 (H) 10/05/2022 02:28 AM    CO2 20 (L) 10/05/2022 02:28 AM    BUN 79 (H) 10/05/2022 02:28 AM    Creatinine 1.23 10/05/2022 02:28 AM    Calcium 8.6 10/05/2022 02:28 AM    Magnesium 2.1 10/05/2022 02:28 AM    Phosphorus 3.7 09/21/2022 05:55 AM      Lab Results   Component Value Date/Time    Alk.  phosphatase 99 10/03/2022 10:57 PM    Protein, total 5.9 (L) 10/03/2022 10:57 PM    Albumin 1.5 (L) 10/03/2022 10:57 PM    Globulin 4.4 (H) 10/03/2022 10:57 PM     Lab Results   Component Value Date/Time    INR 1.3 (H) 09/02/2022 03:19 AM    Prothrombin time 17.0 (H) 09/02/2022 03:19 AM    aPTT 27.7 09/02/2022 03:19 AM      Lab Results   Component Value Date/Time    Iron 23 (L) 09/02/2022 03:19 AM    TIBC 176 (L) 09/02/2022 03:19 AM    Iron % saturation 13 (L) 09/02/2022 03:19 AM    Ferritin 823 (H) 09/02/2022 03:19 AM      No results found for: PH, PCO2, PO2  No components found for: GLPOC   No results found for: CPK, CKMB           Total time: 50 minutes   Counseling / coordination time, spent as noted above:   > 50% counseling / coordination: yes with patient     Prolonged service was provided for  []30 min   []75 min in face to face time in the presence of the patient, spent as noted above.   Time Start:   Time End:

## 2022-10-05 NOTE — PROGRESS NOTES
[address] [facility] Pharmacokinetic Monitoring Service - Vancomycin     Paulino Adler is a 71 y.o. female starting on vancomycin therapy for possible sepsis and bandemia. Pharmacy consulted by Alfredo Peraza MD for monitoring and adjustment. Target Concentration: Goal AUC/ANDREAS 400-600 mg*hr/L    Additional Antimicrobials: Cefepime    Pertinent Laboratory Values: Wt Readings from Last 1 Encounters:   10/04/22 103.4 kg (228 lb)     Temp Readings from Last 1 Encounters:   10/05/22 98.4 °F (36.9 °C)     No components found for: PROCAL  Estimated Creatinine Clearance: 48.2 mL/min (based on SCr of 1.23 mg/dL). Recent Labs     10/05/22  0228 10/03/22  2257   WBC 8.3 8.9       Pertinent Cultures:  Culture Date Source Results   10/05 Blood x 2 In Progress   MRSA Nasal Swab: N/A. Non-respiratory infection. .    Plan:  Dosing recommendations based on Bayesian software  Start vancomycin 2000 mg x 1, followed by 1000 mg q24h   Anticipated AUC of 522 and trough concentration of 15.2 at steady state  Renal labs as indicated   Vancomycin concentration ordered for 10/06 @ 0400   Pharmacy will continue to monitor patient and adjust therapy as indicated    Thank you for the consult,  GRECIA Hills  10/5/2022 6:36 PM

## 2022-10-05 NOTE — PROGRESS NOTES
Problem: Dysphagia (Adult)  Goal: *Acute Goals and Plan of Care (Insert Text)  Description: Patient will:  1. Tolerate PO trials with 0 s/s overt distress in 4/5 trials  2. Utilize compensatory swallow strategies/maneuvers (decrease bite/sip, size/rate, alt. liq/sol) with min cues in 4/5 trials  3. Perform oral-motor/laryngeal exercises to increase oropharyngeal swallow function with min cues  4. Complete an objective swallow study (i.e., MBSS) to assess swallow integrity, r/o aspiration, and determine of safest LRD, min A    Recommend:   Full liquids; Easy to chew diet with thin liquids when able to advance medically   Meds per pt preference   Aspiration precautions  HOB >45 degrees during all intake and for at least 30 min after intake  Small bites/sips, Slow rate of intake with rest breaks as needed   Oral care three times daily     Outcome: Progressing Towards Goal     SPEECH LANGUAGE PATHOLOGY BEDSIDE SWALLOW EVALUATION/TREATMENT    Patient: Lacy Spann (10 y.o. female)  Date: 10/5/2022  Primary Diagnosis: GI bleed [K92.2]  Precautions: Aspiration      PLOF: As per H&P    ASSESSMENT :  Based on the objective data described below, the patient presents with mild oral and suspected mild pharyngeal dysphagia. Pt alert and oriented x3, following commands and reporting \"sometimes I have trouble\" with swallowing. Pt currently on clear liquid diet, tolerated lunch and ok to advance to full liquids per RN. Oral [facility] exam unremarkable. Pt with increased work of breathing with phonatory tasks. Tolerating thin liquids + straw with timely swallow initiation, adequate laryngeal elevation to palpation and no overt s/sx aspiration. Demo slowed but thorough oral prep phase with pudding. Agreeable to diet advancement as long as food \"soft enough\".       TREATMENT :  Skilled therapy initiated; Educated pt on aspiration precautions and importance of compensatory swallow techniques to decrease aspiration risk (decrease rate of intake & sip/bite size, upright @HOB for all po intake and ~30 minutes after po); verbalized comprehension. Will follow x1-2 visits to ensure continued diet tolerance. Patient will benefit from skilled intervention to address the above impairments. Patient's rehabilitation potential is considered to be Good  Factors which may influence rehabilitation potential include:   []            None noted  [x]            Mental ability/status  [x]            Medical condition  []            Home/family situation and support systems  [x]            Safety awareness  []            Pain tolerance/management  []            Other:      PLAN :  Recommendations and Planned Interventions:  As above   Frequency/Duration: Patient will be followed by speech-language pathology x1-2 visits to address goals.      SUBJECTIVE:   Patient stated \"Sometimes I have trouble    OBJECTIVE:     Past Medical History:   Diagnosis Date    Class 2 obesity due to excess calories without serious comorbidity in adult     HTN (hypertension)     Uterine mass 09/01/2022    Vitamin D deficiency      Past Surgical History:   Procedure Laterality Date    HX CATARACT REMOVAL N/A     IR CHANGE NEPHROSTOMY PYELOS TUBE LT  9/22/2022    IR NEPHROSTOMY PERC LT PLC CATH  SI  9/4/2022    IR NEPHROSTOMY PERC RT PLC CATH  SI  9/4/2022    IR NEPHROSTOMY PERC RT PLC CATH  SI  9/15/2022     Prior Level of Function/Home Situation:   Home Situation  Home Environment: Rehabilitation facility  One/Two Story Residence: Other (Comment)  Living Alone: No  Support Systems: Inpatient [facility]  Patient Expects to be Discharged to[de-identified] Skilled nursing facility  Current DME Used/Available at Home: Hospital bed  Diet prior to admission: regular/thin liquids   Current Diet:  clear liquids   Cognitive and Communication Status:  Neurologic State: Alert  Orientation Level: Oriented to person, Oriented to place, Oriented to situation  Cognition: Follows commands  Oral Assessment:  Oral Assessment  Labial: No impairment  Dentition: Natural  Oral Hygiene: Good  Lingual: No impairment  Velum: No impairment  Mandible: No impairment  P.O. Trials:  Patient Position: [facility] at [facility]  Vocal quality prior to P.O.: Low volume  Consistency Presented: Thin liquid;Pudding  How Presented: Self-fed/presented;Cup/sip;Straw;Successive swallows  Bolus Acceptance: No impairment  Bolus Formation/Control: Impaired  Type of Impairment: Delayed; Posterior  Propulsion: Delayed (# of seconds)  Oral Residue: Less than 10% of bolus; Lingual  Initiation of Swallow: No impairment  Laryngeal Elevation: Functional  Aspiration Signs/Symptoms: None  Pharyngeal Phase Characteristics: Easily fatigued ; Poor endurance  Effective Modifications: Small sips and bites (Slow rate of intake)  Cues for Modifications: Minimal  Oral Phase Severity: Mild  Pharyngeal Phase Severity : Mild    PAIN:  Start of Eval: 9/10  End of Eval: 9/10; RN made aware      After treatment:   []            Patient left in no apparent distress sitting up in chair  [x]            Patient left in no apparent distress in bed  [x]            Call bell left within reach  [x]            Nursing notified  []            Family present  []            Caregiver present  []            Bed alarm activated    COMMUNICATION/EDUCATION:   [x]            Aspiration precautions; swallow safety; compensatory techniques. [x]            Patient/family have participated as able in goal setting and plan of care. []            Patient/family agree to work toward stated goals and plan of care. []            Patient understands intent and goals of therapy; neutral about participation. []            Patient unable to participate in goal setting/plan of care; educ ongoing with interdisciplinary staff  []         Posted safety precautions in patient's room.     Thank you for this referral,  Regi Isaacs M.S., [address] [facility]  Speech-Language Pathologist

## 2022-10-05 NOTE — PROGRESS NOTES
TRANSFER - IN REPORT:    Verbal report received from Sherrie Westbrook RN(name) on Dianne Lincoln  being received from [facility](unit) for change in patient condition(needs higher level of care)      Report consisted of patients Situation, Background, Assessment and   Recommendations(SBAR). Information from the following report(s) SBAR, Kardex, Intake/Output, MAR, and Cardiac Rhythm Sinus Tachy  was reviewed with the receiving nurse. Opportunity for questions and clarification was provided. Assessment completed upon patients arrival to unit and care assumed. 1848- pt arrives to unit. Nurse gets patients hooked to monitor, takes VS and attempts a 2nd IV access.

## 2022-10-05 NOTE — PROGRESS NOTES
Physician Progress Note      Franko Ny  [facility] #:                  [id number]  :                       1950  ADMIT DATE:       10/3/2022 7:30 PM  [address] [place] DATE:  RESPONDING  PROVIDER #:        Chika Mcdonald MD          QUERY TEXT:    Patient admitted with GI Bleed and anemia . Per WD care RN on 10/4/22 , noted to also have friction injuries. If possible, please document in progress notes and discharge summary the location, present on admission status of friction injuries: The medical record reflects the following:    Risk Factors:  71 y.o. female who has a known history of uterine cancer and also received radiation treatments, hypertension, obesity and is at the nursing facility and was sent to the emergency room because she was noted to have low hemoglobin    Clinical Indicators: Per WD care RN PN Left & right buttocks, fleshy part of skin, not over bony prominences, full thickness friction injuries, exacerbated by urine incontinence, skin injury extends to posterior thighs, periwound hyperpigmentation & also located in inner gluteal cleft, POA. Pt has pitting edema in both legs & feet. Both heels are intact. Treatment: Applied purewick to 80mmHG wall suction & booster pad applied. Education provided to pt on plan of care. Heel prevention boots are in place bilaterally. Wound care orders Left & right buttocks: Unit nursing staff to apply thin layer of Triad paste BID & prn (yellow tube at bedside, if running low, notify wound care dept to obtain M-F, 7am to 3pm.) leave paste open to air. Do NOT apply silicone dressing on her buttocks. Place skin fold dry sheets (purple sheets at bedside) under abdomen folds daily & prn soilage , Pt on loida bed with low air loss pump in place.     Thank you  Darleen Sharpe RN CRCR CDI , [facility]  [email or web address]  Options provided:  -- Left & right buttocks, fleshy part of skin, not over bony prominences , full thickness friction injuries and extending to posterior thighs present on admission  -- Other - I will add my own diagnosis  -- Disagree - Not applicable / Not valid  -- Disagree - Clinically unable to determine / Unknown  -- Refer to Clinical Documentation Reviewer    PROVIDER RESPONSE TEXT:    This patient has Left & right buttocks, fleshy part of skin, not over bony prominences , full thickness friction injuries and extending to posterior thighs which are present on admission. Query created by:  Merced العراقي on 10/5/2022 11:32 AM      Electronically signed by:  Shweta Avila MD 10/5/2022 11:35 AM

## 2022-10-05 NOTE — PROGRESS NOTES
Bedside and Verbal shift change report received from [address] [place] [address] (offgoing nurse). Report included the following information SBAR, Kardex, Intake/Output, Recent Results, and Cardiac Rhythm NSR .      1930: AOX4, on room air, generally weak, receiving blood transfusion at this time; denies any pain, shortness of breath or other discomforts; son at bedside; v/s monitored; shift assessment done     >IV Heplocks inserted by staff    2129: 2nd unit PRBC infusing well    2200: had a medium amount of liquid black stools; doug care done    2220: to CT for test    2234: back from CT; Blood transfusion infusing; tolerating well  0030: sleeping comfortably    0120: blood transfusion completed; tolerating well    0300: sleeping; IVF infusing well    0500: CHG bath given    0630: awake, requesting for food; explained NPO status    0715: Bedside and Verbal shift change report given to Erasmo Carney RN (oncoming nurse) by Richardine Dakins RN (offgoing nurse). Report included the following information SBAR, Kardex, Intake/Output, Recent Results, and Cardiac Rhythm Sinus Tachycardia .       Wound Prevention Checklist    Patient: Baylee Clarke (31 y.o. female)  Date: 10/6/2022  Diagnosis: GI bleed [K92.2] <principal problem not specified>    Precautions:         [x]  Heel prevention boots placed on patient    [x]  Patient turned q2h during shift    []  Lift team ordered    [x]  Patient on [place] bed/Specialty bed    [x]  Each Wound is documented during shift (Stage, Color, drainage, odor, measurements, and dressings)    [x]  Dual skin check done with Cari Denver RN Richardine Dakins, RN

## 2022-10-05 NOTE — ACP (ADVANCE CARE PLANNING)
Advance Care Planning     General Advance Care Planning (ACP) Conversation    Date of Conversation: 10/5/2022  Conducted with: Patient with Decision Making Capacity    Healthcare Decision Maker:     Primary Decision Maker: Richi Carter - [phone]  Click here to complete [address] including selection of the Healthcare Decision Maker Relationship (ie \"Primary\")    Today we documented Decision Maker(s) consistent with Legal Next of Kin hierarchy. Content/Action Overview:       Palliative Medicine team members, Deyvi Sanchez NP, Alireza Coates LMSW and this writer for consult visit. Adela Mcfarlane is a 71 y.o. female who has a known history of uterine cancer, s/p palliative radiation treatments with the goal of decreasing the tumor size in preparation for surgical removal. She also suffers from HTN, and obesity. She was residing at [facility] for rehab services when she c/o feeling fatigue with lack of energy. Hgb was recorded at 4.8 with Hct of 14.8. She was admitted via ED. Ms Donald Hernández is alert and aware x3 but lacks clear information about her diagnosis \"I have a cyst, that I was going to [facility] for 2 weeks \"   Ms Donald Hernández is , having lost her spouse 3 years ago. Has shared with team that she had 2 son but one had . \"It's just me and Arnaldo\". Ms. Donald Hernández is a . She has one adult son, Christo Young. She does not have an Advance Medical Directive on file. Per note, she had a discussion with the pastoral care staff. At that time she verbally named her son, Christo Young as her primary health care agent. In the absence of an ADM the legal next of kin, Rodrigue PainterToan will act as primary health care decision maker. Has NO ACP documents/care preferences - information provided, considering goals and options. Ms. Donald Hernández needs further medical information about her diagnosis and treatment options.  She shared her frustration over \"coming back and forth and back and forth, I just turned 72 and I'm still working\". Goals of care were not addressed during this consult visit. Team will continue to build a relationship with Ms. Lauren Brice as she learns more about her diagnosis and treatment plan. This information will be needed for her to make an informer decision about her goals of care. Palliative Medicine will continue to follow.      Fletcher Cook BSN, RN, [facility]  Palliative Medicine Inpatient RN  Palliative COPE Line: [phone]

## 2022-10-05 NOTE — PROGRESS NOTES
PT orders received and chart was reviewed. Pt is currently declining evaluation due to fatigue and overall feeling of overwhelmed with her situation and agitated about NPO status. Will continue to follow.   Natalia Adams, PT

## 2022-10-05 NOTE — PROGRESS NOTES
OT orders received and medical chart review completed. 1027: Palliative consulting with pt  1046: Palliative report pt fatigued and recommend returning later in day to offer OT services  OT to follow up   [phone]: Pt declined OT intervention at this time and verbalized understanding that she can request OT to return as needed for evaluation.      OT to sign off

## 2022-10-05 NOTE — PROGRESS NOTES
RN called stating that patient was having abdominal pain. Repeat hemoglobin noted to be 4.2. I came and reassessed the patient. Currently patient complains of some vague lower abdominal discomfort. Patient primarily complains of some discomfort in her backside from laying on the bed. On examination abdomen is soft. Patient has not had any bloody bowel movement or tarry stool. Also patient has bandemia on her blood counts. Will transfer patient to stepdown  Will transfuse 2 unit PRBC stat and monitor hemoglobin  I called and discussed with the gastroenterologist on-call Dr. Rodney Brown and given that patient has not had any overt bleeding he recommends CT scan of the abdomen which have ordered. Continue aggressive IV fluids. N.p.o.  Given bandemia will add empiric antibiotics and check cultures. Can de-escalate if cultures remain negative  I discussed with patient and son at bedside.   I discussed with RN  Discussed with nursing supervisor  I have signed out to the nighttime hospitalist

## 2022-10-05 NOTE — PALLIATIVE CARE
[facility] [phone]  [facility] [phone]     Ry Rodriguez, HANSEL, Harinder Sanders RN and this [facility] met with pt at bedside to assess. Upon entry, pt laying in bed with head slightly elevated, pt AAOx3 and engaged in conversation appropriately when spoken to. Pt recounted medical issues that have been ongoing since July of this year. She reports prior to that, she was working and doing fine. She reports she had a biopsy and was informed she had a tumor. She reports the MD said they could not remove it, until/unless they could shrink it enough to get it out. [facility]. She then began referring to tumor as \"cyst.\"  She reports the therapy, likely radiation, made her tired and caused her to be nauseated. She reports she is now experiencing issues with her skin. She states she really doesn't know what's going on, other than she keeps going back and forth to hospital.  She reports the \"cyst\" that's in there makes it uncomfortable to move and makes her feel full. Pt reports watery stool. NP explained, there are concerns that pt is bleeding internally in her intestines going to rectum, and that is why she received blood transfusion. NP explained at this time the doctors want to watch and see how she does. Pt verbalized understanding. Pt reports prior to admission she was at [facility]. She reports she has one living son, Liliam Brown, who works at the [employer] in [place]. She denies having other family, as she is  as well, was  55 years. He  three years ago. Pt was provided supportive counseling and was reassured she will be well cared for. Pt verbalized understanding. No additional needs or concerns at this time. Thank you for this referral to Palliative Care.  At this time, it is important to get additional input from Oncology, before having further discussions regarding goals of care.  Palliative team will continue to follow to provide support for pt and her family.       CODE STATUS:  FULL CODE / FULL AGGRESSIVE MEASURES    Kenrick Uriostegui, [address] [facility]  Palliative Medicine Inpatient   DR. [facility]  Palliative COPE Line: [phone] ([phone])

## 2022-10-05 NOTE — PROGRESS NOTES
[facility] Hospitalist Group  Progress Note    Patient: Valentin Soares Age: 71 y.o. : 1950 MR#: [id number] SSN: [id number]  Date/Time: 10/5/2022    Subjective:     Patient is laying in bed in no apparent distress, awake, follows commands    Assessment/Plan:     Acute GI bleed  Acute blood loss anemia  Uterine carcinosarcoma  Hypernatremia  Elevated BUN likely secondary to GI bleed    PLAN  Monitor hemoglobin. Transfuse as needed  Continue PPI, GI is following  Liquid diet  Judicious IV fluids, monitor sodium  Oncology consulted. Discussed with Dr. Nuria Anderson. Dr. Theresa Casillas will see the patient tomorrow. Discussed with palliative care  Discussed with patient. With patient's permission I called her son at phone #[phone] and left a brief message. Case discussed with:  [x]Patient  []Family  [x]Nursing  []Case Management  DVT Prophylaxis:  []Lovenox  []Hep SQ  [x]SCDs  []Coumadin   []On Heparin gtt    Objective:   VS: Visit Vitals  /83   Pulse (!) 119   Temp 98.1 °F (36.7 °C)   Resp 18   Ht 5' 3\" (1.6 m)   Wt 103.4 kg (228 lb)   SpO2 96%   Breastfeeding No   BMI 40.39 kg/m²      Tmax/24hrs: Temp (24hrs), Av.8 °F (36.6 °C), Min:97 °F (36.1 °C), Max:98.2 °F (36.8 °C)    Input/Output:   Intake/Output Summary (Last 24 hours) at 10/5/2022 1616  Last data filed at 10/5/2022 0459  Gross per 24 hour   Intake 212.5 ml   Output 1000 ml   Net -787.5 ml       General:  Awake, follows commands  Cardiovascular:  S1S2+, RRR  Pulmonary:  CTA b/l  GI:  Soft, BS+, NT, ND  Extremities:  No edema      Labs:    Recent Results (from the past 24 hour(s))   RBC, ALLOCATE    Collection Time: 10/04/22  4:30 PM   Result Value Ref Range    HISTORY CHECKED?  Historical check performed    CBC WITH AUTOMATED DIFF    Collection Time: 10/05/22  2:28 AM   Result Value Ref Range    WBC 8.3 4.6 - 13.2 K/uL    RBC 2.58 (L) 4.20 - 5.30 M/uL    HGB 7.1 (L) 12.0 - 16.0 g/dL    HCT 22.6 (L) 35.0 - 45.0 %    MCV 87.6 78.0 - 100.0 FL    MCH 27.5 24.0 - 34.0 PG    MCHC 31.4 31.0 - 37.0 g/dL    RDW 23.9 (H) 11.6 - 14.5 %    PLATELET 726 424 - 926 K/uL    MPV 10.7 9.2 - 11.8 FL    NRBC 1.7 (H) 0  WBC    ABSOLUTE NRBC 0.14 (H) 0.00 - 0.01 K/uL    NEUTROPHILS 79 (H) 40 - 73 %    BAND NEUTROPHILS 8 (H) 0 - 5 %    LYMPHOCYTES 2 (L) 21 - 52 %    MONOCYTES 8 3 - 10 %    EOSINOPHILS 0 0 - 5 %    BASOPHILS 0 0 - 2 %    METAMYELOCYTES 2 (H) 0 %    PROMYELOCYTES 1 (H) 0 %    IMMATURE GRANULOCYTES 0 %    ABS. NEUTROPHILS 7.2 1.8 - 8.0 K/UL    ABS. LYMPHOCYTES 0.2 (L) 0.9 - 3.6 K/UL    ABS. MONOCYTES 0.7 0.05 - 1.2 K/UL    ABS. EOSINOPHILS 0.0 0.0 - 0.4 K/UL    ABS. BASOPHILS 0.0 0.0 - 0.1 K/UL    ABS. IMM.  GRANS. 0.0 K/UL    DF MANUAL      PLATELET COMMENTS ADEQUATE PLATELETS      RBC COMMENTS ANISOCYTOSIS  2+        RBC COMMENTS HYPOCHROMIA  1+        RBC COMMENTS POLYCHROMASIA  1+        RBC COMMENTS MACROCYTOSIS  1+        RBC COMMENTS MICROCYTOSIS  1+        RBC COMMENTS MINISTERIO CELLS  1+        RBC COMMENTS TARGET CELLS  1+        RBC COMMENTS RBC Comment  4 NRBC        WBC COMMENTS TOXIC GRANULATION     METABOLIC PANEL, BASIC    Collection Time: 10/05/22  2:28 AM   Result Value Ref Range    Sodium 148 (H) 136 - 145 mmol/L    Potassium 3.3 (L) 3.5 - 5.5 mmol/L    Chloride 117 (H) 100 - 111 mmol/L    CO2 20 (L) 21 - 32 mmol/L    Anion gap 11 3.0 - 18 mmol/L    Glucose 95 74 - 99 mg/dL    BUN 79 (H) 7.0 - 18 MG/DL    Creatinine 1.23 0.6 - 1.3 MG/DL    BUN/Creatinine ratio 64 (H) 12 - 20      eGFR 47 (L) >60 ml/min/1.73m2    Calcium 8.6 8.5 - 10.1 MG/DL   MAGNESIUM    Collection Time: 10/05/22  2:28 AM   Result Value Ref Range    Magnesium 2.1 1.6 - 2.6 mg/dL     Additional Data Reviewed:      Signed By: Tana Landin MD     October 5, 2022

## 2022-10-06 NOTE — CONSULTS
[id number] [facility] 54: [phone]  [facility]: [address] [place] [address] Way: [phone]    Patient Name: Chris Gonzalez  YOB: 1950    Date of Initial Consult: 10/5/2022  Follow up 10/6/2022   Reason for Consult: goals of care   Requesting Provider: Dr Fernando Guzmán   Primary Care Physician: Alejo Botello MD      SUMMARY:   Chris Gonzalez is a 71y.o. year old with a past history of uterine cancer, s/p radiation treatments, hypertension, obesity , who was admitted on 10/3/2022 from [facility]  with a diagnosis of anemia. Current medical issues leading to Palliative Medicine involvement include: 71year old female who has a history of uterine cancer s/p RT. Of note was not able to have hysterectomy due to size of tumor. Plan was surgery once the tumor had decreased in size. Patient was transferred from [facility] with increased fatigue and unwell feeling. In Er she was found to have a hemoglobin of 4.8. Palliative medicine is consulted for goals of care discussions. 10/6/2022 events noted from last night. No pain this am, seems a bit confused this am. Did not remember oncology coming to room this am.      PALLIATIVE DIAGNOSES:   Goals of care   Advanced care plan discussions  Uterine carcinoma   Anemia        PLAN:   10/6/2022 follow up along with Ms Kg Welch RN and Ms Dru Regalado LMSW this am. Events of last night noted. Required more blood due to bleeding. Comfort this am. Seems a bit more confused this am. Knows she is in the hospital but did not remember oncology coming this am. Worry that she can not make a complex medical decision. We called her son Huong Rodriguez who is legal next of kin. He is aware there is testing to find bleeding source. We introduced our services. Plan to meet tomorrow at 1600 to further discuss. Huong Rodriguez did tell us he and family remain hopefull and \" as long as we are doing everything we can to help his mother, he is OK. \" Goals of care full code with full interventions. ( Please see below for previous notes per palliative team)     Goals of care patient seen along with Ms Will Jimenez RN and Ms Jeff Saenz. She is alert and oriented x 3 and can participate in conversation. She however during our conversation repeated several times, stated \" I don't know what is wrong. She referred to her uterine cancer as her \" cyst\". She understood she went to [facility] but never named radiation therapy. It is very hard to understand if she truly does not understand her medical illness or it is denial. Appreciate oncology consult moving forward. We did not discuss goals of care with Ms Jackie Skinner today. Goals of care full code with full interventions. Discussed with attending. Advanced care plan discussion no AMD on file. She shares she is a . Had 2 biology children, one son has passed. Her son Jeffry Camacho is legal next of kin. Uterine carcinoma s/p radiation therapy followed by Dr John Inman and has seen Dr Clauida Astorga in the past   Anemia admitted with hemoglobin of 4.8. GI has been consulted. Possible EGD if overt bleeding per GI. S/p PRBC's   Initial consult note routed to primary continuity provider  Communicated plan of care with: Palliative IDT, attending and patient     Patient/Health Care Proxy Stated Goals: Prolong life      TREATMENT PREFERENCES:   Code Status: Full Code    Advance Care Planning:  [] The [facility] Interdisciplinary Team has updated the ACP Navigator with [address] and Patient Capacity    Primary Decision [address] ([address]):   Primary Decision Maker: Dulce Maria Pozo - [phone]    Secondary Decision Maker: Virginia Salas - Other Relative - [phone]    Medical Interventions: Full interventions           Other:  As far as possible, the palliative care team has discussed with patient / health care proxy about goals of care / treatment preferences for patient.      HISTORY:     History obtained from: chart and patient     CHIEF COMPLAINT: GI bleed     HPI/SUBJECTIVE:    The patient is:   [x] Verbal and participatory  [] Non-participatory due to:   Please see summary      Clinical Pain Assessment (nonverbal scale for nonverbal patients): Clinical Pain Assessment  Severity: 0          Duration: for how long has pt been experiencing pain (e.g., 2 days, 1 month, years)  Frequency: how often pain is an issue (e.g., several times per day, once every few days, constant)     FUNCTIONAL ASSESSMENT:     Palliative Performance Scale (PPS):50       ECOG  ECOG Status : Limited self-care     PSYCHOSOCIAL/SPIRITUAL SCREENING:      Any spiritual / Yazidism concerns:  [] Yes /  [x] No    Caregiver Burnout:  [] Yes /  [] No /  [x] No Caregiver Present      Anticipatory grief assessment:   [x] Normal  / [] Maladaptive        REVIEW OF SYSTEMS:     Positive and pertinent negative findings in ROS are noted above in HPI. The following systems were [x] reviewed / [] unable to be reviewed as noted in HPI  Other findings are noted below. Systems: constitutional, ears/nose/mouth/throat, respiratory, gastrointestinal, genitourinary, musculoskeletal, integumentary, neurologic, psychiatric, endocrine. Positive findings noted below. Modified ESAS Completed by: provider   Fatigue: 7 Drowsiness: 0   Depression: 0 Pain: 0   Anxiety: 0 Nausea: 2     Dyspnea: 0           Stool Occurrence(s): 1        PHYSICAL EXAM:     Wt Readings from Last 3 Encounters:   10/04/22 103.4 kg (228 lb)   09/23/22 114.3 kg (252 lb)   07/05/22 101.2 kg (223 lb)     Blood pressure (!) 121/49, pulse (!) 120, temperature 98.3 °F (36.8 °C), resp. rate 24, height 5' 3\" (1.6 m), weight 103.4 kg (228 lb), SpO2 99 %, not currently breastfeeding.   Last bowel movement: x1 10/4/2022, 10/5/2022 x 5     Constitutional: chronically ill appearing female who is lying in bed in NAD   Eyes: pupils equal  ENMT: dry MM   Cardiovascular: tachycardia   Respiratory: breathing not labored   Skin: warm, dry  Neurologic: alert and oriented x 2  Psychiatric: full affect, no hallucinations, calm        HISTORY:     Active Problems:    GI bleed (10/4/2022)    Past Medical History:   Diagnosis Date    Class 2 obesity due to excess calories without serious comorbidity in adult     HTN (hypertension)     Uterine mass 09/01/2022    Vitamin D deficiency       Past Surgical History:   Procedure Laterality Date    HX CATARACT REMOVAL N/A     IR CHANGE NEPHROSTOMY PYELOS TUBE LT  9/22/2022    IR NEPHROSTOMY PERC LT PLC CATH  SI  9/4/2022    IR NEPHROSTOMY PERC RT PLC CATH  SI  9/4/2022    IR NEPHROSTOMY PERC RT PLC CATH  SI  9/15/2022      Family History   Problem Relation Age of Onset    Cancer Mother     Diabetes Mother      History reviewed, no pertinent family history.   Social History     Tobacco Use    Smoking status: Not on file    Smokeless tobacco: Never   Substance Use Topics    Alcohol use: Not Currently     No Known Allergies   Current Facility-Administered Medications   Medication Dose Route Frequency    iron sucrose (VENOFER) injection 200 mg  200 mg IntraVENous Q24H    vancomycin (VANCOCIN) 1250 mg in  ml infusion  1,250 mg IntraVENous Q24H    iopamidoL (ISOVUE-370) 76 % injection 100 mL  100 mL IntraVENous RAD ONCE    lidocaine (PF) (XYLOCAINE) 10 mg/mL (1 %) injection 5 mL  5 mL IntraDERMal ONCE    sodium chloride (NS) flush 5-40 mL  5-40 mL IntraVENous Q8H    sodium chloride (NS) flush 5-40 mL  5-40 mL IntraVENous PRN    morphine injection 2 mg  2 mg IntraVENous Q4H PRN    dextrose 5 % - 0.45% NaCl infusion  100 mL/hr IntraVENous CONTINUOUS    cefepime (MAXIPIME) 2 g in 0.9% sodium chloride (MBP/ADV) 100 mL MBP  2 g IntraVENous Q12H    pantoprazole (PROTONIX) 40 mg in 0.9% sodium chloride 10 mL injection  40 mg IntraVENous Q12H    0.9% sodium chloride infusion  100 mL/hr IntraVENous PRN    acetaminophen (TYLENOL) tablet 500 mg  500 mg Oral Q4H PRN        LAB AND IMAGING FINDINGS:     Lab Results Component Value Date/Time    WBC 9.1 10/06/2022 03:00 AM    HGB 9.0 (L) 10/06/2022 09:15 AM    PLATELET 451 (L) 42/78/5563 03:00 AM     Lab Results   Component Value Date/Time    Sodium 152 (H) 10/06/2022 03:00 AM    Potassium 3.4 (L) 10/06/2022 03:00 AM    Chloride 121 (H) 10/06/2022 03:00 AM    CO2 20 (L) 10/06/2022 03:00 AM    BUN 64 (H) 10/06/2022 03:00 AM    Creatinine 1.02 10/06/2022 03:00 AM    Calcium 8.3 (L) 10/06/2022 03:00 AM    Magnesium 2.0 10/06/2022 03:00 AM    Phosphorus 3.7 09/21/2022 05:55 AM      Lab Results   Component Value Date/Time    Alk. phosphatase 99 10/03/2022 10:57 PM    Protein, total 5.9 (L) 10/03/2022 10:57 PM    Albumin 1.5 (L) 10/03/2022 10:57 PM    Globulin 4.4 (H) 10/03/2022 10:57 PM     Lab Results   Component Value Date/Time    INR 2.1 (H) 10/06/2022 09:15 AM    Prothrombin time 23.8 (H) 10/06/2022 09:15 AM    aPTT 30.5 10/06/2022 09:15 AM      Lab Results   Component Value Date/Time    Iron 23 (L) 09/02/2022 03:19 AM    TIBC 176 (L) 09/02/2022 03:19 AM    Iron % saturation 13 (L) 09/02/2022 03:19 AM    Ferritin 823 (H) 09/02/2022 03:19 AM      No results found for: PH, PCO2, PO2  No components found for: GLPOC   No results found for: CPK, CKMB           Total time: 25 minutes   Counseling / coordination time, spent as noted above:   > 50% counseling / coordination: yes with patient     Prolonged service was provided for  []30 min   []75 min in face to face time in the presence of the patient, spent as noted above.   Time Start:   Time End:

## 2022-10-06 NOTE — PROGRESS NOTES
Palliative Medicine    Palliative Medicine team Ry Rodriguez NP, Fernando Waller LMSW and Jamal Negro RN met at the pt's bedside. Pt was somewhat drowsy and did not recall her son visiting her last night or the visit from Dr. Sushma Rausch this morning. Pt did ask why she was bleeding. Explained that the pt would be going for a CTA to evaluate the source of bleeding with possible intervention by IR if necessary. Unable to have a [address] discussion with the pt at this time. Also do not believe that the pt has great insight into her medical condition. Telephone call made to pt's son Liliam Brown. He is agreeable to a family meeting tomorrow FRI 10/7/2022 at 1600. Pt remains FULL code with FULL aggressive medical management at this time. Thank you for the Palliative Medicine consult and allowing us to participate in the care of Ms. Shravan Pérez. Will continue to monitor and provide support.     Jamal Negro RN, BSN  Palliative Medicine Inpatient RN  DR. [facility]  Palliative COPE Line: [phone] ([phone])

## 2022-10-06 NOTE — PROGRESS NOTES
SLP Note:       Follow up attempted. Could not progress with ST intervention because patient:       []  Lethargic, unable to be alerted enough for safe PO intake  []  Refused participation  []  Off the unit  [x]  NPO for possible procedure. D/w AUNDREA Peterson. []  Other:      Will continue to follow per POC.       Bienvenido Brennan M.S., CFY-SLP  Speech-Language Pathologist

## 2022-10-06 NOTE — PROGRESS NOTES
Physician Progress Note      Etelvina Valdez  CSN #:                  [id number]  :                       1950  ADMIT DATE:       10/3/2022 7:30 PM  [address] [place] DATE:  RESPONDING  PROVIDER #:        Gladys Melchor MD          QUERY TEXT:    Patient admitted with severe anemia and  GI bleeding , noted to have + occult stool new dx of  Uterine carcinosarcoma and erosion of abdominal mass into bowel loops per CT of abd . If possible, please document in progress notes and discharge summary the cause of the GI bleeding: The medical record reflects the following:    Risk Factors: 71 y.o. female who has a known history of uterine cancer and also received radiation treatments, hypertension, obesity and is at the nursing facility and was sent to the emergency room because she was noted to have low hemoglobin    Clinical Indicators: GI PN Acute on chronic anemia - w/o overt bleeding during admission. Patient does note some chronic nausea and lower abdominal pain attributed to Uterine carcinosarcoma  -10/5 CT concerning for erosion of abdominal mass into bowel loops possibly contributing to GI bleed  -10/6 Surg. PN Acute hemorrhage from likely metastatic uterine cancer. Recommend CTA to evaluate for active extravasation,    Treatment: 3units  PRBCs ,ONC , Gen Surg and GI consults , CT , Labs carefully monitored , IV FLDs    Thank you  Darleen Sharpe RN CRCR OLIVIA  , [facility]  [email or web address]  Options provided:  -- Acute anemia  due to  Uterine carcinosarcoma associated with GIB  -- Acute anemia  due to  Uterine carcinosarcoma  -- GI bleeding due to metastatic uterine cancer  -- Other - I will add my own diagnosis  -- Disagree - Not applicable / Not valid  -- Disagree - Clinically unable to determine / Unknown  -- Refer to Clinical Documentation Reviewer    PROVIDER RESPONSE TEXT:    This patient has 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     Query created by: Isa Graf on 10/6/2022 12:15 PM      Electronically signed by:  Adeola Moody MD 10/6/2022 1:43 PM

## 2022-10-06 NOTE — PROGRESS NOTES
0700: Bedside and verbal shift report given to Lukas Hill (oncoming nurse) by Renetta Wilcox RN (off going nurse). Report included the following information SBAR, Intake/Output, Mar and Cardiac rhythm. Wound Prevention Checklist    Patient: Luda Mosqueda (11 y.o. female)  Date: 10/6/2022  Diagnosis: GI bleed [K92.2] <principal problem not specified>    Precautions:         [x]  Heel prevention boots placed on patient    [x]  Patient turned q2h during shift    [x]  Lift team ordered    [x]  Patient on [facility] bed/Specialty bed    [x]  Each Wound is documented during shift (Stage, Color, drainage, odor, measurements, and dressings)    [x]  Dual skin check done with AUNDREA Duran RN     1045: 20g PIV placed in [address] [place] [address] for utilization of CT procedure. 1239: Patient left floor with transport to get CT done. 1318: Patient back from CT. Technician unable to do procedure. Per CT tech, \"IV not working. \"     10/06/22 1317   Vital Signs   Temp 98.6 °F (37 °C)   Temp Source Axillary   Pulse (Heart Rate) (!) 124   Heart Rate Source Monitor   Cardiac Rhythm Sinus Tachy   Resp Rate 28   O2 Sat (%) 99 %   Level of Consciousness 0   BP (!) 128/59   MAP (Monitor) 76   MAP (Calculated) 82   BP 1 Method Automatic   BP 1 Location Right lower arm     1357: IV consult was made for pt to get appropriate PIV for CT scan. Nursing admin also notified. 1845: Small amount of bright red blood and minute blood clot seen on patient bed pad while doing hygienic care. Dr. Ciro Flores informed of such. 1900: Bedside and verbal shift report given to Renetta Wilcox RN (oncoming nurse) by Avelino Calvin (off going nurse). Report included the following information SBAR, Intake/Output, Mar and Cardiac rhythm.

## 2022-10-06 NOTE — PROGRESS NOTES
[address] [facility] Pharmacokinetic Monitoring Service - Vancomycin    Consulting Provider: Dr. Gustavo Swanson   Indication: Sepsis of Unknown Etiology  Target Concentration: Goal AUC/ANDREAS 400-600 mg*hr/L  Day of Therapy: 1  Additional Antimicrobials: Cefepime    Pertinent Laboratory Values:   Temp: 98.3 °F (36.8 °C)  Weight: 103.4 kg (228 lb)  Recent Labs     10/06/22  0300 10/05/22  0228   CREA 1.02 1.23   BUN 64* 79*   WBC 9.1 8.3       Estimated Creatinine Clearance: 58.1 mL/min (based on SCr of 1.02 mg/dL). Pertinent Cultures:  Culture Date Source Results   10/5 blood ngtd   10/5 urine pending   MRSA Nasal Swab: N/A.  Non-respiratory infection    Assessment:  Date/Time Current Dose Concentration Timing of Concentration (h) AUC   10/5 2gm x1 6.9 3h -          Note: Serum concentrations collected for AUC dosing may appear elevated if collected in close proximity to the dose administered, this is not necessarily an indication of toxicity    Plan:  Current dosing regimen is sub-therapeutic  Increase dose to 1250mg q24h  Renal labs as indicated   Vancomycin concentration ordered for  10/8 @ 0400  Pharmacy will continue to monitor patient and adjust therapy as indicated    Thank you for the consult,  NICKI Lilly [facility]  10/6/2022

## 2022-10-06 NOTE — PROGRESS NOTES
Problem: Pressure Injury - Risk of  Goal: *Prevention of pressure injury  Description: Document Geoffrey Scale and appropriate interventions in the flowsheet. Outcome: Progressing Towards Goal  Note: Pressure Injury Interventions:  Sensory Interventions: Assess changes in LOC, Check visual cues for pain, Float heels, Keep linens dry and wrinkle-free, Maintain/enhance activity level, Monitor skin under medical devices, Pressure redistribution bed/mattress (bed type)    Moisture Interventions: Absorbent underpads, Check for incontinence Q2 hours and as needed, Apply protective barrier, creams and emollients, Maintain skin hydration (lotion/cream), Limit adult briefs, Moisture barrier    Activity Interventions: Pressure redistribution bed/mattress(bed type)    Mobility Interventions: HOB 30 degrees or less, Float heels, Pressure redistribution bed/mattress (bed type), Turn and reposition approx. every two hours(pillow and wedges)    Nutrition Interventions: Document food/fluid/supplement intake, Offer support with meals,snacks and hydration    Friction and Shear Interventions: HOB 30 degrees or less, Apply protective barrier, creams and emollients                Problem: Patient Education: Go to Patient Education Activity  Goal: Patient/Family Education  Outcome: Progressing Towards Goal     Problem: Falls - Risk of  Goal: *Absence of Falls  Description: Document Joan Fall Risk and appropriate interventions in the flowsheet.   Outcome: Progressing Towards Goal  Note: Fall Risk Interventions:  Mobility Interventions: Bed/chair exit alarm, Patient to call before getting OOB, Strengthening exercises (ROM-active/passive)         Medication Interventions: Bed/chair exit alarm, Evaluate medications/consider consulting pharmacy    Elimination Interventions: Call light in reach, Bed/chair exit alarm              Problem: Patient Education: Go to Patient Education Activity  Goal: Patient/Family Education  Outcome: Progressing Towards Goal     Problem: Nutrition Deficit  Goal: *Optimize nutritional status  Outcome: Progressing Towards Goal     Problem: Patient Education: Go to Patient Education Activity  Goal: Patient/Family Education  Outcome: Progressing Towards Goal     Problem: Anemia Care Plan (Adult and Pediatric)  Goal: *Labs within defined limits  Outcome: Progressing Towards Goal  Goal: *Tolerates increased activity  Outcome: Progressing Towards Goal     Problem: Patient Education: Go to Patient Education Activity  Goal: Patient/Family Education  Outcome: Progressing Towards Goal     Problem: Pain  Goal: *Control of Pain  Outcome: Progressing Towards Goal     Problem: Patient Education: Go to Patient Education Activity  Goal: Patient/Family Education  Outcome: Progressing Towards Goal

## 2022-10-06 NOTE — PROGRESS NOTES
Consulted this morning and chart extensively reviewed including visualization of CT imaging. Acute hemorrhage from likely metastatic uterine cancer. Recommend CTA to evaluate for active extravasation, if this is positive consult IR for possible embolization. We will also discuss with  to see if he would accept the patient in transfer. We do not have gynecology immediately available at the hospital, however if situation becomes emergent would consider exploratory laparotomy. Full consult to follow this afternoon. Continue to check serial hemoglobins, check coags, transfuse as necessary.

## 2022-10-06 NOTE — PROGRESS NOTES
Phone: [phone]    Hematology / Oncology Progress Note            Patient: Dianne Lincoln   MRN: [id number]         [facility]: [id number]    YOB: 1950      Admit Date: 10/3/2022    Assessment:     Active Problems:    GI bleed (10/4/2022)      Uterine carcinosarcoma, unresectable, Dr Daniel Stephens  S/p palliative XRT , recently  Severe debilitation  Acute bleed, severe anemia, sec to malignancy  Plan:     PRBC support  EGD to day  IV venofer written  Overall prognosis poor, not a candidate for chemo/surgery  Complete advance directives, palliative care evaluation    Erasmo Plunkett MD  [facility] [phone]      Subjective:   No new complaints.  Profound fatigue      Objective:     Visit Vitals  BP (!) 121/49   Pulse (!) 120   Temp 98.3 °F (36.8 °C)   Resp 24   Ht 5' 3\" (1.6 m)   Wt 103.4 kg (228 lb)   SpO2 99%   Breastfeeding No   BMI 40.39 kg/m²             Temp (24hrs), Av.2 °F (36.8 °C), Min:98 °F (36.7 °C), Max:98.7 °F (37.1 °C)        Intake/Output Summary (Last 24 hours) at 10/6/2022 5376  Last data filed at 10/6/2022 3192  Gross per 24 hour   Intake 1635 ml   Output 1200 ml   Net 435 ml     Review of Systems:   Constitutional: negative for fevers, chills, sweats and fatigue  Eyes: negative for visual disturbance,  icterus  Ears, Nose, Mouth, Throat, and Face: negative for tinnitus, epistaxis, sore mouth and hoarseness  Respiratory: negative for cough, sputum, hemoptysis, pleurisy/chest pain or wheezing  Cardiovascular: negative for chest pain, , palpitations,  syncope, paroxysmal nocturnal dyspnea  Gastrointestinal: negative for reflux symptoms, nausea, vomiting, melena, diarrhea, constipation and abdominal pain  Genitourinary:negative for dysuria, nocturia, urinary incontinence, hesitancy and hematuria  Endocrine: no polydipsia, no goitre  Integument: negative for rash, skin lesion(s) and pruritus  Hematologic/Lymphatic: negative for easy bruising, bleeding and lymphadenopathy  Musculoskeletal:negative for myalgias, arthralgias and bone pain  Neurological: negative for headaches, dizziness, seizures, paresthesia and weakness    Physical Exam: ECOG : 2  Frail, elderly woman, bed bound  Eyes: PERRLA, anicteric,  pallor  ENT: no palpable Lymph nodes  Respiratory: bilateral air entry  Cardiovascular: S1S2 regular  Gastrointestinal: soft,  non tender  Integument: no rash, no petechiae or ecchymosis. Neurological:alert oriented    Labs:  Recent Results (from the past 24 hour(s))   HGB & HCT    Collection Time: 10/05/22  4:53 PM   Result Value Ref Range    HGB 4.2 (LL) 12.0 - 16.0 g/dL    HCT 13.6 (LL) 35.0 - 45.0 %   RBC, ALLOCATE    Collection Time: 10/05/22  5:30 PM   Result Value Ref Range    HISTORY CHECKED?  Historical check performed    URINALYSIS W/ RFLX MICROSCOPIC    Collection Time: 10/05/22  7:33 PM   Result Value Ref Range    Color YELLOW      Appearance CLOUDY      Specific gravity 1.014 1.005 - 1.030      pH (UA) 5.5 5.0 - 8.0      Protein TRACE (A) NEG mg/dL    Glucose Negative NEG mg/dL    Ketone Negative NEG mg/dL    Bilirubin Negative NEG      Blood SMALL (A) NEG      Urobilinogen 1.0 0.2 - 1.0 EU/dL    Nitrites Positive (A) NEG      Leukocyte Esterase TRACE (A) NEG     URINE MICROSCOPIC ONLY    Collection Time: 10/05/22  7:33 PM   Result Value Ref Range    WBC 1 to 4 0 - 4 /hpf    RBC 1 to 3 0 - 5 /hpf    Epithelial cells Negative 0 - 5 /lpf    Bacteria 2+ (A) NEG /hpf    Mucus FEW (A) NEG /lpf    Uric acid crystals 2+ (A) NEG    Hyaline cast 0 to 1 0 - 2 /lpf   CULTURE, BLOOD    Collection Time: 10/05/22  8:05 PM    Specimen: Whole Blood   Result Value Ref Range    Special Requests: NO SPECIAL REQUESTS      Culture result: NO GROWTH AFTER 11 HOURS     LACTIC ACID    Collection Time: 10/05/22  8:15 PM   Result Value Ref Range    Lactic acid 5.5 (HH) 0.4 - 2.0 MMOL/L   CULTURE, BLOOD    Collection Time: 10/05/22  8:15 PM    Specimen: Whole Blood   Result Value Ref Range    Special Requests: NO SPECIAL REQUESTS      Culture result: NO GROWTH AFTER 11 HOURS     CBC WITH AUTOMATED DIFF    Collection Time: 10/06/22  3:00 AM   Result Value Ref Range    WBC 9.1 4.6 - 13.2 K/uL    RBC 3.07 (L) 4.20 - 5.30 M/uL    HGB 9.1 (L) 12.0 - 16.0 g/dL    HCT 26.7 (L) 35.0 - 45.0 %    MCV 87.0 78.0 - 100.0 FL    MCH 29.6 24.0 - 34.0 PG    MCHC 34.1 31.0 - 37.0 g/dL    RDW 19.8 (H) 11.6 - 14.5 %    PLATELET 100 (L) 439 - 420 K/uL    MPV 10.6 9.2 - 11.8 FL    NRBC 1.5 (H) 0  WBC    ABSOLUTE NRBC 0.14 (H) 0.00 - 0.01 K/uL    NEUTROPHILS 85 (H) 40 - 73 %    BAND NEUTROPHILS 6 (H) 0 - 5 %    LYMPHOCYTES 3 (L) 21 - 52 %    MONOCYTES 5 3 - 10 %    EOSINOPHILS 0 0 - 5 %    BASOPHILS 0 0 - 2 %    METAMYELOCYTES 1 (H) 0 %    IMMATURE GRANULOCYTES 0 %    ABS. NEUTROPHILS 8.3 (H) 1.8 - 8.0 K/UL    ABS. LYMPHOCYTES 0.3 (L) 0.9 - 3.6 K/UL    ABS. MONOCYTES 0.5 0.05 - 1.2 K/UL    ABS. EOSINOPHILS 0.0 0.0 - 0.4 K/UL    ABS. BASOPHILS 0.0 0.0 - 0.1 K/UL    ABS. IMM.  GRANS. 0.0 K/UL    DF MANUAL      PLATELET COMMENTS DECREASED PLATELETS      RBC COMMENTS ANISOCYTOSIS  1+        RBC COMMENTS POLYCHROMASIA  1+        RBC COMMENTS MACROCYTOSIS  1+        RBC COMMENTS TARGET CELLS  1+        RBC COMMENTS MINISTERIO CELLS  1+        RBC COMMENTS OVALOCYTES  1+        WBC COMMENTS TOXIC GRANULATION     METABOLIC PANEL, BASIC    Collection Time: 10/06/22  3:00 AM   Result Value Ref Range    Sodium 152 (H) 136 - 145 mmol/L    Potassium 3.4 (L) 3.5 - 5.5 mmol/L    Chloride 121 (H) 100 - 111 mmol/L    CO2 20 (L) 21 - 32 mmol/L    Anion gap 11 3.0 - 18 mmol/L    Glucose 109 (H) 74 - 99 mg/dL    BUN 64 (H) 7.0 - 18 MG/DL    Creatinine 1.02 0.6 - 1.3 MG/DL    BUN/Creatinine ratio 63 (H) 12 - 20      eGFR 59 (L) >60 ml/min/1.73m2    Calcium 8.3 (L) 8.5 - 10.1 MG/DL   MAGNESIUM    Collection Time: 10/06/22  3:00 AM   Result Value Ref Range    Magnesium 2.0 1.6 - 2.6 mg/dL   HGB & HCT    Collection Time: 10/06/22 3:00 AM   Result Value Ref Range    HGB 9.0 (L) 12.0 - 16.0 g/dL    HCT 26.5 (L) 35.0 - 45.0 %   VANCOMYCIN, RANDOM    Collection Time: 10/06/22  3:00 AM   Result Value Ref Range    Vancomycin, random 6.9 5.0 - 40.0 UG/ML   LACTIC ACID    Collection Time: 10/06/22  3:00 AM   Result Value Ref Range    Lactic acid 1.3 0.4 - 2.0 MMOL/L

## 2022-10-06 NOTE — PROGRESS NOTES
Chart reviewed and PT eval attempted. Pt continues to be NPO, perseverating on having water. Declined to participate until she is able to drink something. Will follow up. 2nd attempt at [phone], pt continues to decline to partcipate, Reporting she needs water and chips. Reporting \"I can't live this way\" and requesting to see the doctor. RN notified. Will continue to follow.      Thank you for this referral   Melissa Hui PT DPT

## 2022-10-06 NOTE — PROGRESS NOTES
Problem: Pressure Injury - Risk of  Goal: *Prevention of pressure injury  Description: Document Geoffrey Scale and appropriate interventions in the flowsheet.   Outcome: Progressing Towards Goal  Note: Pressure Injury Interventions:  Sensory Interventions: Assess need for specialty bed, Keep linens dry and wrinkle-free    Moisture Interventions: Absorbent underpads    Activity Interventions: Pressure redistribution bed/mattress(bed type)    Mobility Interventions: HOB 30 degrees or less    Nutrition Interventions: Offer support with meals,snacks and hydration, Document food/fluid/supplement intake (Npo)    Friction and Shear Interventions: Apply protective barrier, creams and emollients, Transferring/repositioning devices, Foam dressings/transparent film/skin sealants                Problem: Patient Education: Go to Patient Education Activity  Goal: Patient/Family Education  Outcome: Progressing Towards Goal     Problem: Pain  Goal: *Control of Pain  Outcome: Progressing Towards Goal     Problem: Nutrition Deficit  Goal: *Optimize nutritional status  Outcome: Progressing Towards Goal

## 2022-10-06 NOTE — PROGRESS NOTES
[email or web address]  [phone]    Gastroenterology Progress Note    Impression:  1. Acute on chronic anemia - w/o overt bleeding during admission. Patient does note some chronic nausea and lower abdominal pain attributed to #5 diagnosis. 10/3 Hgb of 4.8 improved to 7.1 10/5 s/p 3 units PRBC. 10/5 Hgb 4.2 improved to 9.1 s/p 2 units pRBC. 10/5 CT concerning for erosion of abdominal mass into bowel loops possibly contributing to GI bleed. -  Baseline Hgb 9/22 7.6, 9/21 6.9, 9/5 6.8.   - 11/2017 colonoscopy w/ Dr. Erika Charles significant for diverticulosis and 1 TA polyp w/ 5yr recall  2. Fatigue - secondary to #1  3. Pelvic mass and LUQ mass   4. HTN  5. Uterine carcinosarcoma - newer dx followed by Dr. Chaitanya Aranda w/           [facility]. CT noted below. Patient previously received palliative XRT. 6. Recent [facility] admission - 9/1-23 for abdominal pain related to #5.     10/5 CT ABD PEVLIS W/O CONTRAST IMPRESSION:   Significant interval enlargement of the pelvic mass arising from the uterus, but now containing significant internal gas and fluid within the mass. There is no clear fat plane between the mass and multiple small bowel loops in the sigmoid colon. Given the reported history of GI bleed, this may suggest erosion of the mass into the adjacent bowel loops with resultant GI bleeding. Mass in the left upper quadrant has also significantly enlarged in close  proximity at the splenic hilum and with no clear fat plane between the greater curvature of the stomach and splenic flexure of the colon. No internal gas. However, invasion into the adjacent bowel loops is not excluded at this location as well. This may also contribute to the reported GI bleeding. Left adrenal mass is similar to prior comparison. Interval placement of bilateral nephrostomy tubes with moderate residual  hydronephrosis remaining in the right kidney. Small left pleural effusion. Additional incidentals as above.     9/1 CT Abd Pelvis w/o Contrast IMPRESSION  1. Compared to prior ultrasound and CT, continued enlargement of the uterus with mass effect upon the pelvic structures. Overall concerning for gynecologic malignancy. 2.  Lobular, 7 cm heterogeneous lesion within the spleen are increased compared exam. Findings could represent metastatic disease. 3.  Soft tissue nodule left adrenal gland measuring up to 4 cm. 4.  Distention of the urinary bladder with approximately estimated volume of 1.4  L. Mild resultant bilateral hydronephrosis. Correlate for urinary retention. Plan:  1. Unfortunate case with poor prognosis. CT suggests invasion of known advanced abdominal mass into adjacent bowel loops not amendable to GI intervention. Discussed case with Dr. Jess Jones whom suggests CTA with possible IR intervention if source is identified as well as possible transfer to [facility] w/ Dr. Jerry Morgan. Discussed case with Dr. Terese Doshi as well as Dr. Berry Che. 2. Consider EGD if overt bleeding w/ subsequent significant drop in H/H.    3. Monitor H/H, transfuse if Hgb <7.  4. IV PPI BID  5. Continue medical management per primary team.        Chief Complaint: anemia      Subjective:  No active pain this morning, still without overt bleeding. ROS: Denies any fevers, chills, nausea, vomiting.        General: well developed, well nourished, no acute distress  Eyes: conjunctiva normal, EOM normal  Cardiovascular: heart normal, intact distal pulses, normal rate and regular rhythm  Pulmonary: breath sounds normal and effort normal  Abdominal: non-distended, active bowel sounds, soft, mod BLQ TTP, non-acute  Patient Active Problem List   Diagnosis Code    Uterine mass N85.8    WILBUR (acute kidney injury) ([facility] [address] N17.9    Bladder outlet obstruction N32.0    Lobulation, spleen Q89.09    Adrenal nodule (HCC) E27.8    Leukocytosis D72.829    Microcytic anemia D50.9    Hyponatremia E87.1    Uremia N19    Other hydronephrosis N13.39    GI bleed K92.2         Visit Vitals  BP (!) 117/49   Pulse (!) 123   Temp 98.7 °F (37.1 °C)   Resp 22   Ht 5' 3\" (1.6 m)   Wt 103.4 kg (228 lb)   SpO2 100%   Breastfeeding No   BMI 40.39 kg/m²           Intake/Output Summary (Last 24 hours) at 10/6/2022 0751  Last data filed at 10/6/2022 0655  Gross per 24 hour   Intake 1635 ml   Output 1200 ml   Net 435 ml       CBC w/Diff    Lab Results   Component Value Date/Time    WBC 9.1 10/06/2022 03:00 AM    RBC 3.07 (L) 10/06/2022 03:00 AM    HGB 9.1 (L) 10/06/2022 03:00 AM    HGB 9.0 (L) 10/06/2022 03:00 AM    HCT 26.7 (L) 10/06/2022 03:00 AM    HCT 26.5 (L) 10/06/2022 03:00 AM    MCV 87.0 10/06/2022 03:00 AM    MCH 29.6 10/06/2022 03:00 AM    MCHC 34.1 10/06/2022 03:00 AM    RDW 19.8 (H) 10/06/2022 03:00 AM     (L) 10/06/2022 03:00 AM    Lab Results   Component Value Date/Time    GRANS 85 (H) 10/06/2022 03:00 AM    LYMPH 3 (L) 10/06/2022 03:00 AM    EOS 0 10/06/2022 03:00 AM    BANDS 6 (H) 10/06/2022 03:00 AM    BASOS 0 10/06/2022 03:00 AM    METAS 1 (H) 10/06/2022 03:00 AM    PRO 1 (H) 10/05/2022 02:28 AM      Basic Metabolic Profile   Recent Labs     10/06/22  0300   *   K 3.4*   *   CO2 20*   BUN 64*   CA 8.3*   MG 2.0        Hepatic Function    Lab Results   Component Value Date/Time    ALB 1.5 (L) 10/03/2022 10:57 PM    TP 5.9 (L) 10/03/2022 10:57 PM    AP 99 10/03/2022 10:57 PM    No results found for: TBIL       Coags   No results for input(s): PTP, INR, APTT, INREXT in the last 72 hours. Chata Jacome PA-C.   10/06/22, 9:16 AM   [facility]  [email or web address]  Phone: [phone]  Pager: [phone]

## 2022-10-06 NOTE — PROGRESS NOTES
[facility]  Progress Note    Patient: Terrence Shahid Age: 71 y.o. : 1950 MR#: [id number] SSN: [id number]  Date/Time: 10/6/2022    Subjective:     Patient is laying in bed in no apparent distress,. She wants to eat by mouth. No nausea or vomiting some abdominal discomfort present. No shortness of breath or cough. No blood in the stool today. Assessment/Plan:     Acute GI bleed  2. Acute blood loss anemia  3. Progressive uterine carcinosarcoma  4. Hypernatremia  5. Elevated BUN likely secondary to GI bleed  6. Elevated INR  7. Hypokalemia    PLAN    Stable hemoglobin after blood transfusion  Discussed with patient's gynecologist oncologist []: Does not think patient needs an inpatient to inpatient transfer as there is no way he can provide any surgery. He is in agreement doing CT angiogram and having IR to see this patient. He says he will follow this patient in the clinic upon discharge from here. Discussed with Dr. Melina Mehta and inform him about this decision  Discussed with Dr. Bud Jacques: Patient received radiation without improving in size of her tumor. Poor prognosis. Discussed with GI nurse practitioner: No EGD today. Continue PPI  CT angiogram has been ordered and continue IV fluid at this point. Can resume liquid diet after CT angiogram is over. Change IV fluid and monitor hypernatremia  Will give patient vitamin K for elevated INR  Discussed with palliative care  Discussed with patient. With patient's permission I called her son at phone #[phone] and provided him clinical update regarding this patient. He verbalized understanding about it.     Case discussed with:  [x]Patient  [x]Family  [x]Nursing  []Case Management  DVT Prophylaxis:  []Lovenox  []Hep SQ  [x]SCDs  []Coumadin   []On Heparin gtt    Objective:   VS: Visit Vitals  BP (!) 128/59 (BP 1 Location: Right lower arm)   Pulse (!) 124   Temp 98.6 °F (37 °C)   Resp 28   Ht 5' 3\" (1.6 m)   Wt 103.4 kg (228 lb)   SpO2 99%   Breastfeeding No   BMI 40.39 kg/m²      Tmax/24hrs: Temp (24hrs), Av.3 °F (36.8 °C), Min:98 °F (36.7 °C), Max:98.7 °F (37.1 °C)    Input/Output:   Intake/Output Summary (Last 24 hours) at 10/6/2022 1446  Last data filed at 10/6/2022 0655  Gross per 24 hour   Intake 1635 ml   Output 1200 ml   Net 435 ml         General appearance - alert, well appearing, and in no distress  Eyes - sclera anicteric, no pallor  Chest -clear air entry noted in bases, no wheezes  Heart - S1 and S2 normal  Abdomen - soft, nontender, nondistended, Bowel sounds present  Neurological - alert, oriented, normal speech, no focal findings noted currently. Musculoskeletal - no joint tenderness or erythema of knees bilaterally  Extremities - no pedal edema noted        Labs:    Recent Results (from the past 24 hour(s))   HGB & HCT    Collection Time: 10/05/22  4:53 PM   Result Value Ref Range    HGB 4.2 (LL) 12.0 - 16.0 g/dL    HCT 13.6 (LL) 35.0 - 45.0 %   RBC, ALLOCATE    Collection Time: 10/05/22  5:30 PM   Result Value Ref Range    HISTORY CHECKED?  Historical check performed    URINALYSIS W/ RFLX MICROSCOPIC    Collection Time: 10/05/22  7:33 PM   Result Value Ref Range    Color YELLOW      Appearance CLOUDY      Specific gravity 1.014 1.005 - 1.030      pH (UA) 5.5 5.0 - 8.0      Protein TRACE (A) NEG mg/dL    Glucose Negative NEG mg/dL    Ketone Negative NEG mg/dL    Bilirubin Negative NEG      Blood SMALL (A) NEG      Urobilinogen 1.0 0.2 - 1.0 EU/dL    Nitrites Positive (A) NEG      Leukocyte Esterase TRACE (A) NEG     URINE MICROSCOPIC ONLY    Collection Time: 10/05/22  7:33 PM   Result Value Ref Range    WBC 1 to 4 0 - 4 /hpf    RBC 1 to 3 0 - 5 /hpf    Epithelial cells Negative 0 - 5 /lpf    Bacteria 2+ (A) NEG /hpf    Mucus FEW (A) NEG /lpf    Uric acid crystals 2+ (A) NEG    Hyaline cast 0 to 1 0 - 2 /lpf   CULTURE, BLOOD    Collection Time: 10/05/22  8:05 PM    Specimen: Whole Blood   Result Value Ref Range    Special Requests: NO SPECIAL REQUESTS      Culture result: NO GROWTH AFTER 11 HOURS     LACTIC ACID    Collection Time: 10/05/22  8:15 PM   Result Value Ref Range    Lactic acid 5.5 (HH) 0.4 - 2.0 MMOL/L   CULTURE, BLOOD    Collection Time: 10/05/22  8:15 PM    Specimen: Whole Blood   Result Value Ref Range    Special Requests: NO SPECIAL REQUESTS      Culture result: NO GROWTH AFTER 11 HOURS     CBC WITH AUTOMATED DIFF    Collection Time: 10/06/22  3:00 AM   Result Value Ref Range    WBC 9.1 4.6 - 13.2 K/uL    RBC 3.07 (L) 4.20 - 5.30 M/uL    HGB 9.1 (L) 12.0 - 16.0 g/dL    HCT 26.7 (L) 35.0 - 45.0 %    MCV 87.0 78.0 - 100.0 FL    MCH 29.6 24.0 - 34.0 PG    MCHC 34.1 31.0 - 37.0 g/dL    RDW 19.8 (H) 11.6 - 14.5 %    PLATELET 372 (L) 174 - 420 K/uL    MPV 10.6 9.2 - 11.8 FL    NRBC 1.5 (H) 0  WBC    ABSOLUTE NRBC 0.14 (H) 0.00 - 0.01 K/uL    NEUTROPHILS 85 (H) 40 - 73 %    BAND NEUTROPHILS 6 (H) 0 - 5 %    LYMPHOCYTES 3 (L) 21 - 52 %    MONOCYTES 5 3 - 10 %    EOSINOPHILS 0 0 - 5 %    BASOPHILS 0 0 - 2 %    METAMYELOCYTES 1 (H) 0 %    IMMATURE GRANULOCYTES 0 %    ABS. NEUTROPHILS 8.3 (H) 1.8 - 8.0 K/UL    ABS. LYMPHOCYTES 0.3 (L) 0.9 - 3.6 K/UL    ABS. MONOCYTES 0.5 0.05 - 1.2 K/UL    ABS. EOSINOPHILS 0.0 0.0 - 0.4 K/UL    ABS. BASOPHILS 0.0 0.0 - 0.1 K/UL    ABS. IMM.  GRANS. 0.0 K/UL    DF MANUAL      PLATELET COMMENTS DECREASED PLATELETS      RBC COMMENTS ANISOCYTOSIS  1+        RBC COMMENTS POLYCHROMASIA  1+        RBC COMMENTS MACROCYTOSIS  1+        RBC COMMENTS TARGET CELLS  1+        RBC COMMENTS MINISTERIO CELLS  1+        RBC COMMENTS OVALOCYTES  1+        WBC COMMENTS TOXIC GRANULATION     METABOLIC PANEL, BASIC    Collection Time: 10/06/22  3:00 AM   Result Value Ref Range    Sodium 152 (H) 136 - 145 mmol/L    Potassium 3.4 (L) 3.5 - 5.5 mmol/L    Chloride 121 (H) 100 - 111 mmol/L    CO2 20 (L) 21 - 32 mmol/L    Anion gap 11 3.0 - 18 mmol/L    Glucose 109 (H) 74 - 99 mg/dL    BUN 64 (H) 7.0 - 18 MG/DL    Creatinine 1.02 0.6 - 1.3 MG/DL    BUN/Creatinine ratio 63 (H) 12 - 20      eGFR 59 (L) >60 ml/min/1.73m2    Calcium 8.3 (L) 8.5 - 10.1 MG/DL   MAGNESIUM    Collection Time: 10/06/22  3:00 AM   Result Value Ref Range    Magnesium 2.0 1.6 - 2.6 mg/dL   HGB & HCT    Collection Time: 10/06/22  3:00 AM   Result Value Ref Range    HGB 9.0 (L) 12.0 - 16.0 g/dL    HCT 26.5 (L) 35.0 - 45.0 %   VANCOMYCIN, RANDOM    Collection Time: 10/06/22  3:00 AM   Result Value Ref Range    Vancomycin, random 6.9 5.0 - 40.0 UG/ML   LACTIC ACID    Collection Time: 10/06/22  3:00 AM   Result Value Ref Range    Lactic acid 1.3 0.4 - 2.0 MMOL/L   HGB & HCT    Collection Time: 10/06/22  9:15 AM   Result Value Ref Range    HGB 9.0 (L) 12.0 - 16.0 g/dL    HCT 26.4 (L) 35.0 - 45.0 %   PTT    Collection Time: 10/06/22  9:15 AM   Result Value Ref Range    aPTT 30.5 23.0 - 36.4 SEC   PROTHROMBIN TIME + INR    Collection Time: 10/06/22  9:15 AM   Result Value Ref Range    Prothrombin time 23.8 (H) 11.5 - 15.2 sec    INR 2.1 (H) 0.8 - 1.2       Total time to take care of this patient was 35 minutes and more than 50% of time was spent counseling and coordinating care. Signed By: Jessy Griffiths MD     October 6, 2022      Disclaimer: Sections of this note are dictated using utilizing voice recognition software, which may have resulted in some phonetic based errors in grammar and contents. Even though attempts were made to correct all the mistakes, some may have been missed, and remained in the body of the document. If questions arise, please contact our department.

## 2022-10-06 NOTE — PALLIATIVE CARE
[address] [facility] [phone]  DR. [facility] [phone]     Katherine Canales NP, Pat Huggins RN and this LMSW attended to pt at bedside for follow up assessment. Per notes from Dr. Arlette Puga MD with Oncology, pt has uterine CA, with poor prognosis, not a candidate for chemo or surgery. Pt responds to verbal stimuli and engages in conversation, though pt only AAOx2 at this time. Does not remember Dr. Arlette Puga visiting her this morning, nor does she remember, her son coming last night. Pt asking what is causing her bleeding. Was informed they will be taking her for a test today to try to determine why. Pt stated okay. Pt gave consent for palliative team to contact her son, Ines Nick, to provide and update. Pt was left to have IV placed for test.  No other needs or concerns at this time. LMSW spoke with pt's son, Ines Nick via telephone at [phone] to introduce Palliative Care team and to request a meeting. Palliative team and purpose of palliative care introduced. A brief background of why we were consulted addressed by Katherine Canales NP. Meeting requested? Ines Nick reports he can be here by 1600 hr tomorrow, Friday, October 7th. He was informed we will meet with him at pt's room at that time. He verbalized agreement with this plan and reports, \"No matter what this is, we're going to fight. \"  He reports no further questions at this time. Thank you for this referral to Palliative Care. The palliative care team remains available to provide support for patient and her family. Goals of care to be addressed at family meeting tomorrow.       CODE STATUS:  FULL CODE / FULL AGGRESSIVE MEASURES     Saniya Muñoz, [address] [facility]  Palliative Medicine Inpatient   DR. [facility]  Palliative COPE Line: [phone]

## 2022-10-06 NOTE — PROGRESS NOTES
Comprehensive Nutrition Assessment    Type and Reason for Visit: Reassess, NPO/clear liquid    Nutrition Recommendations/Plan:   Advance PO diet as tolerated to regular diet, easy to chew per SLP recs when medically feasible. Will add oral supplements once PO diet is advanced. Monitor readiness to initiate PO intake, weight, labs, and plan of care during admission. Malnutrition Assessment:  Malnutrition Status: At risk for malnutrition (specify) (r/t NPO, wounds and advance age.) (10/04/22 1601)      Nutrition Assessment:    Pt admitted for low hemoglobin; having fatigue and lack of energy; acute GIB. 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 Per SLP 10/5: rec'd full liquids, easy to chew diet, thin liquids when medically feasible. Per Palliative Care: pt full code. Per oncology note 10/6: EGD today, not a candidate for chemo/surgery. Per GI: CT suggests invasion of known advanced abdominal mass into adjacent bowel loops not amendable to GI intervention. Discussed case with Dr. Edgard Dumont whom suggests CTA with possible IR intervention. Pt asleep at time of visit. Remains NPO for procedure. Nutrition Related Findings:    Pertinent Meds: cefepime, venofer, protonix, vanco, D5 @ 100 ml/hr (provides 120g dex, 408 kcal)   Pertinent Labs: Na 152 H (trending up), K 3.4 L Wound Type: Multiple, Pressure injury, Skin tears    Current Nutrition Intake & Therapies:  Average Meal Intake: NPO  Average Supplement Intake: None ordered  DIET NPO    Anthropometric Measures:  Height: 5' 3\" (160 cm)  Ideal Body Weight (IBW): 115 lbs (52 kg)  Admission Body Weight: 227 lb 15.3 oz (103.4 kg)  Current Body Wt:  103.4 kg (227 lb 15.3 oz), 198.2 % IBW.  Bed scale  Current BMI (kg/m2): 40.4  Usual Body Weight: 102.1 kg (225 lb)  % Weight Change (Calculated): 1.3  Weight Adjustment: No adjustment  BMI Category: Obese class 3 (BMI 40.0 or greater)    Estimated Daily Nutrient Needs:  Energy Requirements Based On: Formula (MSJ x 1.2-1.3)  Weight Used for Energy Requirements: Current  Energy (kcal/day): 6359-3334  Weight Used for Protein Requirements: Ideal  Protein (g/day): 104-130 (1.5-2 g/day)  Method Used for Fluid Requirements: 1 ml/kcal  Fluid (ml/day): 8502-0571    Nutrition Diagnosis:   Inadequate oral intake related to early satiety, altered GI function as evidenced by NPO or clear liquid status due to medical condition, intake 0-25%, GI abnormality    Nutrition Interventions:   Food and/or Nutrient Delivery: Start oral diet  Nutrition Education/Counseling: Education not indicated, No recommendations at this time  Coordination of Nutrition Care: Continue to monitor while inpatient  Plan of Care discussed with: patient    Goals:     Goals: Meet at least 75% of estimated needs, by next RD assessment       Nutrition Monitoring and Evaluation:   Behavioral-Environmental Outcomes: None identified  Food/Nutrient Intake Outcomes: Diet advancement/tolerance  Physical Signs/Symptoms Outcomes: Biochemical data, GI status, Weight, Nutrition focused physical findings, Meal time behavior    Discharge Planning:     Too soon to determine    Andres Carmona [address], [address] [place] [address]   Contact: [phone]

## 2022-10-06 NOTE — PROGRESS NOTES
CTA Delay     Exam can not be completed without appropriate IV access for a CTA. 20g IV in the [facility] or above or power injectable central line or PICC line needed for CTA exams to be diagnostic. Exam on hold until patient has appropriate access for this order. 1256- Patient arrived with a 20G IV but it is not funtional. Patient returned to CVT and RN notified.  JAT    8162 - needs new IV access for CTA/GI Bleed - RN to place new IV and will call back - renetta

## 2022-10-06 NOTE — PROGRESS NOTES
Pt is from [facility] short term rehab to transition to long term care. Pt stated \"I don't know whether I'm going back there or not. \"  Discussed with pt's son Luis Roman and he stated pt will be returning to [facility] and the plan remains for pt to transition to LTC unless pt gets to the point that she can care for herself at home. Left a message for Jessy Alvarez of [facility] for ACP. 1615: Pt's clinicals sent to [facility] in [place].       MRACY NicoleN RN  Care Management  Pager: [phone]

## 2022-10-06 NOTE — PROGRESS NOTES
Reason for Readmission:    GI bleed [K92.2]         RUR Score and Risk Level:      23     Level of Readmission:    3   (1-3)   (1 - First Readmission, 2- Second Readmission within 30 days or multiple admissions over previous 90 days, 3- Greater than two readmissions within 30 days or multiple admissions over previous 90 days)      Care Conference scheduled:   (consider for all patient's with readmission level of 2, should definitely be scheduled for all patients with readmission level of 3)       Resources/supports as identified by patient/family:         Top Challenges facing patient (as identified by patient/family and CM): Finances/Medication cost?     No   Transportation      no   Support system or lack thereof? Family, nursing facility  Living arrangements? Pt came from [facility]   Self-care/ADLs/Cognition? AOx4        Current Advanced Directive/Advance Care Plan:        Healthcare Decision Maker:   Primary Decision Maker: Bonny Maciel - [phone]    Secondary Decision Maker: Virginia Salas - Other Relative - [phone]    Click here to complete [address] including selection of the Healthcare Decision Maker Relationship (ie \"Primary\")           Plan for utilizing home health:   no.             Likelihood of additional readmission:                Transition of Care Plan:    Based on readmission, the patient's previous Plan of Care   has been evaluated and/or modified. The current Transition of Care Plan is:            Initial assessment completed with patient and her son Rudy Jordan on the phone. Cognitive status of patient: oriented to time, place, person and situation. Face sheet information confirmed:  yes. The patient designates her son Randy Rincon to participate in her discharge plan and to receive any needed information.  This patient came from [facility]    Prior to hospitalization, patient was considered to be independent with ADLs/IADLS : no . If not independent,  patient needs assist with : dressing, bathing, food preparation, cooking, and toileting    Patient has a current ACP document on file: no  The BLS will be available to transport patient home upon discharge. The patient already has  medical equipment available at the nursing facility     Patient is not currently active with home health. Patient has stayed in a skilled nursing facility or rehab. Was  stay within last 60 days : yes. This patient is on dialysis :no  Currently, the discharge plan is SNF. The patient states that she can obtain her medications from the pharmacy, and take her medications as directed. Patient's current insurance is Medicare, Haylie Gardner. Care Management Interventions  PCP Verified by CM:  Yes  Palliative Care Criteria Met (RRAT>21 & CHF Dx)?: No  Mode of Transport at Discharge: S  Transition of Care Consult (CM Consult): Discharge Planning  Physical Therapy Consult: Yes  Occupational Therapy Consult: Yes  Support Systems: Child([place])  Confirm Follow Up Transport: Family  Discharge Location  Patient Expects to be Discharged to[de-identified] Skilled nursing facility      Readmission Assessment  Number of days since last admission?: 8-30 days  Previous disposition: SNF  Who is being interviewed?: Patient  What was the patient's/caregiver's perception as to why they think they needed to return back to the hospital?: Other (Comment) (pt stated \"I was sleeping and they woke me up to bring me to the hospital.\")  Did you visit your Primary Care Physician after you left the hospital, before you returned this time?: No  Why weren't you able to visit your PCP?: Other (Comment) (pt went to SNF)  Did you see a specialist, such as Cardiac, Pulmonary, Orthopedic Physician, etc. after you left the hospital?: No  Who advised the patient to return to the hospital?: Skilled Unit  Does the patient report anything that got in the way of taking their medications?: No  In our efforts to provide the best possible care to you and others like you, can you think of anything that we could have done to help you after you left the hospital the first time, so that you might not have needed to return so soon?: Additional Community resources available for illness support      MARCY HumphreysN RN  Care Management  Pager: [phone]

## 2022-10-07 NOTE — PROGRESS NOTES
Problem: Pressure Injury - Risk of  Goal: *Prevention of pressure injury  Description: Document Geoffrey Scale and appropriate interventions in the flowsheet. Outcome: Progressing Towards Goal  Note: Pressure Injury Interventions:  Sensory Interventions: Assess changes in LOC, Check visual cues for pain, Keep linens dry and wrinkle-free, Minimize linen layers, Pad between skin to skin, Pressure redistribution bed/mattress (bed type)    Moisture Interventions: Absorbent underpads, Apply protective barrier, creams and emollients, Check for incontinence Q2 hours and as needed, Maintain skin hydration (lotion/cream), Limit adult briefs, Moisture barrier    Activity Interventions: Pressure redistribution bed/mattress(bed type)    Mobility Interventions: HOB 30 degrees or less, Pressure redistribution bed/mattress (bed type), Turn and reposition approx. every two hours(pillow and wedges)    Nutrition Interventions: Document food/fluid/supplement intake, Offer support with meals,snacks and hydration    Friction and Shear Interventions: Foam dressings/transparent film/skin sealants, HOB 30 degrees or less                Problem: Falls - Risk of  Goal: *Absence of Falls  Description: Document Joan Fall Risk and appropriate interventions in the flowsheet.   Outcome: Progressing Towards Goal  Note: Fall Risk Interventions:  Mobility Interventions: Bed/chair exit alarm         Medication Interventions: Bed/chair exit alarm, Evaluate medications/consider consulting pharmacy    Elimination Interventions: Bed/chair exit alarm, Call light in reach, Toileting schedule/hourly rounds, Patient to call for help with toileting needs              Problem: Nutrition Deficit  Goal: *Optimize nutritional status  Outcome: Progressing Towards Goal     Problem: Anemia Care Plan (Adult and Pediatric)  Goal: *Labs within defined limits  Outcome: Progressing Towards Goal  Goal: *Tolerates increased activity  Outcome: Progressing Towards Goal Problem: Pain  Goal: *Control of Pain  Outcome: Progressing Towards Goal

## 2022-10-07 NOTE — PROGRESS NOTES
0065- Patient has 20G IV Right upper arm that infiltrated during exam. Approximately 90cc's of contrast injected. Patient does not complain of any pain. Site is soft and no redness at IV site. AUNDREA Reagan was present for exam and also assessed the area and placed warm compress on site.

## 2022-10-07 NOTE — PROGRESS NOTES
Nutrition Note    Noted PO diet advanced to full liquids 10/6. Pt majority NPO/liquids since admit. Per Palliative Care: pt full code, full aggressive measures. Pt off unit at time of visit. Nutrition Recommendations/Plan:   Advance PO diet as tolerated to regular diet, easy to chew per SLP recs when medically feasible. Order/trial Ensure Enlive (each provides 350 kcal, 20g protein) BID and Gelatein 20 (each provides 80 kcal, 20g protein) once daily. Monitor PO intake, compliance of oral supplement, weight, labs and plan of care during admission.          Electronically signed by Hamzah Caal RD on 10/7/2022 at 8:15 AM    Contact: [phone]

## 2022-10-07 NOTE — PROGRESS NOTES
Problem: Mobility Impaired (Adult and Pediatric)  Goal: *Acute Goals and Plan of Care (Insert Text)  Description: Physical Therapy Goals  Initiated 10/7/2022 and to be accomplished within 7 day(s)  1. Patient will move from supine to sit and sit to supine  in bed with moderate assistance . 2.  Patient will transfer from bed to chair and chair to bed with maximal assistance using the least restrictive device. 3.  Patient will perform sit to stand with maximal assistance. 4.  Patient will ambulate with maximal assistance for 5 feet with the least restrictive device. PLOF: Pt is from SNF, but reports being able to amb with SPC. Unsure accuracy of info. Outcome: Progressing Towards Goal     PHYSICAL THERAPY EVALUATION    Patient: Shubham Barroso (46 y.o. female)  Date: 10/7/2022  Primary Diagnosis: GI bleed [K92.2]       Precautions:  Fall, Skin    ASSESSMENT :  Based on the objective data described below, the patient presents with generalized weakness, decreased functional mobility tolerance, poor AROM, and poor motivation. RN cleared for mobility. Pt reports feeling tired, but initially willing to work with PT. She reports some abd pain. Pt requests for a few minutes to get ready to sit on EOB, after 10 minutes, pt finally declined siting EOB. Objective exam performed in bed grossly 2/5 strength B LE with edema. Pt declined bed mobility as well. She was edu on importance of OOB mobility next session. Pt left with call bell and all needs met, bed alarm on for safety. Patient will benefit from skilled intervention to address the above impairments.   Patient's rehabilitation potential is considered to be Fair  Factors which may influence rehabilitation potential include:   []         None noted  [x]         Mental ability/status  [x]         Medical condition  [x]         Home/family situation and support systems  [x]         Safety awareness  []         Pain tolerance/management  []         Other: PLAN :  Recommendations and Planned Interventions:   [x]           Bed Mobility Training             [x]    Neuromuscular Re-Education  [x]           Transfer Training                   []    Orthotic/Prosthetic Training  [x]           Gait Training                          []    Modalities  [x]           Therapeutic Exercises           []    Edema Management/Control  [x]           Therapeutic Activities            [x]    Family Training/Education  [x]           Patient Education  []           Other (comment):    Frequency/Duration: Patient will be followed by physical therapy 1-2 times per day/4-7 days per week to address goals. Further Equipment Recommendations for Discharge: N/A    AMPAC: Current research shows that an AM-PAC score of 17 (13 without stairs) or less is not associated with a discharge to the patient's home setting. Based on an AM-PAC score of 11/24 (or **/20 if omitting stairs) and their current functional mobility deficits, it is recommended that the patient have 5-7 sessions per week of Physical Therapy at d/c to increase the patient's independence. Currently, this patient demonstrates the potential endurance, and/or tolerance for 3 hours of therapy each day at d/c. This AMPAC score should be considered in conjunction with interdisciplinary team recommendations to determine the most appropriate discharge setting. Patient's social support, diagnosis, medical stability, and prior level of function should also be taken into consideration. SUBJECTIVE:   Patient stated I don't think I can do it right now.     OBJECTIVE DATA SUMMARY:     Past Medical History:   Diagnosis Date    Class 2 obesity due to excess calories without serious comorbidity in adult     HTN (hypertension)     Uterine mass 09/01/2022    Vitamin D deficiency      Past Surgical History:   Procedure Laterality Date    HX CATARACT REMOVAL N/A     IR CHANGE NEPHROSTOMY PYELOS TUBE LT  9/22/2022    IR NEPHROSTOMY PERC LT [facility] CATH  SI  9/4/2022    IR NEPHROSTOMY PERC RT PLC CATH  SI  9/4/2022    IR NEPHROSTOMY PERC RT PLC CATH  SI  9/15/2022     Barriers to Learning/Limitations: yes;  altered mental status (i.e.Sedation, Confusion)  Compensate with: N/A  Home Situation:  Home Situation  Home Environment: [address] [place] [address] Name: Lucita Fleischer  One/Two Story Residence: Other (Comment)  Living Alone: No  Support Systems: [place]  Patient Expects to be Discharged to[de-identified] Skilled nursing facility  Current DME Used/Available at Home: Hospital bed  Critical Behavior:  Neurologic State: Alert;Confused  Orientation Level: Oriented to person;Oriented to place  Cognition: Follows commands; Impaired decision making  Safety/Judgement: Fall prevention  Psychosocial  Patient Behaviors: Calm       Strength:    Strength: Grossly decreased, non-functional       Tone & Sensation:   Tone: Normal        Sensation: Intact        Range Of Motion:  AROM: Grossly decreased, non-functional           PROM: Generally decreased, functional        Functional Mobility:  Bed Mobility:     Supine to Sit: Other (comment) (pt declined)     Therapeutic Exercises:   Assisted pt with heel slides, ankle pumps    Pain:  Pain level pre-treatment: 9/10 -stomach  Pain level post-treatment: 9/10   Pain Intervention(s) : Medication (see MAR); Rest, Ice, Repositioning  Response to intervention: Nurse notified, See doc flow    Activity Tolerance:   Pt tolerated mobility fair/poor  Please refer to the flowsheet for vital signs taken during this treatment. After treatment:   []         Patient left in no apparent distress sitting up in chair  [x]         Patient left in no apparent distress in bed  [x]         Call bell left within reach  [x]         Nursing notified  []         Caregiver present  [x]         Bed alarm activated  []         SCDs applied    COMMUNICATION/EDUCATION:   [x]         Role of Physical Therapy in the acute care setting.   [x] Fall prevention education was provided and the patient/caregiver indicated understanding. [x]         Patient/family have participated as able in goal setting and plan of care. []         Patient/family agree to work toward stated goals and plan of care. []         Patient understands intent and goals of therapy, but is neutral about his/her participation. []         Patient is unable to participate in goal setting/plan of care: ongoing with therapy staff.  []         Other: Thank you for this referral.  Wilder Osman   Time Calculation: 20 mins      Eval Complexity: History: LOW Complexity : Zero comorbidities / personal factors that will impact the outcome / POCExam:LOW Complexity : 1-2 Standardized tests and measures addressing body structure, function, activity limitation and / or participation in recreation  Presentation: LOW Complexity : Stable, uncomplicated  Clinical Decision Making:Low Complexity    Overall Complexity:LOW     [facility] [address] [address] AM-PAC® Basic Mobility Inpatient Short Form (6-Clicks) Version 2    How much HELP from another person does the patient currently need    (If the patient hasn't done an activity recently, how much help from another person do you think he/she would need if he/she tried?)   Total (Total A or Dep)   A Lot  (Mod to Max A)   A Little (Sup or Min A)   None (Mod I to I)   Turning from your back to your side while in a flat bed without using bedrails? [] 1 [x] 2 [] 3 [] 4   2. Moving from lying on your back to sitting on the side of a flat bed without using bedrails? [] 1 [x] 2 [] 3 [] 4   3. Moving to and from a bed to a chair (including a wheelchair)? [] 1 [x] 2 [] 3 [] 4   4. Standing up from a chair using your arms (e.g., wheelchair, or bedside chair)? [] 1 [x] 2 [] 3 [] 4   5. Walking in hospital room? [] 1 [x] 2 [] 3 [] 4   6. Climbing 3-5 steps with a railing?+   [x] 1 [] 2 [] 3 [] 4   +If stair climbing cannot be assessed, skip item #6.   Sum responses from items 1-5. Current research shows that an AM-PAC score of 17 (13 without stairs) or less is not associated with a discharge to the patient's home setting. Based on an AM-PAC score of 11/24 (or **/20 if omitting stairs) and their current functional mobility deficits, it is recommended that the patient have 5-7 sessions per week of Physical Therapy at d/c to increase the patient's independence. Currently, this patient demonstrates the potential endurance, and/or tolerance for 3 hours of therapy each day at d/c.

## 2022-10-07 NOTE — PROGRESS NOTES
HPI: Rylie Chacon is a 71 y.o. female presenting with chief complain of acute GI bleed. Pt presented to the hospital with history of uterine cancer that has been deemed unresectable. Pt presented with hgb of 4 and is s/p multiple transfusions. INR also elevated to 2.1. Denies abdominal pain but does have mild discomfort, diffuse, with some bloating. Passing liquid stools. Attempt at CT-A but poor IV access so unable to obtain. CT without contrast done. Past Medical History:   Diagnosis Date    Class 2 obesity due to excess calories without serious comorbidity in adult     HTN (hypertension)     Uterine mass 09/01/2022    Vitamin D deficiency        Past Surgical History:   Procedure Laterality Date    HX CATARACT REMOVAL N/A     IR CHANGE NEPHROSTOMY PYELOS TUBE LT  9/22/2022    IR NEPHROSTOMY PERC LT PLC CATH  SI  9/4/2022    IR NEPHROSTOMY PERC RT PLC CATH  SI  9/4/2022    IR NEPHROSTOMY PERC RT PLC CATH  SI  9/15/2022       Family History   Problem Relation Age of Onset    Cancer Mother     Diabetes Mother        Social History     Socioeconomic History    Marital status:    Tobacco Use    Smokeless tobacco: Never   Substance and Sexual Activity    Alcohol use: Not Currently       Review of Systems - all others negative        No Known Allergies    Vitals:    10/06/22 2304 10/07/22 0301 10/07/22 0813 10/07/22 1202   BP: 137/73 130/79 126/80 132/87   Pulse: (!) 118 (!) 123 (!) 120 (!) 124   Resp: 24 26 30 23   Temp: 98.7 °F (37.1 °C) 98.2 °F (36.8 °C) 98 °F (36.7 °C) 97.8 °F (36.6 °C)   SpO2: 98% 97% 94% 98%   Weight:       Height:           Physical Exam  Constitutional:       Appearance: She is well-developed. HENT:      Head: Normocephalic and atraumatic. Eyes:      Conjunctiva/sclera: Conjunctivae normal.   Abdominal:      General: There is distension. Palpations: Abdomen is soft. Tenderness: There is abdominal tenderness. There is no guarding.    Musculoskeletal:         General: Normal range of motion. Lymphadenopathy:      Cervical: No cervical adenopathy. Skin:     General: Skin is warm and dry. Findings: No rash. Neurological:      Sensory: No sensory deficit. Psychiatric:         Speech: Speech normal.     CMP:   Lab Results   Component Value Date/Time     (H) 10/07/2022 01:17 AM    K 3.5 10/07/2022 01:17 AM     (H) 10/07/2022 01:17 AM    CO2 20 (L) 10/07/2022 01:17 AM    AGAP 9 10/07/2022 01:17 AM     (H) 10/07/2022 01:17 AM    BUN 55 (H) 10/07/2022 01:17 AM    CREA 0.99 10/07/2022 01:17 AM    CA 8.7 10/07/2022 01:17 AM     CBC:   Lab Results   Component Value Date/Time    WBC 9.8 10/07/2022 01:17 AM    HGB 8.6 (L) 10/07/2022 01:17 AM    HCT 25.4 (L) 10/07/2022 01:17 AM    PLT 98 (L) 10/07/2022 01:17 AM     INR 1.8 currently     CT without contrast personally visualized, mass in pelvis and LUQ, ? Splenic metastasis    Assessment / Plan    Locally aggressive uterine cancer with possible splenic flexure met  Trend hgb, correct INR  Can advance diet as tolerated  If can get IV access, recommend CT-A if hgb trends down, bleeding may be amenable to IR embolization  Will follow     The diagnoses and plan were discussed with the patient. All questions answered. Plan of care agreed to by all concerned.

## 2022-10-07 NOTE — PROGRESS NOTES
[id number] [facility] 54: [phone]  [facility]: [address] [place] [address] Way: [phone]    Patient Name: Adeola Otto  YOB: 1950    Date of Initial Consult: 10/5/2022  Follow up 10/7/2022   Reason for Consult: goals of care   Requesting Provider: Dr Roney Tobin   Primary Care Physician: Mine Simon MD      SUMMARY:   Adeola Otto is a 71y.o. year old with a past history of uterine cancer, s/p radiation treatments, hypertension, obesity , who was admitted on 10/3/2022 from [facility]  with a diagnosis of anemia. Current medical issues leading to Palliative Medicine involvement include: 71year old female who has a history of uterine cancer s/p RT. Of note was not able to have hysterectomy due to size of tumor. Plan was surgery once the tumor had decreased in size. Patient was transferred from [address] [facility] with increased fatigue and unwell feeling. In Er she was found to have a hemoglobin of 4.8. Palliative medicine is consulted for goals of care discussions. 10/7/2022 more alert this am, tells us she feels better this am.     10/6/2022 events noted from last night. No pain this am, seems a bit confused this am. Did not remember oncology coming to room this am.      PALLIATIVE DIAGNOSES:   Goals of care   Advanced care plan discussions  Uterine carcinoma   Anemia        PLAN:   10/7/2022 follow up along with Ms Claire Jarrell RN and Ms Andi Mccurdy. Patient seems more alert and comfortable this am. Told us \" they tell me they may not be able to do anything. \" She is alert and oriented x 3 this am. We discussed goals of care with patient who really could not make a decision. We later met with her son Susan Bob. He has been well updated on her current medical condition. Goals of care discussed Patient had her eyes closed during this conversation. Susan Bob tearful and wished for everything including CPR to attempted for his mother.  Very difficult conversation for Chandrika Marcus. We offered support with this difficult conversation. Chandrika Marcus expressed his hopefulness that someone can find away to remove the tumor. He is aware CTA no performed due to loss of IV. He is also aware source of bleeding is not currently known. Goals of care full code with full interventions. Patient opened her eyes when Chandrika Marcus was emotional and shared with him he was going to be alright that GOD is in control. She also told him even though gyn onc was positive on their first meeting she felt he knew perhaps this was not going to go well. ( Please see below for previous notes per palliative team)     10/6/2022 follow up along with Ms Linda Salazar RN and Ms Destini Weiss this am. Events of last night noted. Required more blood due to bleeding. Comfort this am. Seems a bit more confused this am. Knows she is in the hospital but did not remember oncology coming this am. Worry that she can not make a complex medical decision. We called her son Chandrika Marcus who is legal next of kin. He is aware there is testing to find bleeding source. We introduced our services. Plan to meet tomorrow at 1600 to further discuss. Chandrika Marcus did tell us he and family remain hopefull and \" as long as we are doing everything we can to help his mother, he is OK. \" Goals of care full code with full interventions. ( Please see below for previous notes per palliative team)     Goals of care patient seen along with Ms Jaron Pittman RN and Ms Destini Weiss. She is alert and oriented x 3 and can participate in conversation. She however during our conversation repeated several times, stated \" I don't know what is wrong. She referred to her uterine cancer as her \" cyst\". She understood she went to [facility] but never named radiation therapy. It is very hard to understand if she truly does not understand her medical illness or it is denial. Appreciate oncology consult moving forward. We did not discuss goals of care with Ms Alvarado Mau today.  Goals of care full code with full interventions. Discussed with attending. Advanced care plan discussion no AMD on file. She shares she is a . Had 2 biology children, one son has passed. Her son Bon Escudero is legal next of kin. Uterine carcinoma s/p radiation therapy followed by Dr Jerry Morgan and has seen Dr Theresa Casillas in the past   Anemia admitted with hemoglobin of 4.8. GI has been consulted. Possible EGD if overt bleeding per GI. S/p PRBC's   Initial consult note routed to primary continuity provider  Communicated plan of care with: Palliative IDT, attending and patient     Patient/Health Care Proxy Stated Goals: Prolong life      TREATMENT PREFERENCES:   Code Status: Full Code    Advance Care Planning:  [] The "[employer] Interdisciplinary Team has updated the ACP Navigator with [address] and Patient Capacity    Primary Decision [address] ([address]):   Primary Decision Maker: Tran Anchors - [phone]    Secondary Decision Maker: Virginia Salas - Other Relative - [phone]    Medical Interventions: Full interventions           Other:  As far as possible, the palliative care team has discussed with patient / health care proxy about goals of care / treatment preferences for patient.      HISTORY:     History obtained from: chart and patient     CHIEF COMPLAINT: GI bleed     HPI/SUBJECTIVE:    The patient is:   [x] Verbal and participatory  [] Non-participatory due to:   Please see summary      Clinical Pain Assessment (nonverbal scale for nonverbal patients): Clinical Pain Assessment  Severity: 0          Duration: for how long has pt been experiencing pain (e.g., 2 days, 1 month, years)  Frequency: how often pain is an issue (e.g., several times per day, once every few days, constant)     FUNCTIONAL ASSESSMENT:     Palliative Performance Scale (PPS):50       ECOG  ECOG Status : Limited self-care     PSYCHOSOCIAL/SPIRITUAL SCREENING:      Any spiritual / Mandaen concerns:  [] Yes /  [x] No    Caregiver Burnout:  [] Yes /  [] No /  [x] No Caregiver Present      Anticipatory grief assessment:   [x] Normal  / [] Maladaptive        REVIEW OF SYSTEMS:     Positive and pertinent negative findings in ROS are noted above in HPI. The following systems were [x] reviewed / [] unable to be reviewed as noted in HPI  Other findings are noted below. Systems: constitutional, ears/nose/mouth/throat, respiratory, gastrointestinal, genitourinary, musculoskeletal, integumentary, neurologic, psychiatric, endocrine. Positive findings noted below. Modified ESAS Completed by: provider   Fatigue: 4 Drowsiness: 0   Depression: 0 Pain: 0   Anxiety: 0 Nausea: 0     Dyspnea: 0           Stool Occurrence(s): 1        PHYSICAL EXAM:     Wt Readings from Last 3 Encounters:   10/04/22 103.4 kg (228 lb)   09/23/22 114.3 kg (252 lb)   07/05/22 101.2 kg (223 lb)     Blood pressure 132/87, pulse (!) 124, temperature 98.2 °F (36.8 °C), resp. rate 22, height 5' 3\" (1.6 m), weight 103.4 kg (228 lb), SpO2 97 %, not currently breastfeeding.   Last bowel movement: x1 10/4/2022, 10/5/2022 x 5     Constitutional: more comfortable appearing this am alert more engage still fatigued appearing   Eyes: pupils equal  ENMT: moist MM   Cardiovascular: tachycardia   Respiratory: breathing not labored   Skin: warm, dry, bruising on arms  Neurologic: alert and oriented x 3  Psychiatric: full affect, no hallucinations, calm        HISTORY:     Active Problems:    GI bleed (10/4/2022)    Past Medical History:   Diagnosis Date    Class 2 obesity due to excess calories without serious comorbidity in adult     HTN (hypertension)     Uterine mass 09/01/2022    Vitamin D deficiency       Past Surgical History:   Procedure Laterality Date    HX CATARACT REMOVAL N/A     IR CHANGE NEPHROSTOMY PYELOS TUBE LT  9/22/2022    IR NEPHROSTOMY PERC LT PLC CATH  SI  9/4/2022    IR NEPHROSTOMY PERC RT PLC CATH  SI  9/4/2022    IR NEPHROSTOMY PERC RT PLC CATH  SI  9/15/2022      Family History   Problem Relation Age of Onset    Cancer Mother     Diabetes Mother      History reviewed, no pertinent family history.   Social History     Tobacco Use    Smoking status: Not on file    Smokeless tobacco: Never   Substance Use Topics    Alcohol use: Not Currently     No Known Allergies   Current Facility-Administered Medications   Medication Dose Route Frequency    morphine injection 1 mg  1 mg IntraVENous Q4H PRN    metoprolol tartrate (LOPRESSOR) tablet 12.5 mg  12.5 mg Oral Q12H    phytonadione (VITAMIN K) 1 mg/mL oral solution 10 mg  10 mg Oral ONCE    lidocaine (PF) (XYLOCAINE) 10 mg/mL (1 %) injection 5 mL  5 mL IntraDERMal ONCE    sodium chloride (NS) flush 5-40 mL  5-40 mL IntraVENous Q8H    sodium chloride (NS) flush 5-40 mL  5-40 mL IntraVENous PRN    iron sucrose (VENOFER) injection 200 mg  200 mg IntraVENous Q24H    vancomycin (VANCOCIN) 1250 mg in  ml infusion  1,250 mg IntraVENous Q24H    sodium chloride (NS) flush 5-40 mL  5-40 mL IntraVENous Q8H    sodium chloride (NS) flush 5-40 mL  5-40 mL IntraVENous PRN    dextrose 5% infusion  50 mL/hr IntraVENous CONTINUOUS    cefepime (MAXIPIME) 2 g in 0.9% sodium chloride (MBP/ADV) 100 mL MBP  2 g IntraVENous Q12H    pantoprazole (PROTONIX) 40 mg in 0.9% sodium chloride 10 mL injection  40 mg IntraVENous Q12H    0.9% sodium chloride infusion  100 mL/hr IntraVENous PRN    acetaminophen (TYLENOL) tablet 500 mg  500 mg Oral Q4H PRN        LAB AND IMAGING FINDINGS:     Lab Results   Component Value Date/Time    WBC 9.8 10/07/2022 01:17 AM    HGB 8.6 (L) 10/07/2022 01:17 AM    PLATELET 98 (L) 52/06/9164 01:17 AM     Lab Results   Component Value Date/Time    Sodium 150 (H) 10/07/2022 01:17 AM    Potassium 3.5 10/07/2022 01:17 AM    Chloride 121 (H) 10/07/2022 01:17 AM    CO2 20 (L) 10/07/2022 01:17 AM    BUN 55 (H) 10/07/2022 01:17 AM    Creatinine 0.99 10/07/2022 01:17 AM    Calcium 8.7 10/07/2022 01:17 AM    Magnesium 2.0 10/06/2022 03:00 AM    Phosphorus 3.7 09/21/2022 05:55 AM      Lab Results   Component Value Date/Time    Alk. phosphatase 99 10/03/2022 10:57 PM    Protein, total 5.9 (L) 10/03/2022 10:57 PM    Albumin 1.5 (L) 10/03/2022 10:57 PM    Globulin 4.4 (H) 10/03/2022 10:57 PM     Lab Results   Component Value Date/Time    INR 1.8 (H) 10/07/2022 01:17 AM    Prothrombin time 21.4 (H) 10/07/2022 01:17 AM    aPTT 30.5 10/06/2022 09:15 AM      Lab Results   Component Value Date/Time    Iron 23 (L) 09/02/2022 03:19 AM    TIBC 176 (L) 09/02/2022 03:19 AM    Iron % saturation 13 (L) 09/02/2022 03:19 AM    Ferritin 823 (H) 09/02/2022 03:19 AM      No results found for: PH, PCO2, PO2  No components found for: GLPOC   No results found for: CPK, CKMB           Total time: 35 minutes   Counseling / coordination time, spent as noted above:   > 50% counseling / coordination: yes with patient     Prolonged service was provided for  []30 min   []75 min in face to face time in the presence of the patient, spent as noted above.   Time Start:   Time End:

## 2022-10-07 NOTE — PROGRESS NOTES
Bedside and Verbal shift change report received from 83Christine Feng (offgoing nurse). Report included the following information SBAR, Kardex, Intake/Output, Recent Results, and Cardiac Rhythm Sinus Tachycardia    1945: AOX3, on room air, resting in bed, son at bedside; IVF infusing well; shift assessment done    2020: had a small amount of black stools; I care done; wound care done          : meds given    2200: repositioned comfortably    2345: reassessment done; no changes noted    0100: sleeping; no apparent distress noted    0330: had a small amount of black liquid stools; doug care done    0426: Morphine 2 mg IV given for c/o 10/10 back pain- see flowsheet    0500: sleeping; no visual indicators for pain noted    0600: kept comfortable in bed    0700: Bedside and Verbal shift change report given to Beck Sena (oncoming nurse) by Shaina Daigle RN (offgoing nurse). Report included the following information SBAR, Kardex, Intake/Output, Recent Results, and Cardiac Rhythm Sinus TAchycardia .       Wound Prevention Checklist    Patient: Zhanna Espinosa (38 y.o. female)  Date: 10/7/2022  Diagnosis: GI bleed [K92.2] <principal problem not specified>    Precautions:         []  Heel prevention boots placed on patient    [x]  Patient turned q2h during shift    []  Lift team ordered    [x]  Patient on [facility] bed/Specialty bed    [x]  Each Wound is documented during shift (Stage, Color, drainage, odor, measurements, and dressings)    [x]  Dual skin check done with Beck Daigle RN

## 2022-10-07 NOTE — PROGRESS NOTES
Informed Julienne and Shola Ortiz of [facility] that pt will be ready for d/c on Sunday per Dr. Britany Latif.             MARCY OteroN RN  Care Management  Pager: [phone]

## 2022-10-07 NOTE — PROGRESS NOTES
Phone: [phone]    Hematology / Oncology Progress Note            Patient: Terrence Shahid   MRN: [id number]         CSN: [id number]    YOB: 1950      Admit Date: 10/3/2022    Assessment:     Active Problems:    GI bleed (10/4/2022)      Uterine carcinosarcoma, Dr Sharita Wiseman  S/p pallitive XRT to tumor  Ac bleed, anemia , sec to tumor  debiliatation  Plan:     PRBC, Venofer  support  CTA done this am, to assess any active site of bleeding  If active bleed , embolization of the vessel to be considered.   Hospitalist has been in communication with primary gyn onc    Lauren Moore MD  [facility] [phone]      Subjective:     Profound fatigue    Objective:     Visit Vitals  /80   Pulse (!) 120   Temp 98 °F (36.7 °C)   Resp 30   Ht 5' 3\" (1.6 m)   Wt 103.4 kg (228 lb)   SpO2 94%   Breastfeeding No   BMI 40.39 kg/m²             Temp (24hrs), Av.4 °F (36.9 °C), Min:98 °F (36.7 °C), Max:98.7 °F (37.1 °C)        Intake/Output Summary (Last 24 hours) at 10/7/2022 1027  Last data filed at 10/7/2022 1004  Gross per 24 hour   Intake 237 ml   Output 1275 ml   Net -1038 ml     Review of Systems:   Constitutional: negative for fevers, chills, sweats and fatigue  Eyes: negative for visual disturbance,  icterus  Ears, Nose, Mouth, Throat, and Face: negative for tinnitus, epistaxis, sore mouth and hoarseness  Respiratory: negative for cough, sputum, hemoptysis, pleurisy/chest pain or wheezing  Cardiovascular: negative for chest pain, , palpitations,  syncope, paroxysmal nocturnal dyspnea  Gastrointestinal: negative for reflux symptoms, nausea, vomiting, melena, diarrhea, constipation and abdominal pain  Genitourinary:negative for dysuria, nocturia, urinary incontinence, hesitancy and hematuria  Endocrine: no polydipsia, no goitre  Integument: negative for rash, skin lesion(s) and pruritus  Hematologic/Lymphatic: negative for easy bruising, bleeding and lymphadenopathy  Musculoskeletal:negative for myalgias, arthralgias and bone pain  Neurological: negative for headaches, dizziness, seizures, paresthesia and weakness    Physical Exam: ECOG : 2  Constitutional: Alert, oriented, not in distress  Eyes: PERRLA, anicteric, pallor  ENT: no palpable Lymph nodes, no mucositis, no thrush. Respiratory: bilateral air entry  Cardiovascular: S1S2 regular   Gastrointestinal: soft,  PCN in place ,normal bowel sounds. Integument: pallor  Neurological: alert. Labs:  Recent Results (from the past 24 hour(s))   HGB & HCT    Collection Time: 10/06/22  3:54 PM   Result Value Ref Range    HGB 9.3 (L) 12.0 - 16.0 g/dL    HCT 27.1 (L) 35.0 - 46.9 %   METABOLIC PANEL, BASIC    Collection Time: 10/07/22  1:17 AM   Result Value Ref Range    Sodium 150 (H) 136 - 145 mmol/L    Potassium 3.5 3.5 - 5.5 mmol/L    Chloride 121 (H) 100 - 111 mmol/L    CO2 20 (L) 21 - 32 mmol/L    Anion gap 9 3.0 - 18 mmol/L    Glucose 160 (H) 74 - 99 mg/dL    BUN 55 (H) 7.0 - 18 MG/DL    Creatinine 0.99 0.6 - 1.3 MG/DL    BUN/Creatinine ratio 56 (H) 12 - 20      eGFR >60 >60 ml/min/1.73m2    Calcium 8.7 8.5 - 10.1 MG/DL   CBC WITH AUTOMATED DIFF    Collection Time: 10/07/22  1:17 AM   Result Value Ref Range    WBC 9.8 4.6 - 13.2 K/uL    RBC 2.95 (L) 4.20 - 5.30 M/uL    HGB 8.6 (L) 12.0 - 16.0 g/dL    HCT 25.4 (L) 35.0 - 45.0 %    MCV 86.1 78.0 - 100.0 FL    MCH 29.2 24.0 - 34.0 PG    MCHC 33.9 31.0 - 37.0 g/dL    RDW 20.2 (H) 11.6 - 14.5 %    PLATELET 98 (L) 059 - 420 K/uL    NRBC 0.5 (H) 0  WBC    ABSOLUTE NRBC 0.05 (H) 0.00 - 0.01 K/uL    NEUTROPHILS 85 (H) 40 - 73 %    BAND NEUTROPHILS 6 (H) 0 - 5 %    LYMPHOCYTES 4 (L) 21 - 52 %    MONOCYTES 5 3 - 10 %    EOSINOPHILS 0 0 - 5 %    BASOPHILS 0 0 - 2 %    IMMATURE GRANULOCYTES 0 %    ABS. NEUTROPHILS 8.9 (H) 1.8 - 8.0 K/UL    ABS. LYMPHOCYTES 0.4 (L) 0.9 - 3.6 K/UL    ABS. MONOCYTES 0.5 0.05 - 1.2 K/UL    ABS. EOSINOPHILS 0.0 0.0 - 0.4 K/UL    ABS. BASOPHILS 0.0 0.0 - 0.1 K/UL    ABS. IMM.  GRANS. 0.0 K/UL    DF MANUAL      PLATELET COMMENTS DECREASED PLATELETS      RBC COMMENTS MACROCYTOSIS  1+        RBC COMMENTS HYPOCHROMIA  1+        RBC COMMENTS POLYCHROMASIA  1+        RBC COMMENTS OVALOCYTES  1+        RBC COMMENTS TARGET CELLS  1+        RBC COMMENTS MINISTERIO CELLS  1+        RBC COMMENTS RBC Comment  2 NRBC       PROTHROMBIN TIME + INR    Collection Time: 10/07/22  1:17 AM   Result Value Ref Range    Prothrombin time 21.4 (H) 11.5 - 15.2 sec    INR 1.8 (H) 0.8 - 1.2

## 2022-10-07 NOTE — PROGRESS NOTES
[email or web address]  [phone]    Gastroenterology Progress Note    Impression:  1. Acute on chronic anemia - w/o overt bleeding during admission. Patient does note some chronic nausea and lower abdominal pain attributed to #5 diagnosis. 10/3 Hgb of 4.8 improved to 7.1 10/5 s/p 3 units PRBC. 10/5 Hgb 4.2 improved to 9.1 s/p 2 units pRBC. 10/5 CT concerning for erosion of abdominal mass into bowel loops possibly contributing to GI bleed. -  Baseline Hgb 9/22 7.6, 9/21 6.9, 9/5 6.8.   - 11/2017 colonoscopy w/ Dr. Tuan Peres significant for diverticulosis and 1 TA polyp w/ 5yr recall  2. Fatigue - secondary to #1  3. Pelvic mass and LUQ mass   4. HTN  5. Uterine carcinosarcoma - newer dx followed by Dr. Uribe Nurse w/           VOA. CT noted below. Patient previously received palliative XRT. 6. Recent [facility] admission - 9/1-23 for abdominal pain related to #5.     10/7 CTA IMPRESSION:  1. The contrast was infiltrated in right arm with only minimal IV contrast  obtained. No additional IV access obtained after multiple attempts. Therefore, the CTA angiogram cannot be obtained. Please see note above. 2.  Overall no significant interval change compared to the noncontrast CT 2 days ago. The large heterogeneously solid mass in the left upper quadrant is encasing the spleen and markedly abutting and displaced the stomach with poor delineation from posterior gastric wall, local invasion cannot be excluded. It is uncertain to be the cause of GI bleeding. Inability to do the CTA makes evaluation very limited. Significant interval enlargement noted compared to the CT 9/1/22. 3.  The large complex mass in the pelvis containing air-fluid levels and faint solid components appear unchanged. The mid sigmoid colon is nondilated visible due to mass effect. This raises possibility of colonic invasion to contrast reviewed the large air-fluid components within the mass. Lack of IV contrast markedly limits the evaluation.   4.  Stable left adrenal mass. 5.  Bilateral nephrostomy tubes again seen. Mildly increased right  hydronephrosis and proximal right hydroureter, distal portion not visible,  assuming compressed by large pelvic mass. 6.  Significant anasarca in bilateral lateral abdominal and pelvic wall and  bilateral thigh. 10/5 CT ABD PEVLIS W/O CONTRAST IMPRESSION:   Significant interval enlargement of the pelvic mass arising from the uterus, but now containing significant internal gas and fluid within the mass. There is no clear fat plane between the mass and multiple small bowel loops in the sigmoid colon. Given the reported history of GI bleed, this may suggest erosion of the mass into the adjacent bowel loops with resultant GI bleeding. Mass in the left upper quadrant has also significantly enlarged in close  proximity at the splenic hilum and with no clear fat plane between the greater curvature of the stomach and splenic flexure of the colon. No internal gas. However, invasion into the adjacent bowel loops is not excluded at this location as well. This may also contribute to the reported GI bleeding. Left adrenal mass is similar to prior comparison. Interval placement of bilateral nephrostomy tubes with moderate residual  hydronephrosis remaining in the right kidney. Small left pleural effusion. Additional incidentals as above. 9/1 CT Abd Pelvis w/o Contrast IMPRESSION  1. Compared to prior ultrasound and CT, continued enlargement of the uterus with mass effect upon the pelvic structures. Overall concerning for gynecologic malignancy. 2.  Lobular, 7 cm heterogeneous lesion within the spleen are increased compared exam. Findings could represent metastatic disease. 3.  Soft tissue nodule left adrenal gland measuring up to 4 cm. 4.  Distention of the urinary bladder with approximately estimated volume of 1.4  L. Mild resultant bilateral hydronephrosis. Correlate for urinary retention. Plan:  1.  Unfortunate case with poor prognosis. Patient's gyn/onc Dr. Nickie Guallpa feels mass is not resectable. CT suggests invasion of known advanced abdominal mass into adjacent bowel loops not amendable to GI intervention. Discussed case with Dr. Dasha Coles whom suggests CTA with possible IR intervention if source is identified. CTA attempted today but unsuccessful secondary to IV infiltration. Recommend re-attempting CTA with reliable IV access to help identify a source. 2. Consider EGD if overt bleeding w/ subsequent significant drop in H/H.    3. Monitor H/H, transfuse if Hgb <7.  4. IV PPI BID  5. Continue medical management per primary team.    GI to sign off. Thank you for this consultation and the opportunity to participate in the care of this patient. Please do not hesitate to call with any questions or concerns, or should event occur that may necessitate additional GI evaluation. Chief Complaint: anemia        Subjective:  Patient restless, requests to move around - RN aware. still without overt bleeding. ROS: Denies any fevers, chills, nausea, vomiting.        General: well developed, well nourished, no acute distress  Eyes: conjunctiva normal, EOM normal  Cardiovascular: heart normal, intact distal pulses, normal rate and regular rhythm  Pulmonary: breath sounds normal and effort normal  Abdominal: non-distended, active bowel sounds, soft, mod BLQ TTP, non-acute  Patient Active Problem List   Diagnosis Code    Uterine mass N85.8    WILBUR (acute kidney injury) ([facility] [address] N17.9    Bladder outlet obstruction N32.0    Lobulation, spleen Q89.09    Adrenal nodule (HCC) E27.8    Leukocytosis D72.829    Microcytic anemia D50.9    Hyponatremia E87.1    Uremia N19    Other hydronephrosis N13.39    GI bleed K92.2         Visit Vitals  /80   Pulse (!) 120   Temp 98 °F (36.7 °C)   Resp 30   Ht 5' 3\" (1.6 m)   Wt 103.4 kg (228 lb)   SpO2 94%   Breastfeeding No   BMI 40.39 kg/m²           Intake/Output Summary (Last 24 hours) at 10/7/2022 1143  Last data filed at 10/7/2022 1004  Gross per 24 hour   Intake 237 ml   Output 1275 ml   Net -1038 ml       CBC w/Diff    Lab Results   Component Value Date/Time    WBC 9.8 10/07/2022 01:17 AM    RBC 2.95 (L) 10/07/2022 01:17 AM    HGB 8.6 (L) 10/07/2022 01:17 AM    HCT 25.4 (L) 10/07/2022 01:17 AM    MCV 86.1 10/07/2022 01:17 AM    MCH 29.2 10/07/2022 01:17 AM    MCHC 33.9 10/07/2022 01:17 AM    RDW 20.2 (H) 10/07/2022 01:17 AM    PLT 98 (L) 10/07/2022 01:17 AM    Lab Results   Component Value Date/Time    GRANS 85 (H) 10/07/2022 01:17 AM    LYMPH 4 (L) 10/07/2022 01:17 AM    EOS 0 10/07/2022 01:17 AM    BANDS 6 (H) 10/07/2022 01:17 AM    BASOS 0 10/07/2022 01:17 AM    METAS 1 (H) 10/06/2022 03:00 AM    PRO 1 (H) 10/05/2022 02:28 AM      Basic Metabolic Profile   Recent Labs     10/07/22  0117 10/06/22  0300   * 152*   K 3.5 3.4*   * 121*   CO2 20* 20*   BUN 55* 64*   CA 8.7 8.3*   MG  --  2.0        Hepatic Function    Lab Results   Component Value Date/Time    ALB 1.5 (L) 10/03/2022 10:57 PM    TP 5.9 (L) 10/03/2022 10:57 PM    AP 99 10/03/2022 10:57 PM    No results found for: TBIL       Coags   Recent Labs     10/07/22  0117 10/06/22  0915   PTP 21.4* 23.8*   INR 1.8* 2.1*   APTT  --  30.5               Neil Duran PA-C.   10/07/22, 11:52 AM   [facility]  [email or web address] [email or web address]  Phone: [phone]  Pager: [phone]

## 2022-10-07 NOTE — PROGRESS NOTES
[facility] Hospitalist Group  Progress Note    Patient: Terrence Shahid Age: 71 y.o. : 1950 MR#: [id number] SSN: [id number]  Date/Time: 10/7/2022    Subjective:     Patient feels fine. No more bleeding since last night. She just completed CT scan. No chest or abdominal pain. No headaches or dizziness. No nausea or vomiting. Assessment/Plan:     Acute GI versus uterine bleed  2. Acute blood loss anemia s/p PRBCs  3.  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 Hypernatremia, slowly improving  5. Elevated BUN likely secondary to GI bleed, slowly improving  6. Elevated INR, improving  7. Hypokalemia, better today    PLAN    Stable hemoglobin so far without any acute bleed  Pending CT scan report  Discussed with Dr. Tipton Presume to follow  Continue IV fluid and monitor hypernatremia  Will give patient 1 more dose of vitamin K for elevated INR  Discussed with palliative care team.  Patient's son will be here later today  Discussed with patient. With patient's permission I called her son at phone #[phone] and provided him clinical update regarding this patient yesterday. I called him today but it keeps ringing.     Case discussed with:  [x]Patient  []Family  [x]Nursing  []Case Management  DVT Prophylaxis:  []Lovenox  []Hep SQ  [x]SCDs  []Coumadin   []On Heparin gtt    Objective:   VS: Visit Vitals  /80   Pulse (!) 120   Temp 98 °F (36.7 °C)   Resp 30   Ht 5' 3\" (1.6 m)   Wt 103.4 kg (228 lb)   SpO2 94%   Breastfeeding No   BMI 40.39 kg/m²      Tmax/24hrs: Temp (24hrs), Av.4 °F (36.9 °C), Min:98 °F (36.7 °C), Max:98.7 °F (37.1 °C)    Input/Output:   Intake/Output Summary (Last 24 hours) at 10/7/2022 0949  Last data filed at 10/7/2022 0355  Gross per 24 hour   Intake --   Output 1275 ml   Net -1275 ml         General appearance - alert, well appearing, and in no distress  Eyes - sclera anicteric, no pallor  Chest -clear air entry noted in bases, no wheezes  Heart - S1 and S2 normal  Abdomen - soft, nontender, nondistended, Bowel sounds present, bilateral PCN tubes are in situ. Neurological - alert, oriented, normal speech, no focal findings noted currently. Extremities - no pedal edema noted        Labs:    Recent Results (from the past 24 hour(s))   HGB & HCT    Collection Time: 10/06/22  3:54 PM   Result Value Ref Range    HGB 9.3 (L) 12.0 - 16.0 g/dL    HCT 27.1 (L) 35.0 - 29.3 %   METABOLIC PANEL, BASIC    Collection Time: 10/07/22  1:17 AM   Result Value Ref Range    Sodium 150 (H) 136 - 145 mmol/L    Potassium 3.5 3.5 - 5.5 mmol/L    Chloride 121 (H) 100 - 111 mmol/L    CO2 20 (L) 21 - 32 mmol/L    Anion gap 9 3.0 - 18 mmol/L    Glucose 160 (H) 74 - 99 mg/dL    BUN 55 (H) 7.0 - 18 MG/DL    Creatinine 0.99 0.6 - 1.3 MG/DL    BUN/Creatinine ratio 56 (H) 12 - 20      eGFR >60 >60 ml/min/1.73m2    Calcium 8.7 8.5 - 10.1 MG/DL   CBC WITH AUTOMATED DIFF    Collection Time: 10/07/22  1:17 AM   Result Value Ref Range    WBC 9.8 4.6 - 13.2 K/uL    RBC 2.95 (L) 4.20 - 5.30 M/uL    HGB 8.6 (L) 12.0 - 16.0 g/dL    HCT 25.4 (L) 35.0 - 45.0 %    MCV 86.1 78.0 - 100.0 FL    MCH 29.2 24.0 - 34.0 PG    MCHC 33.9 31.0 - 37.0 g/dL    RDW 20.2 (H) 11.6 - 14.5 %    PLATELET 98 (L) 443 - 420 K/uL    NRBC 0.5 (H) 0  WBC    ABSOLUTE NRBC 0.05 (H) 0.00 - 0.01 K/uL    NEUTROPHILS 85 (H) 40 - 73 %    BAND NEUTROPHILS 6 (H) 0 - 5 %    LYMPHOCYTES 4 (L) 21 - 52 %    MONOCYTES 5 3 - 10 %    EOSINOPHILS 0 0 - 5 %    BASOPHILS 0 0 - 2 %    IMMATURE GRANULOCYTES 0 %    ABS. NEUTROPHILS 8.9 (H) 1.8 - 8.0 K/UL    ABS. LYMPHOCYTES 0.4 (L) 0.9 - 3.6 K/UL    ABS. MONOCYTES 0.5 0.05 - 1.2 K/UL    ABS. EOSINOPHILS 0.0 0.0 - 0.4 K/UL    ABS. BASOPHILS 0.0 0.0 - 0.1 K/UL    ABS. IMM.  GRANS. 0.0 K/UL    DF MANUAL      PLATELET COMMENTS DECREASED PLATELETS      RBC COMMENTS MACROCYTOSIS  1+        RBC COMMENTS HYPOCHROMIA  1+        RBC COMMENTS POLYCHROMASIA  1+        RBC COMMENTS OVALOCYTES  1+        RBC COMMENTS TARGET CELLS  1+        RBC COMMENTS MINISTERIO CELLS  1+        RBC COMMENTS RBC Comment  2 NRBC       PROTHROMBIN TIME + INR    Collection Time: 10/07/22  1:17 AM   Result Value Ref Range    Prothrombin time 21.4 (H) 11.5 - 15.2 sec    INR 1.8 (H) 0.8 - 1.2       Total time to take care of this patient was 30 minutes and more than 50% of time was spent counseling and coordinating care. Signed By: Danette Franklin MD     October 7, 2022      Disclaimer: Sections of this note are dictated using utilizing voice recognition software, which may have resulted in some phonetic based errors in grammar and contents. Even though attempts were made to correct all the mistakes, some may have been missed, and remained in the body of the document. If questions arise, please contact our department.

## 2022-10-07 NOTE — PROGRESS NOTES
Palliative Medicine    ADDENDUM: Pt's son Jon Trujillo showed up for the 1600 scheduled meeting at [phone]. Our team went down to meet with him. He alsoi had his cousin Giovanna Hamman join us via phone. Brief medical update was provided. Explained that we have been unable to get the CTA due to lack of IV access for the contrast. Also reviewed that at this time the tumor does not seem to be amenable to surgical removal. Gyn-Onc is willing to meet with the pt in their clinic for further follow up. Jon Trujillo explained that they have lost 27 family members since 2010 including his father (pt's ) and his brother (pt's son). He expressed multiple times that he is not ready to lose his mother and \"hopes she can live into her [de-identified] or 90's\". Jon Trujillo stated that he has deep laura that all of the pt's medical issues surrounding the cancer will be worked out through [address] Discussed benefits and burdens of CPR and intubation with the pt's son. Jon Trujillo stated, \"I want you all to do everything to save my mother\". He is interested in pursuing aggressive medical management at this point and is accepting of CPR and intubation if the pt were to arrest. Continue with current medical measures. _____________________________________________________________________________________________________     Palliative Medicine team Dalila Feldman NP, Gloria Larson LMSW and Julian Gomez RN met at the pt's bedside. Pt much more alert and oriented today x 3. We did discuss goals of care with the pt. Discussed benefits and burdens of CPR and intubation. Pt said \"yes\" that she would NOT want to be resuscitated but then said that she would want resuscitation. Pt unclear on the topic of resuscitation and intubation. Would not take a goals of care decision from her at this time. Pt's son was supposed to meet us today at 0 for a GOC discussion but to date has not shown up for the meeting. Pt remains FULL code with FULL aggressive medical management.      Thank you for the Palliative Medicine consult and allowing us to participate in the care of Mrs. Duane Mae. Will continue to monitor and provide support.     Anaya Fierro RN, BSN  Palliative Medicine Inpatient RN  DR. [facility]  Palliative [place] Line: [phone] ([phone])

## 2022-10-07 NOTE — PROGRESS NOTES
0730: 20g PIV inserted into right upper arm for CT with contrast. Pt transported off floor on tele monitor with RN per orders. 0930: Pt returned from CT. 20g PIV infiltrated during CT. Right upper arm appears swollen but pt denies pain or discomfort. Warm compress provided.

## 2022-10-07 NOTE — PALLIATIVE CARE
[address] [facility] [phone]   [facility] [phone]     Brenna Mccallum NP, Yoni Guardado, RN and this [facility] consulted with Dr. Berry Che MD with hospitalist team.  He reports he spoke with pt's son last night and updated him on seriousness of pt's condition, and that pt's son appeared to understand everything discussed. Dr. Adriana Mcgee states he spoke with pt this a.m. and she is now aware of what's going on and that a tranfer is unlikely, as the Gyn/Onc cannot do surgical intervention. Dr. Adriana Mcgee was notified that we have a 1600 hr meeting scheduled with pt and her son, to discuss plans for pt's care moving forward. NP, RN and this [facility] met with pt at bedside for follow up visit. Upon entry, pt found laying in bed with head elevated. Pt AAOx3. Pt stated she had seen MD earlier, and they informed her transfer unlikely, because they can't do anything different. Pt reports she understands. Pt reports she is feeling better today than has been. No shortness of breath, no pain other than what she has in her abdomen. Goals of care were addressed. Pt didn't make a decision either way. She was informed we have a meeting scheduled with Bon Escudero and her at 1600 and we can discuss then. PT verbalized understanding. Brenna Mccallum NP, Yoni Guardado RN and this [facility] met with pt and her son at bedside to discuss plans for care moving forward. Pt's nephew, Charisse Whitley participating in meeting via telephone. NP reiterated what Dr. Adriana Mcgee MD had spoken with son about. Brenna Mccallum NP explained, despite multiple efforts, still have not been able to get CTA, due to IV's failing, and unable to get the dye in; but will continue to try. NP asked how pt's son thinks pt is doing. He reports he can tell being hospitalized has taken a toll on her mental health, and that this has ll happened so quickly, it's as though the tumor just attacked her.  He reports he is having difficult time understanding why this has happened so suddenly, and is hopeful some Dr. Geneva Ashley find a way to get the tumor out and get her back to where she was. Supportive counseling provided. Pt's son emotional, but receptive to support and information. Pt addressed her son and provided encouragement. Goals of care were discussed. Pt's son states at this time, he wants everything possible done for pt to keep her here. He reports if she gets to the point she says, \"I'm tired and I can't do this anymore, he will make the decision to stop, but until then, he wants to keep treatment going, and would want pt to undergo resuscitative efforts if required. NP addressed pt's cousin, Keyana Dominguez and asked that he and the rest of the family provide support for Georgi Orellana, as he deals with the realities of his mother's illness. Keyana Dominguez reports they will do so. Keyana Dominguez asked if tumor could be source of bleeding. NP explained it is a possibility; but really need the CTA to determine if so and if the bleeding can be treated. Zeyad verbalized understanding. Additioinal supporting counseling provided. Pt's son informed we will follow up again on Monday. No further needs or concerns stated at this time. Thank you for this referral to Palliative Care. The palliative Care team remains available to provide support for patient and her family. Pt remains full code with full aggressive measures at this time.      Daniella Ledesma, [address] [facility]  Palliative Medicine Inpatient   [facility]. [facility]  Palliative [place] Line: [phone])

## 2022-10-08 NOTE — PROGRESS NOTES
[facility] 2 CV STEPDOWN  [address] [facility] [id number] [phone]  Colon and Rectal Surgery Progress Note      Patient: Dianne Lincoln MRN: [id number]  SSN: [id number]    YOB: 1950  Age: 71 y.o. Sex: female      Admit Date: 10/3/2022    LOS: 4 days     Subjective: Tolerating full liquids. + Bowel function.      Objective:     Vitals:    10/07/22 2326 10/08/22 0354 10/08/22 0736 10/08/22 1145   BP: 132/75 (!) 144/99 (!) 158/99 134/85   Pulse: (!) 104 (!) 109 (!) 115 (!) 110   Resp: (!) 35 29 (!) 32 (!) 32   Temp: 98.7 °F (37.1 °C) 98.2 °F (36.8 °C) 97.5 °F (36.4 °C) 98.8 °F (37.1 °C)   SpO2: 97% 98%  97%   Weight:  102.5 kg (226 lb)     Height:            Intake and Output:  Current Shift: 10/08 0701 - 10/08 1900  In: 220 [P.O.:220]  Out: -   Last three shifts: 10/06 1901 - 10/08 0700  In: 2961.2 [P.O.:287; I.V.:2674.2]  Out: 1805 [Urine:1805]    Physical Exam:     Constitutional: well developed, in NAD  Abdomen: soft, distended, mildly tender diffusely    Lab/Data Review:    CMP:   Lab Results   Component Value Date/Time     (H) 10/08/2022 05:45 AM    K 3.7 10/08/2022 05:45 AM     (H) 10/08/2022 05:45 AM    CO2 20 (L) 10/08/2022 05:45 AM    AGAP 9 10/08/2022 05:45 AM     (H) 10/08/2022 05:45 AM    BUN 49 (H) 10/08/2022 05:45 AM    CREA 0.93 10/08/2022 05:45 AM    CA 8.2 (L) 10/08/2022 05:45 AM     CBC:   Lab Results   Component Value Date/Time    HGB 8.2 (L) 10/08/2022 05:45 AM    HCT 24.1 (L) 10/08/2022 05:45 AM        Assessment:     GI bleed, unresectable uterine cancer    Plan:     Diet as tolerated  If continued drop in hgb and family still wishes to pursue medical therapy consider better IV access and repeat CT-A with embolization by IR if positive  Has been deemed inoperable by gyn onc surgeon who follows her as outpatient  Recommend hospice discussion with pt and family by palliative care  Call with concerns or questions    Signed By: Duncna Meraz MD October 8, 2022

## 2022-10-08 NOTE — ROUTINE PROCESS
Wound Prevention Checklist    Patient: Vanessa Jarquin (17 y.o. female)  Date: 10/8/2022  Diagnosis: GI bleed [K92.2] <principal problem not specified>    Precautions: Fall, Skin       []  Heel prevention boots placed on patient    [x]  Patient turned q2h during shift    [x]  Lift team ordered    []  Patient on [place] bed/Specialty bed    [x]  Each Wound is documented during shift (Stage, Color, drainage, odor, measurements, and dressings)    Patient has a Stage II pressure injury on the buttocks with erythema. No drainage noted from wound. Nurse cleansed the site during bath and applied barrier cream and replaced the Meplex wound dressing to prevent further injury. Patient also repositioned.      [x]  Dual skin check done with Shirley Guillen RN

## 2022-10-08 NOTE — ROUTINE PROCESS
1933:Bedside and Verbal shift change report given to Huma S Maureen Alfonso (oncoming nurse) by Phong Rothman. (offgoing nurse). Report included the following information SBAR, Kardex, Intake/Output, MAR, Recent Results, and Cardiac Rhythm ST . Quietly resting in bed. HOB elevated. No SOB on RA. Denies any discomfort at this time. Call light within reach. 2130: Due meds given. 2326: No change from previous assessment. 0354: No change from previous assessment. 0430: Incontinence care/bathed provided. 0600: Slept on & off thru night. Needs attended. Turned & repositioned at intervals. 0716:Bedside and Verbal shift change report given to April RN (oncoming nurse) by [facility] RN (offgoing nurse).  Report included the following information SBAR, Kardex, Intake/Output, MAR, Recent Results, and Cardiac Rhythm ST .

## 2022-10-08 NOTE — PROGRESS NOTES
[facility] Hospitalist Group  Progress Note    Patient: Dianne Lincoln Age: 71 y.o. : 1950 MR#: [id number] SSN: [id number]  Date/Time: 10/8/2022    Subjective:     Patient's main concern is the bruising evident on her arm. She otherwise not endorsing any pain, bleeding, or discomfort otherwise. Feet, legs are greatly edematous - patient states this is baseline. Assessment/Plan:     Acute GI versus uterine bleed  2. Acute blood loss anemia s/p PRBCs  3.  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 Hypernatremia, slowly improving  5. Elevated BUN likely secondary to GI bleed, slowly improving  6. Elevated INR, improving  7. Hypokalemia, better today    PLAN    Stable hemoglobin so far without any acute bleed  GI, heme/onc, colo-rectal surgery following   Continue IV fluid and monitor hypernatremia  Palliative care following. Patient currently wishing for full aggressive treatment. If patient continues to bleed - repeat CTA, embolization with IR if positive. Discussed with patient.           Case discussed with:  [x]Patient  []Family  [x]Nursing  []Case Management  DVT Prophylaxis:  []Lovenox  []Hep SQ  [x]SCDs  []Coumadin   []On Heparin gtt    Objective:   VS: Visit Vitals  /85   Pulse (!) 110   Temp 98.8 °F (37.1 °C)   Resp (!) 32   Ht 5' 3\" (1.6 m)   Wt 102.5 kg (226 lb)   SpO2 97%   Breastfeeding No   BMI 40.03 kg/m²        Tmax/24hrs: Temp (24hrs), Av.3 °F (36.8 °C), Min:97.5 °F (36.4 °C), Max:98.8 °F (37.1 °C)    Input/Output:   Intake/Output Summary (Last 24 hours) at 10/8/2022 1632  Last data filed at 10/8/2022 1345  Gross per 24 hour   Intake 1089.17 ml   Output 1005 ml   Net 84.17 ml           General appearance - alert, well appearing, and in no distress  Eyes - sclera anicteric, no pallor  Chest -clear air entry noted in bases, no wheezes  Heart - S1 and S2 normal  Abdomen - soft, nontender, nondistended, Bowel sounds present, bilateral PCN tubes are in situ. Neurological - alert, oriented, normal speech, no focal findings noted currently. Extremities - no pedal edema noted        Labs:    Recent Results (from the past 24 hour(s))   HGB & HCT    Collection Time: 10/07/22  5:23 PM   Result Value Ref Range    HGB 8.6 (L) 12.0 - 16.0 g/dL    HCT 25.5 (L) 35.0 - 45.0 %   VANCOMYCIN, RANDOM    Collection Time: 10/08/22  5:45 AM   Result Value Ref Range    Vancomycin, random 21.6 5.0 - 40.0 UG/ML   HGB & HCT    Collection Time: 10/08/22  5:45 AM   Result Value Ref Range    HGB 8.2 (L) 12.0 - 16.0 g/dL    HCT 24.1 (L) 35.0 - 31.9 %   METABOLIC PANEL, BASIC    Collection Time: 10/08/22  5:45 AM   Result Value Ref Range    Sodium 150 (H) 136 - 145 mmol/L    Potassium 3.7 3.5 - 5.5 mmol/L    Chloride 121 (H) 100 - 111 mmol/L    CO2 20 (L) 21 - 32 mmol/L    Anion gap 9 3.0 - 18 mmol/L    Glucose 123 (H) 74 - 99 mg/dL    BUN 49 (H) 7.0 - 18 MG/DL    Creatinine 0.93 0.6 - 1.3 MG/DL    BUN/Creatinine ratio 53 (H) 12 - 20      eGFR >60 >60 ml/min/1.73m2    Calcium 8.2 (L) 8.5 - 10.1 MG/DL   PROTHROMBIN TIME + INR    Collection Time: 10/08/22  5:45 AM   Result Value Ref Range    Prothrombin time 19.3 (H) 11.5 - 15.2 sec    INR 1.6 (H) 0.8 - 1.2       Total time to take care of this patient was 30 minutes and more than 50% of time was spent counseling and coordinating care. Signed By: Marcellus Litten, DO     October 8, 2022      Disclaimer: Sections of this note are dictated using utilizing voice recognition software, which may have resulted in some phonetic based errors in grammar and contents. Even though attempts were made to correct all the mistakes, some may have been missed, and remained in the body of the document. If questions arise, please contact our department.

## 2022-10-08 NOTE — ROUTINE PROCESS
SBAR report given to  on-coming nurse Fay STARKEY RN from off going nurse Leidy VILLA RN. Patient has had an uneventful day except with some increase moments of restless behavior pulling out her IV line and removing her gown. A new IV was placed today, 24g to right hand. Patient tolerated care well. Nephrostomy drain to left side dressing was change. Nurse notices some greenish drainage around insertion site. Site was cleansed well with NS and pat dry. A mepilex dressing was applied to site. Both Nephrostomy  drains were putting out urine today. Son was at bedside for most of the evening hours. Appetite was not great today but patient did tolerate some drinks today. SBAR report given to Zahida Sesay RN.

## 2022-10-09 NOTE — ROUTINE PROCESS
1935:Bedside and Verbal shift change report given to [facility] Alfonso (oncoming nurse) by Fya GUILLAUME (offgoing nurse). Report included the following information SBAR, Kardex, Intake/Output, MAR, Recent Results, and Cardiac Rhythm ST . Comfortably resting. Assessment done. Denies any pain or discomfort at this time. Call light within reach. Son at bedside. 2204: Due meds given. 2230: Incontinence care provided. 2325: No change from previous assessment   Noticed that patient is grimacing & is rubbing her abdomen back & fort. Asked if she is in pain. Replied yes. Offered pain medicine. Refused. 0204: Patient shivering. Reinforced blanket. 0230: Incontinence care provided/bathed. Medicated for pain     0400: No change from previous assessment. 0731: Slept on & off thru night. Needs attended. Turned & repositioned. 5531: Bedside and Verbal shift change report given to Fay GUILLAUME (oncoming nurse) by Fouzia Nieves RN (offgoing nurse). Report included the following information SBAR, Kardex, Intake/Output, MAR, Recent Results, and Cardiac Rhythm SR/ST . Wound Prevention Checklist    Patient: Hilario Garza (32 y.o. female)  Date: 10/9/2022  Diagnosis: GI bleed [K92.2] <principal problem not specified>    Precautions: Fall, Skin       [x]  Heel border placed on patient    []  Patient turned q2h during shift    [x]  Lift team ordered    [x]  Patient on [place] bed/Specialty bed    [x]  Each Wound is documented during shift (Stage, Color, drainage, odor, measurements, and dressings)    [x]  Dual skin check done with Fay Nick RN

## 2022-10-09 NOTE — PROGRESS NOTES
Problem: Pressure Injury - Risk of  Goal: *Prevention of pressure injury  Description: Document Geoffrey Scale and appropriate interventions in the flowsheet. Outcome: Progressing Towards Goal  Note: Pressure Injury Interventions:  Sensory Interventions: Assess changes in LOC, Check visual cues for pain, Float heels, Minimize linen layers, Pad between skin to skin, Turn and reposition approx. every two hours (pillows and wedges if needed)    Moisture Interventions: Absorbent underpads, Apply protective barrier, creams and emollients, Minimize layers, Moisture barrier    Activity Interventions: Pressure redistribution bed/mattress(bed type)    Mobility Interventions: Float heels, HOB 30 degrees or less, Pressure redistribution bed/mattress (bed type)    Nutrition Interventions: Document food/fluid/supplement intake, Offer support with meals,snacks and hydration    Friction and Shear Interventions: Apply protective barrier, creams and emollients, Foam dressings/transparent film/skin sealants, HOB 30 degrees or less, Minimize layers                Problem: Patient Education: Go to Patient Education Activity  Goal: Patient/Family Education  Outcome: Progressing Towards Goal     Problem: Falls - Risk of  Goal: *Absence of Falls  Description: Document Joan Fall Risk and appropriate interventions in the flowsheet.   Outcome: Progressing Towards Goal  Note: Fall Risk Interventions:  Mobility Interventions: Bed/chair exit alarm, Communicate number of staff needed for ambulation/transfer    Mentation Interventions: Adequate sleep, hydration, pain control, Bed/chair exit alarm, Door open when patient unattended, Family/sitter at bedside, Reorient patient, Room close to nurse's station    Medication Interventions: Bed/chair exit alarm, Evaluate medications/consider consulting pharmacy    Elimination Interventions: Bed/chair exit alarm, Call light in reach              Problem: Nutrition Deficit  Goal: *Optimize nutritional status  Outcome: Progressing Towards Goal     Problem: Anemia Care Plan (Adult and Pediatric)  Goal: *Labs within defined limits  Outcome: Progressing Towards Goal  Goal: *Tolerates increased activity  Outcome: Progressing Towards Goal     Problem: Pain  Goal: *Control of Pain  Outcome: Progressing Towards Goal     Problem: Patient Education: Go to Patient Education Activity  Goal: Patient/Family Education  Outcome: Progressing Towards Goal

## 2022-10-09 NOTE — PROGRESS NOTES
[facility] Hospitalist Group  Progress Note    Patient: Lacy Spann Age: 71 y.o. : 1950 MR#: [id number] SSN: [id number]  Date/Time: 10/9/2022    Subjective:     Patient seen and examined. No family at bedside today. WBC increased from 9.8 to 17.2 today. VSS. Afebrile. Exam grossly unchanged. Not endorsing any new symptoms. No abdominal pain, does not feel she is distended. Assessment/Plan:     Acute GI versus uterine bleed  2. Acute blood loss anemia s/p PRBCs  3.  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 Hypernatremia, slowly improving  5. Elevated BUN likely secondary to GI bleed, slowly improving  6. Elevated INR, improving  7. Hypokalemia, better today  8. Pseudomonas UTI - on cefepime, vanc since 10/6.   9.  Leukocytosis - unknown source. Cannot exclude intra-abdominal infection given possible invasion of tumor into colon. No endorsing new symptoms. 10. Hypernatremia - likely secondary to poor PO intake. PLAN    Stable hemoglobin so far without any definitive acute bleed  GI, heme/onc, colo-rectal surgery, ID following   Continue IV fluid and monitor hypernatremia  Palliative care following. Patient currently wishing for full aggressive treatment. If patient continues to bleed - repeat CTA, possible embolization with IR if positive. Given new leukocytosis despite being on broad spectrum antibiotics - ID consulted. Fluconazole added. Possible need to repeat CT Abdomen. Will repeat urine culture, blood culture, CXR. Start gentle IV hydration   Discussed with patient.           Case discussed with:  [x]Patient  []Family  [x]Nursing  []Case Management  DVT Prophylaxis:  []Lovenox  []Hep SQ  [x]SCDs  []Coumadin   []On Heparin gtt    Objective:   VS: Visit Vitals  /84   Pulse (!) 115   Temp 97.3 °F (36.3 °C)   Resp 28   Ht 5' 3\" (1.6 m)   Wt 102.5 kg (226 lb)   SpO2 98%   Breastfeeding No   BMI 40.03 kg/m²        Tmax/24hrs: Temp (24hrs), Av.5 °F (36.9 °C), Min:97.3 °F (36.3 °C), Max:100.2 °F (37.9 °C)    Input/Output:   Intake/Output Summary (Last 24 hours) at 10/9/2022 1734  Last data filed at 10/9/2022 1425  Gross per 24 hour   Intake 939.17 ml   Output 835 ml   Net 104.17 ml           General appearance - alert, well appearing, and in no distress  Eyes - sclera anicteric, no pallor  Chest -clear air entry noted in bases, no wheezes  Heart - S1 and S2 normal  Abdomen - soft, nontender, distended but at baseline. Bowel sounds present, bilateral PCN tubes are in situ. Neurological - alert, oriented, normal speech, no focal findings noted currently. Extremities - no pedal edema noted        Labs:    Recent Results (from the past 24 hour(s))   VANCOMYCIN, RANDOM    Collection Time: 10/09/22  6:07 AM   Result Value Ref Range    Vancomycin, random 23.9 5.0 - 40.0 UG/ML   CBC WITH AUTOMATED DIFF    Collection Time: 10/09/22  6:07 AM   Result Value Ref Range    WBC 17.2 (H) 4.6 - 13.2 K/uL    RBC 2.84 (L) 4.20 - 5.30 M/uL    HGB 8.2 (L) 12.0 - 16.0 g/dL    HCT 24.8 (L) 35.0 - 45.0 %    MCV 87.3 78.0 - 100.0 FL    MCH 28.9 24.0 - 34.0 PG    MCHC 33.1 31.0 - 37.0 g/dL    RDW 20.9 (H) 11.6 - 14.5 %    PLATELET 73 (L) 060 - 420 K/uL    NRBC 0.6 (H) 0  WBC    ABSOLUTE NRBC 0.10 (H) 0.00 - 0.01 K/uL    NEUTROPHILS 94 (H) 40 - 73 %    BAND NEUTROPHILS 3 %    LYMPHOCYTES 3 (L) 21 - 52 %    MONOCYTES 0 (L) 3 - 10 %    EOSINOPHILS 0 0 - 5 %    BASOPHILS 0 0 - 2 %    IMMATURE GRANULOCYTES 0 0.0 - 0.5 %    ABS. NEUTROPHILS 16.7 (H) 1.8 - 8.0 K/UL    ABS. LYMPHOCYTES 0.5 (L) 0.9 - 3.6 K/UL    ABS. MONOCYTES 0.0 (L) 0.05 - 1.2 K/UL    ABS. EOSINOPHILS 0.0 0.0 - 0.4 K/UL    ABS. BASOPHILS 0.0 0.0 - 0.1 K/UL    ABS. IMM.  GRANS. 0.0 0.00 - 0.04 K/UL    DF MANUAL      PLATELET COMMENTS DECREASED PLATELETS      RBC COMMENTS TARGET CELLS  1+        RBC COMMENTS MINISTERIO CELLS  1+        RBC COMMENTS POIKILOCYTOSIS  1+        RBC COMMENTS ANISOCYTOSIS  1+        RBC COMMENTS POLYCHROMASIA  1+        WBC COMMENTS VACUOLATED POLYS       Total time to take care of this patient was 30 minutes and more than 50% of time was spent counseling and coordinating care. Signed By: Rob Agudelo DO     October 9, 2022      Disclaimer: Sections of this note are dictated using utilizing voice recognition software, which may have resulted in some phonetic based errors in grammar and contents. Even though attempts were made to correct all the mistakes, some may have been missed, and remained in the body of the document. If questions arise, please contact our department.

## 2022-10-09 NOTE — ROUTINE PROCESS
Wound Prevention Checklist    Patient: Shubham Barroso (85 y.o. female)  Date: 10/9/2022  Diagnosis: GI bleed [K92.2] <principal problem not specified>    Precautions: Fall, Skin       []  Heel prevention boots placed on patient    [x]  Patient turned q2h during shift  Nurse and lift team to reposition patient q2hrs. [x]  Lift team ordered    []  Patient on [place] bed/Specialty bed    [x]  Each Wound is documented during shift (Stage, Color, drainage, odor, measurements, and dressings)    []  Dual skin check done with Nichelle DHEZ RN    Skin is with Stage II pressure injuries that are with some drainage. Wound was assessed today, cleansed and Mepilex dressing was changed today.      Fay Warner RN

## 2022-10-09 NOTE — PROGRESS NOTES
[address] [facility] Pharmacokinetic Monitoring Service - Vancomycin    Consulting Provider: Dr. Roach Friendly   Indication: Sepsis of Unknown Etiology  Target Concentration: Goal AUC/ANDREAS 400-600 mg*hr/L  Day of Therapy: 4  Additional Antimicrobials: Cefepime    Pertinent Laboratory Values:   Temp: 97.7 °F (36.5 °C)  Weight: 102.5 kg (226 lb)  Recent Labs     10/08/22  0545 10/07/22  0117   CREA 0.93 0.99   BUN 49* 55*   WBC  --  9.8       Estimated Creatinine Clearance: 63.4 mL/min (based on SCr of 0.93 mg/dL). Pertinent Cultures:  Culture Date Source Results   10/5 blood ngtd   10/5 urine pseudomonas   MRSA Nasal Swab: N/A. Non-respiratory infection    Assessment:  Date/Time Current Dose Concentration Timing of Concentration (h) AUC   10/5 2gm x1 6.9 3h -   10/6 1250mg q24h      10/7 1250mg q24h      10/8 1250 mg24 21.6 17.5 977?   10/9 1250 mg q24 23.9 15.5    Note: Serum concentrations collected for AUC dosing may appear elevated if collected in close proximity to the dose administered, this is not necessarily an indication of toxicity    Plan:  Current therapy remains supratherapeutic with project AUC > 400  Will adjust dose to Vancomycin 750 mg q24h for est AUC/tr = 474/17.1  BMP for tomorrow AM   Repeat vancomycin concentration ordered for 10/10/ @ 0400  Pharmacy will continue to monitor patient and adjust therapy as indicated    Thank you for the consult,  Patricia Hernández.  GRECIA Love  10/9/2022

## 2022-10-09 NOTE — ROUTINE PROCESS
SBAR report given by Calvin Urrutia RN to on coming nurse Fay STARKEY RN.     4110: Patient was resting in bed appearing to be moderately comfortable at start of the shift. Nurse notes that patient became slightly restless with some moaning. Nurse medicated with Morphine x1 today 1mg IV push. Patient appeared to calm down for the rest of the shift. Patient did moan with repositioning but relaxed after she was turned. Nephrostomy drains were assessed, flushed and emptied. Patient has multiple visitors today. PO intake was poor today. SBAr report was given Karon De Los Santos RN on coming nurse.

## 2022-10-09 NOTE — PROGRESS NOTES
Problem: Upper and Lower GI Bleed:  Discharge Outcomes  Goal: *Hemodynamically stable  Outcome: Progressing Towards Goal  Goal: *Lungs clear or at baseline  Outcome: Progressing Towards Goal  Goal: *Demonstrates independent activity or return to baseline  Outcome: Progressing Towards Goal  Goal: *Pain is controlled to three or less  Outcome: Progressing Towards Goal  Goal: *Tolerating diet  Outcome: Progressing Towards Goal  Goal: *Anxiety reduced or absent  Outcome: Progressing Towards Goal  Goal: *Understands and describes signs and symptoms to report to providers(Stroke Metric)  Outcome: Progressing Towards Goal

## 2022-10-10 NOTE — PROGRESS NOTES
Nurse paged to state that Pt's HR is elevated. She did not get her PO Lopressor this AM since she is not able to take PO - will give IV Lopressor x1. Oncology note reviewed. Called Son to discuss hospice since her prognosis looks poor. Spoke with son - ok to consult Hospice, order placed - will continue to follow.

## 2022-10-10 NOTE — HOSPICE
Hospice referral received. Chart review in process. Thank you for the referral to [facility]. If we can be of further assistance please contact [phone].      Tal Kidney BSN, [facility] Inpatient Clinical Liaison  [facility] [address], [address] [facility] [address]  [phone]  Email: [email or web address]

## 2022-10-10 NOTE — CONSULTS
[facility] Infectious Disease Physicians  (A Division of [address] [place] [address])      Consultation Note      Date of Admission: 10/3/2022    Date of Note: 10/10/2022      Reason for Referral: sepsis  Referring Physician: Dr. Shilpa Blanco from this admission:   10/5 urine culture greater than 100,000 Pseudomonas aeruginosa pansensitive  10/5 blood culture: No growth to date x2  10/9 blood cultures no growth to date x2    Current Antimicrobials:    Prior Antimicrobials:  Vancomycin 10/5 to present  Cefepime 10/5 to present        Assessment:         Intermittent leukocytosis: Blood cultures thus far have been remained negative. 10/9 chest x-ray with left pleural effusion. It is difficult to quantify if there is underlying infection. It certainly possible especially if there is some potential erosion of the tumor through the bowel wall. Acute blood loss anemia: Either from GI bleed versus uterine mass. Status post multiple transfusions since 10/5 with lowest hemoglobin of 4.2, presenting at 4.8   -10/7 CT of the abdomen and pelvis shows a large complex pelvic mass with air-fluid and is well as solid components with concern for possible colonic invasion of uterine mass. Complicated pseudomonal UTI with bilateral nephrostomy tubes: From 10/5 urine cultures. Has been on cefepime since it 10/5  Thrombocytopenia  Lactic acidosis: Resolved. 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 Status post palliative radiation. Heme-onc recommends comfort measures, candidate for chemotherapy  Plan:   De-escalate cefepime and vancomycin since blood cultures remain negative.   Change to levofloxacin 500 mg p.o. daily  Continue with fluconazole for now  - Plan for 10 to 14 days of antibiotics given history of bilateral nephrostomy tube placement and pseudomonal infection. Follow-up 10/9 blood cultures-currently without growth  Trend CBC    Agree with recommendation for transition to hospice. Discussed with Dr. Cori Lala at time of consult. [facility] Infectious Disease Physicians  [address] , [address] [facility]  Office: [phone], Ext [phone]  Mobile/Text: [phone]    Lines / Catheters:  Peripheral    HPI:  Ms. Jackie Skinner is a 27-year-old female with hypertension, vitamin D deficiency, diverticulosis and a uterine carcinosarcoma followed by  who had presented initially for evaluation on October 4 from her nursing facility after routine labs had revealed a hemoglobin of 4.8. The patient reports she was having profound fatigue but did not have any other particular symptoms. There was no reported melena or hematuria. She did not have any abnormal vaginal bleeding. No hematemesis. In the ER she was originally given 2 units of packed red blood cells. Stool occult blood in the ER was positive. Of note she has bilateral percutaneous nephrostomy tubes that were placed at a recent hospitalization due to hydroureter and hydronephrosis from mass compressing her ureters. Urine from 10/6 grew greater than 100,000 Pseudomonas aeruginosa which was pansensitive. She had been started on empiric antibiotics to include cefepime and vancomycin due to a signs and symptoms of sepsis including tachycardia leukocytosis and suspected UTI. Following initiation of antibiotics, her WBCs have improved however over the last 24 hours there is been an increase in WBCs as well as some intermittent tachycardia concerning for possible new developing sepsis picture. She did have a CT of the abdomen and pelvis repeated on 10/7 which was unchanged from the initial set of imaging that had been performed.   Still not able to distinguish if the uterine mass had eroded through the bowel. Heme-onc is also currently following and had recommended transition to comfort measures given progression of her disease.          Past Medical History:   Diagnosis Date    Class 2 obesity due to excess calories without serious comorbidity in adult     HTN (hypertension)     Uterine mass 09/01/2022    Vitamin D deficiency      Past Surgical History:   Procedure Laterality Date    HX CATARACT REMOVAL N/A     IR CHANGE NEPHROSTOMY PYELOS TUBE LT  9/22/2022    IR NEPHROSTOMY PERC LT PLC CATH  SI  9/4/2022    IR NEPHROSTOMY PERC RT PLC CATH  SI  9/4/2022    IR NEPHROSTOMY PERC RT PLC CATH  SI  9/15/2022     Family History   Problem Relation Age of Onset    Cancer Mother     Diabetes Mother      Medications reviewed as below:   Current Facility-Administered Medications   Medication Dose Route Frequency Provider Last Rate Last Admin    vancomycin (VANCOCIN) 750 mg in 0.9% sodium chloride 250 mL (VIAL-MATE)  750 mg IntraVENous Q24H Curry Sotelo  mL/hr at 10/09/22 1414 750 mg at 10/09/22 1414    fluconazole (DIFLUCAN) 200mg/100 mL IVPB (premix)  200 mg IntraVENous DAILY Ysabel Gonzales DO        0.45% sodium chloride infusion  75 mL/hr IntraVENous CONTINUOUS Ysabel Gonzales DO 75 mL/hr at 10/10/22 0006 75 mL/hr at 10/10/22 0006    morphine injection 1 mg  1 mg IntraVENous Q4H PRN Saran Calderon MD   1 mg at 10/09/22 0853    metoprolol tartrate (LOPRESSOR) tablet 12.5 mg  12.5 mg Oral Q12H Saran Calderon MD   12.5 mg at 10/09/22 2133    sodium chloride (NS) flush 5-40 mL  5-40 mL IntraVENous Q8H Emi Eng MD   10 mL at 10/10/22 0544    sodium chloride (NS) flush 5-40 mL  5-40 mL IntraVENous PRN Saran Calderon MD        sodium chloride (NS) flush 5-40 mL  5-40 mL IntraVENous Q8H Tylor Eng MD   10 mL at 10/10/22 0544    sodium chloride (NS) flush 5-40 mL  5-40 mL IntraVENous PRN Saran Calderon MD        dextrose 5% infusion  50 mL/hr IntraVENous CONTINUOUS Jim Oseguera MD 50 mL/hr at 10/09/22 1609 50 mL/hr at 10/09/22 1609    cefepime (MAXIPIME) 2 g in 0.9% sodium chloride (MBP/ADV) 100 mL MBP  2 g IntraVENous Q12H Lino Ibrahim MD 25 mL/hr at 10/10/22 0800 2 g at 10/10/22 0800    pantoprazole (PROTONIX) 40 mg in 0.9% sodium chloride 10 mL injection  40 mg IntraVENous Q12H Rico Gomez MD   40 mg at 10/10/22 0900    0.9% sodium chloride infusion  100 mL/hr IntraVENous PRN Aleti, Marylene Check, MD        acetaminophen (TYLENOL) tablet 500 mg  500 mg Oral Q4H PRN Chel Velez MD   500 mg at 10/05/22 9405     No Known Allergies  Social History     Socioeconomic History    Marital status:      Spouse name: Not on file    Number of children: Not on file    Years of education: Not on file    Highest education level: Not on file   Occupational History    Not on file   Tobacco Use    Smoking status: Not on file    Smokeless tobacco: Never   Substance and Sexual Activity    Alcohol use: Not Currently    Drug use: Not on file    Sexual activity: Not on file   Other Topics Concern    Not on file   Social History Narrative    Not on file     Social Determinants of Health     Financial Resource Strain: Not on file   Food Insecurity: Not on file   Transportation Needs: Not on file   Physical Activity: Not on file   Stress: Not on file   Social Connections: Not on file   Intimate Partner Violence: Not on file   Housing Stability: Not on file        Review of Systems    Negative Unless BOLDED    General: fevers, chills, myalgias, arthralgias, unexplained weight loss, malaise, fatigue. HEENT:  headaches,sinus pain or presure, recent URI, recent dental procedures;  tinnitus, hearing loss, catarats, dizziness   PUlMONARY:  cough , shortness of breath, sputum production, hx of asthma or COPD. previous treatement for TB or PPD.   Cardiovascular: chest pain, previous CAD/MI, hx valvular heart disease,  murmurs, peripheral edema, weight gain  GI: nausea, vomiting, diarrhea, abdominal pain, prior C.diff, heartburn, melena, hematochezia  :  urinary frequency, dysuria, hematuria, bladder incontinence, flank pain   Neurologic:  seizures, syncope, prior CVA/TIA, confusion, memory impairment, neuropathy, weakness of extremities, difficulty speaking/word finding, headache, vision changes  Musculoskeletal:  myalgias, arthralgias, joint pain, joint swelling, back pain  Skin:  Pruritis, rash, chronic stasis changes, diabetic foot ulcers, lymphedema, pressure wounds  Endocrine: polyuria, polydipsia, hair loss  Psych: anxiousness, prior substance abuse, suicidal ideation, depressed mood. Heme-Onc: prior DVT, easy bruising, fatigue, malignancy        Objective:      Visit Vitals  BP (!) 167/83   Pulse (!) 120   Temp 98.2 °F (36.8 °C)   Resp 28   Ht 5' 3\" (1.6 m)   Wt 102.5 kg (226 lb)   SpO2 98%   Breastfeeding No   BMI 40.03 kg/m²     Temp (24hrs), Av °F (36.7 °C), Min:97.3 °F (36.3 °C), Max:98.3 °F (36.8 °C)        General:   awake alert and oriented to person, very lethargic and difficult to wake up   Skin:   no rashes or skin lesions noted on limited exam   HEENT:  Normocephalic, atraumatic, PERRL, EOMI, no scleral icterus or pallor; no conjunctival hemmohage;  nasal and oral mucous are moist and without evidence of lesions. No thrush. Dentition fair. Neck supple, no bruits. Lymph Nodes:   no cervical, axillary or inguinal adenopathy   Lungs:   non-labored, bilaterally clear to aspiration- no crackles wheezes rales or rhonchi   Heart:  RRR, s1 and s2; no murmurs rubs or gallops, no edema, + pedal pulses   Abdomen:  soft, obese non-distended, active bowel sounds, no hepatomegaly, no splenomegaly. Appropriate surgical scars for stated surgeries. No apparent   Genitourinary:  deferred   Extremities:   no clubbing, cyanosis; no joint effusions or swelling; muscle mass appropriate for age   Neurologic:  No gross focal sensory abnormalities; Speech appropirate. Cranial nerves intact   Psychiatric:  Calm, lethargic       Labs: Results:   Chemistry Recent Labs     10/10/22  0438 10/08/22  0545   * 123*   * 150*   K 3.5 3.7   * 121*   CO2 20* 20*   BUN 44* 49*   CREA 0.83 0.93   CA 8.4* 8.2*   AGAP 9 9   BUCR 53* 53*      CBC w/Diff Recent Labs     10/10/22  0438 10/09/22  0607 10/08/22  0545   WBC 12.4 17.2*  --    RBC 2.68* 2.84*  --    HGB 7.6* 8.2* 8.2*   HCT 22.7* 24.8* 24.1*   PLT 71* 73*  --    GRANS 93* 94*  --    LYMPH 3* 3*  --    EOS 1 0  --             No results found for: SDES Lab Results   Component Value Date/Time    Culture result: NO GROWTH 5 DAYS 10/05/2022 08:15 PM    Culture result: NO GROWTH 5 DAYS 10/05/2022 08:05 PM    Culture result: PSEUDOMONAS AERUGINOSA (A) 10/05/2022 07:33 PM    Culture result: NO GROWTH 6 DAYS 09/01/2022 06:33 PM    Culture result: NO GROWTH 6 DAYS 09/01/2022 06:03 PM        Results       Procedure Component Value Units Date/Time    CULTURE, URINE [[id number]]     Order Status: Sent Specimen: Cath Urine     CULTURE, BLOOD [[id number]] Collected: 10/09/22 0441    Order Status: Sent Specimen: Blood Updated: 10/10/22 0658    CULTURE, BLOOD [[id number]] Collected: 10/09/22 0438    Order Status: Sent Specimen: Blood Updated: 10/10/22 0658    CULTURE, BLOOD [[id number]] Collected: 10/05/22 2015    Order Status: Completed Specimen: Whole Blood Updated: 10/10/22 0636     Special Requests: NO SPECIAL REQUESTS        Culture result: NO GROWTH 5 DAYS       CULTURE, BLOOD [[id number]] Collected: 10/05/22 2005    Order Status: Completed Specimen: Whole Blood Updated: 10/10/22 0636     Special Requests: NO SPECIAL REQUESTS        Culture result: NO GROWTH 5 DAYS       CULTURE, URINE [[id number]]  (Abnormal)  (Susceptibility) Collected: 10/05/22 1933    Order Status: Completed Specimen: Clean catch Updated: 10/08/22 1511     Special Requests: NO SPECIAL REQUESTS        [place] Count --        >100,000  COLONIES/mL       Culture result: PSEUDOMONAS AERUGINOSA       Susceptibility        Pseudomonas aeruginosa      ANDREAS      Amikacin ($) Susceptible      Cefepime ($$) Susceptible      Ceftazidime ($) Susceptible      Ciprofloxacin ($) Susceptible      Gentamicin ($) Susceptible      Levofloxacin ($) Susceptible      Meropenem ($$) Susceptible      Piperacillin/Tazobac ($) Susceptible      Tobramycin ($) Susceptible                                     Imaging:      All imaging reviewed from Admission to present as per radiology [place] [facility]

## 2022-10-10 NOTE — HOSPICE
Spoke with pts son, Jon Trujillo. Discussed [facility] philosophy, services, criteria, and IDT. Discussed caregiver need for round the clock care with Jon Trujillo . Primary caregiver identified as Jon Trujillo and facility staff as pt will need to go to LTC at [place]. Caregiver concerns identified as none at this time. (n/a if no caregiver issues notified)    Answered all questions. Gave 24/7 contact information. [facility]. Arrangements have been made to meet in the pt room, 2308, tomorrow, October 11, 2022, at 1600 to sign consents. Information to be sent to [facility] for approval of hospice services. Thank you for the referral to [facility]. If we can be of further assistance please contact [phone].     Mauricio THOMAS, [facility] Inpatient Clinical Liaison  [place] [address] [facility] [facility] [address]  [phone]  Email: [email or web address]

## 2022-10-10 NOTE — PROGRESS NOTES
[facility] Hospitalist Group  Progress Note    Patient: Kennedi Sutherland Age: 71 y.o. : 1950 MR#: [id number] SSN: [id number]  Date/Time: 10/10/2022    Subjective:     Patient seen and examined. Family at bedside, nurse paged to state patient's heart rate 120s to 130s. Patient did not take her p.o. dose of metoprolol this morning she appears to be sedated. Will give IV Lopressor x1. Oncology note reviewed, recommending hospice/comfort measures. I spoke with patient's son Rivka Leonard and he is agreeable for hospice consult however wishes for patient to remain a full code at this time. Assessment/Plan:     Acute GI versus uterine bleed  2. Acute blood loss anemia s/p PRBCs  3.  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 Hypernatremia, slowly improving  5. Elevated BUN likely secondary to GI bleed, slowly improving  6. Elevated INR, improving  7. Hypokalemia, better today  8. Pseudomonas UTI - on cefepime, vanc since 10/6.   9.  Leukocytosis - unknown source. Cannot exclude intra-abdominal infection given possible invasion of tumor into colon. No endorsing new symptoms. 10. Hypernatremia - likely secondary to poor PO intake. PLAN    Hemoglobin continues to downtrend. Stable hemoglobin so far without any definitive acute bleed  Oncology note reviewed, recommending hospice/comfort measures. I spoke with patient's son, he is agreeable to hospice help of up wishes for patient to remain full code at this time. Hospice consulted. Prognosis guarded.         Case discussed with:  [x]Patient  []Family  [x]Nursing  []Case Management  DVT Prophylaxis:  []Lovenox  []Hep SQ  [x]SCDs  []Coumadin   []On Heparin gtt    Objective:   VS: Visit Vitals  /67 (BP 1 Location: Left leg, BP Patient Position: Semi fowlers)   Pulse (!) 123   Temp 98.7 °F (37.1 °C)   Resp 29   Ht 5' 3\" (1.6 m)   Wt 102.5 kg (226 lb)   SpO2 98%   Breastfeeding No   BMI 40.03 kg/m² Tmax/24hrs: Temp (24hrs), Av °F (36.7 °C), Min:97.3 °F (36.3 °C), Max:98.7 °F (37.1 °C)    Input/Output:   Intake/Output Summary (Last 24 hours) at 10/10/2022 1423  Last data filed at 10/10/2022 8961  Gross per 24 hour   Intake --   Output 1350 ml   Net -1350 ml           General appearance - alert, well appearing, and in no distress  Eyes - sclera anicteric, no pallor  Chest -clear air entry noted in bases, no wheezes  Heart - S1 and S2 normal  Abdomen - soft, nontender, distended but at baseline. Bowel sounds present, bilateral PCN tubes are in situ. Neurological - alert, oriented, normal speech, no focal findings noted currently.   Extremities - no pedal edema noted        Labs:    Recent Results (from the past 24 hour(s))   METABOLIC PANEL, BASIC    Collection Time: 10/10/22  4:38 AM   Result Value Ref Range    Sodium 152 (H) 136 - 145 mmol/L    Potassium 3.5 3.5 - 5.5 mmol/L    Chloride 123 (H) 100 - 111 mmol/L    CO2 20 (L) 21 - 32 mmol/L    Anion gap 9 3.0 - 18 mmol/L    Glucose 111 (H) 74 - 99 mg/dL    BUN 44 (H) 7.0 - 18 MG/DL    Creatinine 0.83 0.6 - 1.3 MG/DL    BUN/Creatinine ratio 53 (H) 12 - 20      eGFR >60 >60 ml/min/1.73m2    Calcium 8.4 (L) 8.5 - 10.1 MG/DL   VANCOMYCIN, RANDOM    Collection Time: 10/10/22  4:38 AM   Result Value Ref Range    Vancomycin, random 21.2 5.0 - 40.0 UG/ML   CBC WITH AUTOMATED DIFF    Collection Time: 10/10/22  4:38 AM   Result Value Ref Range    WBC 12.4 4.6 - 13.2 K/uL    RBC 2.68 (L) 4.20 - 5.30 M/uL    HGB 7.6 (L) 12.0 - 16.0 g/dL    HCT 22.7 (L) 35.0 - 45.0 %    MCV 84.7 78.0 - 100.0 FL    MCH 28.4 24.0 - 34.0 PG    MCHC 33.5 31.0 - 37.0 g/dL    RDW 20.5 (H) 11.6 - 14.5 %    PLATELET 71 (L) 000 - 420 K/uL    NRBC 0.5 (H) 0  WBC    ABSOLUTE NRBC 0.06 (H) 0.00 - 0.01 K/uL    NEUTROPHILS 93 (H) 40 - 73 %    BAND NEUTROPHILS 1 0 - 5 %    LYMPHOCYTES 3 (L) 21 - 52 %    MONOCYTES 2 (L) 3 - 10 %    EOSINOPHILS 1 0 - 5 %    BASOPHILS 0 0 - 2 %    IMMATURE GRANULOCYTES 0 %    ABS. NEUTROPHILS 11.7 (H) 1.8 - 8.0 K/UL    ABS. LYMPHOCYTES 0.4 (L) 0.9 - 3.6 K/UL    ABS. MONOCYTES 0.2 0.05 - 1.2 K/UL    ABS. EOSINOPHILS 0.1 0.0 - 0.4 K/UL    ABS. BASOPHILS 0.0 0.0 - 0.1 K/UL    ABS. IMM. GRANS. 0.0 K/UL    DF AUTOMATED      RBC COMMENTS TARGET CELLS  FEW  MINISTERIO CELLS  1+         Total time to take care of this patient was 30 minutes and more than 50% of time was spent counseling and coordinating care. Signed By: Jarad Lincoln MD     October 10, 2022      Disclaimer: Sections of this note are dictated using utilizing voice recognition software, which may have resulted in some phonetic based errors in grammar and contents. Even though attempts were made to correct all the mistakes, some may have been missed, and remained in the body of the document. If questions arise, please contact our department.

## 2022-10-10 NOTE — PROGRESS NOTES
Comprehensive Nutrition Assessment    Type and Reason for Visit: Reassess, Positive nutrition screen    Nutrition Recommendations/Plan:   Advance PO diet as tolerated to easy to chew per SLP recs when medically feasible. Encourage PO intake. Continue oral supplements as tolerated. Monitor PO intake, compliance of oral supplement, weight, labs and plan of care during admission. Malnutrition Assessment:  Malnutrition Status:  Mild malnutrition (10/10/22 1030)    Context:  Acute illness     Findings of the 6 clinical characteristics of malnutrition:   Energy Intake:  50% or less of est energy requirements for 5 or more days  Weight Loss:  No significant weight loss     Body Fat Loss:  No significant body fat loss,     Muscle Mass Loss:  No significant muscle mass loss,    Fluid Accumulation:  Unable to assess,     Strength:  Not performed     Nutrition Assessment:    Pt admitted for low hemoglobin; having fatigue and lack of energy; acute GIB. 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 Per SLP 10/5: rec'd full liquids, easy to chew diet, thin liquids when medically feasible. Per oncology MD 10/10: not a candidate for chemotherapy. Per CRS, pt w/ unresectable tumor. Pt has been on full liquids since 10/6. Continues w/ full code per palliative. Positive nutrition screen noted, PUR. Pt asleep at time of visit and did not wake w/ verbal stimuli. Observed 2 ensure unopened at bedside. Pt currently not meeting nutritional needs, question pt current ability to meet needs via PO diet alone. Discussed possible PO diet advancement w/ MD XAVIER to eval pt.     Nutrition Related Findings:    Pertinent Meds: cefepime, protonix, vanco, 1/2 NS @ 75 ml/hr  Pertinent Labs: Na 152 H (trending up) Wound Type: Multiple, Pressure injury, Skin tears    Current Nutrition Intake & Therapies:  Average Meal Intake: NPO  Average Supplement Intake: None ordered  ADULT DIET Full Liquid  ADULT ORAL NUTRITION SUPPLEMENT Breakfast, Dinner; Standard High Calorie/High Protein  ADULT ORAL NUTRITION SUPPLEMENT Lunch; Protein Modular    Anthropometric Measures:  Height: 5' 3\" (160 cm)  Ideal Body Weight (IBW): 115 lbs (52 kg)  Admission Body Weight: 227 lb 15.3 oz (103.4 kg)  Current Body Wt:  102.5 kg (225 lb 15.5 oz), 198.2 % IBW. Bed scale  Current BMI (kg/m2): 40  Usual Body Weight: 102.1 kg (225 lb)  % Weight Change (Calculated): 1.3  Weight Adjustment: No adjustment  BMI Category: Obese class 3 (BMI 40.0 or greater)    Estimated Daily Nutrient Needs:  Energy Requirements Based On: Formula (MSJ x 1.2-1.3)  Weight Used for Energy Requirements: Current  Energy (kcal/day): 0383-5047  Weight Used for Protein Requirements: Ideal  Protein (g/day): 104-130 (1.5-2 g/day)  Method Used for Fluid Requirements: 1 ml/kcal  Fluid (ml/day): 0543-2059    Nutrition Diagnosis:   Inadequate oral intake related to early satiety, acute injury/trauma as evidenced by  (intake <50% x >5 days, on full liquids only)    Nutrition Interventions:   Food and/or Nutrient Delivery: Continue oral nutrition supplement, Continue current diet (advance to solids per MD)  Nutrition Education/Counseling: Education not indicated, No recommendations at this time  Coordination of Nutrition Care: Continue to monitor while inpatient  Plan of Care discussed with: MD    Goals:  Previous Goal Met: No progress toward goal(s)  Goals: Meet at least 75% of estimated needs, by next RD assessment       Nutrition Monitoring and Evaluation:   Behavioral-Environmental Outcomes: None identified  Food/Nutrient Intake Outcomes: Food and nutrient intake, Supplement intake  Physical Signs/Symptoms Outcomes: Biochemical data, Meal time behavior, GI status, Weight    Discharge Planning:     Too soon to determine    Andres Cárdenas [address], [address]   Contact: [phone]

## 2022-10-10 NOTE — PROGRESS NOTES
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  Ac bleed, anemia , sec to tumor  debiliatation  Plan:     CTA noted, progression of tumor  Hb stable   Comfort measures, hospice recommended  Not a candidate for chemotherapy    Conrad Erickson  [facility] [phone]      Subjective:     Profound fatigue    Objective:     Visit Vitals  /62   Pulse (!) 115   Temp 98.3 °F (36.8 °C)   Resp 28   Ht 5' 3\" (1.6 m)   Wt 102.5 kg (226 lb)   SpO2 98%   Breastfeeding No   BMI 40.03 kg/m²             Temp (24hrs), Av.9 °F (36.6 °C), Min:97.3 °F (36.3 °C), Max:98.3 °F (36.8 °C)        Intake/Output Summary (Last 24 hours) at 10/10/2022 0854  Last data filed at 10/10/2022 4433  Gross per 24 hour   Intake --   Output 1350 ml   Net -1350 ml       Review of Systems:   Could not be obtained    Physical Exam: ECOG : 2  Constitutional: could  not be aroused  Eyes:  pallor  ENT: no mucositis, no thrush. Respiratory: bilateral air entry, tachypnea  Cardiovascular: S1S2 regular   Gastrointestinal: soft,  PCN in place ,normal bowel sounds.   Integument: pallor        Labs:  Recent Results (from the past 24 hour(s))   METABOLIC PANEL, BASIC    Collection Time: 10/10/22  4:38 AM   Result Value Ref Range    Sodium 152 (H) 136 - 145 mmol/L    Potassium 3.5 3.5 - 5.5 mmol/L    Chloride 123 (H) 100 - 111 mmol/L    CO2 20 (L) 21 - 32 mmol/L    Anion gap 9 3.0 - 18 mmol/L    Glucose 111 (H) 74 - 99 mg/dL    BUN 44 (H) 7.0 - 18 MG/DL    Creatinine 0.83 0.6 - 1.3 MG/DL    BUN/Creatinine ratio 53 (H) 12 - 20      eGFR >60 >60 ml/min/1.73m2    Calcium 8.4 (L) 8.5 - 10.1 MG/DL   VANCOMYCIN, RANDOM    Collection Time: 10/10/22  4:38 AM Result Value Ref Range    Vancomycin, random 21.2 5.0 - 40.0 UG/ML   CBC WITH AUTOMATED DIFF    Collection Time: 10/10/22  4:38 AM   Result Value Ref Range    WBC 12.4 4.6 - 13.2 K/uL    RBC 2.68 (L) 4.20 - 5.30 M/uL    HGB 7.6 (L) 12.0 - 16.0 g/dL    HCT 22.7 (L) 35.0 - 45.0 %    MCV 84.7 78.0 - 100.0 FL    MCH 28.4 24.0 - 34.0 PG    MCHC 33.5 31.0 - 37.0 g/dL    RDW 20.5 (H) 11.6 - 14.5 %    PLATELET 71 (L) 492 - 420 K/uL    NRBC 0.5 (H) 0  WBC    ABSOLUTE NRBC 0.06 (H) 0.00 - 0.01 K/uL    NEUTROPHILS 93 (H) 40 - 73 %    BAND NEUTROPHILS 1 0 - 5 %    LYMPHOCYTES 3 (L) 21 - 52 %    MONOCYTES 2 (L) 3 - 10 %    EOSINOPHILS 1 0 - 5 %    BASOPHILS 0 0 - 2 %    IMMATURE GRANULOCYTES 0 %    ABS. NEUTROPHILS 11.7 (H) 1.8 - 8.0 K/UL    ABS. LYMPHOCYTES 0.4 (L) 0.9 - 3.6 K/UL    ABS. MONOCYTES 0.2 0.05 - 1.2 K/UL    ABS. EOSINOPHILS 0.1 0.0 - 0.4 K/UL    ABS. BASOPHILS 0.0 0.0 - 0.1 K/UL    ABS. IMM.  GRANS. 0.0 K/UL    DF AUTOMATED      RBC COMMENTS TARGET CELLS  FEW  MINISTERIO CELLS  1+

## 2022-10-10 NOTE — PROGRESS NOTES
Problem: Dysphagia (Adult)  Goal: *Acute Goals and Plan of Care (Insert Text)  Description: Patient will:  1. Tolerate PO trials with 0 s/s overt distress in 4/5 trials  2. Utilize compensatory swallow strategies/maneuvers (decrease bite/sip, size/rate, alt. liq/sol) with min cues in 4/5 trials  3. Perform oral-motor/laryngeal exercises to increase oropharyngeal swallow function with min cues  4. Complete an objective swallow study (i.e., MBSS) to assess swallow integrity, r/o aspiration, and determine of safest LRD, min A    Recommend:   Full liquids; Easy to chew diet with thin liquids when able to advance medically   Meds per pt preference   Aspiration precautions  HOB >45 degrees during all intake and for at least 30 min after intake  Small bites/sips, Slow rate of intake with rest breaks as needed   Oral care three times daily     Outcome: Progressing Towards Goal     SPEECH LANGUAGE PATHOLOGY DYSPHAGIA TREATMENT    Patient: Zhanna Espinosa (12 y.o. female)  Date: 10/10/2022  Diagnosis: GI bleed [K92.2] <principal problem not specified>  Precautions: Aspiration, Fall, Skin  PLOF:as per H&P     ASSESSMENT:  Pt seen on this date for dysphagia follow up. Consult for hospice noted in chart. Pt asleep upon entry with friends at bedside. She required multimodal stim to become alert and maintain alertness. PO trials consumed: thin + straw. Min delay of swallow trigger, no other overt s/sx of aspiration/penetration observed. Laryngeal elevation upon palpation appears weak. PO trials discontinued due to decreased alertness. Recommend cont full liquid diet with aspiration precautions. SLP following.      Progression toward goals:  []         Improving appropriately and progressing toward goals  [x]         Improving slowly and progressing toward goals  []         Not making progress toward goals and plan of care will be adjusted     PLAN:  Recommendations and Planned Interventions:  See above  Patient continues to benefit from skilled intervention to address the above impairments. Continue treatment per established plan of care. SUBJECTIVE:   Patient stated ok. OBJECTIVE:   Cognitive and Communication Status:  Neurologic State: Drowsy, Eyes open to stimulus, Eyes open to voice  Orientation Level: Unable to verbalize  Cognition: Decreased command following, Decreased attention/concentration  Safety/Judgement: Fall prevention  Dysphagia Treatment:  Oral Assessment:  Oral Assessment  Labial: No impairment  Dentition: Natural  Oral Hygiene: Good  Lingual: No impairment  Velum: No impairment  Mandible: No impairment  P.O. Trials:   Patient Position: 45 at [facility]   Vocal quality prior to P.O.: Low volume   Consistency Presented:  Thin liquid   How Presented: SLP-fed/presented, Straw, Successive swallows   Bolus Acceptance: No impairment   Bolus Formation/Control: Impaired   Type of Impairment: Delayed, Posterior   Propulsion: Delayed (# of seconds)   Oral Residue: Less than 10% of bolus, Lingual   Initiation of Swallow: No impairment   Laryngeal Elevation: Functional   Aspiration Signs/Symptoms: None   Pharyngeal Phase Characteristics: Easily fatigued , Poor endurance   Effective Modifications: Small sips and bites (Slow rate of intake)   Cues for Modifications: Minimal   Oral Phase Severity: Mild   Pharyngeal Phase Severity : Mild     PAIN:  Pain level pre-treatment: non reported/10   Pain level post-treatment: non reported/10   After treatment:   []              Patient left in no apparent distress sitting up in chair  [x]              Patient left in no apparent distress in bed  [x]              Call bell left within reach  [x]              Nursing notified  [x]              Friends present  []              Caregiver present  []              Bed alarm activated      COMMUNICATION/EDUCATION:   [x] Aspiration precautions; swallow safety; compensatory techniques  [x]        Patient unable to participate in education; education ongoing with staff  [x]  Posted safety precautions in patient's room.   [] Oral-motor/laryngeal strengthening exercises      Erasmo Nichols M.S., CFY-SLP  Speech-Language Pathologist

## 2022-10-10 NOTE — PROGRESS NOTES
[address] [facility] Pharmacokinetic Monitoring Service - Vancomycin    Consulting Provider: Dr. Dayna Duncan   Indication: Sepsis of Unknown Etiology  Target Concentration: Goal AUC/ANDREAS 400-600 mg*hr/L  Day of Therapy: 5  Additional Antimicrobials: Cefepime    Pertinent Laboratory Values:   Temp: 98.2 °F (36.8 °C)  Weight: 102.5 kg (226 lb)  Recent Labs     10/10/22  0438 10/09/22  0607 10/08/22  0545   CREA 0.83  --  0.93   BUN 44*  --  49*   WBC 12.4 17.2*  --        Estimated Creatinine Clearance: 71.1 mL/min (based on SCr of 0.83 mg/dL). Pertinent Cultures:  Culture Date Source Results   10/5 blood ngtd   10/5 urine pseudomonas   MRSA Nasal Swab: N/A.  Non-respiratory infection    Assessment:  Date/Time Current Dose Concentration Timing of Concentration (h) AUC   10/5 2gm x1 6.9 3h -   10/6 1250mg q24h      10/7 1250mg q24h      10/8 1250 mg24 21.6 17.5 977?   10/9 1250 mg q24 23.9 15.5    10/10 750 mg q 24 h 21.2 16h 31m 408   Note: Serum concentrations collected for AUC dosing may appear elevated if collected in close proximity to the dose administered, this is not necessarily an indication of toxicity    Plan:  Current therapy remains therapeutic  Will continue dose at Vancomycin 750 mg q24h   BMP for tomorrow AM   Repeat vancomycin concentration ordered for 10/11 @ 0400  Pharmacy will continue to monitor patient and adjust therapy as indicated    Thank you for the consult,  GRECIA Rothman  10/10/2022

## 2022-10-11 NOTE — PROGRESS NOTES
Spoke with pt's son - he would like to continue full code for now & as well as IV Abx till he is able to figure out details with Hospice agency.

## 2022-10-11 NOTE — PROGRESS NOTES
[facility] she spoke with pt's son and gave him a list of personal care aids to call for pt's continued care at home. Case Management will continue to follow this case.             MARTHA Gonzalez RN  Care Management  Pager: [phone]

## 2022-10-11 NOTE — PROGRESS NOTES
[facility] Hospitalist Group  Progress Note    Patient: Shubham Barroso Age: 71 y.o. : 1950 MR#: [id number] SSN: [id number]  Date/Time: 10/11/2022    Subjective:     Patient seen and examined. Family at bedside, nurse paged to state patient's heart rate 120s to 130s. Patient did not take her p.o. dose of metoprolol this morning she appears to be sedated. Will give IV Lopressor x1. Oncology note reviewed, recommending hospice/comfort measures. I spoke with patient's son Nehemiah Vega and he is agreeable for hospice consult however wishes for patient to remain a full code at this time. 10/11 -spoke with patient's son, he is meeting up with hospice. He mentions that he would like to continue antibiotic therapy at this time. We will defer to hospice to discuss CODE STATUS since I did discuss with him yesterday and he wished for patient to remain a full code. Assessment/Plan:     Acute GI versus uterine bleed  2. Acute blood loss anemia s/p PRBCs  3.  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 Hypernatremia, slowly improving  5. Elevated BUN likely secondary to GI bleed, slowly improving  6. Elevated INR, improving  7. Hypokalemia, better today  8. Pseudomonas UTI - on cefepime, vanc since 10/6.   9.  Leukocytosis - unknown source. Cannot exclude intra-abdominal infection given possible invasion of tumor into colon. No endorsing new symptoms. 10. Hypernatremia - likely secondary to poor PO intake. PLAN    Case management on board. Patient will be discharged with hospice. Son who is power of  mentions that he is meeting with hospice later today.         Case discussed with:  [x]Patient  []Family  [x]Nursing  []Case Management  DVT Prophylaxis:  []Lovenox  []Hep SQ  [x]SCDs  []Coumadin   []On Heparin gtt    Objective:   VS: Visit Vitals  /62   Pulse (!) 127   Temp 98.6 °F (37 °C)   Resp (!) 38   Ht 5' 3\" (1.6 m)   Wt 102.5 kg (226 lb) SpO2 100%   Breastfeeding No   BMI 40.03 kg/m²        Tmax/24hrs: Temp (24hrs), Av.3 °F (36.8 °C), Min:97.7 °F (36.5 °C), Max:99.1 °F (37.3 °C)    Input/Output:   Intake/Output Summary (Last 24 hours) at 10/11/2022 1538  Last data filed at 10/11/2022 1310  Gross per 24 hour   Intake 73.33 ml   Output 925 ml   Net -851.67 ml           General appearance - alert, well appearing, and in no distress  Eyes - sclera anicteric, no pallor  Chest -clear air entry noted in bases, no wheezes  Heart - S1 and S2 normal  Abdomen - soft, nontender, distended but at baseline. Bowel sounds present, bilateral PCN tubes are in situ. Neurological - alert, oriented, normal speech, no focal findings noted currently. Extremities - no pedal edema noted        Labs:    Recent Results (from the past 24 hour(s))   CBC WITH AUTOMATED DIFF    Collection Time: 10/11/22  4:57 AM   Result Value Ref Range    WBC 13.2 4.6 - 13.2 K/uL    RBC 2.57 (L) 4.20 - 5.30 M/uL    HGB 7.3 (L) 12.0 - 16.0 g/dL    HCT 21.9 (L) 35.0 - 45.0 %    MCV 85.2 78.0 - 100.0 FL    MCH 28.4 24.0 - 34.0 PG    MCHC 33.3 31.0 - 37.0 g/dL    RDW 20.2 (H) 11.6 - 14.5 %    PLATELET 80 (L) 731 - 420 K/uL    NRBC 0.3 (H) 0  WBC    ABSOLUTE NRBC 0.04 (H) 0.00 - 0.01 K/uL    NEUTROPHILS 89 (H) 40 - 73 %    BAND NEUTROPHILS 3 0 - 5 %    LYMPHOCYTES 7 (L) 21 - 52 %    MONOCYTES 0 (L) 3 - 10 %    EOSINOPHILS 1 0 - 5 %    BASOPHILS 0 0 - 2 %    IMMATURE GRANULOCYTES 0 %    ABS. NEUTROPHILS 12.2 (H) 1.8 - 8.0 K/UL    ABS. LYMPHOCYTES 0.9 0.9 - 3.6 K/UL    ABS. MONOCYTES 0.0 (L) 0.05 - 1.2 K/UL    ABS. EOSINOPHILS 0.1 0.0 - 0.4 K/UL    ABS. BASOPHILS 0.0 0.0 - 0.1 K/UL    ABS. IMM. GRANS. 0.0 K/UL    DF MANUAL      PLATELET COMMENTS DECREASED PLATELETS      RBC COMMENTS TARGET CELLS  2+         Total time to take care of this patient was 30 minutes and more than 50% of time was spent counseling and coordinating care.         Signed By: Jarad Lincoln MD     , 2022      Disclaimer: Sections of this note are dictated using utilizing voice recognition software, which may have resulted in some phonetic based errors in grammar and contents. Even though attempts were made to correct all the mistakes, some may have been missed, and remained in the body of the document. If questions arise, please contact our department.

## 2022-10-11 NOTE — ROUTINE PROCESS
SBAR report given by Vineet Ghotra RN off going nurse to on coming nurse Fay STARKEY RN. Patient has had an eventful day seen by the Palliative team. Family was notified that patient was not improving and that her prognosis was poor. Family decided make patient a Hospice patient but according to notes edmundo wants patient to continue with full code status. Patient today did not eat at all for nurse refused to open her mouth. ALL oral meds were held. Nurse medicated with IV lopressor for a heart rate greater than   Nurse medicated x1 with Morphine 1mg for some moans and groans. UOP from Nephrostomy drains were good with Dory urine. Patient was visited today by a lot of family. SBAR report given to Brian Cruz RN. On coming nurse.

## 2022-10-11 NOTE — HOSPICE
called and spoke with patient son. He informed this nurse that he spoke with CM yesterday who told him that his mom's insurance will not pay for room and board at the facility. He states that he would do anything and everything for his mom but he is just not sure about paying upfront for those costs. Son also informs this nurse that he has to work to pay his own bills so he just can't take off of work. Son sated that he would have to hire nurses to take care of him.  spoke with patient son regarding in home caregivers.  offered son to send him a list of in home caregiver for him to research what is available. Someone clicked in on his other line and he stated he needed to go.  will follow up with another call.

## 2022-10-11 NOTE — PROGRESS NOTES
Bedside and Verbal shift change report given to Kalia Lang (oncoming nurse) by Oj Cardona RN (offgoing nurse). Report included the following information SBAR, Kardex, MAR, and Cardiac Rhythm Sinus Tachy .         Wound Prevention Checklist    Patient: Shubham Barroso (89 y.o. female)  Date: 10/11/2022  Diagnosis: GI bleed [K92.2] <principal problem not specified>    Precautions: Fall, Skin       []  Heel prevention boots placed on patient    []  Patient turned q2h during shift    []  Lift team ordered    []  Patient on [facility] bed/Specialty bed    [x]  Each Wound is documented during shift (Stage, Color, drainage, odor, measurements, and dressings)    [x]  Dual skin check done with Jeri Hayes RN

## 2022-10-11 NOTE — PROGRESS NOTES
[facility]  (A Division of [address] [place] [address])      Progress note      Date of Admission: 10/3/2022    Date of Note: 10/11/2022      Reason for Referral: sepsis  Referring Physician: Dr. Jose A Workman from this admission:   10/5 urine culture greater than 100,000 Pseudomonas aeruginosa pansensitive  10/5 blood culture: No growth to date x2  10/9 blood cultures 1 out of 4 gram-negative rods    Current Antimicrobials:    Prior Antimicrobials:  Levofloxacin 10/10 to present  Fluconazole 10/10 to present Vancomycin 10/5 to 10/10  Cefepime 10/5 to 10/10       Assessment:         Gram-negative nikhil bacteremia: Blood cultures from 10/9 with 1 out of 4 bottles gram-negative rods  Intermittent leukocytosis: Blood cultures thus far have been remained negative. 10/9 chest x-ray with left pleural effusion. It is difficult to quantify if there is underlying infection. It certainly possible especially if there is some potential erosion of the tumor through the bowel wall. Acute blood loss anemia: Either from GI bleed versus uterine mass. Status post multiple transfusions since 10/5 with lowest hemoglobin of 4.2, presenting at 4.8   -10/7 CT of the abdomen and pelvis shows a large complex pelvic mass with air-fluid and is well as solid components with concern for possible colonic invasion of uterine mass. Complicated pseudomonal UTI with bilateral nephrostomy tubes: From 10/5 urine cultures. Has been on cefepime since it 10/5  Thrombocytopenia  Lactic acidosis: Resolved. 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 Status post palliative radiation.   Heme-onc recommends comfort measures, candidate for chemotherapy  Plan:   Continue levofloxacin 500 mg p.o. daily  Continue with fluconazole  - Plan for 10 to 14 days of antibiotics given history of bilateral nephrostomy tube placement and pseudomonal infection.  -Can transition both medications to p.o. at time of discharge    Follow-up 10/9 blood cultures-currently with 1 out of 4 bottles gram-negative rods   -No need to repeat blood cultures since she may be transitioning to hospice  Trend CBC-can discontinue if moving towards hospice    Agree with recommendation for transition to hospice.    -Family would like to continue with antibiotics. Bryanna Lopez DO  [facility]  [address], [address] [facility] [address]  Office: [phone], Ext [phone]  Mobile/Text: [phone]    Lines / Catheters:  Peripheral    Subjective:   Patient seen and examined at bedside. Resting comfortably. Does not provide much history. No fevers overnight. Does have tachycardia. Plans for transition to hospice upon discharge is noted. T-max 99.1  WBCs 13.2    HPI:  Ms. Beverley Zhu is a 67-year-old female with hypertension, vitamin D deficiency, diverticulosis and a uterine carcinosarcoma followed by  who had presented initially for evaluation on October 4 from her nursing facility after routine labs had revealed a hemoglobin of 4.8. The patient reports she was having profound fatigue but did not have any other particular symptoms. There was no reported melena or hematuria. She did not have any abnormal vaginal bleeding. No hematemesis. In the ER she was originally given 2 units of packed red blood cells. Stool occult blood in the ER was positive. Of note she has bilateral percutaneous nephrostomy tubes that were placed at a recent hospitalization due to hydroureter and hydronephrosis from mass compressing her ureters. Urine from 10/6 grew greater than 100,000 Pseudomonas aeruginosa which was pansensitive.   She had been started on empiric antibiotics to include cefepime and vancomycin due to a signs and symptoms of sepsis including tachycardia leukocytosis and suspected UTI. Following initiation of antibiotics, her WBCs have improved however over the last 24 hours there is been an increase in WBCs as well as some intermittent tachycardia concerning for possible new developing sepsis picture. She did have a CT of the abdomen and pelvis repeated on 10/7 which was unchanged from the initial set of imaging that had been performed. Still not able to distinguish if the uterine mass had eroded through the bowel. Heme-onc is also currently following and had recommended transition to comfort measures given progression of her disease. Objective:      Visit Vitals  /70   Pulse (!) 128   Temp 98.9 °F (37.2 °C)   Resp (!) 35   Ht 5' 3\" (1.6 m)   Wt 102.5 kg (226 lb)   SpO2 100%   Breastfeeding No   BMI 40.03 kg/m²     Temp (24hrs), Av.5 °F (36.9 °C), Min:97.7 °F (36.5 °C), Max:99.1 °F (37.3 °C)        General:   awake alert and oriented to person, very lethargic and difficult to wake up   Skin:   no rashes or skin lesions noted on limited exam   HEENT:  Normocephalic, atraumatic, PERRL, EOMI, no scleral icterus or pallor; no conjunctival hemmohage;  nasal and oral mucous are moist and without evidence of lesions. No thrush. Dentition fair. Neck supple, no bruits. Lymph Nodes:   no cervical, axillary or inguinal adenopathy   Lungs:   non-labored, bilaterally clear to aspiration- no crackles wheezes rales or rhonchi   Heart:  RRR, s1 and s2; no murmurs rubs or gallops, no edema, + pedal pulses   Abdomen:  soft, obese non-distended, active bowel sounds, no hepatomegaly, no splenomegaly. Appropriate surgical scars for stated surgeries. No apparent   Genitourinary:  deferred   Extremities:   no clubbing, cyanosis; no joint effusions or swelling; muscle mass appropriate for age   Neurologic:  No gross focal sensory abnormalities;  Speech appropirate. Cranial nerves intact   Psychiatric:  Calm, lethargic       Labs: Results:   Chemistry Recent Labs     10/10/22  0438   *   *   K 3.5   *   CO2 20*   BUN 44*   CREA 0.83   CA 8.4*   AGAP 9   BUCR 53*      CBC w/Diff Recent Labs     10/11/22  0457 10/10/22  0438 10/09/22  0607   WBC 13.2 12.4 17.2*   RBC 2.57* 2.68* 2.84*   HGB 7.3* 7.6* 8.2*   HCT 21.9* 22.7* 24.8*   PLT 80* 71* 73*   GRANS 89* 93* 94*   LYMPH 7* 3* 3*   EOS 1 1 0            No results found for: SDES Lab Results   Component Value Date/Time    Culture result: NO GROWTH AFTER 23 HOURS 10/09/2022 04:41 AM    Culture result: (A) 10/09/2022 04:38 AM     GRAM NEGATIVE RODS GROWING IN 1 OF 2 BOTTLES DRAWN No Site Indicated    Culture result: NO GROWTH 6 DAYS 10/05/2022 08:15 PM    Culture result: NO GROWTH 6 DAYS 10/05/2022 08:05 PM    Culture result: PSEUDOMONAS AERUGINOSA (A) 10/05/2022 07:33 PM        Results       Procedure Component Value Units Date/Time    CULTURE, URINE [[id number]]     Order Status: Sent Specimen: Cath Urine     CULTURE, BLOOD [[id number]] Collected: 10/09/22 0441    Order Status: Completed Specimen: Blood Updated: 10/11/22 0635     Special Requests: NO SPECIAL REQUESTS        Culture result: NO GROWTH AFTER 23 HOURS       CULTURE, BLOOD [[id number]]  (Abnormal) Collected: 10/09/22 0438    Order Status: Completed Specimen: Blood Updated: 10/11/22 1552     Special Requests: NO SPECIAL REQUESTS        GRAM STAIN       ANAEROBIC BOTTLE GRAM NEGATIVE RODS                  SMEAR CALLED TO AND CORRECTLY REPEATED BY: AUNDREA AKHTAR ON 11OCT22 AT 0730 HRS TO 1396.            Culture result:       GRAM NEGATIVE RODS GROWING IN 1 OF 2 BOTTLES DRAWN No Site Indicated          BLOOD CULTURE ID PANEL [[id number]] Collected: 10/09/22 0438    Order Status: No result Updated: 10/11/22 0659    BLOOD CULTURE ID PANEL [[id number]] Collected: 10/09/22 0438    Order Status: Canceled     CULTURE, BLOOD [[id number]] Collected: 10/05/22 2015    Order Status: Completed Specimen: Whole Blood Updated: 10/11/22 0635     Special Requests: NO SPECIAL REQUESTS        Culture result: NO GROWTH 6 DAYS       CULTURE, BLOOD [[id number]] Collected: 10/05/22 2005    Order Status: Completed Specimen: Whole Blood Updated: 10/11/22 0635     Special Requests: NO SPECIAL REQUESTS        Culture result: NO GROWTH 6 DAYS       CULTURE, URINE [[id number]]  (Abnormal)  (Susceptibility) Collected: 10/05/22 1933    Order Status: Completed Specimen: Clean catch Updated: 10/08/22 1511     Special Requests: NO SPECIAL REQUESTS        [place] Count --        >100,000  COLONIES/mL       Culture result: PSEUDOMONAS AERUGINOSA       Susceptibility        Pseudomonas aeruginosa      ANDREAS      Amikacin ($) Susceptible      Cefepime ($$) Susceptible      Ceftazidime ($) Susceptible      Ciprofloxacin ($) Susceptible      Gentamicin ($) Susceptible      Levofloxacin ($) Susceptible      Meropenem ($$) Susceptible      Piperacillin/Tazobac ($) Susceptible      Tobramycin ($) Susceptible                                     Imaging:      All imaging reviewed from Admission to present as per radiology interpretation in [address] [place] [address]

## 2022-10-11 NOTE — PROGRESS NOTES
Sharan Bates of [facility] paged CM and CM tried to return the call.   Left a message for her to call CM back          MARCY PeñaN RN  Care Management  Pager: [phone]

## 2022-10-11 NOTE — PROGRESS NOTES
10/10/22 2000   Vitals   Temp 99.1 °F (37.3 °C)   Temp Source Axillary   Pulse (Heart Rate) (!) 130   Heart Rate Source Monitor   Resp Rate 25   O2 Sat (%) 100 %   /72   MAP (Monitor) 84   MAP (Calculated) 89   BP 1 Location Left leg   Heart rate up to 130s. MD paged.   Orders received

## 2022-10-11 NOTE — PROGRESS NOTES
Hospice consult noted. Noted that [facility] is following. Called Radha Pierce of [facility] and informed her of pt's hospice consult. Pt does not have Medicaid at this time. Radha Pierce stated pt will have to be private pay. and she will follow up with this. CM will continue to follow. Called pt's son Lulú Garza and had a long discussions with him concerning pt going to a nursing facility on hospice and private pay. We discussed options and he asked questions and CM answered as best as possible. He stated he will start calling facilities to see which will best suit his situation and will wait for hospice nurse to call him. Left [facility] message [facility].               MARCY HumphreysN RN  Care Management  Pager: [phone]

## 2022-10-11 NOTE — PROGRESS NOTES
Problem: Pressure Injury - Risk of  Goal: *Prevention of pressure injury  Description: Document Geoffrey Scale and appropriate interventions in the flowsheet. Outcome: Progressing Towards Goal  Note: Pressure Injury Interventions:  Sensory Interventions: Assess changes in LOC, Avoid rigorous massage over bony prominences, Check visual cues for pain, Discuss PT/OT consult with provider, Float heels, Keep linens dry and wrinkle-free, Maintain/enhance activity level, Minimize linen layers, Monitor skin under medical devices, Pressure redistribution bed/mattress (bed type), Turn and reposition approx. every two hours (pillows and wedges if needed), Use 30-degree side-lying position    Moisture Interventions: Absorbent underpads, Apply protective barrier, creams and emollients, Check for incontinence Q2 hours and as needed, Contain wound drainage, Internal/External urinary devices, Limit adult briefs, Maintain skin hydration (lotion/cream), Minimize layers, Moisture barrier    Activity Interventions: Pressure redistribution bed/mattress(bed type)    Mobility Interventions: Float heels, HOB 30 degrees or less, Pressure redistribution bed/mattress (bed type), Turn and reposition approx. every two hours(pillow and wedges)    Nutrition Interventions: Document food/fluid/supplement intake, Offer support with meals,snacks and hydration    Friction and Shear Interventions: Apply protective barrier, creams and emollients, Feet elevated on foot rest, HOB 30 degrees or less, Foam dressings/transparent film/skin sealants, Minimize layers, Lift team/patient mobility team, Transferring/repositioning devices                Problem: Patient Education: Go to Patient Education Activity  Goal: Patient/Family Education  Outcome: Progressing Towards Goal     Problem: Falls - Risk of  Goal: *Absence of Falls  Description: Document Joan Fall Risk and appropriate interventions in the flowsheet.   Outcome: Progressing Towards Goal  Note: Fall Risk Interventions:  Mobility Interventions: Bed/chair exit alarm, Communicate number of staff needed for ambulation/transfer    Mentation Interventions: Adequate sleep, hydration, pain control, Bed/chair exit alarm, Evaluate medications/consider consulting pharmacy, More frequent rounding, Room close to nurse's station, Reorient patient, Toileting rounds, Update white board    Medication Interventions: Bed/chair exit alarm, Evaluate medications/consider consulting pharmacy    Elimination Interventions: Bed/chair exit alarm, Call light in reach    History of Falls Interventions: Bed/chair exit alarm, Door open when patient unattended, Room close to nurse's station         Problem: Patient Education: Go to Patient Education Activity  Goal: Patient/Family Education  Outcome: Progressing Towards Goal     Problem: Anemia Care Plan (Adult and Pediatric)  Goal: *Labs within defined limits  Outcome: Progressing Towards Goal  Goal: *Tolerates increased activity  Outcome: Progressing Towards Goal     Problem: Patient Education: Go to Patient Education Activity  Goal: Patient/Family Education  Outcome: Progressing Towards Goal

## 2022-10-11 NOTE — ROUTINE PROCESS
Wound Prevention Checklist    Patient: Paulino Adler (25 y.o. female)  Date: 10/10/2022  Diagnosis: GI bleed [K92.2] <principal problem not specified>    Precautions: Fall, Skin       []  Heel prevention boots placed on patient    [x]  Patient turned q2h during shift    [x]  Lift team ordered    []  Patient on [place] bed/Specialty bed    [x]  Each Wound is documented during shift (Stage, Color, drainage, odor, measurements, and dressings)    [x]  Dual skin check done with Kaylee HDEZ RN  Skin is with pressure injury stage II to right and left buttocks area. Cleased today and a new Mepilex dressing was applied.        Fay Warner RN

## 2022-10-12 NOTE — PROGRESS NOTES
Bedside and Verbal shift change report given to Isabella Holland (oncoming nurse) by Roxi Davis RN (offgoing nurse). Report included the following information SBAR, Kardex, Intake/Output, MAR, and Cardiac Rhythm Sinus Tachy . Wound Prevention Checklist    Patient: Dl Carrero (2 y.o. female)  Date: 10/12/2022  Diagnosis: GI bleed [K92.2] <principal problem not specified>    Precautions: Fall, Skin       []  Heel prevention boots placed on patient    []  Patient turned q2h during shift    []  Lift team ordered    []  Patient on [place] bed/Specialty bed    [x]  Each Wound is documented during shift (Stage, Color, drainage, odor, measurements, and dressings)    [x]  Dual skin check done with Roxi Davis, AUNDREA Spencer, [email or web address] started by Debbi Antoine RN (see her progress notes)      [phone]- checked patient for incontinence, none noted, but changed pads. Son at bedside to assist with patient turning. After being cleaned up, Son was looking at patient, patient looking back at son, and son was \"crying with tears of zonia\" stating his mom looks so much better today than yesterday. Patient still not able to vocalize answers, but did shake head when asked if she was in pain, and nodded that she would like the light turned off.

## 2022-10-12 NOTE — PALLIATIVE CARE
[address] [phone]     Livier Kilpatrick, RN and this LMSW attended to pt at bedside for follow up assessment. Upon entry, pt laying in bed with head elevated. She opened her eyes to verbal stimuli. Pt's breathing appears to be labored at this time, with a high rate of respiration. Pt allowed NP to elevate her head further to assist with breathing. Pt did not respond verbally to any questions. NP, RN and LMSW consulted with pt's RN, Héctor Sanchez and Dr. Niurka Pal, to inform them of concerns regarding pt's current condition. Both reported they will attend to pt for further assessment. Thank you for this referral to Palliative Care. The palliative care team remains available to provide support for patient and her family.       Sergei Mortensen, [address] [facility]  Palliative Medicine Inpatient   DR. [facility]  Palliative COPE Line: [phone])

## 2022-10-12 NOTE — HOSPICE
DME ordered from [facility] bed with full rails, OBT to be delivered October 14 between 8-12 to give son time to move furniture and prepare home for delivery.

## 2022-10-12 NOTE — PROGRESS NOTES
Palliative Medicine    Visited patient along with Palliative team members DC Claire LMSW and MAURICE White NP. Patient lying in bed, eyes closed. Opened eyes when name called. Denies any pain or discomfort at this time. Is short of breath, resp rate in low 40's, head of bed elevated. Oxygen is not in use. Notified bedside RN [place] of respiratory status. Dr. Sudhir Best in [facility] and updated as well. States he will follow up with the son regarding discharge plan. Palliative team remains available to provide support to Ms. Shravan Pérez and her family during this hospital stay.     CODE STATUS: FULL CODE WITH FULL INTERVENTIONS    Yobany Cannon RN  Palliative Medicine Inpatient RN  Palliative COPE Line: [phone]

## 2022-10-12 NOTE — HOSPICE
Pt/family pursuing hospice:yes   Admission dx approved by Hospice Medical Director: Uterine Carcinoma  Hospice related correlated conditions:  anemia and debility  Anticipated hospital d/c date:14October2022   Discussion occurred with patient and/or family about preference of hospitalization once admitted to hospice: yes   Reviewed and discussed hospice philosophy and keeping the patient comfortable in their residence: yes (Document additional information below)  Discussion with patient/family regarding around the clock caregiver in place due to patient safety in the home once discharged: yes

## 2022-10-12 NOTE — PROGRESS NOTES
Received [facility] billing information for patient awaiting hospice prescriptions to be sent to outpatient pharmacy

## 2022-10-12 NOTE — PROGRESS NOTES
1421: Blood administration started. 75 ml/hr. Vitals stable. Pt resting quietly. 1444: Vitals rechecked. No changes. 1503: Vitals unchanged. Mews still 5. Pt is resting quietly. 1522: /57, temp 99.8, heart rat 113 bpm. No changes noted. 1630: Rate changed to 100 ml/hr. 1759: tranfusion completed. Vitals unchanged. Pt alert and sitting up in bed. 500 mLs  of PRBCs infused.

## 2022-10-12 NOTE — HOSPICE
Writer received phone call from Moni Acevedo, [address] this nurse that son would like to take patient home on hospice and he and his family will take care of patient. Writer called patient son to follow up. Received voice mail. Left message to return writer's call at the 24-hour number.

## 2022-10-12 NOTE — PROGRESS NOTES
Called pt's Anju Munroe. He stated he wants his mom to remain on hospice. He stated he is making arrangement for pt to return home and between himself, his wife and other family members, they will be able give 24 hours care to pt. Discussed Medicaid application. He stated his mom does not have much time to live and he is hesitating about Medicaid application. He stated his mom makes about $2,000/month which will not qualify pt. He is in agreement for CM to contact the hospice nurse to call him. He verified the address pt will be discharged to as is on file. Updated Malini Tejada of [facility] [place]. Sent message to Dr. Greg Lewis. 1550: Per Kayla's notes, discharge will be for 10/14/22 to allow pt's family get pt's room ready as plan is for pt to return home when discharged. MARCY MaierN RN  Care Management  Pager: [phone]

## 2022-10-12 NOTE — HOSPICE
Please send scripts for comfort medications to the outpatient pharmacy to be filled and sent home with the pt at discharge. I have verified with the pharmacy the medication is available. 1. Morphine concentrate 20mg/ml  Give 0.25-1ml po/sl every 3 hours PRN for pain/air hunger  Quantity 30ml     2. Lorazepam oral solution 2mg/ml   Give 0.5mg -1ml (0.25-0.5ml) po/sl every 6 hours PRN anxiety/agitation   Quantity 30 ml     3. Hyoscyamine 0.125mg tablets  Give 1 tab po/sl every 6 hours PRN terminal congestion  Quantity 10 tabs.      4.  Bisacodyl Suppository 10mg  Give one suppository rectally every day PRN  Quantity 5 suppositories

## 2022-10-12 NOTE — PROGRESS NOTES
[facility]  (A Division of [address] [place] [address])      Progress note      Date of Admission: 10/3/2022    Date of Note: 10/12/2022      Reason for Referral: sepsis  Referring Physician: Dr. Cruz Held from this admission:   10/5 urine culture greater than 100,000 Pseudomonas aeruginosa pansensitive  10/5 blood culture: No growth to date x2  10/9 blood cultures 1 out of 4 gram-negative rods    Current Antimicrobials:    Prior Antimicrobials:  Levofloxacin 10/10 to present  Fluconazole 10/10 to present Vancomycin 10/5 to 10/10  Cefepime 10/5 to 10/10       Assessment:         Gram-negative nikhil bacteremia: Blood cultures from 10/9 with 1 out of 4 bottles gram-negative rods  Intermittent leukocytosis: Blood cultures thus far have been remained negative. 10/9 chest x-ray with left pleural effusion. It is difficult to quantify if there is underlying infection. It certainly possible especially if there is some potential erosion of the tumor through the bowel wall. Acute blood loss anemia: Either from GI bleed versus uterine mass. Status post multiple transfusions since 10/5 with lowest hemoglobin of 4.2, presenting at 4.8   -10/7 CT of the abdomen and pelvis shows a large complex pelvic mass with air-fluid and is well as solid components with concern for possible colonic invasion of uterine mass. Complicated pseudomonal UTI with bilateral nephrostomy tubes: From 10/5 urine cultures. Has been on cefepime since it 10/5  Thrombocytopenia  Lactic acidosis: Resolved. 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 Status post palliative radiation.   Heme-onc recommends comfort measures, candidate for chemotherapy  Plan: At present family continues to want IV and p.o. antibiotics. Continue levofloxacin 500 mg p.o. daily and fluconazole  -Tentative stop date 24/53 for complicated UTI and gram-negative bloodstream infection  -Can transition both medications to p.o. at time of discharge if the family desires. Follow-up 10/9 blood cultures-currently with 1 out of 4 bottles gram-negative rods   -No need to repeat blood cultures since she may be transitioning to hospice  Trend CBC-can discontinue     Agree with recommendation for transition to hospice.  assisting with transition   -Family would like to continue with antibiotics. Marlin Simons DO  [facility]  [address], [address] [facility] [address]  Office: [phone], Ext [phone]  Mobile/Text: [phone]    Lines / Catheters:  Peripheral    Subjective:   Patient seen and examined at bedside. Resting comfortably. Does not provide much history. No fevers overnight. Family is working on plans for discharge. T-max 99.5  WBCs 14.2    HPI:  Ms. Blanco Clayton is a 78-year-old female with hypertension, vitamin D deficiency, diverticulosis and a uterine carcinosarcoma followed by  who had presented initially for evaluation on October 4 from her nursing facility after routine labs had revealed a hemoglobin of 4.8. The patient reports she was having profound fatigue but did not have any other particular symptoms. There was no reported melena or hematuria. She did not have any abnormal vaginal bleeding. No hematemesis. In the ER she was originally given 2 units of packed red blood cells. Stool occult blood in the ER was positive. Of note she has bilateral percutaneous nephrostomy tubes that were placed at a recent hospitalization due to hydroureter and hydronephrosis from mass compressing her ureters. Urine from 10/6 grew greater than 100,000 Pseudomonas aeruginosa which was pansensitive.   She had been started on empiric antibiotics to include cefepime and vancomycin due to a signs and symptoms of sepsis including tachycardia leukocytosis and suspected UTI. Following initiation of antibiotics, her WBCs have improved however over the last 24 hours there is been an increase in WBCs as well as some intermittent tachycardia concerning for possible new developing sepsis picture. She did have a CT of the abdomen and pelvis repeated on 10/7 which was unchanged from the initial set of imaging that had been performed. Still not able to distinguish if the uterine mass had eroded through the bowel. Heme-onc is also currently following and had recommended transition to comfort measures given progression of her disease. Objective:      Visit Vitals  BP (!) 152/111   Pulse (!) 118   Temp 98.8 °F (37.1 °C)   Resp (!) 41   Ht 5' 3\" (1.6 m)   Wt 102.5 kg (226 lb)   SpO2 100%   Breastfeeding No   BMI 40.03 kg/m²     Temp (24hrs), Av °F (37.2 °C), Min:98.3 °F (36.8 °C), Max:99.5 °F (37.5 °C)        General:   awake alert and oriented to person, very lethargic and difficult to wake up   Skin:   no rashes or skin lesions noted on limited exam   HEENT:  Normocephalic, atraumatic, PERRL, EOMI, no scleral icterus or pallor; no conjunctival hemmohage;  nasal and oral mucous are moist and without evidence of lesions. No thrush. Dentition fair. Neck supple, no bruits. Lymph Nodes:   no cervical, axillary or inguinal adenopathy   Lungs:   non-labored, bilaterally clear to aspiration- no crackles wheezes rales or rhonchi   Heart:  RRR, s1 and s2; no murmurs rubs or gallops, no edema, + pedal pulses   Abdomen:  soft, obese non-distended, active bowel sounds, no hepatomegaly, no splenomegaly. Appropriate surgical scars for stated surgeries.   No apparent   Genitourinary:  deferred   Extremities:   no clubbing, cyanosis; no joint effusions or swelling; muscle mass appropriate for age   Neurologic:  No gross focal sensory abnormalities; Speech appropirate. Cranial nerves intact   Psychiatric:  Calm, lethargic       Labs: Results:   Chemistry Recent Labs     10/10/22  0438   *   *   K 3.5   *   CO2 20*   BUN 44*   CREA 0.83   CA 8.4*   AGAP 9   BUCR 53*      CBC w/Diff Recent Labs     10/12/22  0523 10/11/22  0457 10/10/22  0438   WBC 14.2* 13.2 12.4   RBC 2.31* 2.57* 2.68*   HGB 6.7* 7.3* 7.6*   HCT 20.2* 21.9* 22.7*   PLT 84* 80* 71*   GRANS 94* 89* 93*   LYMPH 3* 7* 3*   EOS 0 1 1            No results found for: SDES Lab Results   Component Value Date/Time    Culture result: NO GROWTH 2 DAYS 10/09/2022 04:41 AM    Culture result: (A) 10/09/2022 04:38 AM     GRAM NEGATIVE RODS GROWING IN 1 OF 2 BOTTLES DRAWN No Site Indicated    Culture result: NO GROWTH 6 DAYS 10/05/2022 08:15 PM    Culture result: NO GROWTH 6 DAYS 10/05/2022 08:05 PM    Culture result: PSEUDOMONAS AERUGINOSA (A) 10/05/2022 07:33 PM        Results       Procedure Component Value Units Date/Time    CULTURE, URINE [[id number]] Collected: 10/09/22 1745    Order Status: Canceled Specimen: Cath Urine     CULTURE, BLOOD [[id number]] Collected: 10/09/22 0441    Order Status: Completed Specimen: Blood Updated: 10/12/22 0720     Special Requests: NO SPECIAL REQUESTS        Culture result: NO GROWTH 2 DAYS       CULTURE, BLOOD [[id number]]  (Abnormal) Collected: 10/09/22 0438    Order Status: Completed Specimen: Blood Updated: 10/11/22 1552     Special Requests: NO SPECIAL REQUESTS        GRAM STAIN       ANAEROBIC BOTTLE GRAM NEGATIVE RODS                  SMEAR CALLED TO AND CORRECTLY REPEATED BY: AUNDREA AKHTAR ON 11OCT22 AT 0730 HRS TO 1396.            Culture result:       GRAM NEGATIVE RODS GROWING IN 1 OF 2 BOTTLES DRAWN No Site Indicated          BLOOD CULTURE ID PANEL [[id number]] Collected: 10/09/22 0438    Order Status: Completed Specimen: Blood Updated: 10/11/22 1802     Acc. no. from Micro Order [id number]     Enterococcus faecalis Not detected        Enterococcus faecium Not detected        Listeria monocytogenes Not detected        Staphylococcus Not detected        Staphylococcus aureus Not detected        Staph epidermidis Not detected        Staph lugdunensis Not detected        Streptococcus Not detected        Streptococcus agalactiae (Group B) Not detected        Streptococcus pneumoniae Not detected        Streptococcus pyogenes (Group A) Not detected        Acinetobacter calcoaceticus-baumanii complex Not detected        Bacteroides fragilis Not detected        Enterobacterales species Not detected        Enterobacter cloacae complex Not detected        Escherichia coli Not detected        Klebsiella aerogenes Not detected        Klebsiella oxytoca Not detected        Klebsiella pneumoniae group Not detected        Proteus Not detected        Salmonella Not detected        Serratia marcescens Not detected        Haemophilus influenzae Not detected        Neisseria meningitidis Not detected        Pseudomonas aeruginosa Not detected        Steno maltophilia Not detected        Candida albicans Not detected        Candida auris Not detected        Candida glabrata Not detected        Candida krusei Not detected        Candida parapsilosis Not detected        Candida tropicalis Not detected        Crypto neoformans/gattii Not detected        RESISTANT GENES:            Comment       False positive results may rarely occur.  Correlate with clinical,epidemiologic, and other laboratory findings           Comment: Please see BCID Interpretation Guide in EPIC Links       BLOOD CULTURE ID PANEL [[id number]] Collected: 10/09/22 0438    Order Status: Canceled     CULTURE, BLOOD [[id number]] Collected: 10/05/22 2015    Order Status: Completed Specimen: Whole Blood Updated: 10/11/22 0635     Special Requests: NO SPECIAL REQUESTS        Culture result: NO GROWTH 6 DAYS       CULTURE, BLOOD [[id number]] Collected: 10/05/22 2005    Order Status: Completed Specimen: Whole Blood Updated: 10/11/22 0635     Special Requests: NO SPECIAL REQUESTS        Culture result: NO GROWTH 6 DAYS       CULTURE, URINE [[id number]]  (Abnormal)  (Susceptibility) Collected: 10/05/22 1933    Order Status: Completed Specimen: Clean catch Updated: 10/08/22 1511     Special Requests: NO SPECIAL REQUESTS        [place] Count --        >100,000  COLONIES/mL       Culture result: PSEUDOMONAS AERUGINOSA       Susceptibility        Pseudomonas aeruginosa      ANDREAS      Amikacin ($) Susceptible      Cefepime ($$) Susceptible      Ceftazidime ($) Susceptible      Ciprofloxacin ($) Susceptible      Gentamicin ($) Susceptible      Levofloxacin ($) Susceptible      Meropenem ($$) Susceptible      Piperacillin/Tazobac ($) Susceptible      Tobramycin ($) Susceptible                                     Imaging:      All imaging reviewed from Admission to present as per radiology interpretation in [address] [place] [address]

## 2022-10-12 NOTE — HOSPICE
Writer received return call from patient son. He states that he would like to take patient home, however, he needs times to get the room ready. Son has agreed to receiving DME on 14 October between [id number]. Comfort medications explained to son and son agreed to having comfort medications brought home with patient.

## 2022-10-12 NOTE — PROGRESS NOTES
[facility]ist [facility]  Progress Note    Patient: Chris Gonzalez Age: 71 y.o. : 1950 MR#: [id number] SSN: [id number]  Date: 10/12/2022     Subjective:   Sedated v lethargic. Receiving blood products. Plan for hospice care. Discharge home once DME arrives to son's home. Assessment/Plan:   1. Acute GIB v uterine bleed  2. Acute blood loss anemia s/p PRBC transfusion   3. Progressive uterine carcinosarcoma s/p bilateral PCN  4. Leukocytosis   5. Acute cystitis. pseudomonas   6. Hypernatremia   7. Elevated BUN secondary to GIB  8. Elevated INR  9. Debility     Plan  Son agreed to hospice services. DME ordered and hospice medications ordered. Plan to anticipate discharge on 10/14/22  Continue current antibiotic therapy  Monitor HH and transfuse. PRBC today   Further recommendations pending clinical course. Son has been notified by hospice and updated on plan of care    Past Medical History:   Diagnosis Date    Class 2 obesity due to excess calories without serious comorbidity in adult     HTN (hypertension)     Uterine mass 2022    Vitamin D deficiency      30 minutes in total spent today in preparation for visit, review of external notes and test results, review of test results, order placement, obtaining history, physical exam of the patient, and/or counseling the patient concerning diagnosis, test results, treatment plan and speaking with physicians and nurses involved in patient's care. Additional time spent in review and independent interpretation of external notes, imaging, labs via hospital EMR and/or Care Everywhere, as well as pre-charting activity all of which occur on the day of service.   10 minutes were spent in Preparation to see Patient and Obtaining and Reviewing Separate History  5 minutes were spent in Examination, Counseling, & Educating Patient  5 minutes were spent (if any) in [place] [id number] with Nursing Staff and Physicians  10 minutes were spent Documenting in EHR and Interpreting Tests Independently    A Total of 30 minutes were spent seeing this Patient. Case discussed with:  [x]Patient  [x]Family  [x]Nursing  []Case Management  DVT Prophylaxis:  []Lovenox  []Hep SQ  [x]SCDs  []Coumadin   []On Heparin gtt    Objective:   VS: Visit Vitals  BP (!) 115/59   Pulse (!) 115   Temp 98.2 °F (36.8 °C)   Resp (!) 41   Ht 5' 3\" (1.6 m)   Wt 102.5 kg (226 lb)   SpO2 100%   Breastfeeding No   BMI 40.03 kg/m²      Tmax/24hrs: Temp (24hrs), Av.1 °F (37.3 °C), Min:98.2 °F (36.8 °C), Max:99.8 °F (37.7 °C)    Intake/Output Summary (Last 24 hours) at 10/12/2022 1643  Last data filed at 10/12/2022 1419  Gross per 24 hour   Intake 772.5 ml   Output 910 ml   Net -137.5 ml       General:         lethargic, cooperative, no acute distress    HEENT:           NC, Atraumatic. PERRLA, anicteric sclerae. Lungs:            CTA bilaterally. No Wheezing/Rhonchi/Rales  Heart:              tachy, No murmur, No Rubs, No Gallops  Abdomen:      Soft, Non distended, Non tender. +Bowel sounds, no HSM  Extremities:   No c/c/e  Psych:             Good insight. Not anxious or agitated. Neurologic:     lethargic, NAD. No acute neurological deficits   Labs:    Recent Results (from the past 24 hour(s))   CBC WITH AUTOMATED DIFF    Collection Time: 10/12/22  5:23 AM   Result Value Ref Range    WBC 14.2 (H) 4.6 - 13.2 K/uL    RBC 2.31 (L) 4.20 - 5.30 M/uL    HGB 6.7 (L) 12.0 - 16.0 g/dL    HCT 20.2 (L) 35.0 - 45.0 %    MCV 87.4 78.0 - 100.0 FL    MCH 29.0 24.0 - 34.0 PG    MCHC 33.2 31.0 - 37.0 g/dL    RDW 20.7 (H) 11.6 - 14.5 %    PLATELET 84 (L) 777 - 420 K/uL    NRBC 0.3 (H) 0  WBC    ABSOLUTE NRBC 0.04 (H) 0.00 - 0.01 K/uL    NEUTROPHILS 94 (H) 40 - 73 %    LYMPHOCYTES 3 (L) 21 - 52 %    MONOCYTES 3 3 - 10 %    EOSINOPHILS 0 0 - 5 %    BASOPHILS 0 0 - 2 %    IMMATURE GRANULOCYTES 0 0.0 - 0.5 %    ABS. NEUTROPHILS 13.4 (H) 1.8 - 8.0 K/UL    ABS.  LYMPHOCYTES 0.4 (L) 0.9 - 3.6 K/UL    ABS. MONOCYTES 0.4 0.05 - 1.2 K/UL    ABS. EOSINOPHILS 0.0 0.0 - 0.4 K/UL    ABS. BASOPHILS 0.0 0.0 - 0.1 K/UL    ABS. IMM. GRANS. 0.0 0.00 - 0.04 K/UL    DF MANUAL      PLATELET COMMENTS DECREASED PLATELETS      RBC COMMENTS TARGET CELLS  2+        RBC COMMENTS 2 NRBC SEEN    RBC, ALLOCATE    Collection Time: 10/12/22 11:30 AM   Result Value Ref Range    HISTORY CHECKED?  Historical check performed    TYPE & SCREEN    Collection Time: 10/12/22 12:03 PM   Result Value Ref Range    Crossmatch Expiration 10/15/2022,2359     ABO/Rh(D) Atul Speaks POSITIVE     Antibody screen NEG     CALLED TO: RINA MAN SDU, 1339, 10/12/22 BY 4768     Unit number [id number]     Blood component type  LR     Unit division 00     Status of unit ISSUED     Crossmatch result Compatible        Signed By: Lore Aschoff, NP     October 12, 2022

## 2022-10-12 NOTE — PROGRESS NOTES
Physician Progress Note      Amrik Kaur  CSN #:                  [id number]  :                       1950  ADMIT DATE:       10/3/2022 7:30 PM  [address] [place] DATE:  RESPONDING  PROVIDER #:        Janina WEI MD          QUERY TEXT:    Pt admitted with Acute GI versus uterine bleed, Acute blood loss anemia s/p PRBCs Progressive uterine carcinosarcoma status post bilateral PCN . Noted documentation of Mild malnutrition 10/10/22 by RDs Positive nutrition screen. If possible, please document in progress notes and discharge summary:      The medical record reflects the following:    Risk Factors: 78-year-old female with hypertension, vitamin D deficiency, diverticulosis and a uterine carcinosarcoma followed by  who had presented initially for evaluation on  from her nursing facility after routine labs had revealed a hemoglobin of 4.8. The patient reports she was having profound fatigue but did not have any other particular symptoms. There was no reported melena or hematuria. She did not have any abnormal vaginal bleeding. No hematemesis. In the ER she was originally given 2 units of packed red blood cells. Stool occult blood in the ER was positive. Clinical Indicators: RD PN Malnutrition Assessment:  Malnutrition Status:  Mild malnutrition (10/10/22 1030)  Context:  Acute illness  Findings of the 6 clinical characteristics of malnutrition:  Energy Intake:  50% or less of est energy requirements for 5 or more days  Weight Loss:  No significant weight loss  Body Fat Loss:  No significant body fat loss,  Muscle Mass Loss:  No significant muscle mass loss    Positive nutrition screen noted, PUR. Pt asleep at time of visit and did not wake w/ verbal stimuli. Observed 2 ensure unopened at bedside. Pt currently not meeting nutritional needs, question pt current ability to meet needs via PO diet alone.  Discussed possible PO diet advancement w/ MD XAVIER to luis miguel pt.    Treatment: Advance PO diet as tolerated to easy to chew per SLP recs when medically feasible. Encourage PO intake. Continue oral supplements as tolerated. Monitor PO intake, compliance of oral supplement, weight, labs and plan of care during admission. ensure enlive  and gelatein    Thank you  Darleen Sharpe RN CRCR CDI , [facility]  [email or web address]  Options provided:  -- Mild malnutrition confirmed present on admission  -- Mild malnutrition ruled out  -- Other - I will add my own diagnosis  -- Disagree - Not applicable / Not valid  -- Disagree - Clinically unable to determine / Unknown  -- Refer to Clinical Documentation Reviewer    PROVIDER RESPONSE TEXT:    The diagnosis of Mild malnutrition was confirmed as present on admission. Query created by:  Re Aleman on 10/12/2022 3:11 PM      Electronically signed by:  Glory Cantu MD 10/12/2022 6:40 PM

## 2022-10-13 PROBLEM — Z71.89 ADVANCED CARE PLANNING/COUNSELING DISCUSSION: Status: ACTIVE | Noted: 2022-01-01

## 2022-10-13 PROBLEM — Z51.5 ENCOUNTER FOR PALLIATIVE CARE: Status: ACTIVE | Noted: 2022-01-01

## 2022-10-13 NOTE — PROGRESS NOTES
[address] [facility] 54: [phone]  [facility]: [address] [place] [address] Way: [phone]    Patient Name: Terrence Shahid  YOB: 1950    Date of Initial Consult: 10/5/2022  Follow up 10/7/2022; 10/13/2022   Reason for Consult: goals of care   Requesting Provider: Dr Elizabeth Melara   Primary Care Physician: Hector Santos MD      SUMMARY:   Terrence Shahid is a 71y.o. year old with a past history of uterine cancer, s/p radiation treatments, hypertension, obesity , who was admitted on 10/3/2022 from [facility]  with a diagnosis of anemia. Current medical issues leading to Palliative Medicine involvement include: 71year old female who has a history of uterine cancer s/p RT. Of note was not able to have hysterectomy due to size of tumor. Plan was surgery once the tumor had decreased in size. Patient was transferred from [facility] with increased fatigue and unwell feeling. In Er she was found to have a hemoglobin of 4.8. Palliative medicine is consulted for goals of care discussions. 10/13/2022  lying in bed, awake and alert, tachypneic and tachycardic with increased WOB, speech is limited, appears fatigued    10/7/2022 more alert this am, tells us she feels better this am.     10/6/2022 events noted from last night. No pain this am, seems a bit confused this am. Did not remember oncology coming to room this am.      PALLIATIVE DIAGNOSES:   Encounter for palliative care/Goals of care   Advanced care plan discussions  Uterine carcinoma   Anemia        PLAN:   10/13/2022  seen at bedside along with Ms. Elen Gonzales, RN and Ms. Dakotah LMSW. Patient is lying in bed, bedside RN in room providing care and preparing to assist feeding patient. Ms. Melody Saldivar appears fatigued but is awake, alert and oriented x3, speaks a few words but is very difficult to understand. Patient is tachycardic, tachypneic with increased WOB. Denies pain or SOB.   Does not appear entirely comfortable. Broached goals of care with patient who shook her head \"no\" when asked if she would want attempts at resuscitation including CPR and intubation. When asked if she wanted to pass away peacefully in the event of cardiopulmonary arrest, patient nodded her head \"yes\". Patient was consistent in her responses when questions were asked by other team members. Asked patient's permission to call her son to share her wishes with him and she agreed but does not want son to have to  leave work and come to the hospital.  Informed patient we would call son Hannah Hunt. Palliative team reached out to son, Hannah Hunt and had to leave a voicemail message. Return call received from Hannah Hunt and palliative team called back. Brief medical update provided and asked Hannah Hunt to share about his visit with patient yesterday. Hannah Hunt shared that he thought his mother looked better and said that gave him \"hope\". Reviewed with Hannah Hunt our visit with patient earlier today and that she appears tired and that she told us she would not want attempts at resuscitation in the event of cardiopulmonary arrest. Discussed that patient is tachycardic and tachypneic and that she is getting more fatigued as a result. Son shared that if that is her wish, he will respect that. Furthermore, he shared that \"if seeing her like she was yesterday, is the last time I see her, I'm okay with that\". Son, Hannah Hunt is in agreement with DNR/DNI. POST form completed for DNR/DNI, limited interventions and sent to Hannah Hunt via [email or web address] for signature. Awaiting return of signed POST form. Code status updated to DNR/DNI. Hospitalist attending and bedside RN notified. Hannah Hunt asked if patient still needed hospice services and we shared that she does and that hospice can help support his mother, her family and provide symptom management to ensure her comfort at home  Hannah Hunt expressed agreement and understanding.   Goals of care are now DNR/DNI, limited interventions. Please see below for previous conversations with the palliative medicine team:    10/7/2022 follow up along with Ms Samm Epps RN and Ms Latoya Castorena. Patient seems more alert and comfortable this am. Told us \" they tell me they may not be able to do anything. \" She is alert and oriented x 3 this am. We discussed goals of care with patient who really could not make a decision. We later met with her son Jon Trujillo. He has been well updated on her current medical condition. Goals of care discussed Patient had her eyes closed during this conversation. Jon Trujillo tearful and wished for everything including CPR to attempted for his mother. Very difficult conversation for Jon Trujillo. We offered support with this difficult conversation. Jon Trujillo expressed his hopefulness that someone can find away to remove the tumor. He is aware CTA no performed due to loss of IV. He is also aware source of bleeding is not currently known. Goals of care full code with full interventions. Patient opened her eyes when Jon Trujillo was emotional and shared with him he was going to be alright that GOD is in control. She also told him even though gyn onc was positive on their first meeting she felt he knew perhaps this was not going to go well. ( Please see below for previous notes per palliative team)     10/6/2022 follow up along with Ms Samm Epps RN and Ms Latoya Castorena this am. Events of last night noted. Required more blood due to bleeding. Comfort this am. Seems a bit more confused this am. Knows she is in the hospital but did not remember oncology coming this am. Worry that she can not make a complex medical decision. We called her son Jon Trujillo who is legal next of kin. He is aware there is testing to find bleeding source. We introduced our services. Plan to meet tomorrow at 1600 to further discuss. Jon Trujillo did tell us he and family remain hopefull and \" as long as we are doing everything we can to help his mother, he is OK. \" Goals of care full code with full interventions. ( Please see below for previous notes per palliative team)     Goals of care patient seen along with Ms Jaron Pittman RN and Ms Destini Weiss. She is alert and oriented x 3 and can participate in conversation. She however during our conversation repeated several times, stated \" I don't know what is wrong. She referred to her uterine cancer as her \" cyst\". She understood she went to [facility] but never named radiation therapy. It is very hard to understand if she truly does not understand her medical illness or it is denial. Appreciate oncology consult moving forward. We did not discuss goals of care with Ms Christiano León today. Goals of care full code with full interventions. Discussed with attending. Advanced care plan discussion no AMD on file. She shares she is a . Had 2 biology children, one son has passed. Her son Chandrika Marcus is legal next of kin. Uterine carcinoma s/p radiation therapy followed by Dr Charan Diaz and has seen Dr Sridevi Min in the past   Anemia admitted with hemoglobin of 4.8. GI has been consulted. Possible EGD if overt bleeding per GI. S/p PRBC's   Initial consult note routed to primary continuity provider  Communicated plan of care with: Palliative IDT, attending and patient     Patient/Health Care Proxy Stated Goals: Prolong life      TREATMENT PREFERENCES:   Code Status: DNR    Advance Care Planning:  [] [facility] Interdisciplinary Team has updated the ACP Navigator with [address] and Patient Capacity    Primary Decision [facility] ([address]):   Primary Decision Maker: Polina Ni - [phone]    Secondary Decision Maker: Virginia Salas - Other Relative - [phone]    Medical Interventions: Limited additional interventions           Other:  As far as possible, the palliative care team has discussed with patient / health care proxy about goals of care / treatment preferences for patient.      HISTORY:     History obtained from: chart and patient CHIEF COMPLAINT: GI bleed     HPI/SUBJECTIVE:    The patient is:   [x] Verbal and participatory  [] Non-participatory due to:   Please see summary      Clinical Pain Assessment (nonverbal scale for nonverbal patients): Clinical Pain Assessment  Severity: 0     Activity (Movement): Lying quietly, normal position    Duration: for how long has pt been experiencing pain (e.g., 2 days, 1 month, years)  Frequency: how often pain is an issue (e.g., several times per day, once every few days, constant)     FUNCTIONAL ASSESSMENT:     Palliative Performance Scale (PPS):40       ECOG  ECOG Status : Completely disabled     PSYCHOSOCIAL/SPIRITUAL SCREENING:      Any spiritual / Judaism concerns:  [] Yes /  [x] No    Caregiver Burnout:  [] Yes /  [] No /  [x] No Caregiver Present      Anticipatory grief assessment:   [x] Normal  / [] Maladaptive        REVIEW OF SYSTEMS:     Positive and pertinent negative findings in ROS are noted above in HPI. The following systems were [x] reviewed / [] unable to be reviewed as noted in HPI  Other findings are noted below. Systems: constitutional, ears/nose/mouth/throat, respiratory, gastrointestinal, genitourinary, musculoskeletal, integumentary, neurologic, psychiatric, endocrine. Positive findings noted below. Modified ESAS Completed by: provider   Fatigue: 2 Drowsiness: 0   Depression: 0 Pain: 0   Anxiety: 0 Nausea: 0     Dyspnea: 3           Stool Occurrence(s): 1        PHYSICAL EXAM:     Wt Readings from Last 3 Encounters:   10/08/22 102.5 kg (226 lb)   09/23/22 114.3 kg (252 lb)   07/05/22 101.2 kg (223 lb)     Blood pressure 114/60, pulse (!) 113, temperature (!) 100.8 °F (38.2 °C), resp. rate (!) 44, height 5' 3\" (1.6 m), weight 102.5 kg (226 lb), SpO2 100 %, not currently breastfeeding.   Last bowel movement: x1 10/4/2022, 10/5/2022 x 5     Constitutional: fatigued appearing, awake and alert, oriented x3, speak a few words, speech unintelligible at times, poor appetite, does not appear comfortable  Eyes: pupils equal  ENMT: moist MM   Cardiovascular: tachycardia   Respiratory: tachypneic, increased WOB, on room air   Skin: warm, dry, bruising on arms  Neurologic: alert and oriented x 3  Psychiatric: calm, not agitated       HISTORY:     Active Problems:    GI bleed (10/4/2022)    Past Medical History:   Diagnosis Date    Class 2 obesity due to excess calories without serious comorbidity in adult     HTN (hypertension)     Uterine mass 09/01/2022    Vitamin D deficiency       Past Surgical History:   Procedure Laterality Date    HX CATARACT REMOVAL N/A     IR CHANGE NEPHROSTOMY PYELOS TUBE LT  9/22/2022    IR NEPHROSTOMY PERC LT PLC CATH  SI  9/4/2022    IR NEPHROSTOMY PERC RT PLC CATH  SI  9/4/2022    IR NEPHROSTOMY PERC RT PLC CATH  SI  9/15/2022      Family History   Problem Relation Age of Onset    Cancer Mother     Diabetes Mother      History reviewed, no pertinent family history.   Social History     Tobacco Use    Smoking status: Not on file    Smokeless tobacco: Never   Substance Use Topics    Alcohol use: Not Currently     No Known Allergies   Current Facility-Administered Medications   Medication Dose Route Frequency    metoprolol (LOPRESSOR) injection 1.25 mg  1.25 mg IntraVENous Q6H    0.9% sodium chloride infusion 250 mL  250 mL IntraVENous PRN    levoFLOXacin (LEVAQUIN) 500 mg in D5W IVPB  500 mg IntraVENous Q24H    fluconazole (DIFLUCAN) 200mg/100 mL IVPB (premix)  200 mg IntraVENous DAILY    morphine injection 1 mg  1 mg IntraVENous Q4H PRN    sodium chloride (NS) flush 5-40 mL  5-40 mL IntraVENous Q8H    sodium chloride (NS) flush 5-40 mL  5-40 mL IntraVENous PRN    sodium chloride (NS) flush 5-40 mL  5-40 mL IntraVENous Q8H    sodium chloride (NS) flush 5-40 mL  5-40 mL IntraVENous PRN    dextrose 5% infusion  50 mL/hr IntraVENous CONTINUOUS    pantoprazole (PROTONIX) 40 mg in 0.9% sodium chloride 10 mL injection  40 mg IntraVENous Q12H    0.9% sodium chloride infusion 100 mL/hr IntraVENous PRN    acetaminophen (TYLENOL) tablet 500 mg  500 mg Oral Q4H PRN        LAB AND IMAGING FINDINGS:     Lab Results   Component Value Date/Time    WBC 14.0 (H) 10/13/2022 05:03 AM    HGB 7.7 (L) 10/13/2022 05:03 AM    PLATELET 92 (L) 82/11/5490 05:03 AM     Lab Results   Component Value Date/Time    Sodium 154 (H) 10/13/2022 05:03 AM    Potassium 3.4 (L) 10/13/2022 05:03 AM    Chloride 128 (H) 10/13/2022 05:03 AM    CO2 19 (L) 10/13/2022 05:03 AM    BUN 58 (H) 10/13/2022 05:03 AM    Creatinine 1.40 (H) 10/13/2022 05:03 AM    Calcium 8.5 10/13/2022 05:03 AM    Magnesium 2.0 10/06/2022 03:00 AM    Phosphorus 3.7 09/21/2022 05:55 AM      Lab Results   Component Value Date/Time    Alk. phosphatase 99 10/03/2022 10:57 PM    Protein, total 5.9 (L) 10/03/2022 10:57 PM    Albumin 1.5 (L) 10/03/2022 10:57 PM    Globulin 4.4 (H) 10/03/2022 10:57 PM     Lab Results   Component Value Date/Time    INR 1.6 (H) 10/08/2022 05:45 AM    Prothrombin time 19.3 (H) 10/08/2022 05:45 AM    aPTT 30.5 10/06/2022 09:15 AM      Lab Results   Component Value Date/Time    Iron 23 (L) 09/02/2022 03:19 AM    TIBC 176 (L) 09/02/2022 03:19 AM    Iron % saturation 13 (L) 09/02/2022 03:19 AM    Ferritin 823 (H) 09/02/2022 03:19 AM      No results found for: PH, PCO2, PO2  No components found for: GLPOC   No results found for: CPK, CKMB           Total time: 35 minutes     > 50% counseling / coordination: yes with patient     Prolonged service was provided for  []30 min   []75 min in face to face time in the presence of the patient, spent as noted above.   Time Start:   Time End:

## 2022-10-13 NOTE — PROGRESS NOTES
Palliative Medicine    ADDENDUM: Bea Moses pt's son returned our phone call. Explained our interaction with the pt today and that she stated that she was \"tired\". Also shared that the pt expressed that she would not want to be subjected to CPR or intubation for any reason. Bea Moses states that he is supportive of the pt's wishes. He is agreeable to making the pt DNR/DNI. POST form sent to Bea Moses via [address] [place] [address] for his electronic signature. POST form denotes DDNR status, limited medical interventions and NO feeding tubes. Pt is supposed to have hospice equipment delivered to her home tomorrow. BSRN and Attending updated on DNR/DNI status. ______________________________________________________________________________________________________    Palliative Medicine team Francisco Cortez NP, Jarred Domingo LMSW and Dolores Porter RN met at the pt's bedside. Pt sitting up in bed and alert. BSRN present attending to pt. Pt alert and oriented x 3. Difficulty understanding her speech today. Very mumbled and overall unintelligble. Able to understand a few words. Discussed goals of care with the pt including CPR and intubation. She shook her head \"no\" that she did not want to be subjected to these medical measures. Question was asked 3 times to ensure that pt has clear understanding of what DNR/DNI means. Pt shook her head \"yes\" that DNR/DNI means that she would be allowed to have a peaceful passing. We stated that pt's son Bea Moses was having a difficult time making these decisions for the pt. She stated, \"I know he is\". Stated that it was ok for us to contact Bea Moses but that she did not want him to leave work to come up here to the hospital. Pt has increased respiratory rate and appears to be working hard to breathe however pt denied having difficulty breathing. Pt appears to be actively dying. Telephone call was made to pt's son to inform him of our discussion with the pt regarding goals of care. No answer - VM left asking for return call. Pt remains FULL code with FULL aggressive medical management at this time. Plan for pt to d/c home with the support of hospice. Thank you for the Palliative Medicine consult and allowing us to participate in the care of Mrs. Farzana Claros. Will continue to monitor and provide support.     Paul Cohn RN, BSN  Palliative Medicine Inpatient RN   [facility]  Palliative COPE Line: [phone] ([phone])

## 2022-10-13 NOTE — PROGRESS NOTES
Received [facility] discharge prescriptions Hyoscyamine and Bisacodyl suppositories for patient.

## 2022-10-13 NOTE — PROGRESS NOTES
[facility]ed [facility] scheduled 2:00 pm transport for tomorrow (Friday, October 14, 2022) to patient's home ([address] [place] [address]) with Sudarshan Friends. Informed Saúl Townsend of transportation conversation and arrangements.

## 2022-10-13 NOTE — PROGRESS NOTES
[facility]  [facility]    Patient: Aubrey Good Age: 71 y.o. : 1950 MR#: [id number] SSN: [id number]  Date/Time: 10/12/2022     C/C: anemia      Subjective:   HPI : HPI per admitting MD \" Aubrey Good is a 71 y.o. female who has a known history of uterine cancer and also received radiation treatments, hypertension, obesity and is at the nursing facility and was sent to the emergency room because she was noted to have low hemoglobin. Patient was doing fine except having fatigue and lack of energy but she was just woken up in the nursing facility was sent to the emergency department. Patient denies any abdomen pain nausea vomiting blood in her stools or melena fevers or chills chest pain or shortness of breath. Patient denies having any new symptoms. In the emergency room patient was noted to have a hemoglobin of 4.8 and was typed and screened and asked for 2 units PRBC. Stool occult done in the emergency room was positive and GI consult was requested. Dr. Janelle Moya was called from the emergency room. \"     Since admission , patient received blood transfusion, GI was consulted which suggested conservative treatment transfusion as needed venous PPI, EGD only during for bleeding which was not noticed. Intrauterine cancer patient was also consulted by oncologist who suggested-comfort care hospice, patient has such was on palliative XRT from history. With above terminal diagnosis family was approached by palliative care, patient's son who is the main decision maker decided patient should go hospice. Patient's son was initially reluctant to adopt DNR/DNI status for patient later on he understood and signed for DNR/DNI so patient currently is DNR/DNI. Also patient will be placed under hospice care. Review of Systems:  Somewhat more alert than yesterday however patient does not give me full ROS  Assessment/Plan:     1.   Anemia of acute blood loss  2 uterine cancer  3 obstructive uropathy  4 complicated UTI present on admission secondary to gram-negative bacilli    Plan    For now we will continue current management including antibiotics and other supportive care    Hospice care consult is done-patient will be ready by 10/14/2022 for transport back to home under hospice care        Objective:       General: Patient is sleepy  HEENT: No facial asymmetry, MEENU Kt, External ears - WNL    Cardiovascular: S1S2 - regular , No Murmur   Pulmonary: Equal expansion , No Use of accessory muscles , No Rales No Rhonchi    GI:  +BS in all four quadrants, soft, non-tender  Extremities:  No edema; 2+ dorsalis pedis pulses bilaterally  Neuro: No obvious focal deficit noted      DVT Prophylaxis:  []Lovenox  []Hep SQ  []SCDs  []Coumadin   []On Heparin gtt    [] Eliquis [] Xarelto     Vitals:         VS: Visit Vitals  /60   Pulse (!) 113   Temp (!) 100.8 °F (38.2 °C)   Resp (!) 44   Ht 5' 3\" (1.6 m)   Wt 102.5 kg (226 lb)   SpO2 100%   Breastfeeding No   BMI 40.03 kg/m²      Tmax/24hrs: Temp (24hrs), Av.2 °F (37.3 °C), Min:98.1 °F (36.7 °C), Max:100.8 °F (38.2 °C)        Medications:   Current Facility-Administered Medications   Medication Dose Route Frequency    metoprolol (LOPRESSOR) injection 1.25 mg  1.25 mg IntraVENous Q6H    0.9% sodium chloride infusion 250 mL  250 mL IntraVENous PRN    levoFLOXacin (LEVAQUIN) 500 mg in D5W IVPB  500 mg IntraVENous Q24H    fluconazole (DIFLUCAN) 200mg/100 mL IVPB (premix)  200 mg IntraVENous DAILY    morphine injection 1 mg  1 mg IntraVENous Q4H PRN    sodium chloride (NS) flush 5-40 mL  5-40 mL IntraVENous Q8H    sodium chloride (NS) flush 5-40 mL  5-40 mL IntraVENous PRN    sodium chloride (NS) flush 5-40 mL  5-40 mL IntraVENous Q8H    sodium chloride (NS) flush 5-40 mL  5-40 mL IntraVENous PRN    dextrose 5% infusion  50 mL/hr IntraVENous CONTINUOUS    pantoprazole (PROTONIX) 40 mg in 0.9% sodium chloride 10 mL injection  40 mg IntraVENous Q12H    0.9% sodium chloride infusion  100 mL/hr IntraVENous PRN    acetaminophen (TYLENOL) tablet 500 mg  500 mg Oral Q4H PRN       Labs:    Recent Labs     10/13/22  0503 10/12/22  0523 10/11/22  0457   WBC 14.0* 14.2* 13.2   HGB 7.7* 6.7* 7.3*   HCT 24.0* 20.2* 21.9*   PLT 92* 84* 80*     Recent Labs     10/13/22  0503   *   K 3.4*   *   CO2 19*   *   BUN 58*   CREA 1.40*   CA 8.5         Time spent on direct patient care >30 mints     Complexity : High complex - due to multiple medical issues outlined above. CODE Status : DNR/DNI    Case discussed with:  [x]Patient  [] Family  []Nursing  [x]Case Management         Disclaimer: Sections of this note are dictated utilizing voice recognition software, which may have resulted in some phonetic based errors in grammar and contents. Even though attempts were made to correct all the mistakes, some may have been missed, and remained in the body of the document. If questions arise, please contact our department.     Signed By: Stevo Anne MD     October 12, 2022

## 2022-10-13 NOTE — PROGRESS NOTES
Bedside and Verbal shift change report received from Tamera Soirano RN(offgoing nurse). Report included the following information SBAR, Kardex, Intake/Output, Recent Results, and Cardiac Rhythm Sinus Tachycardia . 1940: alert, generally weak, with limited bed mobility; assessment done; Sinus Tachycardia 115 on tele; denies any pain at this time; pad dry; family members at bedside    2119: meds given IVF infusing well    2340: reassessment done; no significant changes noted    0200: awake; sips of fluids given; pad dry    0530: bath given; gown and linens changed    0730: awake, callbell within reach    Bedside and Verbal shift change report given to Brian Matt RN (oncoming nurse) by Primo Magana RN (offgoing nurse). Report included the following information SBAR, Kardex, Intake/Output, Recent Results, and Cardiac Rhythm SInus Tachycardia .       Wound Prevention Checklist    Patient: Yessi Jade (54 y.o. female)  Date: 10/13/2022  Diagnosis: GI bleed [K92.2] <principal problem not specified>    Precautions: Fall, Skin       [x]  Heel prevention boots placed on patient    [x]  Patient turned q2h during shift    []  Lift team ordered    [x]  Patient on Shon bed/Specialty bed    [x]  Each Wound is documented during shift (Stage, Color, drainage, odor, measurements, and dressings)    []  Dual skin check done with Brian Magana, RN

## 2022-10-13 NOTE — PROGRESS NOTES
Requested Case Management specialist to assist with transportation to [address] [place] [address]. and phone number is [phone]  Patient will require BLS transport. Pt requires Stretcher If stretcher, reason: uterine carcinosarcoma, hospice comfort care, decreased command following, impaired mobility  Patient is currently requiring oxygen No No  Height:5'3   Weight: 226 Ib  Pt is on isolation: No    Is the pt ready now? no  Requested time:  tomorrow 10/14/22 at 2pm  PCS Faxed: yes  Insurance verified on face sheet: yes  Auth needed for transport: N/A  CM completed PCS/ Envelope and placed on chart. 1606: Transport has [facility] arranged with [facility] for 2pm tomorrow 10/14/22. Notified Enrique Hendricks of [facility], pt's nurse Michial Eisenmenger and pt's son Brandon Buchanan.         Quoc Gonzales, BSN RN  Care Management  Pager: [phone]

## 2022-10-13 NOTE — PROGRESS NOTES
Physician Progress Note      Julius Tsai  CSN #:                  [id number]  :                       1950  ADMIT DATE:       10/3/2022 7:30 PM  [address] DATE:  RESPONDING  PROVIDER #:        Sandor BRITO DO          QUERY TEXT:    Pt admitted with Acute anemia due to Uterine carcinosarcoma associated with GIB. Pt noted to have 1st 24hrs Admitted -10/3/2022 Lab bands 6 , VS axillary  temp 94.7  , RR 26 , Pulse 681 Complicated pseudomonal UTI with bilateral nephrostomy tubes from 10/5 . If possible, please document in the progress notes and discharge summary if you are evaluating and /or treating any of the following: The medical record reflects the following:    Risk Factors: 71 y.o. female w/ PMH HTN and uterine carcinosarcoma    Clinical Indicators: 1st 24hrs Admitted - 10/3/2022 1930 LAB  bands 6 , VS  axillary  temp 94.7 , RR 26 , Pulse 116    ID MD PN Complicated pseudomonal UTI with bilateral nephrostomy tubes: From 10/5 urine cultures. Has been on cefepime since it 10/5    Treatment: Levofloxacin 10/10 to present ,Fluconazole 10/10 to present ,Vancomycin 10/5 to 10/10 ,Cefepime 10/5 to 10/10 ,Labs carefully monitored . ID consulted      Thank you  Darleen Sharpe RN CRCR [facility]  [email or web address]  Options provided:  -- Sepsis due to UTI , present on admission  -- Sepsis due to UTI , present on admission now resolved  -- Sepsis due to UTI , not present on admission  -- UTI without Sepsis  -- Other - I will add my own diagnosis  -- Disagree - Not applicable / Not valid  -- Disagree - Clinically unable to determine / Unknown  -- Refer to Clinical Documentation Reviewer    PROVIDER RESPONSE TEXT:    This patient has sepsis due to UTI which was present on admission. Query created by:  Avni Gant on 10/13/2022 10:05 AM      Electronically signed by:  Dodie Archuleta DO 10/13/2022 11:01 AM

## 2022-10-13 NOTE — PROGRESS NOTES
[facility]  (A Division of [address] [place] [address])      Progress note      Date of Admission: 10/3/2022    Date of Note: 10/13/2022      Reason for Referral: sepsis  Referring Physician: Dr. Myla Roach from this admission:   10/5 urine culture greater than 100,000 Pseudomonas aeruginosa pansensitive  10/5 blood culture: No growth to date x2  10/9 blood cultures 1 out of 4 gram-negative rods    Current Antimicrobials:    Prior Antimicrobials:  Levofloxacin 10/10 to present  Fluconazole 10/10 to present Vancomycin 10/5 to 10/10  Cefepime 10/5 to 10/10       Assessment:         Bacterial Reddys bacteremia: Blood cultures from 10/9 with 1 out of 4 bottles gram-negative rods-this is most likely a contaminant rather than a true infectious process. However there may be intermittent seeding from her tumor  Intermittent leukocytosis: Blood cultures thus far have been remained negative. 10/9 chest x-ray with left pleural effusion. It is difficult to quantify if there is underlying infection. It certainly possible especially if there is some potential erosion of the tumor through the bowel wall. Acute blood loss anemia: Either from GI bleed versus uterine mass. Status post multiple transfusions since 10/5 with lowest hemoglobin of 4.2, presenting at 4.8   -10/7 CT of the abdomen and pelvis shows a large complex pelvic mass with air-fluid and is well as solid components with concern for possible colonic invasion of uterine mass. Complicated pseudomonal UTI with bilateral nephrostomy tubes: From 10/5 urine cultures. Has been on cefepime since it 10/5  Thrombocytopenia  Lactic acidosis: Resolved.   Initially 5.5 on 10/5 with improvement and resolution in 12 hours  Right hydronephrosis and proximal hydroureter: Status post bilateral percutaneous nephrostomy tubes placed following hydronephrosis and hydroureter secondary to compression from pelvic mass  Uterine carcinosarcoma: Followed by Dr. Arnie Vital treated with. Status post palliative radiation. Heme-onc recommends comfort measures, candidate for chemotherapy  Plan:   Continue levofloxacin 500 mg p.o. daily and fluconazole  -Tentative stop date 30/53 for complicated UTI and gram-negative bloodstream infection  -transition both medications to p.o. at time of discharge if the family desires. Follow-up 10/9 blood cultures-currently with 1 out of 4 bottles gram-negative rods   -No need to repeat blood cultures since she may be transitioning to hospice  Trend CBC-can discontinue     Agree with recommendation for transition to hospice.  assisting with transition   -Family would like to continue with antibiotics. Ellie De León DO  [facility]  [address], [address] [facility]  Office: [phone], Ext [phone]  Mobile/Text: [phone]    Lines / Catheters:  Peripheral    Subjective:   Patient seen and examined at bedside. More awake and in her. Denies having any pain. Has a little bit of nausea. No fevers overnight. Family is working on plans for discharge. T-max 100.8  WBCs 14.0    HPI:  Ms. Lianet Schmid is a 52-year-old female with hypertension, vitamin D deficiency, diverticulosis and a uterine carcinosarcoma followed by  who had presented initially for evaluation on October 4 from her nursing facility after routine labs had revealed a hemoglobin of 4.8. The patient reports she was having profound fatigue but did not have any other particular symptoms. There was no reported melena or hematuria. She did not have any abnormal vaginal bleeding. No hematemesis. In the ER she was originally given 2 units of packed red blood cells. Stool occult blood in the ER was positive. Of note she has bilateral percutaneous nephrostomy tubes that were placed at a recent hospitalization due to hydroureter and hydronephrosis from mass compressing her ureters.   Urine from 10/6 grew greater than 100,000 Pseudomonas aeruginosa which was pansensitive. She had been started on empiric antibiotics to include cefepime and vancomycin due to a signs and symptoms of sepsis including tachycardia leukocytosis and suspected UTI. Following initiation of antibiotics, her WBCs have improved however over the last 24 hours there is been an increase in WBCs as well as some intermittent tachycardia concerning for possible new developing sepsis picture. She did have a CT of the abdomen and pelvis repeated on 10/7 which was unchanged from the initial set of imaging that had been performed. Still not able to distinguish if the uterine mass had eroded through the bowel. Heme-onc is also currently following and had recommended transition to comfort measures given progression of her disease. Objective:      Visit Vitals  /60   Pulse (!) 113   Temp (!) 100.8 °F (38.2 °C)   Resp (!) 44   Ht 5' 3\" (1.6 m)   Wt 102.5 kg (226 lb)   SpO2 100%   Breastfeeding No   BMI 40.03 kg/m²     Temp (24hrs), Av.4 °F (37.4 °C), Min:98.1 °F (36.7 °C), Max:100.8 °F (38.2 °C)        General:   awake alert and oriented to person, very lethargic and difficult to wake up   Skin:   no rashes or skin lesions noted on limited exam   HEENT:  Normocephalic, atraumatic, PERRL, EOMI, no scleral icterus or pallor; no conjunctival hemmohage;  nasal and oral mucous are moist and without evidence of lesions. No thrush. Dentition fair. Neck supple, no bruits. Lymph Nodes:   no cervical, axillary or inguinal adenopathy   Lungs:   non-labored, bilaterally clear to aspiration- no crackles wheezes rales or rhonchi   Heart:  RRR, s1 and s2; no murmurs rubs or gallops, no edema, + pedal pulses   Abdomen:  soft, obese non-distended, active bowel sounds, no hepatomegaly, no splenomegaly. Appropriate surgical scars for stated surgeries.   No apparent   Genitourinary:  deferred   Extremities:   no clubbing, cyanosis; no joint effusions or swelling; muscle mass appropriate for age   Neurologic:  No gross focal sensory abnormalities; Speech appropirate. Cranial nerves intact   Psychiatric:  Calm, lethargic       Labs: Results:   Chemistry Recent Labs     10/13/22  0503   *   *   K 3.4*   *   CO2 19*   BUN 58*   CREA 1.40*   CA 8.5   AGAP 7   BUCR 41*      CBC w/Diff Recent Labs     10/13/22  0503 10/12/22  0523 10/11/22  0457   WBC 14.0* 14.2* 13.2   RBC 2.70* 2.31* 2.57*   HGB 7.7* 6.7* 7.3*   HCT 24.0* 20.2* 21.9*   PLT 92* 84* 80*   GRANS 91* 94* 89*   LYMPH 5* 3* 7*   EOS 0 0 1            No results found for: [facility] [place] Lab Results   Component Value Date/Time    Culture result: NO GROWTH 3 DAYS 10/09/2022 04:41 AM    Culture result: (A) 10/09/2022 04:38 AM     BACTEROIDES THETAIOTAOMICRON BETA LACTAMASE POSITIVE GROWING IN 1 OF 2 BOTTLES DRAWN No Site Indicated    Culture result: REMAINING BOTTLE(S) HAS/HAVE NO GROWTH SO FAR 10/09/2022 04:38 AM    Culture result: NO GROWTH 6 DAYS 10/05/2022 08:15 PM    Culture result: NO GROWTH 6 DAYS 10/05/2022 08:05 PM        Results       Procedure Component Value Units Date/Time    CULTURE, URINE [[id number]] Collected: 10/09/22 1745    Order Status: Canceled Specimen: Cath Urine     CULTURE, BLOOD [[id number]] Collected: 10/09/22 0441    Order Status: Completed Specimen: Blood Updated: 10/13/22 0833     Special Requests: NO SPECIAL REQUESTS        Culture result: NO GROWTH 3 DAYS       CULTURE, BLOOD [[id number]]  (Abnormal) Collected: 10/09/22 0438    Order Status: Completed Specimen: Blood Updated: 10/12/22 1336     Special Requests: NO SPECIAL REQUESTS        GRAM STAIN       ANAEROBIC BOTTLE GRAM NEGATIVE RODS                  SMEAR CALLED TO AND CORRECTLY REPEATED BY: AUNDREA AKHTAR ON 11OCT22 AT 0730 HRS TO 1396.            Culture result:       BACTEROIDES THETAIOTAOMICRON BETA LACTAMASE POSITIVE GROWING IN 1 OF 2 BOTTLES DRAWN No Site Indicated                  REMAINING BOTTLE(S) HAS/HAVE NO GROWTH SO FAR          BLOOD CULTURE ID PANEL [[id number]] Collected: 10/09/22 0438    Order Status: Completed Specimen: Blood Updated: 10/11/22 1802     Acc. no. from Micro Order [id number]     Enterococcus faecalis Not detected        Enterococcus faecium Not detected        Listeria monocytogenes Not detected        Staphylococcus Not detected        Staphylococcus aureus Not detected        Staph epidermidis Not detected        Staph lugdunensis Not detected        Streptococcus Not detected        Streptococcus agalactiae (Group B) Not detected        Streptococcus pneumoniae Not detected        Streptococcus pyogenes (Group A) Not detected        Acinetobacter calcoaceticus-baumanii complex Not detected        Bacteroides fragilis Not detected        Enterobacterales species Not detected        Enterobacter cloacae complex Not detected        Escherichia coli Not detected        Klebsiella aerogenes Not detected        Klebsiella oxytoca Not detected        Klebsiella pneumoniae group Not detected        Proteus Not detected        Salmonella Not detected        Serratia marcescens Not detected        Haemophilus influenzae Not detected        Neisseria meningitidis Not detected        Pseudomonas aeruginosa Not detected        Steno maltophilia Not detected        Candida albicans Not detected        Candida auris Not detected        Candida glabrata Not detected        Candida krusei Not detected        Candida parapsilosis Not detected        Candida tropicalis Not detected        Crypto neoformans/gattii Not detected        RESISTANT GENES:            Comment       False positive results may rarely occur.  Correlate with clinical,epidemiologic, and other laboratory findings           Comment: Please see BCID Interpretation Guide in EPIC Links       BLOOD CULTURE ID PANEL [[id number]] Collected: 10/09/22 0438    Order Status: Holley Vyas, BLOOD [[id number]] Collected: 10/05/22 2015 Order Status: Completed Specimen: Whole Blood Updated: 10/11/22 0635     Special Requests: NO SPECIAL REQUESTS        Culture result: NO GROWTH 6 DAYS       CULTURE, BLOOD [[id number]] Collected: 10/05/22 2005    Order Status: Completed Specimen: Whole Blood Updated: 10/11/22 0635     Special Requests: NO SPECIAL REQUESTS        Culture result: NO GROWTH 6 DAYS       CULTURE, URINE [[id number]]  (Abnormal)  (Susceptibility) Collected: 10/05/22 1933    Order Status: Completed Specimen: Clean catch Updated: 10/08/22 1511     Special Requests: NO SPECIAL REQUESTS        Kirby Count --        >100,000  COLONIES/mL       Culture result: PSEUDOMONAS AERUGINOSA       Susceptibility        Pseudomonas aeruginosa      ANDREAS      Amikacin ($) Susceptible      Cefepime ($$) Susceptible      Ceftazidime ($) Susceptible      Ciprofloxacin ($) Susceptible      Gentamicin ($) Susceptible      Levofloxacin ($) Susceptible      Meropenem ($$) Susceptible      Piperacillin/Tazobac ($) Susceptible      Tobramycin ($) Susceptible                                     Imaging:      All imaging reviewed from Admission to present as per radiology interpretation in [address] [place] [address]

## 2022-10-13 NOTE — PROGRESS NOTES
Problem: Mobility Impaired (Adult and Pediatric)  Goal: *Acute Goals and Plan of Care (Insert Text)  Description: Physical Therapy Goals  Initiated 10/7/2022, reevaluated and goals updated 10/13/22 and to be accomplished within 7 day(s)  1. Pt would benefit from functional maintenance program to maintain/increase LE ROM and strength for functional tasks       PLOF: Pt is from SNF, but reports being able to amb with SPC. Unsure accuracy of info. Outcome: Not Progressing Towards Goal     PHYSICAL THERAPY RE-EVALUATION    Patient: Lacy Spann (22 y.o. female)  Date: 10/13/2022  Primary Diagnosis: GI bleed [K92.2]       Precautions:  Fall, Skin    ASSESSMENT :  Based on the objective data described below, the patient presents with confusion, decreased command following, and decreased LE ROM. Patients speech is difficult to comprehend. AROM < 25 % of full ROM. PROM/AAROM exercises performed. Would require Total A for mobility. At this time, pt would benefit from functional maintenance program to maintain/increase LE ROM and strength for functional tasks. Rehab tech educated on and verbalized appropriate LE exercises for patient. Patient will benefit from skilled intervention to address the above impairments.   Patient's rehabilitation potential is considered to be Guarded  Factors which may influence rehabilitation potential include:   []         None noted  []         Mental ability/status  [x]         Medical condition  [x]         Home/family situation and support systems  [x]         Safety awareness  []         Pain tolerance/management  []         Other:      PLAN :  Recommendations and Planned Interventions:   []           Bed Mobility Training             []    Neuromuscular Re-Education  []           Transfer Training                   []    Orthotic/Prosthetic Training  []           Gait Training                          []    Modalities  [x]           Therapeutic Exercises           []    Edema Management/Control  [x]           Therapeutic Activities            []    Family Training/Education  []           Patient Education  []           Other (comment):    Frequency/Duration: Patient will be followed by rehab tech 3-5 times a week to address goals, weekly by PT. Further Equipment Recommendations for Discharge: hospital bed, mechanical lift, WC    AMPAC: Current research shows that an AM-PAC score of 17 (13 without stairs) or less is not associated with a discharge to the patient's home setting. Based on an AM-PAC score of 5/20 if omitting stairs and their current functional mobility deficits, it is recommended that the patient have 3-5 sessions per week of Physical Therapy at d/c to increase the patient's independence. This AMPAC score should be considered in conjunction with interdisciplinary team recommendations to determine the most appropriate discharge setting. Patient's social support, diagnosis, medical stability, and prior level of function should also be taken into consideration. SUBJECTIVE:   Patient mumbles. OBJECTIVE DATA SUMMARY:   Hospital course since last seen and reason for re-evaluation: Patient making limited progress toward goals.   Past Medical History:   Diagnosis Date    Class 2 obesity due to excess calories without serious comorbidity in adult     HTN (hypertension)     Uterine mass 09/01/2022    Vitamin D deficiency      Past Surgical History:   Procedure Laterality Date    HX CATARACT REMOVAL N/A     IR CHANGE NEPHROSTOMY PYELOS TUBE LT  9/22/2022    IR NEPHROSTOMY PERC LT PLC CATH  SI  9/4/2022    IR NEPHROSTOMY PERC RT PLC CATH  SI  9/4/2022    IR NEPHROSTOMY PERC RT PLC CATH  SI  9/15/2022     Barriers to Learning/Limitations: yes;  altered mental status (i.e.Sedation, Confusion)  Compensate with: Visual Cues, Verbal Cues, and Tactile Cues  Home Situation:   Home Situation  Home Environment: [address] [place] [address] Name: [facility]  [employer] Residence: Other (Comment)  Living Alone: No  Support Systems: [place]  Patient Expects to be Discharged to[de-identified] Skilled nursing facility  Current DME Used/Available at Home: Hospital bed  Critical Behavior:  Neurologic State: Alert;Eyes open spontaneously  Orientation Level: Unable to verbalize (Speech not clear.)  Cognition: Decreased command following;Decreased attention/concentration     Psychosocial  Patient Behaviors: Confused  Family  Behaviors: Calm; Cooperative  Needs Expressed: Nutritional  Purposeful Interaction: Yes  Pt Identified Daily Priority: Clinical issues (comment)  Caritas Process: Nurture loving kindness;Establish trust;Teaching/learning; Attend basic human needs  Caring Interventions: Reassure; Therapeutic modalities  Reassure: Therapeutic listening;Caring rounds  Therapeutic Modalities: Intentional therapeutic touch  Other Caring Modalities: hourly rounding     Family  Behaviors: Calm; Cooperative           Strength:    Strength: Generally decreased, functional                    Tone & Sensation:   Tone: Normal                              Range Of Motion:  AROM: Grossly decreased, non-functional           PROM: Generally decreased, functional                   Functional Mobility:  Bed Mobility:  NT: would be total A       Therapeutic Exercises:   Heel slides and hip abduction/adduction    Pain:  Pain level pre-treatment: 0/10   Pain level post-treatment: 0/10   Pain Intervention(s) : Medication (see MAR); Rest, Ice, Repositioning   Response to intervention: Nurse notified, See doc flow    Activity Tolerance:   Poor  Please refer to the flowsheet for vital signs taken during this treatment.   After treatment:   []         Patient left in no apparent distress sitting up in chair  [x]         Patient left in no apparent distress in bed  [x]         Call bell left within reach  [x]         Nursing notified  []         Caregiver present  [x]         Bed alarm activated  []         SCDs applied    COMMUNICATION/EDUCATION:   [x]         Role of Physical Therapy in the acute care setting. [x]         Fall prevention education was provided and the patient/caregiver indicated understanding. [x]         Patient/family have participated as able in goal setting and plan of care. [x]         Patient/family agree to work toward stated goals and plan of care. []         Patient understands intent and goals of therapy, but is neutral about his/her participation. []         Patient is unable to participate in goal setting/plan of care: ongoing with therapy staff.  []         Other: Thank you for this referral.  Naila Kam, PT   Time Calculation: 9 mins    [employer] AM-PAC® Basic Mobility Inpatient Short Form (6-Clicks) Version 2    How much HELP from another person does the patient currently need    (If the patient hasn't done an activity recently, how much help from another person do you think he/she would need if he/she tried?)   Total (Total A or Dep)   A Lot  (Mod to Max A)   A Little (Sup or Min A)   None (Mod I to I)   Turning from your back to your side while in a flat bed without using bedrails? [x] 1 [] 2 [] 3 [] 4   2. Moving from lying on your back to sitting on the side of a flat bed without using bedrails? [x] 1 [] 2 [] 3 [] 4   3. Moving to and from a bed to a chair (including a wheelchair)? [x] 1 [] 2 [] 3 [] 4   4. Standing up from a chair using your arms (e.g., wheelchair, or bedside chair)? [x] 1 [] 2 [] 3 [] 4   5. Walking in hospital room? [x] 1 [] 2 [] 3 [] 4   6. Climbing 3-5 steps with a railing?+   [] 1 [] 2 [] 3 [] 4   +If stair climbing cannot be assessed, skip item #6. Sum responses from items 1-5. Britney Maldonado

## 2022-10-13 NOTE — PROGRESS NOTES
Problem: Pressure Injury - Risk of  Goal: *Prevention of pressure injury  Description: Document Geoffrey Scale and appropriate interventions in the flowsheet. Outcome: Progressing Towards Goal  Note: Pressure Injury Interventions:  Sensory Interventions: Assess changes in LOC, Check visual cues for pain, Keep linens dry and wrinkle-free, Monitor skin under medical devices, Pressure redistribution bed/mattress (bed type), Turn and reposition approx. every two hours (pillows and wedges if needed)    Moisture Interventions: Absorbent underpads, Internal/External urinary devices, Limit adult briefs, Minimize layers, Maintain skin hydration (lotion/cream), Moisture barrier, Apply protective barrier, creams and emollients, Check for incontinence Q2 hours and as needed    Activity Interventions: Pressure redistribution bed/mattress(bed type)    Mobility Interventions: HOB 30 degrees or less, Pressure redistribution bed/mattress (bed type)    Nutrition Interventions: Document food/fluid/supplement intake, Discuss nutritional consult with provider, Offer support with meals,snacks and hydration    Friction and Shear Interventions: Apply protective barrier, creams and emollients, HOB 30 degrees or less, Transferring/repositioning devices                Problem: Patient Education: Go to Patient Education Activity  Goal: Patient/Family Education  Outcome: Progressing Towards Goal     Problem: Falls - Risk of  Goal: *Absence of Falls  Description: Document Ojan Fall Risk and appropriate interventions in the flowsheet.   Outcome: Progressing Towards Goal  Note: Fall Risk Interventions:  Mobility Interventions: Bed/chair exit alarm, Strengthening exercises (ROM-active/passive)    Mentation Interventions: Adequate sleep, hydration, pain control, Bed/chair exit alarm, Door open when patient unattended, Update white board, Toileting rounds    Medication Interventions: Bed/chair exit alarm, Evaluate medications/consider consulting pharmacy    Elimination Interventions: Call light in reach, Bed/chair exit alarm, Toileting schedule/hourly rounds    History of Falls Interventions: Bed/chair exit alarm, Evaluate medications/consider consulting pharmacy, Door open when patient unattended         Problem: Anemia Care Plan (Adult and Pediatric)  Goal: *Labs within defined limits  Outcome: Progressing Towards Goal     Problem: Pain  Goal: *Control of Pain  Outcome: Progressing Towards Goal

## 2022-10-13 NOTE — ACP (ADVANCE CARE PLANNING)
[address] [phone]     General Advance Care Planning (ACP) Conversation    Bonnie Montano, NP, Marcy Torre, AUNDREA and this LMSW met with pt at bedside for follow up visit/assessment. Upon entry, pt laying in bed with head elevated, RN attending to pt. Pt's breathing appears labored, has high respiratory rate at this time. Pt AAOx3, speech mostly unintelligible, but could understand some words periodically. LMSW explained to pt the importance of her guiding her son in making decisions regarding her goals of care, as he is struggling with these decisions. Pt said, \"I know he is. \"  LMSW addressed goals of care, pt reports she would not want resuscitative efforts in event of cardiopulmonary arrest.  LMSW clarified this x2, and NP clarified again though questions requiring teach back. LMSW asked permission to notify her son, Nasreen López of her decision. Pt gave permission, but does not want her son asked to leave work at this time. Pt was informed we will call him, but won't ask him to come in right now. Despite observations of labored breathing, pt states she is not having difficulty. At this time it was determined we need to speak with son, and ensure he understands and is in agreement with transitioning pt to DNR/ DNI.      0945 hr-  LMSW attempted to reach pt's son at [phone]; left VM requesting return call. Will follow up when he calls back. 1130 hr- Received return call from pt's son, Nasreen López and called him back. LMSW introduced palliative team members participating in call. Scarlet Pel to talk about his visit with his mother yesterday, and how he felt she was so much better than she was on Tuesday. He reports it gave him \"hope. \"  LMSW informed Nasreen López, we met with pt this a.m. and provide summary of her vital signs and that pt appeared very tired.   LMSW explained to Nasreen López that we addressed goals of care, and that his mother indicated to all three of us that she would not want to be put through resuscitative efforts, and gave  us permission to communicate that to him. Pt's son verbalized understanding and stated, if that's what she wants, I will respect that. He further stated that \"if seeing her like she was yesterday, is the last time I see her, I'm okay with that. \"  LMSW commended Brandon Buchanan for respecting his mother's wishes. CONRADOSW informed him, she will continue to receive the treatment she has been receiving to include blood transfusions etc, until she leaves the hospital. Chaitanya Robert verbalized understanding. Brandon Buchanan asked if pt still needs hospice. CONRADOSW informed him, she does. He asked when will she be Dc'd. CONRADOSW informed him, likely late tomorrow afternoon, since equipment is being delivered tomorrow. He verbalized understanding. Brandonimtiaz Buchanan asked for clarification regarding pt's vitals. NP provided update for the past 36 hours, and informed him, her heart has been working hard, and she continues to work at breathing, and this will eventually wear her out. He verbalized understanding of this. Brandon Buchanan was encouraged to utilize all the supports available to him and his family through Hospice. He verbalized understanding and states he will do so. Pt's son agreed to complete POST via [employer] if acble. Document sent. Unsigned document will be placed in chart for completion, if not returned by 1600 hours. He was told he can sign it in person, if docusign fails. He verbalized understanding and states he will sign. Thank you for this referral to Palliative Care. The palliative care team remains available to provide support for patient and her family. Goals of care under discussion at this time. Plan is for pt to DC to pt's home with support of [facility].      CODE STATUS:  DNR / Newt Phoenix, LMSW  Palliative Medicine Inpatient   [facility]  Palliative COPE Line: [phone] ([phone]     Advanced Steps [facility] (Physician Orders for Scope of Treatment)       Date of conversation:  10/13/2022 Location:  [facility]   Length (minutes): 20    Participants:   [x] Patient    [x] Other Surrogate Decision Maker / Next of Kin (via telephone)    Name: Theopolis Ards     Relationship to Patient: son/ Legal Next of Shadia Ahumada    Phone Number: [phone]       Advanced Steps® ACP Facilitator: Tisha Luz LMSW      Conversation Topics   (If Patient does not have decision making capacity, Agent/Surrogate responds based on understanding of how patient would respond if capable)    Understanding of Medical Condition/s AND Potential Complications:    Patient response:Pt verbalized understanding of the grave nature of her condition and possible complications thereof. Healthcare Agent/Other Surrogate: Pt's son verbalized understanding that pt's condition is grave and possible complications thereof. Needs to discuss with spiritual/Mormon advisor: [] Yes  [x] No    Needs more information about illness and complications:  [] Yes  [x] No      Cardiopulmonary Resuscitation      \"What do you understand about CPR? \" Response: Will die if CPR is not attempted     Order Elected for CPR:  []  Attempt Resuscitation [x]  Do Not Attempt Resuscitation      When NOT in Cardiopulmonary Arrest, Order Elected:      [] Comfort Measures  [x] Limited Additional Interventions  [] Full Interventions    Artificially Administered Nutrition, Order Elected:    [] No Feeding Tube   [] Feeding Tube for a defined trial period  [] Feeding Tube long-term if indicated  *Did not discuss at this time.      Meeting Outcomes:   [x] ACP discussion completed   [x] [place] POST form completed  [x] [place] prepared for Provider review and signature   [x] Original placed on Chart, if in facility (form to be sent with patient at discharge)  [x] Copy given to healthcare agent    [x] Copy scanned to electronic medical record      Tasha Toure, [address] [facility]   dAitya Gentile 76: [phone] ([phone])

## 2022-10-13 NOTE — PROGRESS NOTES
[facility]  [facility]    Patient: Eric Sears Age: 71 y.o. : 1950 MR#: [id number] SSN: [id number]  Date/Time: 10/13/2022     C/C: anemia      Subjective:   HPI : HPI per admitting MD \" Eric Sears is a 71 y.o. female who has a known history of uterine cancer and also received radiation treatments, hypertension, obesity and is at the nursing facility and was sent to the emergency room because she was noted to have low hemoglobin. Patient was doing fine except having fatigue and lack of energy but she was just woken up in the nursing facility was sent to the emergency department. Patient denies any abdomen pain nausea vomiting blood in her stools or melena fevers or chills chest pain or shortness of breath. Patient denies having any new symptoms. In the emergency room patient was noted to have a hemoglobin of 4.8 and was typed and screened and asked for 2 units PRBC. Stool occult done in the emergency room was positive and GI consult was requested. Dr. Danelle Randolph was called from the emergency room. \"     Since admission , patient received blood transfusion, GI was consulted which suggested conservative treatment transfusion as needed venous PPI, EGD only during for bleeding which was not noticed. Intrauterine cancer patient was also consulted by oncologist who suggested-comfort care hospice, patient has such was on palliative XRT from history. With above terminal diagnosis family was approached by palliative care, patient's son who is the main decision maker decided patient should go hospice. Patient's son was initially reluctant to adopt DNR/DNI status for patient later on he understood and signed for DNR/DNI so patient currently is DNR/DNI. Also patient will be placed under hospice care. Review of Systems:  Somewhat more alert than yesterday however patient does not give me full ROS  Assessment/Plan:     1.   Anemia of acute blood loss  2 uterine cancer  3 obstructive uropathy  4 complicated UTI present on admission secondary to gram-negative bacilli    Plan    No change in patient's condition   Continue supportive care   Possible DC tomorrow - under hospice       Objective:       General: Patient is sleepy  HEENT: No facial asymmetry, MEENU Kt, External ears - WNL    Cardiovascular: S1S2 - regular , No Murmur   Pulmonary: Equal expansion , No Use of accessory muscles , No Rales No Rhonchi    GI:  +BS in all four quadrants, soft, non-tender  Extremities:  No edema; 2+ dorsalis pedis pulses bilaterally  Neuro: No obvious focal deficit noted      DVT Prophylaxis:  []Lovenox  []Hep SQ  []SCDs  []Coumadin   []On Heparin gtt    [] Eliquis [] Xarelto     Vitals:         VS: Visit Vitals  BP (!) 108/55   Pulse (!) 118   Temp 100.2 °F (37.9 °C)   Resp (!) 44   Ht 5' 3\" (1.6 m)   Wt 102.5 kg (226 lb)   SpO2 100%   Breastfeeding No   BMI 40.03 kg/m²      Tmax/24hrs: Temp (24hrs), Av.4 °F (37.4 °C), Min:98.1 °F (36.7 °C), Max:100.8 °F (38.2 °C)        Medications:   Current Facility-Administered Medications   Medication Dose Route Frequency    metoprolol (LOPRESSOR) injection 1.25 mg  1.25 mg IntraVENous Q6H    0.9% sodium chloride infusion 250 mL  250 mL IntraVENous PRN    levoFLOXacin (LEVAQUIN) 500 mg in D5W IVPB  500 mg IntraVENous Q24H    fluconazole (DIFLUCAN) 200mg/100 mL IVPB (premix)  200 mg IntraVENous DAILY    morphine injection 1 mg  1 mg IntraVENous Q4H PRN    sodium chloride (NS) flush 5-40 mL  5-40 mL IntraVENous Q8H    sodium chloride (NS) flush 5-40 mL  5-40 mL IntraVENous PRN    sodium chloride (NS) flush 5-40 mL  5-40 mL IntraVENous Q8H    sodium chloride (NS) flush 5-40 mL  5-40 mL IntraVENous PRN    dextrose 5% infusion  50 mL/hr IntraVENous CONTINUOUS    pantoprazole (PROTONIX) 40 mg in 0.9% sodium chloride 10 mL injection  40 mg IntraVENous Q12H    0.9% sodium chloride infusion  100 mL/hr IntraVENous PRN    acetaminophen (TYLENOL) tablet 500 mg  500 mg Oral Q4H PRN       Labs:    Recent Labs     10/13/22  0503 10/12/22  0523 10/11/22  0457   WBC 14.0* 14.2* 13.2   HGB 7.7* 6.7* 7.3*   HCT 24.0* 20.2* 21.9*   PLT 92* 84* 80*     Recent Labs     10/13/22  0503   *   K 3.4*   *   CO2 19*   *   BUN 58*   CREA 1.40*   CA 8.5         Time spent on direct patient care >30 mints     Complexity : High complex - due to multiple medical issues outlined above. CODE Status : DNR/DNI    Case discussed with:  [x]Patient  [] Family  []Nursing  [x]Case Management         Disclaimer: Sections of this note are dictated utilizing voice recognition software, which may have resulted in some phonetic based errors in grammar and contents. Even though attempts were made to correct all the mistakes, some may have been missed, and remained in the body of the document. If questions arise, please contact our department.     Signed By: Ana Chaudhry MD

## 2022-10-13 NOTE — PROGRESS NOTES
Comprehensive Nutrition Assessment    Type and Reason for Visit: Reassess, Positive nutrition screen    Nutrition Recommendations/Plan:   Continue nutrition interventions prescribed previously   Monitor PO intake, compliance of oral supplement, weight, labs, and plan of care during admission. Malnutrition Assessment:  Malnutrition Status:  Mild malnutrition (10/10/22 1030)    Context:  Acute illness     Findings of the 6 clinical characteristics of malnutrition:   Energy Intake:  50% or less of est energy requirements for 5 or more days  Weight Loss:  No significant weight loss     Body Fat Loss:  No significant body fat loss,     Muscle Mass Loss:  No significant muscle mass loss,    Fluid Accumulation:  Unable to assess,     Strength:  Not performed     Nutrition Assessment:    Pt remains on FULL LIQUID diet with varied PO intake 25-75%; with hx of 0%. Per chart review, pt discharging on hospice care with pending date of 10/14. Pt with uterine cancer with extensive metastasis as well as significant drop in H&H-transfusion documented. Current lab shows low H/H (7.7/24.0), Potassium (3.4) and elevated BUN (58), Creatinine (1.40), and Na (154). Wound noted. Nutrition Related Findings:    Last BM 10/12. Output: 300mL (urine-nephrostomy tube). Pertinent Medications: reviewed. Wound Type: Multiple, Pressure injury, Skin tears       Current Nutrition Intake & Therapies:  Average Meal Intake: 26-50% (varied PO intake)  Average Supplement Intake: 26-50%  ADULT DIET Full Liquid  ADULT ORAL NUTRITION SUPPLEMENT Breakfast, Dinner; Standard High Calorie/High Protein  ADULT ORAL NUTRITION SUPPLEMENT Lunch; Protein Modular    Anthropometric Measures:  Height: 5' 3\" (160 cm)  Ideal Body Weight (IBW): 115 lbs (52 kg)  Admission Body Weight: 227 lb 15.3 oz (103.4 kg)  Current Body Wt:  102.5 kg (225 lb 15.5 oz), 198.2 % IBW.  Bed scale  Current BMI (kg/m2): 40  Usual Body Weight: 102.1 kg (225 lb)  % Weight Change (Calculated): 1.3  Weight Adjustment: No adjustment  BMI Category: Obese class 3 (BMI 40.0 or greater)    Estimated Daily Nutrient Needs:  Energy Requirements Based On: Formula (MSJ x 1.2-1.3)  Weight Used for Energy Requirements: Current  Energy (kcal/day): 8152-6076  Weight Used for Protein Requirements: Ideal  Protein (g/day): 104-130 (1.5-2 g/day)  Method Used for Fluid Requirements: 1 ml/kcal  Fluid (ml/day): 3236-6227    Nutrition Diagnosis:   Inadequate oral intake related to early satiety, acute injury/trauma as evidenced by  (intake <50% x >5 days, on full liquids only)    Nutrition Interventions:   Food and/or Nutrient Delivery: Continue current diet, Continue oral nutrition supplement  Nutrition Education/Counseling: Education not indicated, No recommendations at this time  Coordination of Nutrition Care: Continue to monitor while inpatient  Plan of Care discussed with: .     Goals:  Previous Goal Met: Progressing toward goal(s)  Goals: Meet at least 75% of estimated needs, by next RD assessment       Nutrition Monitoring and Evaluation:   Behavioral-Environmental Outcomes: None identified  Food/Nutrient Intake Outcomes: Food and nutrient intake, Supplement intake  Physical Signs/Symptoms Outcomes: Meal time behavior, Nutrition focused physical findings, GI status    Discharge Planning:    Continue current diet, Continue oral nutrition supplement    Fátima Kearney MA, RDN, LD   Contact: [phone]

## 2022-10-13 NOTE — PROGRESS NOTES
0700: Bedside and verbal shift report given to Nupur Love (oncoming nurse) by Letitia Li RN (off going nurse). Report included the following information SBAR, Intake/Output, Mar and Cardiac rhythm. Wound Prevention Checklist    Patient: Abdullahi Pavon (3 y.o. female)  Date: 10/13/2022  Diagnosis: GI bleed [K92.2] <principal problem not specified>    Precautions: Fall, Skin       []  Heel prevention boots placed on patient    [x]  Patient turned q2h during shift    []  Lift team ordered    [x]  Patient on [facility] bed/Specialty bed    [x]  Each Wound is documented during shift (Stage, Color, drainage, odor, measurements, and dressings)    [x]  Dual skin check done with Jorge A Blum. Patient medicated per mar. 1438: Called to give report on patient. Receiving nurse is in the process of discharging a patient and will return call. 1529: second attempt to contact RN receiving patient. Spoke to April, I presumed that was the  then put on hold for seven minutes. 1623: report given to [facility]. Patient is to be transported to  bed 462. Transport request ws made.

## 2022-10-14 NOTE — DISCHARGE SUMMARY
Discharge Summary     Patient ID:  Chris Gonzalez  [id number]  14 y.o.  1950  Body mass index is 40.03 kg/m². PCP on record: Alejo Botello MD    Admit date: 10/3/2022  Discharge date and time: 10/14/2022      Reason for admission: Anemia of acute blood loss    Discharge Diagnoses:                                           1.  Anemia of acute blood loss  2 uterine cancer  3 obstructive uropathy  4 complicated UTI present on admission secondary to gram-negative bacilli      Consults: Cardiology and [address] by problems:  HPI : HPI per admitting MD \" Chris Gonzalez is a 71 y.o. female who has a known history of uterine cancer and also received radiation treatments, hypertension, obesity and is at the nursing facility and was sent to the emergency room because she was noted to have low hemoglobin. Patient was doing fine except having fatigue and lack of energy but she was just woken up in the nursing facility was sent to the emergency department. Patient denies any abdomen pain nausea vomiting blood in her stools or melena fevers or chills chest pain or shortness of breath. Patient denies having any new symptoms. In the emergency room patient was noted to have a hemoglobin of 4.8 and was typed and screened and asked for 2 units PRBC. Stool occult done in the emergency room was positive and GI consult was requested. Dr. Max Jeffers was called from the emergency room. \"      Since admission , patient received blood transfusion, GI was consulted which suggested conservative treatment transfusion as needed venous PPI, EGD only during for bleeding which was not noticed. Intrauterine cancer patient was also consulted by oncologist who suggested-comfort care hospice, patient has such was on palliative XRT from history. With above terminal diagnosis family was approached by palliative care, patient's son who is the main decision maker decided patient should go hospice. Patient's son was initially reluctant to adopt DNR/DNI status for patient later on he understood and signed for DNR/DNI so patient currently is DNR/DNI. Also patient will be placed under hospice care        Patient seen and examined by me on discharge day. Pertinent Findings: guarded            Pertinent Lab Data:  Recent Labs     10/13/22  0503   WBC 14.0*   HGB 7.7*   HCT 24.0*   PLT 92*     Recent Labs     10/13/22  0503   *   K 3.4*   *   CO2 19*   *   BUN 58*   CREA 1.40*   CA 8.5       DISCHARGE MEDICATIONS:   @  Discharge Medication List as of 10/14/2022 12:58 PM        START taking these medications    Details   fluconazole (Diflucan) 200 mg tablet Take 1 Tablet by mouth daily for 4 days. FDA advises cautious prescribing of oral fluconazole in pregnancy. , Normal, Disp-4 Tablet, R-0      levoFLOXacin (LEVAQUIN) 500 mg tablet Take 1 Tablet by mouth daily. , Normal, Disp-4 Tablet, R-0      LORazepam 1 mg/0.5 mL syrg Take 0.5 mL by mouth every six (6) hours as needed for PRN Reason (Other) (anxiety/depression). Max Daily Amount: 2 mL. , Print, Disp-30 Each, R-ML      hyoscyamine SL (LEVSIN/SL) 0.125 mg SL tablet 1 Tablet by SubLINGual route every six (6) hours as needed for PRN Reason (Other) (terminal congestion). , Normal, Disp-10 Tablet, R-0      bisacodyL (DULCOLAX) 10 mg supp Insert 10 mg into rectum daily as needed for Constipation. , Normal, Disp-5 Suppository, R-0           CONTINUE these medications which have CHANGED    Details   morphine (ROXANOL) 100 mg/5 mL (20 mg/mL) concentrated solution Take 1 mL by mouth every three (3) hours as needed for Pain for up to 3 days. Max Daily Amount: 160 mg., Normal, Disp-30 mL, R-0           CONTINUE these medications which have NOT CHANGED    Details   metoprolol tartrate (LOPRESSOR) 50 mg tablet Take 1 Tablet by mouth two (2) times a day for 30 days. , No Print, Disp-60 Tablet, R-0      senna-docusate (PERICOLACE) 8.6-50 mg per tablet Take 1 Tablet by mouth daily for 30 days. , No Print, Disp-30 Tablet, R-0      simethicone (MYLICON) 80 mg chewable tablet Take 1 Tablet by mouth four (4) times daily as needed for GI Pain., No Print, Disp-20 Tablet, R-0           STOP taking these medications       L. acidophilus,casei,rhamnosus (BIO-K PLUS) 50 billion cell cpDR capsule Comments:   Reason for Stopping:         sodium bicarbonate 650 mg tablet Comments:   Reason for Stopping:         vits A and D-white pet-lanolin (A&D) oint ointment Comments:   Reason for Stopping:         Vitamin D2 1,250 mcg (50,000 unit) capsule Comments:   Reason for Stopping:                 My Recommended Diet, Activity, Wound Care, and follow-up labs are listed in the patient's Discharge Insturctions which I have personally completed and reviewed. Disposition:     [x] Home with hospice  Condition at Discharge:  Stable    Follow up with:   PCP : Cortney Ortiz MD      Please follow-up tests/labs that are still pendin. None  2.    >30 minutes spent coordinating this discharge (review instructions/follow-up, prescriptions, preparing report for sign off)    Disclaimer: Sections of this note are dictated utilizing voice recognition software, which may have resulted in some phonetic based errors in grammar and contents. Even though attempts were made to correct all the mistakes, some may have been missed, and remained in the body of the document. If questions arise, please contact our department.     Signed:  Gisel Arriaza MD

## 2022-10-14 NOTE — PROGRESS NOTES
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 Noted documentation of sepsis due to UTI which was present on admission on 10/13/22 by ordered ID consultant. If possible, please document in progress notes and discharge summary:    Risk Factors: 71 y.o. female w/ PMH HTN and uterine carcinosarcoma    Clinical Indicators: ID MD PN This patient has sepsis due to UTI which was present on admission. 1st 24hrs Admitted - 10/3/2022 1930 LAB  bands 6 , VS  axillary core  temp 94.7 , RR 26 , Pulse 116    ID MD PN Complicated pseudomonal UTI with bilateral nephrostomy tubes: From 10/5 urine cultures. Has been on cefepime since it 10/5    Treatment: Levofloxacin 10/10 to present ,Fluconazole 10/10 to present ,Vancomycin 10/5 to 10/10 ,Cefepime 10/5 to 10/10 ,Labs carefully monitored . ID consulted      Thank you  Darleen Sharpe RN CRCR CDI  , [facility]  [email or web address]  Options provided:  -- sepsis due to  pseudomonal UTI associated with bilateral nephrostomy tubes which was  confirmed present on admission  -- Sepsis ruled out  -- Defer to ID consultant documentation regarding sepsis due to  pseudomonal UTI associated with bilateral nephrostomy tubes which was present on admission  -- Other - I will add my own diagnosis  -- Disagree - Not applicable / Not valid  -- Disagree - Clinically unable to determine / Unknown  -- Refer to Clinical Documentation Reviewer    PROVIDER RESPONSE TEXT:    The diagnosis of sepsis due to pseudomonal UTI associated with bilateral nephrostomy tubes which was confirmed as present on admission. Query created by:  Dennys Monteiro on 10/13/2022 11:32 AM      Electronically signed by: [facility] MD Laura Warren MD 10/14/2022 12:24 PM

## 2022-10-14 NOTE — PROGRESS NOTES
conducted a Follow up consultation and Spiritual Assessment for Zhanna Espinosa, who is a 71 y.o.,female. The  provided the following Interventions:  Continued the relationship of care and support. Offered prayer and assurance of continued prayer on patients behalf. Chart reviewed. Assessment:  There are no further spiritual or Sikh issues which require Spiritual Care Services interventions at this time. Plan:  Chaplains will continue to follow and will provide pastoral care on an as needed/requested basis.  recommends bedside caregivers page  on duty if patient shows signs of acute spiritual or emotional distress. Rev. Karyna Wilson MDiv.   [address] [facility]   [phone]

## 2022-10-14 NOTE — DISCHARGE INSTRUCTIONS
DISCHARGE SUMMARY from Nurse    PATIENT INSTRUCTIONS:    After general anesthesia or intravenous sedation, for 24 hours or while taking prescription Narcotics:  Limit your activities  Do not drive and operate hazardous machinery  Do not make important personal or business decisions  Do  not drink alcoholic beverages  If you have not urinated within 8 hours after discharge, please contact your surgeon on call. Report the following to your surgeon:  Excessive pain, swelling, redness or odor of or around the surgical area  Temperature over 100.5  Nausea and vomiting lasting longer than 4 hours or if unable to take medications  Any signs of decreased circulation or nerve impairment to extremity: change in color, persistent  numbness, tingling, coldness or increase pain  Any questions    What to do at Home:  Recommended activity: Activity as tolerated,     If you experience any of the following symptoms Discharging Home Hospice, please follow up with Hospice    *  Please give a list of your current medications to your Primary Care Provider. *  Please update this list whenever your medications are discontinued, doses are      changed, or new medications (including over-the-counter products) are added. *  Please carry medication information at all times in case of emergency situations. These are general instructions for a healthy lifestyle:    No smoking/ No tobacco products/ Avoid exposure to second hand smoke  Surgeon General's Warning:  Quitting smoking now greatly reduces serious risk to your health.     Obesity, smoking, and sedentary lifestyle greatly increases your risk for illness    A healthy diet, regular physical exercise & weight monitoring are important for maintaining a healthy lifestyle    You may be retaining fluid if you have a history of heart failure or if you experience any of the following symptoms:  Weight gain of 3 pounds or more overnight or 5 pounds in a week, increased swelling in our hands or feet or shortness of breath while lying flat in bed. Please call your doctor as soon as you notice any of these symptoms; do not wait until your next office visit. Patient armband removed and shredded     MyChart Activation    Thank you for requesting access to [facility]. Please follow the instructions below to securely access and download your online medical record. [facility] allows you to send messages to your doctor, view your test results, renew your prescriptions, schedule appointments, and more. How Do I Sign Up? In your internet browser, go to [email or web address]  Click on the First Time User? Click Here link in the Sign In box. You will be redirect to the New Member Sign Up page. Enter your [facility] Access Code exactly as it appears below. You will not need to use this code after youve completed the sign-up process. If you do not sign up before the expiration date, you must request a new code. [facility] Access Code: [email or web address]  Expires: 10/16/2022  2:49 PM (This is the date your [facility] access code will )    Enter the last four digits of your Social Security Number (xxxx) and Date of Birth (mm/dd/yyyy) as indicated and click Submit. You will be taken to the next sign-up page. Create a [facility] ID. This will be your [facility] login ID and cannot be changed, so think of one that is secure and easy to remember. Create a [facility] password. You can change your password at any time. Enter your Password Reset Question and Answer. This can be used at a later time if you forget your password. Enter your e-mail address. You will receive e-mail notification when new information is available in [address] Click Sign Up. You can now view and download portions of your medical record. Click the [employer] link to download a portable copy of your medical information.     Additional Information    If you have questions, please visit the Frequently Asked Questions section of the [employer] website at [email or web address] "[employer]". [email or web address] Remember, [employer] is NOT to be used for urgent needs. For medical emergencies, dial 911. The discharge information has been reviewed with the {PATIENT PARENT GUARDIAN:65721}. The {PATIENT PARENT GUARDIAN:43289} verbalized understanding. Discharge medications reviewed with the {Dishcarge meds reviewed LMMI:93997} and appropriate educational materials and side effects teaching were provided.   ___________________________________________________________________________________________________________________________________

## 2022-10-14 NOTE — PROGRESS NOTES
Problem: Pressure Injury - Risk of  Goal: *Prevention of pressure injury  Description: Document Geoffrey Scale and appropriate interventions in the flowsheet. Outcome: Progressing Towards Goal  Note: Pressure Injury Interventions:  Sensory Interventions: Assess changes in LOC, Discuss PT/OT consult with provider, Float heels, Keep linens dry and wrinkle-free, Maintain/enhance activity level, Minimize linen layers, Pressure redistribution bed/mattress (bed type)    Moisture Interventions: Absorbent underpads, Apply protective barrier, creams and emollients, Check for incontinence Q2 hours and as needed, Internal/External urinary devices, Maintain skin hydration (lotion/cream), Minimize layers, Moisture barrier    Activity Interventions: Pressure redistribution bed/mattress(bed type)    Mobility Interventions: Float heels, HOB 30 degrees or less, Pressure redistribution bed/mattress (bed type)    Nutrition Interventions: Document food/fluid/supplement intake    Friction and Shear Interventions: Apply protective barrier, creams and emollients, Foam dressings/transparent film/skin sealants, HOB 30 degrees or less, Lift sheet, Lift team/patient mobility team, Minimize layers, Transferring/repositioning devices                Problem: Patient Education: Go to Patient Education Activity  Goal: Patient/Family Education  Outcome: Progressing Towards Goal     Problem: Falls - Risk of  Goal: *Absence of Falls  Description: Document Joan Fall Risk and appropriate interventions in the flowsheet.   Outcome: Progressing Towards Goal  Note: Fall Risk Interventions:  Mobility Interventions: Bed/chair exit alarm, Communicate number of staff needed for ambulation/transfer, Patient to call before getting OOB, Utilize walker, cane, or other assistive device    Mentation Interventions: Adequate sleep, hydration, pain control, Bed/chair exit alarm, Family/sitter at bedside, Reorient patient, Toileting rounds    Medication Interventions: Bed/chair exit alarm, Patient to call before getting OOB, Teach patient to arise slowly    Elimination Interventions: Bed/chair exit alarm, Call light in reach, Patient to call for help with toileting needs, Toilet paper/wipes in reach, Toileting schedule/hourly rounds    History of Falls Interventions: Bed/chair exit alarm         Problem: Patient Education: Go to Patient Education Activity  Goal: Patient/Family Education  Outcome: Progressing Towards Goal     Problem: Nutrition Deficit  Goal: *Optimize nutritional status  Outcome: Progressing Towards Goal     Problem: Patient Education: Go to Patient Education Activity  Goal: Patient/Family Education  Outcome: Progressing Towards Goal     Problem: Anemia Care Plan (Adult and Pediatric)  Goal: *Labs within defined limits  Outcome: Progressing Towards Goal  Goal: *Tolerates increased activity  Outcome: Progressing Towards Goal     Problem: Patient Education: Go to Patient Education Activity  Goal: Patient/Family Education  Outcome: Progressing Towards Goal     Problem: Pain  Goal: *Control of Pain  Outcome: Progressing Towards Goal     Problem: Patient Education: Go to Patient Education Activity  Goal: Patient/Family Education  Outcome: Progressing Towards Goal     Problem: Upper and Lower GI Bleed:  Discharge Outcomes  Goal: *Hemodynamically stable  Outcome: Progressing Towards Goal  Goal: *Lungs clear or at baseline  Outcome: Progressing Towards Goal  Goal: *Demonstrates independent activity or return to baseline  Outcome: Progressing Towards Goal  Goal: *Pain is controlled to three or less  Outcome: Progressing Towards Goal  Goal: *Verbalizes understanding and describes prescribed diet  Outcome: Progressing Towards Goal  Goal: *Tolerating diet  Outcome: Progressing Towards Goal  Goal: *Verbalizes name, dosage, time, side effects, and number of days to continue medications  Outcome: Progressing Towards Goal  Goal: *Anxiety reduced or absent  Outcome: Progressing Towards Goal  Goal: *Understands and describes signs and symptoms to report to providers(Stroke Metric)  Outcome: Progressing Towards Goal  Goal: *Describes follow-up/return visits to physicians  Outcome: Progressing Towards Goal  Goal: *Describes available resources and support systems  Outcome: Progressing Towards Goal

## 2022-10-14 NOTE — PROGRESS NOTES
Important Message from Medicare not required at this time patient is discharging on hospice, last letter given on 10/04/22

## 2022-10-14 NOTE — PROGRESS NOTES
Problem: Pressure Injury - Risk of  Goal: *Prevention of pressure injury  Description: Document Geoffrey Scale and appropriate interventions in the flowsheet. Outcome: Progressing Towards Goal  Note: Pressure Injury Interventions:  Sensory Interventions: Assess changes in LOC, Discuss PT/OT consult with provider, Float heels, Keep linens dry and wrinkle-free, Maintain/enhance activity level, Minimize linen layers, Pressure redistribution bed/mattress (bed type)    Moisture Interventions: Absorbent underpads, Apply protective barrier, creams and emollients, Check for incontinence Q2 hours and as needed, Internal/External urinary devices, Maintain skin hydration (lotion/cream), Minimize layers, Moisture barrier    Activity Interventions: Pressure redistribution bed/mattress(bed type)    Mobility Interventions: Float heels, HOB 30 degrees or less, Pressure redistribution bed/mattress (bed type)    Nutrition Interventions: Document food/fluid/supplement intake    Friction and Shear Interventions: Apply protective barrier, creams and emollients, Foam dressings/transparent film/skin sealants, HOB 30 degrees or less, Lift sheet, Lift team/patient mobility team, Minimize layers, Transferring/repositioning devices                Problem: Patient Education: Go to Patient Education Activity  Goal: Patient/Family Education  Outcome: Progressing Towards Goal     Problem: Falls - Risk of  Goal: *Absence of Falls  Description: Document Joan Fall Risk and appropriate interventions in the flowsheet.   Outcome: Progressing Towards Goal  Note: Fall Risk Interventions:  Mobility Interventions: Bed/chair exit alarm    Mentation Interventions: Adequate sleep, hydration, pain control    Medication Interventions: Bed/chair exit alarm    Elimination Interventions: Bed/chair exit alarm    History of Falls Interventions: Bed/chair exit alarm         Problem: Patient Education: Go to Patient Education Activity  Goal: Patient/Family Education  Outcome: Progressing Towards Goal     Problem: Nutrition Deficit  Goal: *Optimize nutritional status  Outcome: Progressing Towards Goal     Problem: Patient Education: Go to Patient Education Activity  Goal: Patient/Family Education  Outcome: Progressing Towards Goal     Problem: Anemia Care Plan (Adult and Pediatric)  Goal: *Labs within defined limits  Outcome: Progressing Towards Goal  Goal: *Tolerates increased activity  Outcome: Progressing Towards Goal     Problem: Patient Education: Go to Patient Education Activity  Goal: Patient/Family Education  Outcome: Progressing Towards Goal     Problem: Pain  Goal: *Control of Pain  Outcome: Progressing Towards Goal     Problem: Patient Education: Go to Patient Education Activity  Goal: Patient/Family Education  Outcome: Progressing Towards Goal     Problem: Patient Education: Go to Patient Education Activity  Goal: Patient/Family Education  Outcome: Progressing Towards Goal     Problem: Upper and Lower GI Bleed:  Discharge Outcomes  Goal: *Hemodynamically stable  Outcome: Progressing Towards Goal  Goal: *Lungs clear or at baseline  Outcome: Progressing Towards Goal  Goal: *Demonstrates independent activity or return to baseline  Outcome: Progressing Towards Goal  Goal: *Pain is controlled to three or less  Outcome: Progressing Towards Goal  Goal: *Verbalizes understanding and describes prescribed diet  Outcome: Progressing Towards Goal  Goal: *Tolerating diet  Outcome: Progressing Towards Goal  Goal: *Verbalizes name, dosage, time, side effects, and number of days to continue medications  Outcome: Progressing Towards Goal  Goal: *Anxiety reduced or absent  Outcome: Progressing Towards Goal  Goal: *Understands and describes signs and symptoms to report to providers(Stroke Metric)  Outcome: Progressing Towards Goal  Goal: *Describes follow-up/return visits to physicians  Outcome: Progressing Towards Goal  Goal: *Describes available resources and support systems  Outcome: Progressing Towards Goal

## 2022-10-14 NOTE — HOSPICE
Writer spoke with CM regarding patient medications being sent to outpatient pharmacy. Informed her that patient Morphine was sent to outpatient pharmacy, writer asked her to ask physician to send the rest of the medications on discharge order to outpatient pharmacy for medications to go home with patient.   CM to claude PARK.

## 2022-10-14 NOTE — PROGRESS NOTES
Discharge planning    Discharge order noted for today. Pt has been accepted to [facility]. Transport has been arranged through [address] [place] [address],[address] at 2 pm. Updated bedside RN, Jim Swift,  to the transition plan. Discharge information has been documented on the AVS.     RN please send  hospice medications with home with patient.        MARCY CampbellN, RN  Pager # [phone]  Care Manager

## 2022-10-14 NOTE — PROGRESS NOTES
Received discharge prescriptions for patient at outpatient pharmacy. Patient has COPAY $6.06. Will collect copay and deliver.

## 2022-10-16 VITALS
DIASTOLIC BLOOD PRESSURE: 49 MMHG | SYSTOLIC BLOOD PRESSURE: 87 MMHG | RESPIRATION RATE: 22 BRPM | HEART RATE: 110 BPM | OXYGEN SATURATION: 78 % | TEMPERATURE: 97.8 F

## 2022-10-16 LAB
BACTERIA SPEC CULT: NORMAL
SERVICE CMNT-IMP: NORMAL

## 2022-10-16 NOTE — HOSPICE
Postmortem care provided to patient. Family actively and willingly participated in care. Patient and families postmortem [employer] and cultural wishes maintained.

## 2022-10-16 NOTE — HOSPICE
Upon arrival patient resting in bed, patient is mouth breathing and short of breath, patient is refusing oxygen, patient tolerated assessment, caregiver present in home, advised caregiver to call 24 hr on call number if needed, reminded caregiver of  visits, tuck in visit tomorrow, questions/concerns answered during admission visit, will continue to monitor. Hospice Admission Summary    Ms. Will Lang is a 78-year-old female admitted to Hospice services for a terminal diagnosis of uterine carcinoma. Patient has elected hospice services and is no longer seeking aggressive treatment. Co-morbidities related to the terminal diagnosis are debility and anemia. Patient also has a past medical history of obesity, HTN, WILBUR, bladder outlet obstruction, adrenal nodule, leukocytosis, microcytic, and uremia. Ms. Will Lang  status 6 months - 1 year prior to hospice admission . Per patients' family member the patient was still working up until the day she was admitted to the hospital. The patient was still going to work, still driving and living alone. The patient was diagnosed with Uterine Cancer in July 2022, she has been having intermittent bleeding. The patient received radiation in September 2022. She then presented to the ED on October 3, 2022 for extreme abdominal pain where her uterine mass was significantly larger and no longer operable per surgeon. She was referred for Hospice services on 10/10/22 and she was admitted to Hospice service on 10/14/22. The patient family is present and willing and safely able to provide care and administer medications, their availability as needed. The patient family participated in goal setting, care planning, and are agreeable to the care plan.   Admission booklet reviewed with family; services provided under hospice benefit, review of rights and responsibilities, disposal of medications, contact information for , Joint Commission, Medicare, QIO, and outside resources for independence. The family educated on IDT and their right to attend meetings. Education provided regarding 24-hour availability of hospice services and on-call number provided. Attending physician:  Dr. Rashad Alarcon Director:  Dr. Liza Argueta  Level of Care:  Routine  Advance Directives:  POST  Allergies:  NKA  MAC:  unable to obtain  Height:  63\"  Weight:  226  PPS:  20%  FAST:  NA  NYHA:  N/A   EF%:  N/A  Tobacco usage:  N/A  Functional status:  totally dependent for all care  Infection:  UTI  Pain:  mild with activity, Morphine PRN  Bowel Regimen:  initiated, Dulcolax suppository PRN  Lines, Drains, or Airways present:   bilateral nephrostomy tubes  Wounds present: (4) stage 2 pressure ulcers to right buttocks, (2) stage 2 pressure ulcers to left buttocks, 1 skin tear to right outer hip patient is unable to tolerate wound measurement, she is unable to lay on her side. Wounds covered with dressing. Hospice Aide Services Requested: yes  MSW Requested: yes   Requested: yes  Volunteer Services Requested:  yes  Bereavement risk/contact:  Yennifer Miles (son)  Patient/family/caregiver specific end of life goal:  comfort  Training and education provided this visit:  yes, hospice services, end of life care and medication administration  Plan for next visit:  admission follow  Care coordination with Medical Director, NISHANT, and Yennifer Miles regarding admission to hospice and all are in agreement with plan of care.

## 2022-10-16 NOTE — HOSPICE
Medication refills ordered this visit: CLARK    Medications reconciled and all medications are available in the home this visit. The following education was provided regarding medications, medication interactions, and look alike medications:  Response to teaching: appropriate. Medications are effective at this time. Supplies by type and quantity ordered this visit include: CLARK    Consulted medical director/attending physician regarding: CLARK    Instructed patient/family/caregiver on 24-hour hospice availability and phone number.     Plan for next visit: daily visits for end of life symptom management

## 2022-10-19 NOTE — PROGRESS NOTES
Neveah Rider. Norma Adams. IV 1mg morphine was administered to patient at 9:25 on October 13, 2022. Dose from Pyxis 2mg/1ml.  1mg was wasted by this writer with Amy Starkey RN at 9:40 am.
